# Patient Record
Sex: MALE | Race: BLACK OR AFRICAN AMERICAN | Employment: UNEMPLOYED | URBAN - METROPOLITAN AREA
[De-identification: names, ages, dates, MRNs, and addresses within clinical notes are randomized per-mention and may not be internally consistent; named-entity substitution may affect disease eponyms.]

---

## 2017-02-28 ENCOUNTER — OFFICE VISIT (OUTPATIENT)
Dept: FAMILY MEDICINE CLINIC | Age: 57
End: 2017-02-28

## 2017-02-28 ENCOUNTER — HOSPITAL ENCOUNTER (OUTPATIENT)
Dept: LAB | Age: 57
Discharge: HOME | End: 2017-02-28
Payer: COMMERCIAL

## 2017-02-28 VITALS
DIASTOLIC BLOOD PRESSURE: 80 MMHG | BODY MASS INDEX: 30.7 KG/M2 | RESPIRATION RATE: 12 BRPM | HEART RATE: 69 BPM | HEIGHT: 66 IN | WEIGHT: 191 LBS | SYSTOLIC BLOOD PRESSURE: 160 MMHG | TEMPERATURE: 97.8 F | OXYGEN SATURATION: 98 %

## 2017-02-28 DIAGNOSIS — E78.00 HYPERCHOLESTEREMIA: ICD-10-CM

## 2017-02-28 DIAGNOSIS — I10 HTN (HYPERTENSION), BENIGN: ICD-10-CM

## 2017-02-28 DIAGNOSIS — B35.4 TINEA CORPORIS: ICD-10-CM

## 2017-02-28 DIAGNOSIS — R79.89 AZOTEMIA: ICD-10-CM

## 2017-02-28 DIAGNOSIS — I10 HTN (HYPERTENSION), BENIGN: Primary | ICD-10-CM

## 2017-02-28 DIAGNOSIS — E87.1 HYPONATREMIA: ICD-10-CM

## 2017-02-28 LAB
ALT SERPL-CCNC: 36 U/L (ref 16–61)
ANION GAP BLD CALC-SCNC: 10 MMOL/L (ref 3–18)
AST SERPL W P-5'-P-CCNC: 26 U/L (ref 15–37)
BUN SERPL-MCNC: 63 MG/DL (ref 7–18)
BUN/CREAT SERPL: 15 (ref 12–20)
CALCIUM SERPL-MCNC: 8.9 MG/DL (ref 8.5–10.1)
CHLORIDE SERPL-SCNC: 93 MMOL/L (ref 100–108)
CHOLEST SERPL-MCNC: 214 MG/DL
CO2 SERPL-SCNC: 29 MMOL/L (ref 21–32)
CREAT SERPL-MCNC: 4.31 MG/DL (ref 0.6–1.3)
GLUCOSE SERPL-MCNC: 221 MG/DL (ref 74–99)
HDLC SERPL-MCNC: 71 MG/DL (ref 40–60)
HDLC SERPL: 3 {RATIO} (ref 0–5)
LDLC SERPL CALC-MCNC: 113.4 MG/DL (ref 0–100)
LIPID PROFILE,FLP: ABNORMAL
POTASSIUM SERPL-SCNC: 3.9 MMOL/L (ref 3.5–5.5)
SODIUM SERPL-SCNC: 132 MMOL/L (ref 136–145)
TRIGL SERPL-MCNC: 148 MG/DL (ref ?–150)
VLDLC SERPL CALC-MCNC: 29.6 MG/DL

## 2017-02-28 PROCEDURE — 80048 BASIC METABOLIC PNL TOTAL CA: CPT | Performed by: FAMILY MEDICINE

## 2017-02-28 PROCEDURE — 84450 TRANSFERASE (AST) (SGOT): CPT | Performed by: FAMILY MEDICINE

## 2017-02-28 PROCEDURE — 84460 ALANINE AMINO (ALT) (SGPT): CPT | Performed by: FAMILY MEDICINE

## 2017-02-28 PROCEDURE — 36415 COLL VENOUS BLD VENIPUNCTURE: CPT | Performed by: FAMILY MEDICINE

## 2017-02-28 PROCEDURE — 80061 LIPID PANEL: CPT | Performed by: FAMILY MEDICINE

## 2017-02-28 RX ORDER — KETOCONAZOLE 20 MG/G
CREAM TOPICAL DAILY
Qty: 15 G | Refills: 1 | Status: SHIPPED | OUTPATIENT
Start: 2017-02-28 | End: 2018-01-11

## 2017-02-28 NOTE — PROGRESS NOTES
HISTORY OF PRESENT ILLNESS  Ayana Mcdaniels is a 64 y.o. male. HPI  Patient is here today for evaluation and treatment of: Ringworm/Fatigue/Hypertension/Cholesterol  Ringworm:  States he noted a ringworm on the right shoulder X 2 weeks; There is no itch. Has been using neosporin to area; area may be improving. Fatigue:Pt states that he he gets up in am ; walks around and gets \" exhausted\" ; He is now totally disabled. Blood sugars have been high. He has had blood sugars in the 400s; He is followed by endocrinology. He is scheduled to see endocrinology soon. He is drinking regular soda    Hypertension: BP is stable; he is on losartan, lopressor, lasix, coreg, clonidine and norvasc. BP is stable at home. BP actually increased at second check with this provider    Cholesterol: Pt is on meds as listed below; ( crestor);attempts a lower cholesterol diet. Current Outpatient Prescriptions:     DORZOLAMIDE HCL (DORZOLAMIDE OP), Apply  to eye., Disp: , Rfl:     BRIMONIDINE TARTRATE/TIMOLOL (COMBIGAN OP), Apply  to eye., Disp: , Rfl:     losartan (COZAAR) 100 mg tablet, Take 1 Tab by mouth two (2) times a day., Disp: 1 Tab, Rfl: 0    metoprolol tartrate (LOPRESSOR) 100 mg IR tablet, Take  by mouth two (2) times a day., Disp: , Rfl:     OTHER, PGIIL/P CRM - Apply 1 -2 grams ( 1-2 pumps) to left foot 3 - 4 times daily. , Disp: , Rfl:     furosemide (LASIX) 40 mg tablet, Take 80 mg by mouth daily. , Disp: , Rfl:     cloNIDine (CATAPRES) 0.3 mg/24 hr, 1 Patch by TransDERmal route every seven (7) days. , Disp: 12 Patch, Rfl: 1    carvedilol (COREG) 25 mg tablet, Take 1 Tab by mouth two (2) times daily (with meals). , Disp: 180 Tab, Rfl: 1    VGO 30 alicia, , Disp: , Rfl:     calcitRIOL (ROCALTROL) 0.25 mcg capsule, TAKE ONE CAPSULE BY MOUTH EVERY MONDAY, WEDNESDAY, AND FRIDAY, Disp: , Rfl: 2    rosuvastatin (CRESTOR) 20 mg tablet, Take 1 Tab by mouth nightly., Disp: 30 Tab, Rfl: 6    amLODIPine (NORVASC) 10 mg tablet, TAKE ONE TABLET BY MOUTH EVERY DAY FOR BLOOD PRESSURE, Disp: 30 Tab, Rfl: 5    docusate sodium (COLACE) 100 mg capsule, Take 1 Cap by mouth two (2) times a day., Disp: 60 Cap, Rfl: 0    insulin lispro (HUMALOG) 100 unit/mL injection, Check FSBS AC*HS For sugars between 160 and 200- Give 2 units SQ, For sugars between 201 and 250, Give 3 units SQ, For sugars Between 251 and 300, give 5 units SQ, For Sugars between 301 and 350, give 7 units SQ For sugars between 351 and 400, give 8 units SQ and contact PCP, Disp: 1 Vial, Rfl: 1    glucose blood VI test strips (ASCENSIA AUTODISC VI, ONE TOUCH ULTRA TEST VI) strip, Test twice daily:  Fasting before breakfast and 2 hours after dinner (log readings), One touch ultra 2 test strips please; Dx: 250.02, Disp: 1 Package, Rfl: 11    fluticasone (FLONASE) 50 mcg/actuation nasal spray, 1 [place] by Both Nostrils route two (2) times a day., Disp: 1 Bottle, Rfl: 2    Insulin Needles, Disposable, 31 X 5/16 \" Ndle, Use as directed with Levemir Insulin Pen., Disp: 1 Package, Rfl: 11    ferrous sulfate (IRON) 325 mg (65 mg iron) tablet, Take  by mouth Daily (before breakfast). , Disp: , Rfl:     Lancets (ONE TOUCH DELICA) Misc, Test twice daily: Once in the morning fasting, then two hours after dinner (log results), Disp: 1 Package, Rfl: 11    Blood-Glucose Meter monitoring kit, by Does Not Apply route. One touch ultra2, Disp: 1 Kit, Rfl: 0    cyanocobalamin (VITAMIN B-12) 1,000 mcg tablet, Take 1,000 mcg by mouth daily. , Disp: , Rfl:     SUB-Q INSULIN DEVICE, 20 UNIT (VGO 20), by Does Not Apply route., Disp: , Rfl:     OTHER, Wound Care as directed by Podiatrist, Disp: 1 Each, Rfl: 0    OTHER, Check CBC, CMP, Mg, CRP once every week while on Antibiotics through 5/7/16. results to PCP immediately.  Diagnosis- Osteomyelitis, Disp: 1 Each, Rfl: 6      PMH,  Meds, Allergies, Family History, Social history reviewed    Review of Systems   Constitutional: Negative for chills and fever. Cardiovascular: Negative for chest pain and palpitations. Physical Exam   Visit Vitals    /80    Pulse 69    Temp 97.8 °F (36.6 °C) (Oral)    Resp 12    Ht 5' 6\" (1.676 m)    Wt 191 lb (86.6 kg)    SpO2 98%    BMI 30.83 kg/m2     General appearance: alert, cooperative, no distress, appears stated age  Neck: supple, symmetrical, trachea midline, no adenopathy, thyroid: not enlarged, symmetric, no tenderness/mass/nodules, no carotid bruit and no JVD  Lungs: clear to auscultation bilaterally  Heart: regular rate and rhythm, S1, S2 normal, no murmur, click, rub or gallop  Extremities: extremities normal, atraumatic, no cyanosis or edema    Skin:  Right upper posterior shoulder with  A 1/2 dollar sized annualar lesion with central clearing. Lab Results   Component Value Date/Time    Cholesterol, total 209 05/04/2016 03:30 PM    HDL Cholesterol 81 05/04/2016 03:30 PM    LDL, calculated 111.4 05/04/2016 03:30 PM    VLDL, calculated 16.6 05/04/2016 03:30 PM    Triglyceride 83 05/04/2016 03:30 PM    CHOL/HDL Ratio 2.6 05/04/2016 03:30 PM     Lab Results   Component Value Date/Time    Glucose 135 05/04/2016 03:30 PM    Glucose (POC) 231 03/30/2016 03:19 PM     Lab Results   Component Value Date/Time    Hemoglobin A1c 9.3 05/04/2016 03:30 PM    Hemoglobin A1c (POC) 9.6 12/12/2012 08:05 AM    Hemoglobin A1c, External 8.3 08/25/2016         ASSESSMENT and PLAN    ICD-10-CM ICD-9-CM    1. HTN (hypertension), benign Y08 154.5 METABOLIC PANEL, BASIC   2. Tinea corporis- new B35.4 110.5    3. Hypercholesteremia E78.00 272.0 LIPID PANEL      AST      ALT       As above,   BP elevated; low sodium diet advised, exercise as tolerated  Labs as ordered  Continue current meds as ordered  Avoid Soda  Fatigue may be due to uncontrolled sugars;   Advised pt to discuss with endocrine at 3/14 visit  Low chol diet advised  Follow-up Disposition:  Return in about 4 months (around 6/28/2017) for htn, chol.  An After Visit Summary was printed and given to the patient. This has been fully explained to the patient, who indicates understanding.

## 2017-02-28 NOTE — PATIENT INSTRUCTIONS

## 2017-02-28 NOTE — MR AVS SNAPSHOT
Visit Information Date & Time Provider Department Dept. Phone Encounter #  
 2/28/2017 11:00 AM Margareth Araiza, [address] [facility] [id number] Follow-up Instructions Return in about 4 months (around 6/28/2017) for htn, chol. Upcoming Health Maintenance Date Due Hepatitis C Screening 1960 Pneumococcal 19-64 Highest Risk (1 of 3 - PCV13) 10/14/1979 DTaP/Tdap/Td series (1 - Tdap) 10/14/1981 FOOT EXAM Q1 7/7/2015 INFLUENZA AGE 9 TO ADULT 8/1/2016 MICROALBUMIN Q1 10/14/2016 HEMOGLOBIN A1C Q6M 2/25/2017 LIPID PANEL Q1 8/25/2017 EYE EXAM RETINAL OR DILATED Q1 2/9/2018 COLONOSCOPY 10/9/2023 Allergies as of 2/28/2017  Review Complete On: 2/28/2017 By: Ami Rodgers LPN Severity Noted Reaction Type Reactions Bactrim [Sulfamethoprim Ds]  06/06/2014   Side Effect Nausea and Vomiting Current Immunizations  Reviewed on 5/7/2016 No immunizations on file. Not reviewed this visit You Were Diagnosed With   
  
 Codes Comments HTN (hypertension), benign    -  Primary ICD-10-CM: I10 
ICD-9-CM: 401.1 Tinea corporis     ICD-10-CM: B35.4 ICD-9-CM: 110.5 Hypercholesteremia     ICD-10-CM: E78.00 ICD-9-CM: 272.0 Vitals BP  
  
  
  
  
  
 160/80 Vitals History BMI and BSA Data Body Mass Index Body Surface Area  
 30.83 kg/m 2 2.01 m 2 Preferred Pharmacy Pharmacy Name Phone [address] [phone] Your Updated Medication List  
  
   
This list is accurate as of: 2/28/17 11:53 AM.  Always use your most recent med list. amLODIPine 10 mg tablet Commonly known as:  Kay Chaparro TAKE ONE TABLET BY MOUTH EVERY DAY FOR BLOOD PRESSURE Blood-Glucose Meter monitoring kit  
by Does Not Apply route. One touch ultra2  
  
 calcitRIOL 0.25 mcg capsule Commonly known as:  ROCALTROL TAKE ONE CAPSULE BY MOUTH EVERY MONDAY, WEDNESDAY, AND FRIDAY  
  
 carvedilol 25 mg tablet Commonly known as:  [place] Lindquist Take 1 Tab by mouth two (2) times daily (with meals). cloNIDine 0.3 mg/24 hr  
Commonly known as:  CATAPRES  
1 Patch by TransDERmal route every seven (7) days. COMBIGAN OP Apply  to eye.  
  
 docusate sodium 100 mg capsule Commonly known as:  T[place] Mule Take 1 Cap by mouth two (2) times a day. DORZOLAMIDE OP Apply  to eye. fluticasone 50 mcg/actuation nasal spray Commonly known as:  FLONASE  
1 [place] by Both Nostrils route two (2) times a day. glucose blood VI test strips strip Commonly known as:  ASCENSIA AUTODISC VI, ONE TOUCH ULTRA TEST VI Test twice daily:  Fasting before breakfast and 2 hours after dinner (log readings), One touch ultra 2 test strips please; Dx: 250.02  
  
 insulin lispro 100 unit/mL injection Commonly known as:  HUMALOG Check FSBS AC*HS For sugars between 160 and 200- Give 2 units SQ, For sugars between 201 and 250, Give 3 units SQ, For sugars Between 251 and 300, give 5 units SQ, For Sugars between 301 and 350, give 7 units SQ For sugars between 351 and 400, give 8 units SQ and contact PCP Insulin Needles (Disposable) 31 gauge x 5/16\" Ndle Use as directed with Levemir Insulin Pen. Iron 325 mg (65 mg iron) tablet Generic drug:  ferrous sulfate Take  by mouth Daily (before breakfast). ketoconazole 2 % topical cream  
Commonly known as:  NIZORAL Apply  to affected area daily. Lancets Misc Commonly known as: One Touch [place] L[place] Test twice daily: Once in the morning fasting, then two hours after dinner (log results) LASIX 40 mg tablet Generic drug:  furosemide Take 80 mg by mouth daily. losartan 100 mg tablet Commonly known as:  COZAAR Take 1 Tab by mouth two (2) times a day. metoprolol tartrate 100 mg IR tablet Commonly known as:  LOPRESSOR  
 Take  by mouth two (2) times a day. * OTHER PGIIL/P CRM - Apply 1 -2 grams ( 1-2 pumps) to left foot 3 - 4 times daily. * OTHER Wound Care as directed by Podiatrist  
  
 * OTHER Check CBC, CMP, Mg, CRP once every week while on Antibiotics through 5/7/16. results to PCP immediately. Diagnosis- Osteomyelitis  
  
 rosuvastatin 20 mg tablet Commonly known as:  CRESTOR Take 1 Tab by mouth nightly. VGO 20  
by Does Not Apply route. [facility] Generic drug:  sub-q insulin device, 30 unit VITAMIN B-12 1,000 mcg tablet Generic drug:  cyanocobalamin Take 1,000 mcg by mouth daily. * Notice: This list has 3 medication(s) that are the same as other medications prescribed for you. Read the directions carefully, and ask your doctor or other care provider to review them with you. Prescriptions Sent to Pharmacy Refills  
 ketoconazole (NIZORAL) 2 % topical cream 1 Sig: Apply  to affected area daily. Class: Normal  
 Pharmacy: [address] #: [phone] Route: Topical  
  
Follow-up Instructions Return in about 4 months (around 6/28/2017) for htn, chol. To-Do List   
 07/28/2017 Lab:  ALT   
  
 07/28/2017 Lab:  AST   
  
 07/28/2017 Lab:  LIPID PANEL   
  
 07/28/2017 Lab:  METABOLIC PANEL, BASIC Patient Instructions Low Sodium Diet (2,000 Milligram): Care Instructions Your Care Instructions Too much sodium causes your body to hold on to extra water. This can raise your blood pressure and force your heart and kidneys to work harder. In very serious cases, this could cause you to be put in the hospital. It might even be life-threatening. By limiting sodium, you will feel better and lower your risk of serious problems. The most common source of sodium is salt. People get most of the salt in their diet from canned, prepared, and packaged foods.  Fast food and restaurant meals also are very high in sodium. Your doctor will probably limit your sodium to less than 2,000 milligrams (mg) a day. This limit counts all the sodium in prepared and packaged foods and any salt you add to your food. Follow-up care is a key part of your treatment and safety. Be sure to make and go to all appointments, and call your doctor if you are having problems. It's also a good idea to know your test results and keep a list of the medicines you take. How can you care for yourself at home? Read food labels · Read labels on cans and food packages. The labels tell you how much sodium is in each serving. Make sure that you look at the serving size. If you eat more than the serving size, you have eaten more sodium. · Food labels also tell you the Percent Daily Value for sodium. Choose products with low Percent Daily Values for sodium. · Be aware that sodium can come in forms other than salt, including monosodium glutamate (MSG), sodium citrate, and sodium bicarbonate (baking soda). MSG is often added to Asian food. When you eat out, you can sometimes ask for food without MSG or added salt. Buy low-sodium foods · Buy foods that are labeled \"unsalted\" (no salt added), \"sodium-free\" (less than 5 mg of sodium per serving), or \"low-sodium\" (less than 140 mg of sodium per serving). Foods labeled \"reduced-sodium\" and \"light sodium\" may still have too much sodium. Be sure to read the label to see how much sodium you are getting. · Buy fresh vegetables, or frozen vegetables without added sauces. Buy low-sodium versions of canned vegetables, soups, and other canned goods. Prepare low-sodium meals · Cut back on the amount of salt you use in cooking. This will help you adjust to the taste. Do not add salt after cooking. One teaspoon of salt has about 2,300 mg of sodium. · Take the salt shaker off the table.  
· Flavor your food with garlic, lemon juice, onion, vinegar, herbs, and spices. Do not use soy sauce, lite soy sauce, steak sauce, onion salt, garlic salt, celery salt, mustard, or ketchup on your food. · Use low-sodium salad dressings, sauces, and ketchup. Or make your own salad dressings and sauces without adding salt. · Use less salt (or none) when recipes call for it. You can often use half the salt a recipe calls for without losing flavor. Other foods such as rice, pasta, and grains do not need added salt. · Rinse canned vegetables, and cook them in fresh water. This removes somebut not allof the salt. · Avoid water that is naturally high in sodium or that has been treated with water softeners, which add sodium. Call your local water company to find out the sodium content of your water supply. If you buy bottled water, read the label and choose a sodium-free brand. Avoid high-sodium foods · Avoid eating: ¨ Smoked, cured, salted, and canned meat, fish, and poultry. ¨ Ham, arevalo, hot dogs, and luncheon meats. ¨ Regular, hard, and processed cheese and regular peanut butter. ¨ Crackers with salted tops, and other salted snack foods such as pretzels, chips, and salted popcorn. ¨ Frozen prepared meals, unless labeled low-sodium. ¨ Canned and dried soups, broths, and bouillon, unless labeled sodium-free or low-sodium. ¨ Canned vegetables, unless labeled sodium-free or low-sodium. ¨ [place] fries, pizza, tacos, and other fast foods. ¨ Pickles, olives, ketchup, and other condiments, especially soy sauce, unless labeled sodium-free or low-sodium. Where can you learn more? Go to http://hoang-jayden.info/. Enter U377 in the search box to learn more about \"Low Sodium Diet (2,000 Milligram): Care Instructions. \" Current as of: July 26, 2016 Content Version: 11.1 © 8905-2285 [employer].  Care instructions adapted under license by [employer] (which disclaims liability or warranty for this information). If you have questions about a medical condition or this instruction, always ask your healthcare professional. [address] any warranty or liability for your use of this information. Introducing [facility] SERVICES! Heidi Luis Fernando introduces [employer] patient portal. Now you can access parts of your medical record, email your doctor's office, and request medication refills online. 1. In your internet browser, go to [email or web address] 2. Click on the First Time User? Click Here link in the Sign In box. You will see the New Member Sign Up page. 3. Enter your [employer] Access Code exactly as it appears below. You will not need to use this code after youve completed the sign-up process. If you do not sign up before the expiration date, you must request a new code. · [employer] Access Code: [email or web address] Expires: 5/29/2017 11:53 AM 
 
4. Enter the last four digits of your Social Security Number (xxxx) and Date of Birth (mm/dd/yyyy) as indicated and click Submit. You will be taken to the next sign-up page. 5. Create a [employer] ID. This will be your [employer] login ID and cannot be changed, so think of one that is secure and easy to remember. 6. Create a [employer] password. You can change your password at any time. 7. Enter your Password Reset Question and Answer. This can be used at a later time if you forget your password. 8. Enter your e-mail address. You will receive e-mail notification when new information is available in [address] 9. Click Sign Up. You can now view and download portions of your medical record. 10. Click the Download Summary menu link to download a portable copy of your medical information. If you have questions, please visit the Frequently Asked Questions section of the [employer] website. Remember, [employer] is NOT to be used for urgent needs. For medical emergencies, dial 911. Now available from your iPhone and Android! Please provide this summary of care documentation to your next provider. Your primary care clinician is listed as [facility]. If you have any questions after today's visit, please call [phone].

## 2017-02-28 NOTE — PROGRESS NOTES
1. Have you been to the ER, urgent care clinic since your last visit? Hospitalized since your last visit? No    2. Have you seen or consulted any other health care providers outside of the [address] [place] [address] since your last visit? Include any pap smears or colon screening.  No

## 2017-03-20 NOTE — PROGRESS NOTES
Pt has been referred to renal before ( 2010)'  Pt needs a repeat BMP and he needs a referral back to renal.  Other labs to be reviewed by nursing; total cholesterol has increased. Sodium is low. Sugar is up. Orders will be placed.

## 2017-03-21 ENCOUNTER — PATIENT OUTREACH (OUTPATIENT)
Dept: FAMILY MEDICINE CLINIC | Age: 57
End: 2017-03-21

## 2017-03-21 ENCOUNTER — OFFICE VISIT (OUTPATIENT)
Dept: FAMILY MEDICINE CLINIC | Age: 57
End: 2017-03-21

## 2017-03-21 VITALS
DIASTOLIC BLOOD PRESSURE: 100 MMHG | OXYGEN SATURATION: 97 % | RESPIRATION RATE: 16 BRPM | WEIGHT: 190.4 LBS | HEIGHT: 66 IN | TEMPERATURE: 98 F | BODY MASS INDEX: 30.6 KG/M2 | SYSTOLIC BLOOD PRESSURE: 200 MMHG | HEART RATE: 91 BPM

## 2017-03-21 DIAGNOSIS — E11.8 TYPE 2 DIABETES MELLITUS WITH COMPLICATION, WITH LONG-TERM CURRENT USE OF INSULIN (HCC): ICD-10-CM

## 2017-03-21 DIAGNOSIS — Z87.898 HISTORY OF SYNCOPE: ICD-10-CM

## 2017-03-21 DIAGNOSIS — N40.1 BENIGN PROSTATIC HYPERPLASIA WITH LOWER URINARY TRACT SYMPTOMS, UNSPECIFIED MORPHOLOGY: ICD-10-CM

## 2017-03-21 DIAGNOSIS — Z09 HOSPITAL DISCHARGE FOLLOW-UP: ICD-10-CM

## 2017-03-21 DIAGNOSIS — N18.9 ACUTE ON CHRONIC RENAL FAILURE (HCC): ICD-10-CM

## 2017-03-21 DIAGNOSIS — N17.9 ACUTE ON CHRONIC RENAL FAILURE (HCC): ICD-10-CM

## 2017-03-21 DIAGNOSIS — Z79.4 TYPE 2 DIABETES MELLITUS WITH COMPLICATION, WITH LONG-TERM CURRENT USE OF INSULIN (HCC): ICD-10-CM

## 2017-03-21 DIAGNOSIS — E11.21 DIABETIC NEPHROPATHY ASSOCIATED WITH TYPE 2 DIABETES MELLITUS (HCC): Chronic | ICD-10-CM

## 2017-03-21 DIAGNOSIS — N18.4 CHRONIC KIDNEY DISEASE, STAGE IV (SEVERE) (HCC): ICD-10-CM

## 2017-03-21 DIAGNOSIS — Z79.4 TYPE 2 DIABETES MELLITUS WITH HYPERGLYCEMIA, WITH LONG-TERM CURRENT USE OF INSULIN (HCC): ICD-10-CM

## 2017-03-21 DIAGNOSIS — I10 UNCONTROLLED HYPERTENSION: Primary | ICD-10-CM

## 2017-03-21 DIAGNOSIS — E11.65 TYPE 2 DIABETES MELLITUS WITH HYPERGLYCEMIA, WITH LONG-TERM CURRENT USE OF INSULIN (HCC): ICD-10-CM

## 2017-03-21 LAB — HBA1C MFR BLD HPLC: 10.6 %

## 2017-03-21 RX ORDER — BRIMONIDINE TARTRATE, TIMOLOL MALEATE 2; 5 MG/ML; MG/ML
SOLUTION/ DROPS OPHTHALMIC
COMMUNITY
Start: 2017-02-20 | End: 2018-01-18

## 2017-03-21 RX ORDER — AMLODIPINE BESYLATE 10 MG/1
10 TABLET ORAL DAILY
Qty: 30 TAB | Refills: 4 | Status: SHIPPED | OUTPATIENT
Start: 2017-03-21 | End: 2017-03-24 | Stop reason: DRUGHIGH

## 2017-03-21 RX ORDER — ERGOCALCIFEROL 1.25 MG/1
50000 CAPSULE ORAL
COMMUNITY
Start: 2017-03-17 | End: 2017-08-17 | Stop reason: ALTCHOICE

## 2017-03-21 RX ORDER — CODEINE PHOSPHATE AND GUAIFENESIN 10; 100 MG/5ML; MG/5ML
5 SOLUTION ORAL
COMMUNITY
Start: 2017-03-17 | End: 2017-05-10 | Stop reason: ALTCHOICE

## 2017-03-21 RX ORDER — ROSUVASTATIN CALCIUM 20 MG/1
20 TABLET, COATED ORAL
COMMUNITY
Start: 2017-01-16 | End: 2017-04-28 | Stop reason: SDUPTHER

## 2017-03-21 RX ORDER — AMLODIPINE BESYLATE 5 MG/1
5 TABLET ORAL
COMMUNITY
Start: 2017-03-17 | End: 2017-03-21 | Stop reason: DRUGHIGH

## 2017-03-21 RX ORDER — DORZOLAMIDE HCL 20 MG/ML
SOLUTION/ DROPS OPHTHALMIC
COMMUNITY
Start: 2016-12-15 | End: 2017-11-29 | Stop reason: ALTCHOICE

## 2017-03-21 RX ORDER — FINASTERIDE 5 MG/1
5 TABLET, FILM COATED ORAL DAILY
COMMUNITY
Start: 2017-03-17 | End: 2017-07-10 | Stop reason: SDUPTHER

## 2017-03-21 NOTE — PROGRESS NOTES
1. Have you been to the ER, urgent care clinic since your last visit? Hospitalized since your last visit? Yes, [facility] 3/11/2017 for dizziness and weakness    2. Have you seen or consulted any other health care providers outside of the [address] [place] [address] since your last visit? Include any pap smears or colon screening. NO    3. Would you like to have a Flu Shot Today? No    4. Vaccines? PN    Rx updated.

## 2017-03-21 NOTE — PROGRESS NOTES
64year old male patient here today for transitional care from recent hospitalization see below 3-11-17 through 3-17-17. Oliver Mccarthy He and his wife report that they are confused about medications and he has not been taking as directed. He has been contacted by RN navigator. PCP is Max Paul MD.   Summary of hospitalization as follows  hospitalized at [facility] in March 2017 following a syncopal episode. He was diagnosed with significant autonomic dysfunction on a tilt table test where his systolic blood pressure dropped from 200-97 while in the upright 70° position and administered 1 spray of sublingual nitroglycerin. His heart rate did increase appropriately from 75 to 110 bpm. Because of the significant drop in blood pressure, his blood pressure medications were significantly decreased and he was discharged home only taking amlodipine 5 mg daily. He is poorly controlled dm type 2 is under care by local endocrinologist Dr. Kandice Arshad. Wife is very concerned about tilt table results. Patient does have follow up with endocrinology and neurology and nephrology appointments reviewed with wife. ROS: +patient denies lightheadedness today, denies chest pain, denies syncopal episodes since discharge from hospital, +mild increase in peripheral edema, +weight gain since discharge per wife by several pounds, +trying to adhere to diet per patient and wife.          Date Type Department Care Team Description   03/11/2017 - 03/17/2017 [address]   Daniel Long MD    [address] [facility], [address]   [id number]    [phone] (NNM)       PATEL SHEN MD [address] Venetta Popper   [place], [address]   [phone]    [phone] (64 [facility], [address] [facility], MD    [address] [facility], [address]   [phone]    [phone] (ZGD)       QMNNWTHYUN GAMBINO MD    [address] [facility], [address] [phone]    [phone] (Fax)   Weakness   Last Filed Vital Signs    Vital Sign Reading Time Taken   Blood Pressure 156/82 03/17/2017 8:04 AM EDT   Pulse 76 03/17/2017 8:04 AM EDT   Temperature 36.8 °C (98.3 °F) 03/17/2017 8:04 AM EDT   Respiratory Rate 18 03/17/2017 8:04 AM EDT   Height 1.676 m (5' 6\") 03/11/2017 9:04 PM EST   Weight 82.192 kg (181 lb 3.2 oz) 03/17/2017 4:10 AM EDT   Body Mass Index 29.26 03/17/2017 4:10 AM EDT   Oxygen Saturation 97% 03/17/2017 8:04 AM EDT   Functional Status  Functional Status Response Date of Assessment   Increase in assistance with bed, chair, walking etc. No 03/11/2017   Increase in assistance with bathing, dressing, etc. No 03/11/2017   Discharge Summaries    Yonatan Grant MD - 03/17/2017 8:48 AM EDT  Formatting of this note may be different from the original.  Discharge Summary     Admit Date: 3/11/2017  Discharge Date: 3/17/2017    Patient ID:  Chio Osullivan  64 y.o.  1960    Discharge Diagnoses/Hospital Course:  HPI by admitting MD:   Kel Johnson Complaint: Dizziness  Macy Jean is a 59-year-old with history of insulin-dependent diabetes mellitus on insulin pump, chronic renal failure with a baseline creatinine of 2.0, status post left toe amputations presents to emergency room complaining of to 3 days history of dizziness and feeling weak. Patient indicates that he was sitting outside 3 days ago when the weather was warm and the next day he had episodes of feeling very weak and dizziness. He indicates that he has had low urine output in spite of taking Lasix. He denies vertigo. He denies having any upper or lower extremities weakness. He weeks overall in general. He has some cough but nonproductive and no fever or chills. He denies coming in contact with anybody ill. He denies nausea vomiting or diarrhea but he has developed some he  for the past 2 days.  Patient indicates that he has a endocrinologist at [facility] and has as a nephrologist. He indicates that his creatinine was 2 a year ago.  He indicates that he was told that he has left ventricular hypertrophy but he has never had any heart problems otherwise\"    Hospital course by problems:    Acute on chronic renal failure  - followed by Dr. Yumi Miller, appreciated assistance with BP management and communications with outpatient specialties  - creatinine had improved to 3.0 today, he has peaked at 4.1 abd baseline reported around 2    Orthostatic hypotension  - tilt table test results pending read at the time of discharge  - Nephrology team discussed patient's case with Ryan Kay with [facility] and [facility] in the hospitals [facility] and [place], per their note: \"patient is a perfect case of testing for autonomic nervous system dysfunction with dizziness on standing caused by drop in BP, urinary problems (inability to empty bladder) with BPH like symptoms, sweating abnl (excessive sweating), exercise intolerance.    - made outpatient referral to autonomic testing laboratory  - gave family contact information\"  [facility] and [address] [place] [address], [place] [address] (in [facility])    HTN  - on amlodipine QHS and nitro paste now at night time only  - We have to accept the fact that patient laying BP may be high in 180s range    Diabetes mellitus, on insulin pump with hyperglycemia, uncontrolled  - appreciated assistance from diabetic educator and endocrinology team    Status post left fourth and fifth toe amputation in the past due to diabetes complication    Left ventricular HYPERTROPHY according to patient    Elevated troponin due to renal failure    Plan to dc home today, patient is very eager to go home and feels much better, understands all instructions    Patient awake, alert and oriented x3  Vitals reviewed, No distress  Chest - clear, to auscultation, BL, no wheezing, no crackles  Heart - regular, S1. S2,    Abd - soft, BS +, NT  Ext - no LE edema  Speech intact, no gross neuro abnormalities  Skin warm and dry    Current Discharge Medication List     START taking these medications   Details   finasteride (PROSCAR) 5 mg PO TABS Take 1 Tab by Mouth Once a Day. Qty: 30 Tab, Refills: 3     nitroglycerin (NITROBID) 2 % TD OINT Use 0.5 Inches to affected area Every Night at Bedtime. Qty: 1 Tube, Refills: 3     ergocalciferol (VITAMIN D2) 50,000 unit PO CAPS Take 1 Cap by Mouth Every 7 Days. Qty: 12 Cap, Refills: 0     codeine-guaiFENesin (ROBITUSSIN AC)  mg/5 mL PO SYRP Take 5 mL by Mouth Every 6 Hours As Needed for Cough. Qty: 150 mL, Refills: 0       CONTINUE these medications which have CHANGED   Details   amLODIPine (NORVASC) 5 mg PO TABS Take 1 Tab by Mouth Once a Day. Qty: 30 Tab, Refills: 3       CONTINUE these medications which have NOT CHANGED   Details   dorzolamide (TRUSOPT) 2 % OP Drop INSTIL 1 DROP IN EACH EYE 3 TIMES DAILY  Refills: 3     COMBIGAN 0.2-0.5 % OP Drop INSTIL 1 DROP IN EACH EYE 3 TIMES DAILY  Refills: 3     rosuvastatin (CRESTOR) 20 mg PO TABS Take 20 mg by Mouth Every Night at Bedtime.   Refills: 6     VGO 27 Misc OZ       STOP taking these medications     cloNIDine (CATAPRES) 0.3 mg/24 hr TD PTWK Comments:   Reason for Stopping:     furosemide (LASIX) 80 mg PO TABS Comments:   Reason for Stopping:     metOLazone (ZAROXOLYN) 2.5 mg PO TABS Comments:   Reason for Stopping:     metoprolol (LOPRESSOR) 100 mg PO TABS Comments:   Reason for Stopping:     famotidine (PEPCID) 20 mg PO TABS Comments:   Reason for Stopping:         Operative Procedures and Pertinent Imaging Studies:   Tilt table test pending    Consultants:   Consulting Physicians    Consulting Physician: Md, [facility]  Consulting Physician: Buzz Amezcua MD    Physical Exam on Discharge:  /82 mmHg  Pulse 76  Temp(Src) 98.3 °F (36.8 °C)  Resp 18  Ht 5' 6\" (1.676 m)  Wt 82.192 kg (181 lb 3.2 oz)  BMI 29.26 kg/m2  SpO2 97%  General Appearance: Appears in no acute distress.,     Most Recent BMP and CBC:   Recent Labs   17  0419   GLUCOSE 121*   BUN 37*   CALCIUM 9.0   CREAT 3.0*      POTASSIUM 4.3   CHLORIDE 100   CO2 21     Recent Labs   17  0323   WBC 5.5   RBC 3.42*   HEMOGLOBIN 10.0*   HCT 29.7*   MCV 87   MCH 29   MCHC 34   PLATELET 720   RDW 34.2     Condition at discharge:  Afebrile, Ambulating, Eating, Drinking, Voiding and Stable    Disposition: Home     Patient states he has no needs and does not need PT follow up or home care    Discharge Procedure Orders  Discharge Diet   Diabetic  CHO     Discharge Additional Instructions   Contact [facility] with [facility] in the [facility] and [facility] [address] [facility] and [address] [place] [address] (in [facility])  [phone]     Discharge . .. Follow Up with Specialist   Follow up with Dr Thiago Sheikh (nephrologist) Thursday next week, 3/23/2017     Additional Instructions   [facility] Metabolic [facility]   Discharge Instructions    This will be your regimen for your diabetes medications from discharge onwards. Further adjustments will be required; please coordinate with your endocrinologist as needed. # Basal insulin: V-Go 30 device; replace every morning     # Mealtime insulin: Use 1-4 clicks based on PO intake, as prior to admission; *not to be taken if you're not going to eat*    # Correctional insulin: Use additional clicks for high blood sugars as well, three times daily and at bedtime.     Take 1 click for every 50 mg/dL of blood sugar greater than 150 mg/dL as detailed below    For blood sugars less than 150 mgdL: NO CORRECTIONAL INSULIN    For blood sugar 200 - 250, give 1 additional click on V-Go  For blood sugar 251 - 310, give 2 additional clicks on V-Go  For blood sugar 311 - 375, give 3 additional clicks on V-Go  For blood sugar greater than 375, give 4 additional clicks on V-Go    # For blood sugars greater than 400 please call Dr Delgado Hernández, your primary care provider the [facility] [place] at [phone] or call 911 or go to the emergency department. # Please check your blood sugars a minimum of 3 to 4 times daily and as needed:   - fasting blood glucose (when you wake up before you eat or drink anything)  - pre-meal blood glucose (before lunch and dinner)  - nighttime blood glucose (at bedtime)    * Please use correctional insulin as instructed above for high blood sugars at these times. *     # Call MD if blood sugars consistently less than 100 or greater than 200 mg/dL. # For low blood sugar (< 70 mg/dL with symptoms): take 15 grams of sugar (4 glucose tablets or 1/2 candy bar or 1/2 glass of juice or soda) and check your sugar 15 minutes later. Repeat until your blood sugars are above 70 mg/dL. # Follow up with Dr Delgado Hernández after discharge.     [facility] Metabolic [facility]  [address] [facility],  [place]  [phone]     Discharge Activity   Orthostatic precautions, please remember that your blood pressure tends to drop when you get up       Patient Active Problem List   Diagnosis Code    HLD (hyperlipidemia) E78.5    HTN (hypertension) I10    Diabetic retinopathy ([address] E11.319    Noncompliance with treatment Z91.19    Type II or unspecified type diabetes mellitus without mention of complication, uncontrolled E11.65    Paresthesias/numbness R20.9    Diabetes mellitus with renal manifestations, uncontrolled ([address] E11.29, E11.65    Diabetic foot ulcer ([address] E11.621, L97.509    Gangrene of toe ([address] I96    Dry gangrene ([address] I96    Chronic kidney disease, stage IV (severe) ([address] N18.4    Renal failure (ARF), acute on chronic (HCC) N17.9, N18.9    Diabetic nephropathy ([address] E11.21    Hypertension I10    Malignant hypertension I10    CKD (chronic kidney disease) N18.9    Hyperlipidemia E78.5    Type 2 diabetes mellitus with complication, with long-term current use of insulin ([facility]) E11.8, Z79.4    Orthostatic hypotension I95.1    Chest pain R07.9     Current Outpatient Prescriptions on File Prior to Visit   Medication Sig Dispense Refill    VGO 30 alicia       rosuvastatin (CRESTOR) 20 mg tablet Take 1 Tab by mouth nightly. 30 Tab 6    docusate sodium (COLACE) 100 mg capsule Take 1 Cap by mouth two (2) times a day. 60 Cap 0    glucose blood VI test strips (ASCENSIA AUTODISC VI, ONE TOUCH ULTRA TEST VI) strip Test twice daily:  Fasting before breakfast and 2 hours after dinner (log readings), One touch ultra 2 test strips please; Dx: 250.02 1 Package 11    fluticasone (FLONASE) 50 mcg/actuation nasal spray 1 Van by Both Nostrils route two (2) times a day. 1 Bottle 2    Insulin Needles, Disposable, 31 X 5/16 \" Ndle Use as directed with Levemir Insulin Pen. 1 Package 11    ferrous sulfate (IRON) 325 mg (65 mg iron) tablet Take  by mouth Daily (before breakfast).  Lancets (ONE TOUCH DELICA) Misc Test twice daily: Once in the morning fasting, then two hours after dinner (log results) 1 Package 11    Blood-Glucose Meter monitoring kit by Does Not Apply route. One touch ultra2 1 Kit 0    DORZOLAMIDE HCL (DORZOLAMIDE OP) Apply  to eye.  BRIMONIDINE TARTRATE/TIMOLOL (COMBIGAN OP) Apply  to eye.  ketoconazole (NIZORAL) 2 % topical cream Apply  to affected area daily. 15 g 1    OTHER PGIIL/P CRM - Apply 1 -2 grams ( 1-2 pumps) to left foot 3 - 4 times daily.  cloNIDine (CATAPRES) 0.3 mg/24 hr 1 Patch by TransDERmal route every seven (7) days. 12 Patch 1    calcitRIOL (ROCALTROL) 0.25 mcg capsule TAKE ONE CAPSULE BY MOUTH EVERY MONDAY, WEDNESDAY, AND FRIDAY  2    SUB-Q INSULIN DEVICE, 20 UNIT (VGO 20) by Does Not Apply route.       OTHER Wound Care as directed by Podiatrist 1 Each 0    insulin lispro (HUMALOG) 100 unit/mL injection Check FSBS AC*HS  For sugars between 160 and 200- Give 2 units SQ,  For sugars between 201 and 250, Give 3 units SQ,  For sugars Between 251 and 300, give 5 units SQ,  For Sugars between 301 and 350, give 7 units SQ  For sugars between 351 and 400, give 8 units SQ and contact PCP 1 Vial 1    OTHER Check CBC, CMP, Mg, CRP once every week while on Antibiotics through 5/7/16. results to PCP immediately. Diagnosis- Osteomyelitis 1 Each 6    cyanocobalamin (VITAMIN B-12) 1,000 mcg tablet Take 1,000 mcg by mouth daily. No current facility-administered medications on file prior to visit. Wt Readings from Last 3 Encounters:   03/24/17 190 lb (86.2 kg)   03/21/17 190 lb 6.4 oz (86.4 kg)   02/28/17 191 lb (86.6 kg)   OBJECTIVE:  Awake and alert in no acute distress  Neck supple without lymphadenopathy, no carotid artery bruits auscultated bilaterally. Lungs clear throughout  S1 S2 RRR without ectopy or murmur auscultated. Extremities: no clubbing, cyanosis, +1 peripheral edema ankles bilaterally  Visit Vitals    BP (!) 200/100 (BP 1 Location: Left arm)    Pulse 91    Temp 98 °F (36.7 °C) (Oral)    Resp 16    Ht 5' 6\" (1.676 m)    Wt 190 lb 6.4 oz (86.4 kg)    SpO2 97%    BMI 30.73 kg/m2     Results for orders placed or performed in visit on 03/21/17   AMB POC HEMOGLOBIN A1C   Result Value Ref Range    Hemoglobin A1c (POC) 10.6 %       Mickey Riser was seen today for follow-up. Diagnoses and all orders for this visit:    Uncontrolled hypertension  -     Discontinue: amLODIPine (NORVASC) 10 mg tablet; Take 1 Tab by mouth daily.   Increase amlodipine to 10 mg daily case review with Dr. Elon Habermann  Diabetic nephropathy associated with type 2 diabetes mellitus ([facility]  -      DIABETES FOOT EXAM  -     MICROALBUMIN, UR, RAND W/ MICROALBUMIN/CREA RATIO; Future  -     AMB POC HEMOGLOBIN A1C    Hospital discharge follow-up    Type 2 diabetes mellitus with complication, with long-term current use of insulin (HCC)    Benign prostatic hyperplasia with lower urinary tract symptoms, unspecified morphology    Chronic kidney disease, stage IV (severe) (HCC)    Type 2 diabetes mellitus with hyperglycemia, with long-term current use of insulin (HCC)    Acute on chronic renal failure (HCC)    History of syncope    Anticipatory guidance given  Keep appointments with all specialist  Patient verbalizes understanding. I have discussed the diagnosis with the patient and the intended plan as seen in the above orders. The patient has received an after-visit summary and questions were answered concerning future plans. I have discussed medication side effects and warnings with the patient as well. Follow-up Disposition:  Return in about 4 weeks (around 4/18/2017) for follow up with Dr. Tien Miller hypertension.

## 2017-03-21 NOTE — PROGRESS NOTES
Patient has appointment with Dr. Cindy Roth for insulin pump adjustment in less than one week (endocrine).    Tristen RHOADES

## 2017-03-21 NOTE — PROGRESS NOTES
Telephone call to patient regarding recent hospitalization at [facility].  Patient admitted on 3/11/17 and discharged on 3/17/17. Patient is 64year old  male with history of insulin-dependent diabetes on insulin pump, chronic renal failure, and status post two left to amputations. Patient presented to the ED on 3/11/17 complaining of weakness, dizziness,, and low urine output. Patient stated he was doing better. Unable to perform medication reconciliation at this time due to patient stating he was headed out the door on his way to office for confirmed appointment today with Emily Richter, [address]. Advised patient appointment is not until 1120. Patient stated he was going to get something to eat first.      Will follow up with patient after follow up appointment with provider.

## 2017-03-21 NOTE — MR AVS SNAPSHOT
Visit Information Date & Time Provider Department Dept. Phone Encounter #  
 3/21/2017 11:20 AM Vandana Rees, [address] [phone] [id number] Follow-up Instructions Return in about 4 weeks (around 4/18/2017) for follow up with Dr. Juan Carlos Flores hypertension. Your Appointments 7/10/2017 11:00 AM  
ROUTINE CARE with Beryl Salcedo MD  
[address] 365[address] [facility]) Appt Note: 4m fu htn/ chol [address] [address] 2 [address] [place] [address] [address] Upcoming Health Maintenance Date Due Hepatitis C Screening 1960 Pneumococcal 19-64 Highest Risk (1 of 3 - PCV13) 10/14/1979 FOOT EXAM Q1 7/7/2015 MICROALBUMIN Q1 10/14/2016 HEMOGLOBIN A1C Q6M 2/25/2017 EYE EXAM RETINAL OR DILATED Q1 2/27/2018 LIPID PANEL Q1 2/28/2018 COLONOSCOPY 10/9/2023 DTaP/Tdap/Td series (2 - Td) 3/21/2027 Allergies as of 3/21/2017  Review Complete On: 3/21/2017 By: Christine Martines LPN Severity Noted Reaction Type Reactions Bactrim [Sulfamethoprim Ds]  06/06/2014   Side Effect Nausea and Vomiting Current Immunizations  Reviewed on 5/7/2016 No immunizations on file. Not reviewed this visit You Were Diagnosed With   
  
 Codes Comments Diabetic nephropathy associated with type 2 diabetes mellitus ([facility]    -  Primary ICD-10-CM: E11.21 
ICD-9-CM: 250.40, 583.81 Hospital discharge follow-up     ICD-10-CM: 593 [facility] ICD-9-CM: V67.59 Type 2 diabetes mellitus with complication, with long-term current use of insulin (HCC)     ICD-10-CM: E11.8, Z79.4 ICD-9-CM: 250.90, V58.67 Benign prostatic hyperplasia with lower urinary tract symptoms, unspecified morphology     ICD-10-CM: N40.1 ICD-9-CM: 600.01 Chronic kidney disease, stage IV (severe) (HCC)     ICD-10-CM: N18.4 ICD-9-CM: 585.4 Type 2 diabetes mellitus with hyperglycemia, with long-term current use of insulin (HCC)     ICD-10-CM: E11.65, Z79.4 ICD-9-CM: 250.00, 790.29, V58.67 Acute on chronic renal failure (HCC)     ICD-10-CM: N17.9, N18.9 ICD-9-CM: 584.9, 585.9 Uncontrolled hypertension     ICD-10-CM: I10 
ICD-9-CM: 401.9 Vitals BP Pulse Temp Resp Height(growth percentile) Weight(growth percentile) (!) 200/100 (BP 1 Location: Left arm) 91 98 °F (36.7 °C) (Oral) 16 5' 6\" (1.676 m) 190 lb 6.4 oz (86.4 kg) SpO2 BMI Smoking Status 97% 30.73 kg/m2 Never Smoker Vitals History BMI and BSA Data Body Mass Index Body Surface Area 30.73 kg/m 2 2.01 m 2 Preferred Pharmacy Pharmacy Name Phone [facility], [address] [place] [address] [phone] Your Updated Medication List  
  
   
This list is accurate as of: 3/21/17 12:51 PM.  Always use your most recent med list. amLODIPine 10 mg tablet Commonly known as:  Javid Alstrom Take 1 Tab by mouth daily. Blood-Glucose Meter monitoring kit  
by Does Not Apply route. One touch ultra2  
  
 calcitRIOL 0.25 mcg capsule Commonly known as:  ROCALTROL  
TAKE ONE CAPSULE BY MOUTH EVERY MONDAY, WEDNESDAY, AND FRIDAY  
  
 carvedilol 25 mg tablet Commonly known as:  Jestine Pellet Take 1 Tab by mouth two (2) times daily (with meals). cloNIDine 0.3 mg/24 hr  
Commonly known as:  CATAPRES  
1 Patch by TransDERmal route every seven (7) days. * COMBIGAN OP Apply  to eye. * COMBIGAN 0.2-0.5 % Drop ophthalmic solution Generic drug:  brimonidine-timolol INSTIL 1 DROP IN [facility] EYE 3 TIMES DAILY docusate sodium 100 mg capsule Commonly known as:  Carisa Antis Take 1 Cap by mouth two (2) times a day. * DORZOLAMIDE OP Apply  to eye. * dorzolamide 2 % ophthalmic solution Commonly known as:  TRUSOPT INSTIL 1 DROP IN [facility] EYE 3 TIMES DAILY ergocalciferol 50,000 unit capsule Commonly known as:  ERGOCALCIFEROL  
50,000 Units. finasteride 5 mg tablet Commonly known as:  PROSCAR  
5 mg. fluticasone 50 mcg/actuation nasal spray Commonly known as:  FLONASE  
1 [place] by Both Nostrils route two (2) times a day. glucose blood VI test strips strip Commonly known as:  ASCENSIA AUTODISC VI, ONE TOUCH ULTRA TEST VI Test twice daily:  Fasting before breakfast and 2 hours after dinner (log readings), One touch ultra 2 test strips please; Dx: 250.02  
  
 guaiFENesin-codeine 100-10 mg/5 mL solution Commonly known as:  ROBITUSSIN AC  
5 mL. insulin lispro 100 unit/mL injection Commonly known as:  HUMALOG Check FSBS AC*HS For sugars between 160 and 200- Give 2 units SQ, For sugars between 201 and 250, Give 3 units SQ, For sugars Between 251 and 300, give 5 units SQ, For Sugars between 301 and 350, give 7 units SQ For sugars between 351 and 400, give 8 units SQ and contact PCP Insulin Needles (Disposable) 31 gauge x 5/16\" Ndle Use as directed with Levemir Insulin Pen. Iron 325 mg (65 mg iron) tablet Generic drug:  ferrous sulfate Take  by mouth Daily (before breakfast). ketoconazole 2 % topical cream  
Commonly known as:  NIZORAL Apply  to affected area daily. Lancets Misc Commonly known as: One Touch Bynum Lacy Test twice daily: Once in the morning fasting, then two hours after dinner (log results) LASIX 40 mg tablet Generic drug:  furosemide Take 80 mg by mouth daily. losartan 100 mg tablet Commonly known as:  COZAAR Take 1 Tab by mouth two (2) times a day. metoprolol tartrate 100 mg IR tablet Commonly known as:  LOPRESSOR Take  by mouth two (2) times a day. nitroglycerin 2 % ointment Commonly known as:  NITROBID Use 0.5 Inches to affected area Every Night at Bedtime. * OTHER PGIIL/P CRM - Apply 1 -2 grams ( 1-2 pumps) to left foot 3 - 4 times daily.  
  
 * OTHER Wound Care as directed by Podiatrist  
  
 * OTHER Check CBC, CMP, Mg, CRP once every week while on Antibiotics through 5/7/16. results to PCP immediately. Diagnosis- Osteomyelitis * rosuvastatin 20 mg tablet Commonly known as:  CRESTOR Take 1 Tab by mouth nightly. * rosuvastatin 20 mg tablet Commonly known as:  CRESTOR  
20 mg. VGO 20  
by Does Not Apply route. * VGO 30 Jacqueline Generic drug:  sub-q insulin device, 30 unit * VGO 30 Jacqueline Generic drug:  sub-q insulin device, 30 unit VITAMIN B-12 1,000 mcg tablet Generic drug:  cyanocobalamin Take 1,000 mcg by mouth daily. * Notice: This list has 11 medication(s) that are the same as other medications prescribed for you. Read the directions carefully, and ask your doctor or other care provider to review them with you. Prescriptions Sent to Pharmacy Refills  
 amLODIPine (NORVASC) 10 mg tablet 4 Sig: Take 1 Tab by mouth daily. Class: Normal  
 Pharmacy: [address] #: [phone] Route: Oral  
  
We Performed the Following AMB POC HEMOGLOBIN A1C [28645 CPT(R)] HM DIABETES FOOT EXAM [HM7 Custom] Follow-up Instructions Return in about 4 weeks (around 4/18/2017) for follow up with Dr. Yaritza Tobin hypertension. To-Do List   
 03/21/2017 Lab:  MICROALBUMIN, UR, RAND W/ MICROALBUMIN/CREA RATIO Patient Instructions High Blood Pressure: Care Instructions Your Care Instructions If your blood pressure is usually above 140/90, you have high blood pressure, or hypertension. That means the top number is 140 or higher or the bottom number is 90 or higher, or both. Despite what a lot of people think, high blood pressure usually doesn't cause headaches or make you feel dizzy or lightheaded. It usually has no symptoms.  But it does increase your risk for heart attack, stroke, and kidney or eye damage. The higher your blood pressure, the more your risk increases. Your doctor will give you a goal for your blood pressure. Your goal will be based on your health and your age. An example of a goal is to keep your blood pressure below 140/90. Lifestyle changes, such as eating healthy and being active, are always important to help lower blood pressure. You might also take medicine to reach your blood pressure goal. 
Follow-up care is a key part of your treatment and safety. Be sure to make and go to all appointments, and call your doctor if you are having problems. It's also a good idea to know your test results and keep a list of the medicines you take. How can you care for yourself at home? Medical treatment · If you stop taking your medicine, your blood pressure will go back up. You may take one or more types of medicine to lower your blood pressure. Be safe with medicines. Take your medicine exactly as prescribed. Call your doctor if you think you are having a problem with your medicine. · Talk to your doctor before you start taking aspirin every day. Aspirin can help certain people lower their risk of a heart attack or stroke. But taking aspirin isn't right for everyone, because it can cause serious bleeding. · See your doctor regularly. You may need to see the doctor more often at first or until your blood pressure comes down. · If you are taking blood pressure medicine, talk to your doctor before you take decongestants or anti-inflammatory medicine, such as ibuprofen. Some of these medicines can raise blood pressure. · Learn how to check your blood pressure at home. Lifestyle changes · Stay at a healthy weight. This is especially important if you put on weight around the waist. Losing even 10 pounds can help you lower your blood pressure. · If your doctor recommends it, get more exercise. Walking is a good choice. Bit by bit, increase the amount you walk every day.  Try for at least 30 minutes on most days of the week. You also may want to swim, bike, or do other activities. · Avoid or limit alcohol. Talk to your doctor about whether you can drink any alcohol. · Try to limit how much sodium you eat to less than 2,300 milligrams (mg) a day. Your doctor may ask you to try to eat less than 1,500 mg a day. · Eat plenty of fruits (such as bananas and oranges), vegetables, legumes, whole grains, and low-fat dairy products. · Lower the amount of saturated fat in your diet. Saturated fat is found in animal products such as milk, cheese, and meat. Limiting these foods may help you lose weight and also lower your risk for heart disease. · Do not smoke. Smoking increases your risk for heart attack and stroke. If you need help quitting, talk to your doctor about stop-smoking programs and medicines. These can increase your chances of quitting for good. When should you call for help? Call 911 anytime you think you may need emergency care. This may mean having symptoms that suggest that your blood pressure is causing a serious heart or blood vessel problem. Your blood pressure may be over 180/110. For example, call 911 if: 
· You have symptoms of a heart attack. These may include: ¨ Chest pain or pressure, or a strange feeling in the chest. 
¨ Sweating. ¨ Shortness of breath. ¨ Nausea or vomiting. ¨ Pain, pressure, or a strange feeling in the back, neck, jaw, or upper belly or in one or both shoulders or arms. ¨ Lightheadedness or sudden weakness. ¨ A fast or irregular heartbeat. · You have symptoms of a stroke. These may include: 
¨ Sudden numbness, tingling, weakness, or loss of movement in your face, arm, or leg, especially on only one side of your body. ¨ Sudden vision changes. ¨ Sudden trouble speaking. ¨ Sudden confusion or trouble understanding simple statements. ¨ Sudden problems with walking or balance. ¨ A sudden, severe headache that is different from past headaches. · You have severe back or belly pain. Do not wait until your blood pressure comes down on its own. Get help right away. Call your doctor now or seek immediate care if: 
· Your blood pressure is much higher than normal (such as 180/110 or higher), but you don't have symptoms. · You think high blood pressure is causing symptoms, such as: ¨ Severe headache. ¨ Blurry vision. Watch closely for changes in your health, and be sure to contact your doctor if: 
· Your blood pressure measures 140/90 or higher at least 2 times. That means the top number is 140 or higher or the bottom number is 90 or higher, or both. · You think you may be having side effects from your blood pressure medicine. · Your blood pressure is usually normal, but it goes above normal at least 2 times. Where can you learn more? Go to http://hoang-jayden.info/. Enter S023 in the search box to learn more about \"High Blood Pressure: Care Instructions. \" Current as of: August 8, 2016 Content Version: 11.1 © 5692-0702 [employer]. Care instructions adapted under license by [employer] (which disclaims liability or warranty for this information). If you have questions about a medical condition or this instruction, always ask your healthcare professional. [address] any warranty or liability for your use of this information. Amlodipine (By mouth) Amlodipine ([id number]-[id number]) Treats high blood pressure and angina (chest pain). This medicine is a calcium channel blocker. Brand Name(s):Norvasc There may be other brand names for this medicine. When This Medicine Should Not Be Used: This medicine is not right for everyone. Do not use it if you had an allergic reaction to amlodipine. How to Use This Medicine:  
Tablet, Dissolving Tablet · Take your medicine as directed.  Your dose may need to be changed several times to find what works best for you. Take this medicine at the same time each day. · Read and follow the patient instructions that come with this medicine. Talk to your doctor or pharmacist if you have any questions. · Missed dose: Take a dose as soon as you remember. If it has been more than 12 hours since you were supposed to take your dose, skip the missed dose and take your next regular dose at the regular time. · Store the medicine in a closed container at room temperature, away from heat, moisture, and direct light. Drugs and Foods to Avoid: Ask your doctor or pharmacist before using any other medicine, including over-the-counter medicines, vitamins, and herbal products. · Some medicines can affect how amlodipine works. Tell your doctor if you are also using any of the following: ¨ Clarithromycin, cyclosporine, diltiazem, itraconazole, ritonavir, sildenafil, simvastatin, tacrolimus Warnings While Using This Medicine: · Tell your doctor if you are pregnant or breastfeeding, or if you have liver disease, heart disease, coronary artery disease, or aortic stenosis. · This medicine could lower your blood pressure too much, especially when you first use it or if you are dehydrated. Stand or sit up slowly if you feel lightheaded or dizzy. · Your doctor will check your progress and the effects of this medicine at regular visits. Keep all appointments. · Do not stop using this medicine without asking your doctor, even if you feel well. This medicine will not cure high blood pressure, but it will help keep it in normal range. You may have to take blood pressure medicine for the rest of your life. · Keep all medicine out of the reach of children. Never share your medicine with anyone. Possible Side Effects While Using This Medicine:  
Call your doctor right away if you notice any of these side effects: · Allergic reaction: Itching or hives, swelling in your face or hands, swelling or tingling in your mouth or throat, chest tightness, trouble breathing · Lightheadedness, dizziness · New or worsening chest pain · Swelling in your hands, ankles, or legs · Trouble breathing, nausea, unusual sweating, fainting If you notice other side effects that you think are caused by this medicine, tell your doctor. Call your doctor for medical advice about side effects. You may report side effects to FDA at 0-867-ESD-8197 © 2016 [address] is for End User's use only and may not be sold, redistributed or otherwise used for commercial purposes. The above information is an  only. It is not intended as medical advice for individual conditions or treatments. Talk to your doctor, nurse or pharmacist before following any medical regimen to see if it is safe and effective for you. Introducing [facility]! Jeffrey Elena introduces [employer] patient portal. Now you can access parts of your medical record, email your doctor's office, and request medication refills online. 1. In your internet browser, go to [email or web address] 2. Click on the First Time User? Click Here link in the Sign In box. You will see the New Member Sign Up page. 3. Enter your [employer] Access Code exactly as it appears below. You will not need to use this code after youve completed the sign-up process. If you do not sign up before the expiration date, you must request a new code. · [employer] Access Code: [email or web address] Expires: 5/29/2017 12:53 PM 
 
4. Enter the last four digits of your Social Security Number (xxxx) and Date of Birth (mm/dd/yyyy) as indicated and click Submit. You will be taken to the next sign-up page. 5. Create a el?t ID. This will be your [employer] login ID and cannot be changed, so think of one that is secure and easy to remember. 6. Create a el?t password. You can change your password at any time. 7. Enter your Password Reset Question and Answer. This can be used at a later time if you forget your password. 8. Enter your e-mail address. You will receive e-mail notification when new information is available in [address] 9. Click Sign Up. You can now view and download portions of your medical record. 10. Click the Download Summary menu link to download a portable copy of your medical information. If you have questions, please visit the Frequently Asked Questions section of the [employer] website. Remember, [employer] is NOT to be used for urgent needs. For medical emergencies, dial 911. Now available from your iPhone and Android! Please provide this summary of care documentation to your next provider. Your primary care clinician is listed as [address]. If you have any questions after today's visit, please call [phone].

## 2017-03-21 NOTE — PATIENT INSTRUCTIONS
High Blood Pressure: Care Instructions  Your Care Instructions  If your blood pressure is usually above 140/90, you have high blood pressure, or hypertension. That means the top number is 140 or higher or the bottom number is 90 or higher, or both. Despite what a lot of people think, high blood pressure usually doesn't cause headaches or make you feel dizzy or lightheaded. It usually has no symptoms. But it does increase your risk for heart attack, stroke, and kidney or eye damage. The higher your blood pressure, the more your risk increases. Your doctor will give you a goal for your blood pressure. Your goal will be based on your health and your age. An example of a goal is to keep your blood pressure below 140/90. Lifestyle changes, such as eating healthy and being active, are always important to help lower blood pressure. You might also take medicine to reach your blood pressure goal.  Follow-up care is a key part of your treatment and safety. Be sure to make and go to all appointments, and call your doctor if you are having problems. It's also a good idea to know your test results and keep a list of the medicines you take. How can you care for yourself at home? Medical treatment  · If you stop taking your medicine, your blood pressure will go back up. You may take one or more types of medicine to lower your blood pressure. Be safe with medicines. Take your medicine exactly as prescribed. Call your doctor if you think you are having a problem with your medicine. · Talk to your doctor before you start taking aspirin every day. Aspirin can help certain people lower their risk of a heart attack or stroke. But taking aspirin isn't right for everyone, because it can cause serious bleeding. · See your doctor regularly. You may need to see the doctor more often at first or until your blood pressure comes down.   · If you are taking blood pressure medicine, talk to your doctor before you take decongestants or anti-inflammatory medicine, such as ibuprofen. Some of these medicines can raise blood pressure. · Learn how to check your blood pressure at home. Lifestyle changes  · Stay at a healthy weight. This is especially important if you put on weight around the waist. Losing even 10 pounds can help you lower your blood pressure. · If your doctor recommends it, get more exercise. Walking is a good choice. Bit by bit, increase the amount you walk every day. Try for at least 30 minutes on most days of the week. You also may want to swim, bike, or do other activities. · Avoid or limit alcohol. Talk to your doctor about whether you can drink any alcohol. · Try to limit how much sodium you eat to less than 2,300 milligrams (mg) a day. Your doctor may ask you to try to eat less than 1,500 mg a day. · Eat plenty of fruits (such as bananas and oranges), vegetables, legumes, whole grains, and low-fat dairy products. · Lower the amount of saturated fat in your diet. Saturated fat is found in animal products such as milk, cheese, and meat. Limiting these foods may help you lose weight and also lower your risk for heart disease. · Do not smoke. Smoking increases your risk for heart attack and stroke. If you need help quitting, talk to your doctor about stop-smoking programs and medicines. These can increase your chances of quitting for good. When should you call for help? Call 911 anytime you think you may need emergency care. This may mean having symptoms that suggest that your blood pressure is causing a serious heart or blood vessel problem. Your blood pressure may be over 180/110. For example, call 911 if:  · You have symptoms of a heart attack. These may include:  ¨ Chest pain or pressure, or a strange feeling in the chest.  ¨ Sweating. ¨ Shortness of breath. ¨ Nausea or vomiting. ¨ Pain, pressure, or a strange feeling in the back, neck, jaw, or upper belly or in one or both shoulders or arms.   ¨ Lightheadedness or sudden weakness. ¨ A fast or irregular heartbeat. · You have symptoms of a stroke. These may include:  ¨ Sudden numbness, tingling, weakness, or loss of movement in your face, arm, or leg, especially on only one side of your body. ¨ Sudden vision changes. ¨ Sudden trouble speaking. ¨ Sudden confusion or trouble understanding simple statements. ¨ Sudden problems with walking or balance. ¨ A sudden, severe headache that is different from past headaches. · You have severe back or belly pain. Do not wait until your blood pressure comes down on its own. Get help right away. Call your doctor now or seek immediate care if:  · Your blood pressure is much higher than normal (such as 180/110 or higher), but you don't have symptoms. · You think high blood pressure is causing symptoms, such as:  ¨ Severe headache. ¨ Blurry vision. Watch closely for changes in your health, and be sure to contact your doctor if:  · Your blood pressure measures 140/90 or higher at least 2 times. That means the top number is 140 or higher or the bottom number is 90 or higher, or both. · You think you may be having side effects from your blood pressure medicine. · Your blood pressure is usually normal, but it goes above normal at least 2 times. Where can you learn more? Go to http://hoang-jayden.info/. Enter B507 in the search box to learn more about \"High Blood Pressure: Care Instructions. \"  Current as of: August 8, 2016  Content Version: 11.1  © 6064-1856 [employer]. Care instructions adapted under license by [employer] (which disclaims liability or warranty for this information). If you have questions about a medical condition or this instruction, always ask your healthcare professional. [place] [address] any warranty or liability for your use of this information. Amlodipine (By mouth)   Amlodipine ([id number])  Treats high blood pressure and angina (chest pain). This medicine is a calcium channel blocker. Brand Name(s):Norvasc   There may be other brand names for this medicine. When This Medicine Should Not Be Used: This medicine is not right for everyone. Do not use it if you had an allergic reaction to amlodipine. How to Use This Medicine:   Tablet, Dissolving Tablet  · Take your medicine as directed. Your dose may need to be changed several times to find what works best for you. Take this medicine at the same time each day. · Read and follow the patient instructions that come with this medicine. Talk to your doctor or pharmacist if you have any questions. · Missed dose: Take a dose as soon as you remember. If it has been more than 12 hours since you were supposed to take your dose, skip the missed dose and take your next regular dose at the regular time. · Store the medicine in a closed container at room temperature, away from heat, moisture, and direct light. Drugs and Foods to Avoid:   Ask your doctor or pharmacist before using any other medicine, including over-the-counter medicines, vitamins, and herbal products. · Some medicines can affect how amlodipine works. Tell your doctor if you are also using any of the following:   ¨ Clarithromycin, cyclosporine, diltiazem, itraconazole, ritonavir, sildenafil, simvastatin, tacrolimus  Warnings While Using This Medicine:   · Tell your doctor if you are pregnant or breastfeeding, or if you have liver disease, heart disease, coronary artery disease, or aortic stenosis. · This medicine could lower your blood pressure too much, especially when you first use it or if you are dehydrated. Stand or sit up slowly if you feel lightheaded or dizzy. · Your doctor will check your progress and the effects of this medicine at regular visits. Keep all appointments. · Do not stop using this medicine without asking your doctor, even if you feel well.  This medicine will not cure high blood pressure, but it will help keep it in normal range. You may have to take blood pressure medicine for the rest of your life. · Keep all medicine out of the reach of children. Never share your medicine with anyone. Possible Side Effects While Using This Medicine:   Call your doctor right away if you notice any of these side effects:  · Allergic reaction: Itching or hives, swelling in your face or hands, swelling or tingling in your mouth or throat, chest tightness, trouble breathing  · Lightheadedness, dizziness  · New or worsening chest pain  · Swelling in your hands, ankles, or legs  · Trouble breathing, nausea, unusual sweating, fainting  If you notice other side effects that you think are caused by this medicine, tell your doctor. Call your doctor for medical advice about side effects. You may report side effects to FDA at 3-099-FDA-4673  © 2016 [address] is for End User's use only and may not be sold, redistributed or otherwise used for commercial purposes. The above information is an  only. It is not intended as medical advice for individual conditions or treatments. Talk to your doctor, nurse or pharmacist before following any medical regimen to see if it is safe and effective for you.

## 2017-03-24 ENCOUNTER — OFFICE VISIT (OUTPATIENT)
Dept: CARDIOLOGY CLINIC | Age: 57
End: 2017-03-24

## 2017-03-24 VITALS
OXYGEN SATURATION: 97 % | DIASTOLIC BLOOD PRESSURE: 112 MMHG | SYSTOLIC BLOOD PRESSURE: 224 MMHG | BODY MASS INDEX: 30.53 KG/M2 | HEIGHT: 66 IN | WEIGHT: 190 LBS | HEART RATE: 86 BPM

## 2017-03-24 DIAGNOSIS — I95.1 ORTHOSTATIC HYPOTENSION: ICD-10-CM

## 2017-03-24 DIAGNOSIS — I10 ESSENTIAL HYPERTENSION: Primary | ICD-10-CM

## 2017-03-24 DIAGNOSIS — N18.3 CKD (CHRONIC KIDNEY DISEASE), STAGE 3 (MODERATE): ICD-10-CM

## 2017-03-24 DIAGNOSIS — E78.5 HYPERLIPIDEMIA, UNSPECIFIED HYPERLIPIDEMIA TYPE: ICD-10-CM

## 2017-03-24 RX ORDER — AMLODIPINE BESYLATE 5 MG/1
5 TABLET ORAL DAILY
Qty: 30 TAB | Refills: 6 | Status: SHIPPED | OUTPATIENT
Start: 2017-03-24 | End: 2017-05-10 | Stop reason: ALTCHOICE

## 2017-03-24 RX ORDER — FUROSEMIDE 20 MG/1
20 TABLET ORAL DAILY
Qty: 30 TAB | Refills: 6 | Status: SHIPPED | OUTPATIENT
Start: 2017-03-24 | End: 2017-04-28 | Stop reason: ALTCHOICE

## 2017-03-24 NOTE — PROGRESS NOTES
1. Have you been to the ER, urgent care clinic since your last visit? Hospitalized since your last visit? No     2. Have you seen or consulted any other health care providers outside of the [address] [place] [address] since your last visit? Include any pap smears or colon screening.  No

## 2017-03-24 NOTE — PATIENT INSTRUCTIONS
Decrease amlodipine to 5 mg one tab daily  Decrease lasix to 20 mg one tab daily  Stop nitro paste, lopressor, Coreg, losartin  1 month bp check  3 month follow up

## 2017-03-24 NOTE — PROGRESS NOTES
HISTORY OF PRESENT ILLNESS  Maile Rosa is a 64 y.o. male. Hypertension   Pertinent negatives include no chest pain, no abdominal pain, no headaches and no shortness of breath. Shortness of Breath   Pertinent negatives include no fever, no headaches, no cough, no wheezing, no PND, no orthopnea, no chest pain, no vomiting, no abdominal pain, no rash, no leg swelling and no claudication. Dizziness   Pertinent negatives include no chest pain, no abdominal pain, no headaches and no shortness of breath. Patient presents for a follow-up office visit. He was initially referred here for long-standing poorly controlled hypertension. The patient also has a history of chronic kidney disease, stage III, long-standing poorly controlled diabetes, dyslipidemia, and intermittent compliance with his medications. Patient was last seen in the office 5-6 months ago. At that time he was quite hypertensive, so his blood pressure medication was adjusted. His metoprolol was changed to carvedilol and he was placed on a clonidine patch. He underwent an echocardiogram in October 2016 which showed moderate to marked concentric LVH with preserved LV systolic function, EF 28-33% without any significant valvular heart disease. He was recently hospitalized at [facility] in March 2017 following a syncopal episode. He was diagnosed with significant autonomic dysfunction on a tilt table test where his systolic blood pressure dropped from 200-97 while in the upright 70° position and administered 1 spray of sublingual nitroglycerin. His heart rate did increase appropriately from 75 to 110 bpm.  Because of the significant drop in blood pressure, his blood pressure medications were significantly decreased and he was discharged home only taking amlodipine 5 mg daily. Patient reports that his systolic readings have been usually around 200 on this regimen.   He was also taken off of his diuretics, however, his nephrologist recently restarted Lasix at a lower dose at 20 mg daily. Patient has not had any recurrent dizzy spells or syncope. He states he only had one luke syncopal episode. He denies any chest pain or shortness of breath. Past Medical History:   Diagnosis Date    Cardiac echocardiogram 10/21/2016    EF 60-65%. No WMA. Mod-marked LVH. Normal diastolic fx. No significant valvular heart disease.  Cardiac treadmill stress test, low risk 11/02/2012    Negative maximal exercise treadmill test.  Ex time 7 min 45 sec.  Cardiovascular LE peripheral arterial testing 03/22/2016    No significant peripheral arterial disease at rest bilaterally. ABIs deferred due to calcified vessels.  Cardiovascular LLE venous duplex 06/06/2012    Left leg:  No DVT.  Cardiovascular renal duplex 10/21/2016    No significant renal artery stenosis.  CKD (chronic kidney disease) stage 3, GFR 30-59 ml/min     Diabetes mellitus ([facility] [address] 3/12/2010    Diabetic retinopathy ([facility] [address] 5/4/2010    Eye examination 5/4/10    OU: 20/20; OD: 20/25; OS: 20/25 without correction.  HLD (hyperlipidemia) 3/12/2010    HTN (hypertension) 3/12/2010     Current Outpatient Prescriptions   Medication Sig Dispense Refill    guaiFENesin-codeine (ROBITUSSIN AC) 100-10 mg/5 mL solution 5 mL.  nitroglycerin (NITROBID) 2 % ointment Use 0.5 Inches to affected area Every Night at Bedtime.  finasteride (PROSCAR) 5 mg tablet 5 mg.  ergocalciferol (ERGOCALCIFEROL) 50,000 unit capsule 50,000 Units.  dorzolamide (TRUSOPT) 2 % ophthalmic solution INSTIL 1 DROP IN EACH EYE 3 TIMES DAILY      brimonidine-timolol (COMBIGAN) 0.2-0.5 % drop ophthalmic solution INSTIL 1 DROP IN EACH EYE 3 TIMES DAILY      sub-q insulin device, 30 unit (VGO 30) alicia       rosuvastatin (CRESTOR) 20 mg tablet 20 mg.      DORZOLAMIDE HCL (DORZOLAMIDE OP) Apply  to eye.  BRIMONIDINE TARTRATE/TIMOLOL (COMBIGAN OP) Apply  to eye.       ketoconazole (NIZORAL) 2 % topical cream Apply  to affected area daily. 15 g 1    OTHER PGIIL/P CRM - Apply 1 -2 grams ( 1-2 pumps) to left foot 3 - 4 times daily.  cloNIDine (CATAPRES) 0.3 mg/24 hr 1 Patch by TransDERmal route every seven (7) days. 12 Patch 1    VGO 30 alicia       calcitRIOL (ROCALTROL) 0.25 mcg capsule TAKE ONE CAPSULE BY MOUTH EVERY MONDAY, WEDNESDAY, AND FRIDAY  2    rosuvastatin (CRESTOR) 20 mg tablet Take 1 Tab by mouth nightly. 30 Tab 6    SUB-Q INSULIN DEVICE, 20 UNIT (VGO 20) by Does Not Apply route.  OTHER Wound Care as directed by Podiatrist 1 Each 0    docusate sodium (COLACE) 100 mg capsule Take 1 Cap by mouth two (2) times a day. 60 Cap 0    insulin lispro (HUMALOG) 100 unit/mL injection Check FSBS AC*HS  For sugars between 160 and 200- Give 2 units SQ,  For sugars between 201 and 250, Give 3 units SQ,  For sugars Between 251 and 300, give 5 units SQ,  For Sugars between 301 and 350, give 7 units SQ  For sugars between 351 and 400, give 8 units SQ and contact PCP 1 Vial 1    OTHER Check CBC, CMP, Mg, CRP once every week while on Antibiotics through 5/7/16. results to PCP immediately. Diagnosis- Osteomyelitis 1 Each 6    glucose blood VI test strips (ASCENSIA AUTODISC VI, ONE TOUCH ULTRA TEST VI) strip Test twice daily:  Fasting before breakfast and 2 hours after dinner (log readings), One touch ultra 2 test strips please; Dx: 250.02 1 Package 11    fluticasone (FLONASE) 50 mcg/actuation nasal spray 1 Hooper by Both Nostrils route two (2) times a day. 1 Bottle 2    Insulin Needles, Disposable, 31 X 5/16 \" Ndle Use as directed with Levemir Insulin Pen. 1 Package 11    ferrous sulfate (IRON) 325 mg (65 mg iron) tablet Take  by mouth Daily (before breakfast).  Lancets (ONE TOUCH DELICA) Misc Test twice daily: Once in the morning fasting, then two hours after dinner (log results) 1 Package 11    Blood-Glucose Meter monitoring kit by Does Not Apply route.  One touch ultra2 1 Kit 0    cyanocobalamin (VITAMIN B-12) 1,000 mcg tablet Take 1,000 mcg by mouth daily. Allergies   Allergen Reactions    Bactrim [Sulfamethoprim Ds] Nausea and Vomiting        Social History   Substance Use Topics    Smoking status: Never Smoker    Smokeless tobacco: Never Used    Alcohol use No     Family History   Problem Relation Age of Onset    Diabetes Sister     Sickle Cell Anemia Sister     Diabetes Sister        Review of Systems   Constitutional: Negative for chills, fever, malaise/fatigue and weight loss. HENT: Negative for nosebleeds. Eyes: Negative for blurred vision and double vision. Respiratory: Negative for cough, shortness of breath and wheezing. Cardiovascular: Negative for chest pain, palpitations, orthopnea, claudication, leg swelling and PND. Gastrointestinal: Negative for abdominal pain, heartburn, nausea and vomiting. Genitourinary: Negative for dysuria and hematuria. Musculoskeletal: Negative for falls and myalgias. Skin: Negative for rash. Neurological: Positive for dizziness. Negative for focal weakness and headaches. Endo/Heme/Allergies: Does not bruise/bleed easily. Psychiatric/Behavioral: Negative for substance abuse. Visit Vitals    BP (!) 224/112    Pulse 86    Ht 5' 6\" (1.676 m)    Wt 86.2 kg (190 lb)    SpO2 97%    BMI 30.67 kg/m2       Physical Exam   Constitutional: He is oriented to person, place, and time. He appears well-developed and well-nourished. HENT:   Head: Normocephalic and atraumatic. Eyes: Conjunctivae are normal.   Neck: Neck supple. No JVD present. Carotid bruit is not present. Cardiovascular: Normal rate, regular rhythm, S1 normal, S2 normal and normal pulses. Exam reveals distant heart sounds. Exam reveals no gallop and no S3. No murmur heard. Pulmonary/Chest: Breath sounds normal. He has no wheezes. He has no rales. Abdominal: Soft. Bowel sounds are normal. There is no tenderness.    Musculoskeletal: He exhibits no tenderness or deformity. Edema:  Trace bilateral lower extremity. Neurological: He is alert and oriented to person, place, and time. Skin: Skin is warm and dry. Psychiatric: He has a normal mood and affect. His behavior is normal. Thought content normal.     EKG: Normal sinus rhythm, normal axis, normal QTc interval, borderline voltage criteria for LVH, mild diffuse inferolateral T-wave changes, likely from strain, ischemia cannot be excluded. No change compared to the previous EKG    ASSESSMENT and PLAN    Hypertensive cardiovascular disease. No evidence of heart failure. His blood pressure significantly elevated off the majority of his medications. He underwent an echocardiogram in our office in October 2016 and more recently earlier this month at [facility].  He has preserved LV systolic function, EF 60% with significant concentric LVH. I recommended that he restart his clonidine TTS 3 patch weekly and continuing his amlodipine 5 mg nightly. Given his orthostasis, I would accept a supine blood pressure around 196-613 mmHg systolic. Orthostatic hypotension. This was attributed to autonomic dysfunction. Patient is scheduled to follow-up with the dysautonomia clinic. I would avoid high doses of diuretics. I suspect he will be able to tolerate the clonidine and amlodipine for now. Dyslipidemia. Patient is managed with Crestor. This is followed by his PCP. Diabetes mellitus, type II, insulin dependent. Historically this has been poorly controlled. Chronic kidney disease, stage III. This is followed by nephrology. He is now on a lower dose of Lasix at 20 mg daily. I would like the patient to return in 1 month  for blood pressure check. I can see him back in 3 months, sooner if needed.

## 2017-03-24 NOTE — MR AVS SNAPSHOT
Visit Information Date & Time Provider Department Dept. Phone Encounter #  
 3/24/2017  2:00 PM Eufemia Shelton MD [facility] [address] [id number] Your Appointments 4/28/2017  9:30 AM  
Nurse Visit with Bp Hv [facility] ([address] [place] [address]) Appt Note: b/p check [facility] [address] [address] [address] [phone] [address] [facility] [address] [address]  
  
    
 6/22/2017  9:40 AM  
Follow Up with Eufemia Shelton MD  
[facility] ([address] [place] [address]) Appt Note: 3 mth f/up [facility] [address] [address] [address] [phone] [phone]  
  
    
 7/10/2017 11:00 AM  
ROUTINE CARE with Annamarie Shepherd MD  
[address] [place] [address]) Appt Note: 4m fu htn/ chol [address] [address] [address] [place] [address] [address] Upcoming Health Maintenance Date Due Pneumococcal 19-64 Highest Risk (1 of 3 - PCV13) 10/14/1979 HEMOGLOBIN A1C Q6M 9/21/2017 EYE EXAM RETINAL OR DILATED Q1 2/27/2018 LIPID PANEL Q1 2/28/2018 FOOT EXAM Q1 3/21/2018 MICROALBUMIN Q1 3/21/2018 COLONOSCOPY 10/9/2023 DTaP/Tdap/Td series (2 - Td) 3/21/2027 Allergies as of 3/24/2017  Review Complete On: 3/21/2017 By: Tera Infante LPN Severity Noted Reaction Type Reactions Bactrim [Sulfamethoprim Ds]  06/06/2014   Side Effect Nausea and Vomiting Current Immunizations  Reviewed on 5/7/2016 No immunizations on file. Not reviewed this visit You Were Diagnosed With   
  
 Codes Comments Hyperlipidemia, unspecified hyperlipidemia type    -  Primary ICD-10-CM: E78.5 ICD-9-CM: 272.4 Essential hypertension     ICD-10-CM: I10 
ICD-9-CM: 401.9 Vitals BP Pulse Height(growth percentile) Weight(growth percentile) SpO2 BMI  
 (!) 224/112 86 5' 6\" (1.676 m) 190 lb (86.2 kg) 97% 30.67 kg/m2 Smoking Status Never Smoker Vitals History BMI and BSA Data Body Mass Index Body Surface Area  
 30.67 kg/m 2 2 m 2 Preferred Pharmacy Pharmacy Name Phone [facility], Barbara [facility]arez  [phone] Your Updated Medication List  
  
   
This list is accurate as of: 3/24/17  3:47 PM.  Always use your most recent med list.  
  
  
  
  
 Blood-Glucose Meter monitoring kit  
by Does Not Apply route. One touch ultra2  
  
 calcitRIOL 0.25 mcg capsule Commonly known as:  ROCALTROL  
TAKE ONE CAPSULE BY MOUTH EVERY MONDAY, WEDNESDAY, AND FRIDAY  
  
 cloNIDine 0.3 mg/24 hr  
Commonly known as:  CATAPRES  
1 Patch by TransDERmal route every seven (7) days. * COMBIGAN OP Apply  to eye. * COMBIGAN 0.2-0.5 % Drop ophthalmic solution Generic drug:  brimonidine-timolol INSTIL 1 DROP IN [facility] EYE 3 TIMES DAILY docusate sodium 100 mg capsule Commonly known as:  Oneal Maxwell Take 1 Cap by mouth two (2) times a day. * DORZOLAMIDE OP Apply  to eye. * dorzolamide 2 % ophthalmic solution Commonly known as:  TRUSOPT INSTIL 1 DROP IN [facility] EYE 3 TIMES DAILY  
  
 ergocalciferol 50,000 unit capsule Commonly known as:  ERGOCALCIFEROL  
50,000 Units. finasteride 5 mg tablet Commonly known as:  PROSCAR  
5 mg. fluticasone 50 mcg/actuation nasal spray Commonly known as:  FLONASE  
1 [place] by Both Nostrils route two (2) times a day. glucose blood VI test strips strip Commonly known as:  ASCENSIA AUTODISC VI, ONE TOUCH ULTRA TEST VI Test twice daily:  Fasting before breakfast and 2 hours after dinner (log readings), One touch ultra 2 test strips please; Dx: 250.02  
  
 guaiFENesin-codeine 100-10 mg/5 mL solution Commonly known as:  ROBITUSSIN AC  
 5 mL.  
  
 insulin lispro 100 unit/mL injection Commonly known as:  HUMALOG Check FSBS AC*HS For sugars between 160 and 200- Give 2 units SQ, For sugars between 201 and 250, Give 3 units SQ, For sugars Between 251 and 300, give 5 units SQ, For Sugars between 301 and 350, give 7 units SQ For sugars between 351 and 400, give 8 units SQ and contact PCP Insulin Needles (Disposable) 31 gauge x 5/16\" Ndle Use as directed with Levemir Insulin Pen. Iron 325 mg (65 mg iron) tablet Generic drug:  ferrous sulfate Take  by mouth Daily (before breakfast). ketoconazole 2 % topical cream  
Commonly known as:  NIZORAL Apply  to affected area daily. Lancets Misc Commonly known as: One Touch Gilbert Dings Test twice daily: Once in the morning fasting, then two hours after dinner (log results)  
  
 nitroglycerin 2 % ointment Commonly known as:  NITROBID Use 0.5 Inches to affected area Every Night at Bedtime. * OTHER PGIIL/P CRM - Apply 1 -2 grams ( 1-2 pumps) to left foot 3 - 4 times daily. * OTHER Wound Care as directed by Podiatrist  
  
 * OTHER Check CBC, CMP, Mg, CRP once every week while on Antibiotics through 5/7/16. results to PCP immediately. Diagnosis- Osteomyelitis * rosuvastatin 20 mg tablet Commonly known as:  CRESTOR Take 1 Tab by mouth nightly. * rosuvastatin 20 mg tablet Commonly known as:  CRESTOR  
20 mg. VGO 20  
by Does Not Apply route. * VGO 30 Jacqueline Generic drug:  sub-q insulin device, 30 unit * VGO 30 Jacqueline Generic drug:  sub-q insulin device, 30 unit VITAMIN B-12 1,000 mcg tablet Generic drug:  cyanocobalamin Take 1,000 mcg by mouth daily. * Notice: This list has 11 medication(s) that are the same as other medications prescribed for you. Read the directions carefully, and ask your doctor or other care provider to review them with you. We Performed the Following AMB POC EKG ROUTINE W/ 12 LEADS, INTER & REP [05140 CPT(R)] Patient Instructions Decrease amlodipine to 5 mg one tab daily Decrease lasix to 20 mg one tab daily Stop nitro paste, lopressor, Coreg, losartin 
1 month bp check 3 month follow up Introducing \[facility]\"" & HEALTH SERVICES! [facility] introduces [employer] patient portal. Now you can access parts of your medical record, email your doctor's office, and request medication refills online. 1. In your internet browser, go to [email or web address] 2. Click on the First Time User? Click Here link in the Sign In box. You will see the New Member Sign Up page. 3. Enter your [employer] Access Code exactly as it appears below. You will not need to use this code after youve completed the sign-up process. If you do not sign up before the expiration date, you must request a new code. · [employer] Access Code: [email or web address] Expires: 5/29/2017 12:53 PM 
 
4. Enter the last four digits of your Social Security Number (xxxx) and Date of Birth (mm/dd/yyyy) as indicated and click Submit. You will be taken to the next sign-up page. 5. Create a [employer] ID. This will be your [employer] login ID and cannot be changed, so think of one that is secure and easy to remember. 6. Create a [employer] password. You can change your password at any time. 7. Enter your Password Reset Question and Answer. This can be used at a later time if you forget your password. 8. Enter your e-mail address. You will receive e-mail notification when new information is available in [address] 9. Click Sign Up. You can now view and download portions of your medical record. 10. Click the Download Summary menu link to download a portable copy of your medical information. If you have questions, please visit the Frequently Asked Questions section of the [employer] website. Remember, [employer] is NOT to be used for urgent needs. For medical emergencies, dial 911. Now available from your iPhone and Android! Please provide this summary of care documentation to your next provider. Your primary care clinician is listed as [address]. If you have any questions after today's visit, please call [phone].

## 2017-03-28 ENCOUNTER — TELEPHONE (OUTPATIENT)
Dept: FAMILY MEDICINE CLINIC | Age: 57
End: 2017-03-28

## 2017-03-28 RX ORDER — PROMETHAZINE HYDROCHLORIDE 25 MG/1
25 TABLET ORAL
Qty: 20 TAB | Refills: 0 | Status: SHIPPED | OUTPATIENT
Start: 2017-03-28 | End: 2017-05-13 | Stop reason: ALTCHOICE

## 2017-03-28 NOTE — TELEPHONE ENCOUNTER
Patients wife called office to get advice regarding patient. Patient has vommiting and diahhreaa since last night. Please advise [phone] or [phone].

## 2017-03-28 NOTE — TELEPHONE ENCOUNTER
Spoke with Mrs. Crawford to inform that patient should not take gauifenesin-codeine (Robitussin AC) while taking medication Phenergan. Patient verbalized understanding.

## 2017-03-28 NOTE — TELEPHONE ENCOUNTER
Spoke with Jasvir Mcgarry states that patient has chills, diarrhea, and vomiting since last night. Mrs. Crawford states patient has vomited brown liquid 10 times and is not eating anything. Informed that patient may be having a virus, to keep hydrated with water or caffeine-free clear liquids, rest, and eat a bland diet for example like a BRAT (banana, rice, applesauce, and toast) diet. Informed patient of information on Clinical Reference (nausea & vomiting) and to go to emergency room if patient vomits blood or what looks like coffee grounds, passing only a little dark urine, dry eyes and dry mouth, feeling thirstier than usual, high fever, belly pain, or loss of consciousness. Mrs. Crawford verbalized understanding.

## 2017-03-28 NOTE — TELEPHONE ENCOUNTER
Spoke with Mrs. Crawford to inform that Dr. Jazmin Lim is sending a medication order Phenergan to pharmacy to ease nausea and vomiting. Mrs. Crawford verbalized understanding.

## 2017-03-29 NOTE — TELEPHONE ENCOUNTER
Spoke with Nelly Cristobal, permission to release on file, to inquire if patient has improved. Mrs. Crawford stated patient has improved and has stopped vomiting. Patient is still complaining of diarrhea but this symptom has improved since yesterday. Encouraged bland diet or BRAT diet and to keep hydrated with water or clear caffeine-free drinks. Mrs. Crawford verbalized understanding.

## 2017-04-05 ENCOUNTER — PATIENT OUTREACH (OUTPATIENT)
Dept: FAMILY MEDICINE CLINIC | Age: 57
End: 2017-04-05

## 2017-04-05 NOTE — PROGRESS NOTES
Late entry:  Medication reconciliation performed at office visit with provider by ambulatory care navigator on 3/21/17.

## 2017-04-10 ENCOUNTER — PATIENT OUTREACH (OUTPATIENT)
Dept: FAMILY MEDICINE CLINIC | Age: 57
End: 2017-04-10

## 2017-04-28 ENCOUNTER — CLINICAL SUPPORT (OUTPATIENT)
Dept: CARDIOLOGY CLINIC | Age: 57
End: 2017-04-28

## 2017-04-28 VITALS
WEIGHT: 190 LBS | BODY MASS INDEX: 30.67 KG/M2 | SYSTOLIC BLOOD PRESSURE: 136 MMHG | DIASTOLIC BLOOD PRESSURE: 90 MMHG | HEART RATE: 69 BPM | OXYGEN SATURATION: 99 %

## 2017-04-28 DIAGNOSIS — I10 MALIGNANT HYPERTENSION: Primary | ICD-10-CM

## 2017-04-28 RX ORDER — FUROSEMIDE 40 MG/1
TABLET ORAL
Refills: 1 | COMMUNITY
Start: 2017-03-23 | End: 2017-07-28

## 2017-04-28 NOTE — PATIENT INSTRUCTIONS
Calcitriol (By mouth)   Calcitriol ([id number]-CZMA-ol)  Treats low calcium. This medicine is a form of vitamin D.   Brand Name(s): Rocaltrol   There may be other brand names for this medicine. When This Medicine Should Not Be Used: This medicine is not right for everyone. Do not use it if you had an allergic reaction to calcitriol or to vitamin D. How to Use This Medicine:   Liquid Filled Capsule, Liquid  · Take your medicine as directed. Your dose may need to be changed several times to find what works best for you. · Measure the oral liquid medicine with a marked measuring spoon, oral syringe, or medicine cup. · Missed dose: Take a dose as soon as you remember. If it is almost time for your next dose, wait until then and take a regular dose. Do not take extra medicine to make up for a missed dose. · Store the medicine in a closed container at room temperature, away from heat, moisture, and direct light. Drugs and Foods to Avoid:   Ask your doctor or pharmacist before using any other medicine, including over-the-counter medicines, vitamins, and herbal products. · Do not take vitamins, calcium supplements, or other forms of vitamin D while you are taking calcitriol. If you are on dialysis, do not take antacids that contain magnesium while you are taking calcitriol. · Some medicines can affect how calcitriol works. Tell your doctor if you are taking any of the following:   ¨ Cholestyramine  ¨ Digitalis  ¨ Diuretics (water pills), such as hydrochlorothiazide (HCTZ)  ¨ Ketoconazole  ¨ Phenytoin or phenobarbital  ¨ Steroids, such as hydrocortisone, methylprednisolone, prednisone  Warnings While Using This Medicine:   · Tell your doctor if you are pregnant, breastfeeding, or have any medical problems. · Do not use more calcitriol than your doctor ordered. Too much of this medicine may cause a dangerous amount of calcium to build up in your body.   · If you are not on dialysis, drink extra liquids to prevent dehydration. Ask your doctor how much liquid to drink each day. · Keep all medicine out of the reach of children. Never share your medicine with anyone. Possible Side Effects While Using This Medicine:   Call your doctor right away if you notice any of these side effects:  · Allergic reaction: Itching or hives, swelling in your face or hands, swelling or tingling in your mouth or throat, chest tightness, trouble breathing  · Bone or muscle pain  · Cloudy or foaming urine  · Irregular heartbeat  · Nausea and vomiting, long-lasting headache, constipation, sleepiness, weakness  · Severe stomach pain, metallic taste in your mouth  · Seizures  If you notice other side effects that you think are caused by this medicine, tell your doctor. Call your doctor for medical advice about side effects. You may report side effects to FDA at 1-891-FDA-0770  © 2017 [facility] Information is for End User's use only and may not be sold, redistributed or otherwise used for commercial purposes. The above information is an  only. It is not intended as medical advice for individual conditions or treatments. Talk to your doctor, nurse or pharmacist before following any medical regimen to see if it is safe and effective for you.

## 2017-04-28 NOTE — Clinical Note
Discuss with Dr. Jag Knight concerning nephrology increasing lasix to 40 mg BID. (Orthostatic hypotension - autonomic dysfunction, it's in his plan) thanks.  I sent a reminder to myself too

## 2017-04-28 NOTE — PROGRESS NOTES
Komal Nur is a 64 y.o. male that is here for a blood pressure check. His current medications are listed below. Current Outpatient Prescriptions   Medication Sig    furosemide (LASIX) 40 mg tablet TAKE 1 TABLET BY MOUTH BID    amLODIPine (NORVASC) 5 mg tablet Take 1 Tab by mouth daily.  finasteride (PROSCAR) 5 mg tablet Take 5 mg by mouth daily.  ergocalciferol (ERGOCALCIFEROL) 50,000 unit capsule 50,000 Units.  dorzolamide (TRUSOPT) 2 % ophthalmic solution INSTIL 1 DROP IN EACH EYE 3 TIMES DAILY    brimonidine-timolol (COMBIGAN) 0.2-0.5 % drop ophthalmic solution INSTIL 1 DROP IN EACH EYE 3 TIMES DAILY    sub-q insulin device, 30 unit (VGO 30) alicia     DORZOLAMIDE HCL (DORZOLAMIDE OP) Apply  to eye.  BRIMONIDINE TARTRATE/TIMOLOL (COMBIGAN OP) Apply  to eye.  ketoconazole (NIZORAL) 2 % topical cream Apply  to affected area daily.  cloNIDine (CATAPRES) 0.3 mg/24 hr 1 Patch by TransDERmal route every seven (7) days.  calcitRIOL (ROCALTROL) 0.25 mcg capsule TAKE ONE CAPSULE BY MOUTH EVERY MONDAY, WEDNESDAY, AND FRIDAY    rosuvastatin (CRESTOR) 20 mg tablet Take 1 Tab by mouth nightly.  docusate sodium (COLACE) 100 mg capsule Take 1 Cap by mouth two (2) times a day.  insulin lispro (HUMALOG) 100 unit/mL injection Check FSBS AC*HS  For sugars between 160 and 200- Give 2 units SQ,  For sugars between 201 and 250, Give 3 units SQ,  For sugars Between 251 and 300, give 5 units SQ,  For Sugars between 301 and 350, give 7 units SQ  For sugars between 351 and 400, give 8 units SQ and contact PCP    glucose blood VI test strips (ASCENSIA AUTODISC VI, ONE TOUCH ULTRA TEST VI) strip Test twice daily:  Fasting before breakfast and 2 hours after dinner (log readings), One touch ultra 2 test strips please; Dx: 250.02    fluticasone (FLONASE) 50 mcg/actuation nasal spray 1 [place] by Both Nostrils route two (2) times a day.     Insulin Needles, Disposable, 31 X 5/16 \" Ndle Use as directed with Levemir Insulin Pen.  ferrous sulfate (IRON) 325 mg (65 mg iron) tablet Take  by mouth Daily (before breakfast).  Lancets (ONE TOUCH DELICA) Misc Test twice daily: Once in the morning fasting, then two hours after dinner (log results)    Blood-Glucose Meter monitoring kit by Does Not Apply route. One touch ultra2    cyanocobalamin (VITAMIN B-12) 1,000 mcg tablet Take 1,000 mcg by mouth daily.  promethazine (PHENERGAN) 25 mg tablet Take 1 Tab by mouth every six (6) hours as needed for Nausea.  guaiFENesin-codeine (ROBITUSSIN AC) 100-10 mg/5 mL solution 5 mL.  nitroglycerin (NITROBID) 2 % ointment Use 0.5 Inches to affected area Every Night at Bedtime.  OTHER PGIIL/P CRM - Apply 1 -2 grams ( 1-2 pumps) to left foot 3 - 4 times daily.  SUB-Q INSULIN DEVICE, 20 UNIT (VGO 20) by Does Not Apply route.  OTHER Wound Care as directed by Podiatrist    OTHER Check CBC, CMP, Mg, CRP once every week while on Antibiotics through 5/7/16. results to PCP immediately. Diagnosis- Osteomyelitis     No current facility-administered medications for this visit. His   Visit Vitals    /90 (BP 1 Location: Left arm, BP Patient Position: Sitting)    Pulse 69    Wt 86.2 kg (190 lb)    SpO2 99%    BMI 30.67 kg/m2       Patient seen for one month BP check. He denies cardiac complaints. He reports occasional dizziness and denies other cardiac complaints. Lasix was decreased on 3/24/17 by Dr. Torrie Garcia. However since that visit the patient states he was seen by nephrology who increased the Lasix to 40 mg twice a day due to edema in his lower extremities. I discussed with Williams Barry NP. Patient was informed to monitor his BP at home due to history of orthostatic hypotension. Patient has a BP wrist cuff and reports that he monitors his BP regularly. He was also informed to follow up with nephrology regarding increased lasix dose.  Patient informed of instructions, read back and verbalized understanding Perfecto Amas, LPN

## 2017-05-04 RX ORDER — ROSUVASTATIN CALCIUM 10 MG/1
TABLET, FILM COATED ORAL
Qty: 30 TAB | Refills: 4 | Status: SHIPPED | OUTPATIENT
Start: 2017-05-04 | End: 2017-08-28 | Stop reason: ALTCHOICE

## 2017-05-10 ENCOUNTER — OFFICE VISIT (OUTPATIENT)
Dept: FAMILY MEDICINE CLINIC | Age: 57
End: 2017-05-10

## 2017-05-10 VITALS
OXYGEN SATURATION: 99 % | DIASTOLIC BLOOD PRESSURE: 90 MMHG | BODY MASS INDEX: 30.22 KG/M2 | RESPIRATION RATE: 12 BRPM | WEIGHT: 188 LBS | SYSTOLIC BLOOD PRESSURE: 168 MMHG | HEART RATE: 74 BPM | HEIGHT: 66 IN | TEMPERATURE: 97.7 F

## 2017-05-10 DIAGNOSIS — I10 ESSENTIAL HYPERTENSION: Primary | ICD-10-CM

## 2017-05-10 RX ORDER — AMLODIPINE BESYLATE 10 MG/1
10 TABLET ORAL DAILY
COMMUNITY
End: 2017-07-10 | Stop reason: SDUPTHER

## 2017-05-10 NOTE — PROGRESS NOTES
1. Have you been to the ER, urgent care clinic since your last visit? Hospitalized since your last visit? Yes Where: [facility]    2. Have you seen or consulted any other health care providers outside of the [address] [place] [address] since your last visit? Include any pap smears or colon screening.  No

## 2017-05-10 NOTE — PROGRESS NOTES
HISTORY OF PRESENT ILLNESS  Pema Yancey is a 64 y.o. male. HPI  Patient is here today for evaluation and treatment of: Hypertension    Hypertension:  Pt is on norvasc and catapres; He tolerates med well; BP was better with check at home. Pt has not had any further syncopal episodes. He will see renal in about 3 weeks - June 6; his dizziness is some better. He will see podiatry on 5/18. Has had laser surgery on left eye 2 days ago. Inquires about  Use of Vitamin D and calcitriol prescribed by renal;  Reviewed old labs showing low levels of each; Advised pt to check with renal about use of meds. Current Outpatient Prescriptions:     amLODIPine (NORVASC) 10 mg tablet, Take 10 mg by mouth daily. , Disp: , Rfl:     sub-q insulin device, 40 unit (VGO 40) laicia, by Does Not Apply route., Disp: , Rfl:     furosemide (LASIX) 40 mg tablet, TAKE 1 TABLET BY MOUTH BID, Disp: , Rfl: 1    finasteride (PROSCAR) 5 mg tablet, Take 5 mg by mouth daily. , Disp: , Rfl:     ergocalciferol (ERGOCALCIFEROL) 50,000 unit capsule, 50,000 Units. , Disp: , Rfl:     dorzolamide (TRUSOPT) 2 % ophthalmic solution, INSTIL 1 DROP IN EACH EYE 3 TIMES DAILY, Disp: , Rfl:     brimonidine-timolol (COMBIGAN) 0.2-0.5 % drop ophthalmic solution, INSTIL 1 DROP IN EACH EYE 3 TIMES DAILY, Disp: , Rfl:     ketoconazole (NIZORAL) 2 % topical cream, Apply  to affected area daily. , Disp: 15 g, Rfl: 1    OTHER, PGIIL/P CRM - Apply 1 -2 grams ( 1-2 pumps) to left foot 3 - 4 times daily. , Disp: , Rfl:     cloNIDine (CATAPRES) 0.3 mg/24 hr, 1 Patch by TransDERmal route every seven (7) days. , Disp: 12 Patch, Rfl: 1    rosuvastatin (CRESTOR) 20 mg tablet, Take 1 Tab by mouth nightly., Disp: 30 Tab, Rfl: 6    docusate sodium (COLACE) 100 mg capsule, Take 1 Cap by mouth two (2) times a day., Disp: 60 Cap, Rfl: 0    insulin lispro (HUMALOG) 100 unit/mL injection, Check FSBS AC*HS For sugars between 160 and 200- Give 2 units SQ, For sugars between 201 and 250, Give 3 units SQ, For sugars Between 251 and 300, give 5 units SQ, For Sugars between 301 and 350, give 7 units SQ For sugars between 351 and 400, give 8 units SQ and contact PCP, Disp: 1 Vial, Rfl: 1    glucose blood VI test strips (ASCENSIA AUTODISC VI, ONE TOUCH ULTRA TEST VI) strip, Test twice daily:  Fasting before breakfast and 2 hours after dinner (log readings), One touch ultra 2 test strips please; Dx: 250.02, Disp: 1 Package, Rfl: 11    fluticasone (FLONASE) 50 mcg/actuation nasal spray, 1 [place] by Both Nostrils route two (2) times a day., Disp: 1 Bottle, Rfl: 2    ferrous sulfate (IRON) 325 mg (65 mg iron) tablet, Take  by mouth Daily (before breakfast). Take 1 tablet by mouth once a week as per patient., Disp: , Rfl:     Lancets (ONE TOUCH DELICA) Misc, Test twice daily: Once in the morning fasting, then two hours after dinner (log results), Disp: 1 Package, Rfl: 11    Blood-Glucose Meter monitoring kit, by Does Not Apply route. One touch ultra2, Disp: 1 Kit, Rfl: 0    cyanocobalamin (VITAMIN B-12) 1,000 mcg tablet, Take 1,000 mcg by mouth daily. , Disp: , Rfl:     CRESTOR 10 mg tablet, TAKE ONE TABLET BY MOUTH IN THE EVENING, Disp: 30 Tab, Rfl: 4    promethazine (PHENERGAN) 25 mg tablet, Take 1 Tab by mouth every six (6) hours as needed for Nausea., Disp: 20 Tab, Rfl: 0    nitroglycerin (NITROBID) 2 % ointment, Use 0.5 Inches to affected area Every Night at Bedtime. , Disp: , Rfl:     sub-q insulin device, 30 unit (VGO 30) alicia, , Disp: , Rfl:     calcitRIOL (ROCALTROL) 0.25 mcg capsule, TAKE ONE CAPSULE BY MOUTH EVERY MONDAY, WEDNESDAY, AND FRIDAY, Disp: , Rfl: 2    SUB-Q INSULIN DEVICE, 20 UNIT (VGO 20), by Does Not Apply route., Disp: , Rfl:     OTHER, Wound Care as directed by Podiatrist, Disp: 1 Each, Rfl: 0    OTHER, Check CBC, CMP, Mg, CRP once every week while on Antibiotics through 5/7/16. results to PCP immediately.  Diagnosis- Osteomyelitis, Disp: 1 Each, Rfl: 6    Insulin Needles, Disposable, 31 X 5/16 \" Ndle, Use as directed with Levemir Insulin Pen., Disp: 1 Package, Rfl: 11    PMH,  Meds, Allergies, Family History, Social history reviewed      Review of Systems   Constitutional: Negative for chills and fever. Respiratory: Negative for cough and shortness of breath. Cardiovascular: Negative for chest pain and palpitations. Physical Exam   Constitutional: He appears well-developed and well-nourished. Cardiovascular: Normal rate and regular rhythm. Exam reveals no gallop and no friction rub. No murmur heard. Pulmonary/Chest: Breath sounds normal. No respiratory distress. He has no wheezes. He has no rales. Nursing note and vitals reviewed. mild ankle edema  Visit Vitals    /90    Pulse 74    Temp 97.7 °F (36.5 °C) (Oral)    Resp 12    Ht 5' 6\" (1.676 m)    Wt 188 lb (85.3 kg)    SpO2 99%    BMI 30.34 kg/m2         ASSESSMENT and PLAN    ICD-10-CM ICD-9-CM    1. Essential hypertension I10 401.9      As above,  BP elevated today but better at home; will monitor  Follow-up Disposition:  Return in about 3 months (around 8/10/2017) for htn. An After Visit Summary was printed and given to the patient.

## 2017-05-10 NOTE — MR AVS SNAPSHOT
Visit Information Date & Time Provider Department Dept. Phone Encounter #  
 5/10/2017  1:00 PM Naheed Corbin, [address] [id number] Follow-up Instructions Return in about 3 months (around 8/10/2017) for htn. Your Appointments 6/22/2017  9:40 AM  
Follow Up with Sindy Mcdaniels MD  
[facility] ([address]) Appt Note: 3 mth f/up [place] [id number] [phone] [place] [id number]  
  
    
 7/10/2017 11:00 AM  
ROUTINE CARE with Naheed Corbin MD  
[address] [facility]) Appt Note: 4m fu htn/ chol [address] [address] [address] [place] [address] [phone] Upcoming Health Maintenance Date Due Pneumococcal 19-64 Medium Risk (1 of 1 - PPSV23) 10[birth year]14/1979 INFLUENZA AGE 9 TO ADULT 8/1/2017 HEMOGLOBIN A1C Q6M 9/21/2017 LIPID PANEL Q1 2/28/2018 FOOT EXAM Q1 3/21/2018 MICROALBUMIN Q1 3/21/2018 EYE EXAM RETINAL OR DILATED Q1 4/20/2018 COLONOSCOPY 10/9/2023 DTaP/Tdap/Td series (2 - Td) 3/21/2027 Allergies as of 5/10/2017  Review Complete On: 5/10/2017 By: Naheed Corbin MD  
  
 Severity Noted Reaction Type Reactions Bactrim [Sulfamethoprim Ds]  06/06/2014   Side Effect Nausea and Vomiting Current Immunizations  Reviewed on 5/7/2016 No immunizations on file. Not reviewed this visit You Were Diagnosed With   
  
 Codes Comments Essential hypertension    -  Primary ICD-10-CM: I10 
ICD-9-CM: 401.9 Vitals BP Pulse Temp Resp Height(growth percentile) Weight(growth percentile)  
 168/90 74 97.7 °F (36.5 °C) (Oral) 12 5' 6\" (1.676 m) 188 lb (85.3 kg) SpO2 BMI Smoking Status 99% 30.34 kg/m2 Never Smoker Vitals History BMI and BSA Data Body Mass Index Body Surface Area  
 30.34 kg/m 2 1.99 m 2 Preferred Pharmacy Pharmacy Name Phone [facility], [place]  [phone] Your Updated Medication List  
  
   
This list is accurate as of: 5/10/17  2:16 PM.  Always use your most recent med list. amLODIPine 10 mg tablet Commonly known as:  Opal Heymann Take 10 mg by mouth daily. Blood-Glucose Meter monitoring kit  
by Does Not Apply route. One touch ultra2  
  
 calcitRIOL 0.25 mcg capsule Commonly known as:  ROCALTROL  
TAKE ONE CAPSULE BY MOUTH EVERY MONDAY, WEDNESDAY, AND FRIDAY  
  
 cloNIDine 0.3 mg/24 hr  
Commonly known as:  CATAPRES  
1 Patch by TransDERmal route every seven (7) days. COMBIGAN 0.2-0.5 % Drop ophthalmic solution Generic drug:  brimonidine-timolol INSTIL 1 DROP IN [facility] EYE 3 TIMES DAILY docusate sodium 100 mg capsule Commonly known as:  [place] Take 1 Cap by mouth two (2) times a day. dorzolamide 2 % ophthalmic solution Commonly known as:  TRUSOPT INSTIL 1 DROP IN [facility] EYE 3 TIMES DAILY  
  
 ergocalciferol 50,000 unit capsule Commonly known as:  ERGOCALCIFEROL  
50,000 Units. finasteride 5 mg tablet Commonly known as:  PROSCAR Take 5 mg by mouth daily. fluticasone 50 mcg/actuation nasal spray Commonly known as:  FLONASE  
1 Benedict by Both Nostrils route two (2) times a day. furosemide 40 mg tablet Commonly known as:  LASIX TAKE 1 TABLET BY MOUTH BID  
  
 glucose blood VI test strips strip Commonly known as:  ASCENSIA AUTODISC VI, ONE TOUCH ULTRA TEST VI Test twice daily:  Fasting before breakfast and 2 hours after dinner (log readings), One touch ultra 2 test strips please; Dx: 250.02  
  
 insulin lispro 100 unit/mL injection Commonly known as:  HUMALOG Check FSBS AC*HS For sugars between 160 and 200- Give 2 units SQ, For sugars between 201 and 250, Give 3 units SQ, For sugars Between 251 and 300, give 5 units SQ, For Sugars between 301 and 350, give 7 units SQ For sugars between 351 and 400, give 8 units SQ and contact PCP Insulin Needles (Disposable) 31 gauge x 5/16\" Ndle Use as directed with Levemir Insulin Pen. Iron 325 mg (65 mg iron) tablet Generic drug:  ferrous sulfate Take  by mouth Daily (before breakfast). Take 1 tablet by mouth once a week as per patient.  
  
 ketoconazole 2 % topical cream  
Commonly known as:  NIZORAL Apply  to affected area daily. Lancets Misc Commonly known as: One Touch Lillette Cai Test twice daily: Once in the morning fasting, then two hours after dinner (log results)  
  
 nitroglycerin 2 % ointment Commonly known as:  NITROBID Use 0.5 Inches to affected area Every Night at Bedtime. * OTHER PGIIL/P CRM - Apply 1 -2 grams ( 1-2 pumps) to left foot 3 - 4 times daily. * OTHER Wound Care as directed by Podiatrist  
  
 * OTHER Check CBC, CMP, Mg, CRP once every week while on Antibiotics through 5/7/16. results to PCP immediately. Diagnosis- Osteomyelitis  
  
 promethazine 25 mg tablet Commonly known as:  PHENERGAN Take 1 Tab by mouth every six (6) hours as needed for Nausea. * rosuvastatin 20 mg tablet Commonly known as:  CRESTOR Take 1 Tab by mouth nightly. * CRESTOR 10 mg tablet Generic drug:  rosuvastatin TAKE ONE TABLET BY MOUTH IN THE EVENING  
  
 VGO 20  
by Does Not Apply route. [address] [facility] [address] Generic drug:  sub-q insulin device, 30 unit [address] [facility] [address] Generic drug:  sub-q insulin device, 40 unit  
by Does Not Apply route. VITAMIN B-12 1,000 mcg tablet Generic drug:  cyanocobalamin Take 1,000 mcg by mouth daily. * Notice: This list has 5 medication(s) that are the same as other medications prescribed for you.  Read the directions carefully, and ask your doctor or other care provider to review them with you. Follow-up Instructions Return in about 3 months (around 8/10/2017) for htn. Patient Instructions Kidney Disease and High Blood Pressure: Care Instructions Your Care Instructions Long-term (chronic) kidney disease happens when the kidneys cannot remove waste and keep your body's fluids and chemicals in balance. Usually, the kidneys remove waste from the blood through the urine. When the kidneys are not working well, waste can build up so much that it poisons the body. Kidney disease can make you very tired. It also can cause swelling, or edema, in your legs or other areas of your body. High blood pressure is one of the major causes of chronic kidney disease. And kidney disease can also cause high blood pressure. No matter which came first, having high blood pressure damages the tiny blood vessels in the kidneys. If you have high blood pressure, it is important to lower it. There are many things you can do to lower your blood pressure, which may help slow or stop the damage to your kidneys. Follow-up care is a key part of your treatment and safety. Be sure to make and go to all appointments, and call your doctor if you are having problems. It's also a good idea to know your test results and keep a list of the medicines you take. How can you care for yourself at home? · Be safe with medicines. Take your medicines exactly as prescribed. Call your doctor if you have any problems with your medicine. You will probably need more than one medicine to lower your blood pressure. You will get more details on the specific medicines your doctor prescribes. · Work with your doctor and a dietitian to plan meals that have the right amount of nutrients for you. You will probably have to limit salt, fluids, and protein. · Stay at a healthy weight.  This is very important if you put on weight around the waist. Losing even 10 pounds can help you lower your blood pressure. · Manage other health problems such as diabetes and high cholesterol. You can help lower your risk for heart disease and blood vessel problems with a healthy lifestyle along with medicines. · Do not take ibuprofen (Advil, Motrin) or naproxen (Aleve), or similar medicines, unless your doctor tells you to. They may make chronic kidney disease worse. It is okay to take acetaminophen (Tylenol). · If your doctor recommends it, get more exercise. Walking is a good choice. Bit by bit, increase the amount you walk every day. Try for at least 30 minutes on most days of the week. You also may want to swim, bike, or do other activities. · Limit or avoid alcohol. Talk to your doctor about whether you can drink any alcohol. · Do not smoke or allow others to smoke around you. If you need help quitting, talk to your doctor about stop-smoking programs and medicines. These can increase your chances of quitting for good. When should you call for help? Call 911 anytime you think you may need emergency care. For example, call if: 
· You passed out (lost consciousness). · You have symptoms of a heart attack, such as: ¨ Chest pain or pressure. ¨ Sweating. ¨ Shortness of breath. ¨ Nausea or vomiting. ¨ Pain that spreads from the chest to the neck, jaw, or one or both shoulders or arms. ¨ Dizziness or lightheadedness. ¨ A fast or uneven pulse. After calling 911, chew 1 adult-strength aspirin. Wait for an ambulance. Do not try to drive yourself. Call your doctor now or seek immediate medical care if: 
· You are confused or are more tired or weak than usual. 
· You bleed or have bruises. Watch closely for changes in your health, and be sure to contact your doctor if: 
· You do not want to eat. · You have new swelling of your arms or feet, or swelling that you already have gets worse. · You do not get better as expected. Where can you learn more? Go to http://hoang-jayden.info/. Enter H161 in the search box to learn more about \"Kidney Disease and High Blood Pressure: Care Instructions. \" Current as of: November 20, 2015 Content Version: 11.2 © 4843-5981 [employer]. Care instructions adapted under license by [employer] (which disclaims liability or warranty for this information). If you have questions about a medical condition or this instruction, always ask your healthcare professional. [address] any warranty or liability for your use of this information. Introducing [facility]! [facility] introduces [employer] patient portal. Now you can access parts of your medical record, email your doctor's office, and request medication refills online. 1. In your internet browser, go to [email or web address] 2. Click on the First Time User? Click Here link in the Sign In box. You will see the New Member Sign Up page. 3. Enter your [employer] Access Code exactly as it appears below. You will not need to use this code after youve completed the sign-up process. If you do not sign up before the expiration date, you must request a new code. · [employer] Access Code: [email or web address] Expires: 5/29/2017 12:53 PM 
 
4. Enter the last four digits of your Social Security Number (xxxx) and Date of Birth (mm/dd/yyyy) as indicated and click Submit. You will be taken to the next sign-up page. 5. Create a Analytics Enginest ID. This will be your [employer] login ID and cannot be changed, so think of one that is secure and easy to remember. 6. Create a [employer] password. You can change your password at any time. 7. Enter your Password Reset Question and Answer. This can be used at a later time if you forget your password. 8. Enter your e-mail address. You will receive e-mail notification when new information is available in [address] 9. Click Sign Up. You can now view and download portions of your medical record. 10. Click the Download Summary menu link to download a portable copy of your medical information. If you have questions, please visit the Frequently Asked Questions section of the [employer] website. Remember, [employer] is NOT to be used for urgent needs. For medical emergencies, dial 911. Now available from your iPhone and Android! Please provide this summary of care documentation to your next provider. Your primary care clinician is listed as [address]. If you have any questions after today's visit, please call [phone].

## 2017-05-10 NOTE — PATIENT INSTRUCTIONS
Kidney Disease and High Blood Pressure: Care Instructions  Your Care Instructions  Long-term (chronic) kidney disease happens when the kidneys cannot remove waste and keep your body's fluids and chemicals in balance. Usually, the kidneys remove waste from the blood through the urine. When the kidneys are not working well, waste can build up so much that it poisons the body. Kidney disease can make you very tired. It also can cause swelling, or edema, in your legs or other areas of your body. High blood pressure is one of the major causes of chronic kidney disease. And kidney disease can also cause high blood pressure. No matter which came first, having high blood pressure damages the tiny blood vessels in the kidneys. If you have high blood pressure, it is important to lower it. There are many things you can do to lower your blood pressure, which may help slow or stop the damage to your kidneys. Follow-up care is a key part of your treatment and safety. Be sure to make and go to all appointments, and call your doctor if you are having problems. It's also a good idea to know your test results and keep a list of the medicines you take. How can you care for yourself at home? · Be safe with medicines. Take your medicines exactly as prescribed. Call your doctor if you have any problems with your medicine. You will probably need more than one medicine to lower your blood pressure. You will get more details on the specific medicines your doctor prescribes. · Work with your doctor and a dietitian to plan meals that have the right amount of nutrients for you. You will probably have to limit salt, fluids, and protein. · Stay at a healthy weight. This is very important if you put on weight around the waist. Losing even 10 pounds can help you lower your blood pressure. · Manage other health problems such as diabetes and high cholesterol.  You can help lower your risk for heart disease and blood vessel problems with a healthy lifestyle along with medicines. · Do not take ibuprofen (Advil, Motrin) or naproxen (Aleve), or similar medicines, unless your doctor tells you to. They may make chronic kidney disease worse. It is okay to take acetaminophen (Tylenol). · If your doctor recommends it, get more exercise. Walking is a good choice. Bit by bit, increase the amount you walk every day. Try for at least 30 minutes on most days of the week. You also may want to swim, bike, or do other activities. · Limit or avoid alcohol. Talk to your doctor about whether you can drink any alcohol. · Do not smoke or allow others to smoke around you. If you need help quitting, talk to your doctor about stop-smoking programs and medicines. These can increase your chances of quitting for good. When should you call for help? Call 911 anytime you think you may need emergency care. For example, call if:  · You passed out (lost consciousness). · You have symptoms of a heart attack, such as:  ¨ Chest pain or pressure. ¨ Sweating. ¨ Shortness of breath. ¨ Nausea or vomiting. ¨ Pain that spreads from the chest to the neck, jaw, or one or both shoulders or arms. ¨ Dizziness or lightheadedness. ¨ A fast or uneven pulse. After calling 911, chew 1 adult-strength aspirin. Wait for an ambulance. Do not try to drive yourself. Call your doctor now or seek immediate medical care if:  · You are confused or are more tired or weak than usual.  · You bleed or have bruises. Watch closely for changes in your health, and be sure to contact your doctor if:  · You do not want to eat. · You have new swelling of your arms or feet, or swelling that you already have gets worse. · You do not get better as expected. Where can you learn more? Go to http://hoang-jayden.info/. Enter H383 in the search box to learn more about \"Kidney Disease and High Blood Pressure: Care Instructions. \"  Current as of: November 20, 2015  Content Version: 11.2  © 1190-2567 [employer]wise, Incorporated. Care instructions adapted under license by [employer] (which disclaims liability or warranty for this information). If you have questions about a medical condition or this instruction, always ask your healthcare professional. [place] [address] any warranty or liability for your use of this information.

## 2017-07-10 ENCOUNTER — HOSPITAL ENCOUNTER (OUTPATIENT)
Dept: LAB | Age: 57
Discharge: HOME | End: 2017-07-10
Payer: COMMERCIAL

## 2017-07-10 ENCOUNTER — OFFICE VISIT (OUTPATIENT)
Dept: FAMILY MEDICINE CLINIC | Age: 57
End: 2017-07-10

## 2017-07-10 VITALS
HEART RATE: 84 BPM | OXYGEN SATURATION: 98 % | BODY MASS INDEX: 32.47 KG/M2 | SYSTOLIC BLOOD PRESSURE: 180 MMHG | HEIGHT: 66 IN | RESPIRATION RATE: 16 BRPM | WEIGHT: 202 LBS | DIASTOLIC BLOOD PRESSURE: 100 MMHG | TEMPERATURE: 97.9 F

## 2017-07-10 DIAGNOSIS — I10 HTN (HYPERTENSION), BENIGN: Primary | ICD-10-CM

## 2017-07-10 DIAGNOSIS — E87.1 HYPONATREMIA: ICD-10-CM

## 2017-07-10 DIAGNOSIS — E78.5 HYPERLIPIDEMIA, UNSPECIFIED HYPERLIPIDEMIA TYPE: ICD-10-CM

## 2017-07-10 DIAGNOSIS — R79.89 AZOTEMIA: ICD-10-CM

## 2017-07-10 LAB
ANION GAP BLD CALC-SCNC: 10 MMOL/L (ref 3–18)
BUN SERPL-MCNC: 39 MG/DL (ref 7–18)
BUN/CREAT SERPL: 9 (ref 12–20)
CALCIUM SERPL-MCNC: 8.6 MG/DL (ref 8.5–10.1)
CHLORIDE SERPL-SCNC: 105 MMOL/L (ref 100–108)
CO2 SERPL-SCNC: 25 MMOL/L (ref 21–32)
CREAT SERPL-MCNC: 4.54 MG/DL (ref 0.6–1.3)
GLUCOSE SERPL-MCNC: 80 MG/DL (ref 74–99)
POTASSIUM SERPL-SCNC: 3.6 MMOL/L (ref 3.5–5.5)
SODIUM SERPL-SCNC: 140 MMOL/L (ref 136–145)

## 2017-07-10 PROCEDURE — 36415 COLL VENOUS BLD VENIPUNCTURE: CPT | Performed by: FAMILY MEDICINE

## 2017-07-10 PROCEDURE — 80048 BASIC METABOLIC PNL TOTAL CA: CPT | Performed by: FAMILY MEDICINE

## 2017-07-10 RX ORDER — CLONIDINE 0.3 MG/24H
1 PATCH, EXTENDED RELEASE TRANSDERMAL
Qty: 12 PATCH | Refills: 1 | Status: SHIPPED | OUTPATIENT
Start: 2017-07-10 | End: 2017-10-02 | Stop reason: SDUPTHER

## 2017-07-10 RX ORDER — AMLODIPINE BESYLATE 10 MG/1
10 TABLET ORAL DAILY
Qty: 30 TAB | Refills: 6 | Status: SHIPPED | OUTPATIENT
Start: 2017-07-10 | End: 2018-01-11 | Stop reason: SDUPTHER

## 2017-07-10 RX ORDER — FINASTERIDE 5 MG/1
5 TABLET, FILM COATED ORAL DAILY
Qty: 30 TAB | Refills: 6 | Status: SHIPPED | OUTPATIENT
Start: 2017-07-10 | End: 2018-03-07 | Stop reason: SDUPTHER

## 2017-07-10 NOTE — PROGRESS NOTES
1. Have you been to the ER, urgent care clinic since your last visit? Hospitalized since your last visit? No    2. Have you seen or consulted any other health care providers outside of the [address] [place] [address] since your last visit? Include any pap smears or colon screening.  No

## 2017-07-10 NOTE — MR AVS SNAPSHOT
Visit Information Date & Time Provider Department Dept. Phone Encounter #  
 7/10/2017 11:00 AM Lady Villar, [address] [id number] Follow-up Instructions Return in about 3 months (around 10/10/2017) for BP. Your Appointments 8/10/2017 11:20 AM  
ROUTINE CARE with Lady Villar MD  
[facility] ([address]) Appt Note: fu on htn  
 [address] [address] Thor Fletcher [address] [address] Upcoming Health Maintenance Date Due INFLUENZA AGE 9 TO ADULT 8/1/2017 HEMOGLOBIN A1C Q6M 9/21/2017 LIPID PANEL Q1 2/28/2018 FOOT EXAM Q1 3/21/2018 MICROALBUMIN Q1 3/21/2018 EYE EXAM RETINAL OR DILATED Q1 5/8/2018 COLONOSCOPY 10/9/2023 DTaP/Tdap/Td series (2 - Td) 3/21/2027 Allergies as of 7/10/2017  Review Complete On: 7/10/2017 By: Lady Villar MD  
  
 Severity Noted Reaction Type Reactions Bactrim [Sulfamethoprim Ds]  06/06/2014   Side Effect Nausea and Vomiting Current Immunizations  Reviewed on 5/7/2016 No immunizations on file. Not reviewed this visit You Were Diagnosed With   
  
 Codes Comments HTN (hypertension), benign    -  Primary ICD-10-CM: I10 
ICD-9-CM: 401.1 Hyperlipidemia, unspecified hyperlipidemia type     ICD-10-CM: E78.5 ICD-9-CM: 272.4 Vitals BP Pulse Temp Resp Height(growth percentile) Weight(growth percentile) (!) 180/100 84 97.9 °F (36.6 °C) (Oral) 16 5' 6\" (1.676 m) 202 lb (91.6 kg) SpO2 BMI Smoking Status 98% 32.6 kg/m2 Never Smoker Vitals History BMI and BSA Data Body Mass Index Body Surface Area  
 32.6 kg/m 2 2.07 m 2 Preferred Pharmacy Pharmacy Name Phone [facility], [address] [place] [address] [phone] Your Updated Medication List  
  
   
 This list is accurate as of: 7/10/17 12:29 PM.  Always use your most recent med list. amLODIPine 10 mg tablet Commonly known as:  Sundra Boon Take 1 Tab by mouth daily. Blood-Glucose Meter monitoring kit  
by Does Not Apply route. One touch ultra2  
  
 calcitRIOL 0.25 mcg capsule Commonly known as:  ROCALTROL  
TAKE ONE CAPSULE BY MOUTH EVERY MONDAY, WEDNESDAY, AND FRIDAY  
  
 cloNIDine 0.3 mg/24 hr  
Commonly known as:  CATAPRES  
1 Patch by TransDERmal route every seven (7) days. COMBIGAN 0.2-0.5 % Drop ophthalmic solution Generic drug:  brimonidine-timolol INSTIL 1 DROP IN [facility] EYE 3 TIMES DAILY docusate sodium 100 mg capsule Commonly known as:  Corrina Silk Take 1 Cap by mouth two (2) times a day. dorzolamide 2 % ophthalmic solution Commonly known as:  TRUSOPT INSTIL 1 DROP IN [facility] EYE 3 TIMES DAILY  
  
 ergocalciferol 50,000 unit capsule Commonly known as:  ERGOCALCIFEROL  
50,000 Units. finasteride 5 mg tablet Commonly known as:  PROSCAR Take 1 Tab by mouth daily. fluticasone 50 mcg/actuation nasal spray Commonly known as:  FLONASE  
1 [place] by Both Nostrils route two (2) times a day. furosemide 40 mg tablet Commonly known as:  LASIX TAKE 1 TABLET BY MOUTH BID  
  
 glucose blood VI test strips strip Commonly known as:  ASCENSIA AUTODISC VI, ONE TOUCH ULTRA TEST VI Test twice daily:  Fasting before breakfast and 2 hours after dinner (log readings), One touch ultra 2 test strips please; Dx: 250.02  
  
 insulin lispro 100 unit/mL injection Commonly known as:  HUMALOG Check FSBS AC*HS For sugars between 160 and 200- Give 2 units SQ, For sugars between 201 and 250, Give 3 units SQ, For sugars Between 251 and 300, give 5 units SQ, For Sugars between 301 and 350, give 7 units SQ For sugars between 351 and 400, give 8 units SQ and contact PCP Insulin Needles (Disposable) 31 gauge x 5/16\" Ndle Use as directed with Levemir Insulin Pen. Iron 325 mg (65 mg iron) tablet Generic drug:  ferrous sulfate Take  by mouth Daily (before breakfast). Take 1 tablet by mouth once a week as per patient.  
  
 ketoconazole 2 % topical cream  
Commonly known as:  NIZORAL Apply  to affected area daily. Lancets Misc Commonly known as: One Touch [place] Test twice daily: Once in the morning fasting, then two hours after dinner (log results)  
  
 nitroglycerin 2 % ointment Commonly known as:  NITROBID Use 0.5 Inches to affected area Every Night at Bedtime. * OTHER PGIIL/P CRM - Apply 1 -2 grams ( 1-2 pumps) to left foot 3 - 4 times daily. * OTHER Wound Care as directed by Podiatrist  
  
 * OTHER Check CBC, CMP, Mg, CRP once every week while on Antibiotics through 5/7/16. results to PCP immediately. Diagnosis- Osteomyelitis * rosuvastatin 20 mg tablet Commonly known as:  CRESTOR Take 1 Tab by mouth nightly. * CRESTOR 10 mg tablet Generic drug:  rosuvastatin TAKE ONE TABLET BY MOUTH IN THE EVENING  
  
 VGO 20  
by Does Not Apply route. [address] [facility] [address] Generic drug:  sub-q insulin device, 30 unit [address] [facility] [address] Generic drug:  sub-q insulin device, 40 unit  
by Does Not Apply route. VITAMIN B-12 1,000 mcg tablet Generic drug:  cyanocobalamin Take 1,000 mcg by mouth daily. * Notice: This list has 5 medication(s) that are the same as other medications prescribed for you. Read the directions carefully, and ask your doctor or other care provider to review them with you. Prescriptions Sent to Pharmacy Refills  
 finasteride (PROSCAR) 5 mg tablet 6 Sig: Take 1 Tab by mouth daily. Class: Normal  
 Pharmacy: [facility], [address] [place] [address] #: [phone] Route: Oral  
 amLODIPine (NORVASC) 10 mg tablet 6 Sig: Take 1 Tab by mouth daily.   
 Class: Normal  
 [facility] #: [phone] Route: Oral  
 cloNIDine (CATAPRES) 0.3 mg/24 hr 1 Si Patch by TransDERmal route every seven (7) days. Class: Normal  
 [facility] #: [phone] Route: TransDERmal  
  
Follow-up Instructions Return in about 3 months (around 10/10/2017) for BP. Patient Instructions DASH Diet: Care Instructions Your Care Instructions The DASH diet is an eating plan that can help lower your blood pressure. DASH stands for Dietary Approaches to Stop Hypertension. Hypertension is high blood pressure. The DASH diet focuses on eating foods that are high in calcium, potassium, and magnesium. These nutrients can lower blood pressure. The foods that are highest in these nutrients are fruits, vegetables, low-fat dairy products, nuts, seeds, and legumes. But taking calcium, potassium, and magnesium supplements instead of eating foods that are high in those nutrients does not have the same effect. The DASH diet also includes whole grains, fish, and poultry. The DASH diet is one of several lifestyle changes your doctor may recommend to lower your high blood pressure. Your doctor may also want you to decrease the amount of sodium in your diet. Lowering sodium while following the DASH diet can lower blood pressure even further than just the DASH diet alone. Follow-up care is a key part of your treatment and safety. Be sure to make and go to all appointments, and call your doctor if you are having problems. It's also a good idea to know your test results and keep a list of the medicines you take. How can you care for yourself at home? Following the DASH diet · Eat 4 to 5 servings of fruit each day.  A serving is 1 medium-sized piece of fruit, ½ cup chopped or canned fruit, 1/4 cup dried fruit, or 4 ounces (½ cup) of fruit juice. Choose fruit more often than fruit juice. · Eat 4 to 5 servings of vegetables each day. A serving is 1 cup of lettuce or raw leafy vegetables, ½ cup of chopped or cooked vegetables, or 4 ounces (½ cup) of vegetable juice. Choose vegetables more often than vegetable juice. · Get 2 to 3 servings of low-fat and fat-free dairy each day. A serving is 8 ounces of milk, 1 cup of yogurt, or 1 ½ ounces of cheese. · Eat 6 to 8 servings of grains each day. A serving is 1 slice of bread, 1 ounce of dry cereal, or ½ cup of cooked rice, pasta, or cooked cereal. Try to choose whole-grain products as much as possible. · Limit lean meat, poultry, and fish to 2 servings each day. A serving is 3 ounces, about the size of a deck of cards. · Eat 4 to 5 servings of nuts, seeds, and legumes (cooked dried beans, lentils, and split peas) each week. A serving is 1/3 cup of nuts, 2 tablespoons of seeds, or ½ cup of cooked beans or peas. · Limit fats and oils to 2 to 3 servings each day. A serving is 1 teaspoon of vegetable oil or 2 tablespoons of salad dressing. · Limit sweets and added sugars to 5 servings or less a week. A serving is 1 tablespoon jelly or jam, ½ cup sorbet, or 1 cup of lemonade. · Eat less than 2,300 milligrams (mg) of sodium a day. If you limit your sodium to 1,500 mg a day, you can lower your blood pressure even more. Tips for success · Start small. Do not try to make dramatic changes to your diet all at once. You might feel that you are missing out on your favorite foods and then be more likely to not follow the plan. Make small changes, and stick with them. Once those changes become habit, add a few more changes. · Try some of the following: ¨ Make it a goal to eat a fruit or vegetable at every meal and at snacks. This will make it easy to get the recommended amount of fruits and vegetables each day. ¨ Try yogurt topped with fruit and nuts for a snack or healthy dessert. ¨ Add lettuce, tomato, cucumber, and onion to sandwiches. ¨ Combine a ready-made pizza crust with low-fat mozzarella cheese and lots of vegetable toppings. Try using tomatoes, squash, spinach, broccoli, carrots, cauliflower, and onions. ¨ Have a variety of cut-up vegetables with a low-fat dip as an appetizer instead of chips and dip. ¨ Sprinkle sunflower seeds or chopped almonds over salads. Or try adding chopped walnuts or almonds to cooked vegetables. ¨ Try some vegetarian meals using beans and peas. Add garbanzo or kidney beans to salads. Make burritos and tacos with mashed estrada beans or black beans. Where can you learn more? Go to http://hoang-jayden.info/. Enter Z457 in the search box to learn more about \"DASH Diet: Care Instructions. \" Current as of: April 3, 2017 Content Version: 11.3 © 1774-2908 [employer]. Care instructions adapted under license by [employer] (which disclaims liability or warranty for this information). If you have questions about a medical condition or this instruction, always ask your healthcare professional. [address] any warranty or liability for your use of this information. Introducing [facility]! Kathy Hidalgo introduces e-Rewards patient portal. Now you can access parts of your medical record, email your doctor's office, and request medication refills online. 1. In your internet browser, go to [email or web address] 2. Click on the First Time User? Click Here link in the Sign In box. You will see the New Member Sign Up page. 3. Enter your e-Rewards Access Code exactly as it appears below. You will not need to use this code after youve completed the sign-up process. If you do not sign up before the expiration date, you must request a new code. · e-Rewards Access Code: [email or web address] Expires: 10/8/2017 12:29 PM 
 
 4. Enter the last four digits of your Social Security Number (xxxx) and Date of Birth (mm/dd/yyyy) as indicated and click Submit. You will be taken to the next sign-up page. 5. Create a [employer] ID. This will be your [employer] login ID and cannot be changed, so think of one that is secure and easy to remember. 6. Create a [employer] password. You can change your password at any time. 7. Enter your Password Reset Question and Answer. This can be used at a later time if you forget your password. 8. Enter your e-mail address. You will receive e-mail notification when new information is available in [address] 9. Click Sign Up. You can now view and download portions of your medical record. 10. Click the Download Summary menu link to download a portable copy of your medical information. If you have questions, please visit the Frequently Asked Questions section of the [employer] website. Remember, [employer] is NOT to be used for urgent needs. For medical emergencies, dial 911. Now available from your iPhone and Android! Please provide this summary of care documentation to your next provider. Your primary care clinician is listed as [address]. If you have any questions after today's visit, please call [phone].

## 2017-07-10 NOTE — PATIENT INSTRUCTIONS

## 2017-07-10 NOTE — PROGRESS NOTES
HISTORY OF PRESENT ILLNESS  Antionette Richardson is a 64 y.o. male. HPI  Patient is here today for evaluation and treatment of: Hypertension/Cholesterol problem    Hypertension: Has had BPs in the 160s /90s. Takes meds as listed below; he has been out of the catapres patch; he needs a refill; He does see cardiology. Has noted some weight gain. Wt Readings from Last 3 Encounters:   07/10/17 202 lb (91.6 kg)   05/10/17 188 lb (85.3 kg)   04/28/17 190 lb (86.2 kg)         Cholesterol: Pt is nonfasting;  Has had just had blood drawn this am and results are pending. Pt states he has been taken off losartan ;  States renal and the specialists while he was in the hospital told him med was not helping the kidneys. Current Outpatient Prescriptions:     amLODIPine (NORVASC) 10 mg tablet, Take 10 mg by mouth daily. , Disp: , Rfl:     sub-q insulin device, 40 unit (VGO 40) alicia, by Does Not Apply route., Disp: , Rfl:     CRESTOR 10 mg tablet, TAKE ONE TABLET BY MOUTH IN THE EVENING, Disp: 30 Tab, Rfl: 4    furosemide (LASIX) 40 mg tablet, TAKE 1 TABLET BY MOUTH BID, Disp: , Rfl: 1    nitroglycerin (NITROBID) 2 % ointment, Use 0.5 Inches to affected area Every Night at Bedtime. , Disp: , Rfl:     finasteride (PROSCAR) 5 mg tablet, Take 5 mg by mouth daily. , Disp: , Rfl:     ergocalciferol (ERGOCALCIFEROL) 50,000 unit capsule, 50,000 Units. , Disp: , Rfl:     dorzolamide (TRUSOPT) 2 % ophthalmic solution, INSTIL 1 DROP IN EACH EYE 3 TIMES DAILY, Disp: , Rfl:     brimonidine-timolol (COMBIGAN) 0.2-0.5 % drop ophthalmic solution, INSTIL 1 DROP IN EACH EYE 3 TIMES DAILY, Disp: , Rfl:     ketoconazole (NIZORAL) 2 % topical cream, Apply  to affected area daily. , Disp: 15 g, Rfl: 1    OTHER, PGIIL/P CRM - Apply 1 -2 grams ( 1-2 pumps) to left foot 3 - 4 times daily. , Disp: , Rfl:     cloNIDine (CATAPRES) 0.3 mg/24 hr, 1 Patch by TransDERmal route every seven (7) days. , Disp: 12 Patch, Rfl: 1    calcitRIOL (ROCALTROL) 0.25 mcg capsule, TAKE ONE CAPSULE BY MOUTH EVERY MONDAY, WEDNESDAY, AND FRIDAY, Disp: , Rfl: 2    rosuvastatin (CRESTOR) 20 mg tablet, Take 1 Tab by mouth nightly., Disp: 30 Tab, Rfl: 6    OTHER, Wound Care as directed by Podiatrist, Disp: 1 Each, Rfl: 0    docusate sodium (COLACE) 100 mg capsule, Take 1 Cap by mouth two (2) times a day., Disp: 60 Cap, Rfl: 0    insulin lispro (HUMALOG) 100 unit/mL injection, Check FSBS AC*HS For sugars between 160 and 200- Give 2 units SQ, For sugars between 201 and 250, Give 3 units SQ, For sugars Between 251 and 300, give 5 units SQ, For Sugars between 301 and 350, give 7 units SQ For sugars between 351 and 400, give 8 units SQ and contact PCP, Disp: 1 Vial, Rfl: 1    glucose blood VI test strips (ASCENSIA AUTODISC VI, ONE TOUCH ULTRA TEST VI) strip, Test twice daily:  Fasting before breakfast and 2 hours after dinner (log readings), One touch ultra 2 test strips please; Dx: 250.02, Disp: 1 Package, Rfl: 11    fluticasone (FLONASE) 50 mcg/actuation nasal spray, 1 [place] by Both Nostrils route two (2) times a day., Disp: 1 Bottle, Rfl: 2    Insulin Needles, Disposable, 31 X 5/16 \" Ndle, Use as directed with Levemir Insulin Pen., Disp: 1 Package, Rfl: 11    ferrous sulfate (IRON) 325 mg (65 mg iron) tablet, Take  by mouth Daily (before breakfast). Take 1 tablet by mouth once a week as per patient., Disp: , Rfl:     Lancets (ONE TOUCH DELICA) Misc, Test twice daily: Once in the morning fasting, then two hours after dinner (log results), Disp: 1 Package, Rfl: 11    Blood-Glucose Meter monitoring kit, by Does Not Apply route. One touch ultra2, Disp: 1 Kit, Rfl: 0    cyanocobalamin (VITAMIN B-12) 1,000 mcg tablet, Take 1,000 mcg by mouth daily. , Disp: , Rfl:     sub-q insulin device, 30 unit (VGO 30) alicia, , Disp: , Rfl:     SUB-Q INSULIN DEVICE, 20 UNIT (VGO 20), by Does Not Apply route., Disp: , Rfl:     OTHER, Check CBC, CMP, Mg, CRP once every week while on Antibiotics through 5/7/16. results to PCP immediately. Diagnosis- Osteomyelitis, Disp: 1 Each, Rfl: 6      PMH,  Meds, Allergies, Family History, Social history reviewed    Review of Systems   Eyes:        Has had recent laser treatment; thinks the vision is worsening. Respiratory: Positive for shortness of breath (on ocassion;  none significant surrently. ). Musculoskeletal:        Neuropathic pain in feet       Physical Exam   Constitutional: He appears well-developed and well-nourished. No distress. Cardiovascular: Normal rate and regular rhythm. Exam reveals no gallop and no friction rub. No murmur heard. Pulmonary/Chest: Breath sounds normal. No respiratory distress. He has no wheezes. He has no rales. Musculoskeletal: He exhibits edema (in bilateral feet). Nursing note and vitals reviewed. Visit Vitals    BP (!) 180/100    Pulse 84    Temp 97.9 °F (36.6 °C) (Oral)    Resp 16    Ht 5' 6\" (1.676 m)    Wt 202 lb (91.6 kg)    SpO2 98%    BMI 32.6 kg/m2       Lab Results   Component Value Date/Time    Cholesterol, total 214 02/28/2017 09:22 AM    HDL Cholesterol 71 02/28/2017 09:22 AM    LDL, calculated 113.4 02/28/2017 09:22 AM    VLDL, calculated 29.6 02/28/2017 09:22 AM    Triglyceride 148 02/28/2017 09:22 AM    CHOL/HDL Ratio 3.0 02/28/2017 09:22 AM     Lab Results   Component Value Date/Time    Sodium 132 02/28/2017 09:22 AM    Potassium 3.9 02/28/2017 09:22 AM    Chloride 93 02/28/2017 09:22 AM    CO2 29 02/28/2017 09:22 AM    Anion gap 10 02/28/2017 09:22 AM    Glucose 221 02/28/2017 09:22 AM    BUN 63 02/28/2017 09:22 AM    Creatinine 4.31 02/28/2017 09:22 AM    BUN/Creatinine ratio 15 02/28/2017 09:22 AM    GFR est AA 17 02/28/2017 09:22 AM    GFR est non-AA 14 02/28/2017 09:22 AM    Calcium 8.9 02/28/2017 09:22 AM         ASSESSMENT and PLAN    ICD-10-CM ICD-9-CM    1. HTN (hypertension), benign I10 401.1    2.  Hyperlipidemia, unspecified hyperlipidemia type E78.5 272.4 As above, BP not controlled due to no clonidine  Refilled clonidine  Continue other current meds  Follow up with specialists as scheduled  Follow-up Disposition:  Return in about 3 months (around 10/10/2017) for BP. An After Visit Summary was printed and given to the patient. This has been fully explained to the patient, who indicates understanding.

## 2017-07-28 ENCOUNTER — OFFICE VISIT (OUTPATIENT)
Dept: CARDIOLOGY CLINIC | Age: 57
End: 2017-07-28

## 2017-07-28 VITALS
BODY MASS INDEX: 32.14 KG/M2 | DIASTOLIC BLOOD PRESSURE: 110 MMHG | HEIGHT: 66 IN | WEIGHT: 200 LBS | OXYGEN SATURATION: 97 % | SYSTOLIC BLOOD PRESSURE: 240 MMHG | HEART RATE: 81 BPM

## 2017-07-28 DIAGNOSIS — N18.3 CKD (CHRONIC KIDNEY DISEASE), STAGE 3 (MODERATE): ICD-10-CM

## 2017-07-28 DIAGNOSIS — R60.0 BILATERAL LOWER EXTREMITY EDEMA: ICD-10-CM

## 2017-07-28 DIAGNOSIS — I10 MALIGNANT HYPERTENSION: Primary | ICD-10-CM

## 2017-07-28 DIAGNOSIS — E78.5 HYPERLIPIDEMIA, UNSPECIFIED HYPERLIPIDEMIA TYPE: ICD-10-CM

## 2017-07-28 DIAGNOSIS — R06.09 DOE (DYSPNEA ON EXERTION): ICD-10-CM

## 2017-07-28 RX ORDER — BUMETANIDE 2 MG/1
2 TABLET ORAL DAILY
Qty: 30 TAB | Refills: 6 | Status: SHIPPED | OUTPATIENT
Start: 2017-07-28 | End: 2017-08-17 | Stop reason: SDUPTHER

## 2017-07-28 NOTE — PATIENT INSTRUCTIONS
Restart Clonidine patch 0.3/24 hours - 1 patch every 7 days  Discontinue Lasix (furosemide)  Begin Bumex 2mg - one tablet daily  BMP to be done prior to follow-up  Follow-up with Jadiel Holliday in 2 weeks   Weigh daily and record.   If you you lose 8-10 pounds prior to returning for follow-up, call the office so we can adjust your fluid pill  Low sodium diet 2000mg diet  Follow-up with Dr. Whitney Milian as scheduled and as needed

## 2017-07-28 NOTE — MR AVS SNAPSHOT
Visit Information Date & Time Provider Department Dept. Phone Encounter #  
 7/28/2017  2:00 PM Karlie Rodriguez NP [facility] [address] [id number] Your Appointments 8/11/2017  9:30 AM  
Follow Up with Karlie Rodriguez NP [facility] [place] [facility] ([address]) Appt Note: 2 week  f/u HTN  
 [address] [place] [address]  
[phone] Stewart Louann  
  
    
 10/10/2017 10:40 AM  
ROUTINE CARE with Camille Felton MD  
[address] [facility] [address] [facility]) Appt Note: 3m fu BP; .  
 [address] [address] [address] [place] [address] [address]  
  
    
 10/11/2017  2:40 PM  
Follow Up with Jacek Medrano MD  
[facility] ([address] [place] [address]) Appt Note: 3 mth f/up; 6/9/17 - lmom to r/s june appt. provider out of office plk; 6/9/17 - lmom to r/s june appt. provider out of office plk; 6/13/17 - lmom to r/s june appt provider out of office plk; called 2x and mailed r/s letter - Jenifer Curtis; Rescheduling Appointment From 06/22/2017 [place] [id number]  
[phone] [address] [place] [address] [address] Upcoming Health Maintenance Date Due INFLUENZA AGE 9 TO ADULT 8/1/2017 HEMOGLOBIN A1C Q6M 9/21/2017 LIPID PANEL Q1 2/28/2018 FOOT EXAM Q1 3/21/2018 MICROALBUMIN Q1 3/21/2018 EYE EXAM RETINAL OR DILATED Q1 5/8/2018 COLONOSCOPY 10/9/2023 DTaP/Tdap/Td series (2 - Td) 3/21/2027 Allergies as of 7/28/2017  Review Complete On: 7/10/2017 By: Camille Felton MD  
  
 Severity Noted Reaction Type Reactions Bactrim [Sulfamethoprim Ds]  06/06/2014   Side Effect Nausea and Vomiting Current Immunizations  Reviewed on 5/7/2016 No immunizations on file. Not reviewed this visit You Were Diagnosed With   
  
 Codes Comments Malignant hypertension    -  Primary ICD-10-CM: I10 
ICD-9-CM: 401.0 CKD (chronic kidney disease), stage 3 (moderate)     ICD-10-CM: N18.3 ICD-9-CM: 657. 3 Hyperlipidemia, unspecified hyperlipidemia type     ICD-10-CM: E78.5 ICD-9-CM: 272.4 Bilateral lower extremity edema     ICD-10-CM: R60.0 ICD-9-CM: 782.3 ABRAMS (dyspnea on exertion)     ICD-10-CM: R06.09 
ICD-9-CM: 786.09 Vitals BP Pulse Height(growth percentile) Weight(growth percentile) SpO2 BMI  
 (!) 210/102 (BP 1 Location: Left arm, BP Patient Position: Sitting) 81 5' 6\" (1.676 m) 200 lb (90.7 kg) 97% 32.28 kg/m2 Smoking Status Never Smoker BMI and BSA Data Body Mass Index Body Surface Area  
 32.28 kg/m 2 2.06 m 2 Preferred Pharmacy Pharmacy Name Phone [facility], [address] [place] [address] [phone] Your Updated Medication List  
  
   
This list is accurate as of: 7/28/17  3:19 PM.  Always use your most recent med list. amLODIPine 10 mg tablet Commonly known as:  Achilles [place] Take 1 Tab by mouth daily. Blood-Glucose Meter monitoring kit  
by Does Not Apply route. One touch ultra2  
  
 calcitRIOL 0.25 mcg capsule Commonly known as:  ROCALTROL  
TAKE ONE CAPSULE BY MOUTH EVERY MONDAY, WEDNESDAY, AND FRIDAY  
  
 cloNIDine 0.3 mg/24 hr  
Commonly known as:  CATAPRES  
1 Patch by TransDERmal route every seven (7) days. COMBIGAN 0.2-0.5 % Drop ophthalmic solution Generic drug:  brimonidine-timolol INSTIL 1 DROP IN [facility] EYE 3 TIMES DAILY docusate sodium 100 mg capsule Commonly known as:  Carole Breech Take 1 Cap by mouth two (2) times a day. dorzolamide 2 % ophthalmic solution Commonly known as:  TRUSOPT INSTIL 1 DROP IN [facility] EYE 3 TIMES DAILY  
  
 ergocalciferol 50,000 unit capsule Commonly known as:  ERGOCALCIFEROL  
50,000 Units. finasteride 5 mg tablet Commonly known as:  PROSCAR Take 1 Tab by mouth daily. fluticasone 50 mcg/actuation nasal spray Commonly known as:  FLONASE  
1 [place] by Both Nostrils route two (2) times a day. furosemide 40 mg tablet Commonly known as:  LASIX TAKE 1 TABLET BY MOUTH BID  
  
 glucose blood VI test strips strip Commonly known as:  ASCENSIA AUTODISC VI, ONE TOUCH ULTRA TEST VI Test twice daily:  Fasting before breakfast and 2 hours after dinner (log readings), One touch ultra 2 test strips please; Dx: 250.02  
  
 insulin lispro 100 unit/mL injection Commonly known as:  HUMALOG Check FSBS AC*HS For sugars between 160 and 200- Give 2 units SQ, For sugars between 201 and 250, Give 3 units SQ, For sugars Between 251 and 300, give 5 units SQ, For Sugars between 301 and 350, give 7 units SQ For sugars between 351 and 400, give 8 units SQ and contact PCP Insulin Needles (Disposable) 31 gauge x 5/16\" Ndle Use as directed with Levemir Insulin Pen. Iron 325 mg (65 mg iron) tablet Generic drug:  ferrous sulfate Take  by mouth Daily (before breakfast). Take 1 tablet by mouth once a week as per patient.  
  
 ketoconazole 2 % topical cream  
Commonly known as:  NIZORAL Apply  to affected area daily. Lancets Misc Commonly known as: One Touch Sandi Moisés Test twice daily: Once in the morning fasting, then two hours after dinner (log results)  
  
 nitroglycerin 2 % ointment Commonly known as:  NITROBID Use 0.5 Inches to affected area Every Night at Bedtime. OTHER PGIIL/P CRM - Apply 1 -2 grams ( 1-2 pumps) to left foot 3 - 4 times daily. * rosuvastatin 20 mg tablet Commonly known as:  CRESTOR Take 1 Tab by mouth nightly. * CRESTOR 10 mg tablet Generic drug:  rosuvastatin TAKE ONE TABLET BY MOUTH IN THE EVENING  
  
 VGO 20  
by Does Not Apply route. [facility] Generic drug:  sub-q insulin device, 30 unit Melba Valentino Generic drug:  sub-q insulin device, 40 unit  
by Does Not Apply route. VITAMIN B-12 1,000 mcg tablet Generic drug:  cyanocobalamin Take 1,000 mcg by mouth daily. * Notice: This list has 2 medication(s) that are the same as other medications prescribed for you. Read the directions carefully, and ask your doctor or other care provider to review them with you. We Performed the Following AMB POC EKG ROUTINE W/ 12 LEADS, INTER & REP [80676 CPT(R)] To-Do List   
 Around 08/10/2017 Lab:  METABOLIC PANEL, BASIC Patient Instructions Restart Clonidine patch 0.3/24 hours - 1 patch every 7 days Discontinue Lasix (furosemide) Begin Bumex 2mg - one tablet daily BMP to be done prior to follow-up Follow-up with Lucille Lindsey in 2 weeks Weigh daily and record. If you you lose 8-10 pounds prior to returning for follow-up, call the office so we can adjust your fluid pill Low sodium diet 2000mg diet Follow-up with Dr. Jill Leon as scheduled and as needed Introducing [facility]! [facility] introduces [employer] patient portal. Now you can access parts of your medical record, email your doctor's office, and request medication refills online. 1. In your internet browser, go to [email or web address] 2. Click on the First Time User? Click Here link in the Sign In box. You will see the New Member Sign Up page. 3. Enter your [employer] Access Code exactly as it appears below. You will not need to use this code after youve completed the sign-up process. If you do not sign up before the expiration date, you must request a new code. · [employer] Access Code: [email or web address] Expires: 10/8/2017 12:29 PM 
 
4. Enter the last four digits of your Social Security Number (xxxx) and Date of Birth (mm/dd/yyyy) as indicated and click Submit. You will be taken to the next sign-up page. 5. Create a [employer] ID. This will be your [employer] login ID and cannot be changed, so think of one that is secure and easy to remember. 6. Create a [employer] password. You can change your password at any time. 7. Enter your Password Reset Question and Answer. This can be used at a later time if you forget your password. 8. Enter your e-mail address. You will receive e-mail notification when new information is available in [address] 9. Click Sign Up. You can now view and download portions of your medical record. 10. Click the Download Summary menu link to download a portable copy of your medical information. If you have questions, please visit the Frequently Asked Questions section of the [employer] website. Remember, [employer] is NOT to be used for urgent needs. For medical emergencies, dial 911. Now available from your iPhone and Android! Please provide this summary of care documentation to your next provider. Your primary care clinician is listed as [address]. If you have any questions after today's visit, please call [phone].

## 2017-07-28 NOTE — PROGRESS NOTES
Sohail Waite presents today for evaluation of complaints of increased lower extremity edema, 10 pound weight gain, and elevated blood pressures. His wife called the office to schedule this appointment. They state that he has been without his clonidine patch for about a month. They report that previously the clonidine patches were around $40 a month but recently the price increased to about $100 a month and they were unable to afford the medication. He is a 27-year-old -American male with history of chronic kidney disease stage III-IV, long-standing poorly controlled diabetes and hypertension, dyslipidemia, and intermittent compliance with his medications. He also has significant autonomic dysfunction as noted on the tilt table study done in March 2017. He was [facility] but it is unclear whether or not he followed up with them. Both the patient and his wife states that compliance with his medications has been dependent on their finances. He states that at times, he is unable to afford all of his medications. He was last seen by Dr. Eric Patterson in March 2017. His last echocardiogram was done in March 2017 which showed a ejection fraction of 65% with significant concentric LVH. His last treadmill stress test was done in November 2012 which was considered and negative maximal stress test.  He had a renal artery duplex study done in Oct. 2016, which showed no significant renal artery stenosis. During his March 2017 appointment with Dr. Eric Patterson, his blood pressure was also quite elevated at 224/112 and at that time, they also reported that he had been without his clonidine patch. Per Dr. Kamryn Zheng last office note, he stated that a supine blood pressure around 894-460 mmHg systolic would be acceptable due to his history of orthostatic hypotension attributed to autonomic dysfunction. He continues to complain of fatigue.  He denies chest pain, tightness, heaviness, and palpitations. He denies shortness of breath at rest, admits to dyspnea on exertion, denies orthopnea and PND. He denies abdominal bloating. He denies lightheadedness, admits to occasional dizziness, and denies syncope. He admits to increased lower extremity edema and denies claudication. Denies nausea, vomiting, diarrhea, fever, chills. He is followed by Dr. Raegan Lemon and MrTimur Michaud states that hemodialysis has been discussed with him. His wife states that he is taking his Lasix 40 mg twice a day but it does not seem to be working. PMH:  Past Medical History:   Diagnosis Date    Cardiac echocardiogram 10/21/2016    EF 60-65%. No WMA. Mod-marked LVH. Normal diastolic fx. No significant valvular heart disease.  Cardiac treadmill stress test, low risk 11/02/2012    Negative maximal exercise treadmill test.  Ex time 7 min 45 sec.  Cardiovascular LE peripheral arterial testing 03/22/2016    No significant peripheral arterial disease at rest bilaterally. ABIs deferred due to calcified vessels.  Cardiovascular LLE venous duplex 06/06/2012    Left leg:  No DVT.  Cardiovascular renal duplex 10/21/2016    No significant renal artery stenosis.  CKD (chronic kidney disease) stage 3, GFR 30-59 ml/min     Diabetes mellitus ([address] 3/12/2010    Diabetic retinopathy ([facility] [address] 5/4/2010    Eye examination 5/4/10    OU: 20/20; OD: 20/25; OS: 20/25 without correction.  HLD (hyperlipidemia) 3/12/2010    HTN (hypertension) 3/12/2010       PSH:  Past Surgical History:   Procedure Laterality Date    HX HERNIA REPAIR  2005       MEDS:  Current Outpatient Prescriptions   Medication Sig    finasteride (PROSCAR) 5 mg tablet Take 1 Tab by mouth daily.  amLODIPine (NORVASC) 10 mg tablet Take 1 Tab by mouth daily.  cloNIDine (CATAPRES) 0.3 mg/24 hr 1 Patch by TransDERmal route every seven (7) days.     sub-q insulin device, 40 unit (VGO 40) alicia by Does Not Apply route.    CRESTOR 10 mg tablet TAKE ONE TABLET BY MOUTH IN THE EVENING    furosemide (LASIX) 40 mg tablet TAKE 1 TABLET BY MOUTH BID    nitroglycerin (NITROBID) 2 % ointment Use 0.5 Inches to affected area Every Night at Bedtime.  ergocalciferol (ERGOCALCIFEROL) 50,000 unit capsule 50,000 Units.  dorzolamide (TRUSOPT) 2 % ophthalmic solution INSTIL 1 DROP IN EACH EYE 3 TIMES DAILY    brimonidine-timolol (COMBIGAN) 0.2-0.5 % drop ophthalmic solution INSTIL 1 DROP IN EACH EYE 3 TIMES DAILY    sub-q insulin device, 30 unit (VGO 30) alicia     ketoconazole (NIZORAL) 2 % topical cream Apply  to affected area daily.  OTHER PGIIL/P CRM - Apply 1 -2 grams ( 1-2 pumps) to left foot 3 - 4 times daily.  calcitRIOL (ROCALTROL) 0.25 mcg capsule TAKE ONE CAPSULE BY MOUTH EVERY MONDAY, WEDNESDAY, AND FRIDAY    rosuvastatin (CRESTOR) 20 mg tablet Take 1 Tab by mouth nightly.  SUB-Q INSULIN DEVICE, 20 UNIT (VGO 20) by Does Not Apply route.  docusate sodium (COLACE) 100 mg capsule Take 1 Cap by mouth two (2) times a day.  insulin lispro (HUMALOG) 100 unit/mL injection Check FSBS AC*HS  For sugars between 160 and 200- Give 2 units SQ,  For sugars between 201 and 250, Give 3 units SQ,  For sugars Between 251 and 300, give 5 units SQ,  For Sugars between 301 and 350, give 7 units SQ  For sugars between 351 and 400, give 8 units SQ and contact PCP    glucose blood VI test strips (ASCENSIA AUTODISC VI, ONE TOUCH ULTRA TEST VI) strip Test twice daily:  Fasting before breakfast and 2 hours after dinner (log readings), One touch ultra 2 test strips please; Dx: 250.02    fluticasone (FLONASE) 50 mcg/actuation nasal spray 1 [place] by Both Nostrils route two (2) times a day.  Insulin Needles, Disposable, 31 X 5/16 \" Ndle Use as directed with Levemir Insulin Pen.  ferrous sulfate (IRON) 325 mg (65 mg iron) tablet Take  by mouth Daily (before breakfast). Take 1 tablet by mouth once a week as per patient.     Lancets (ONE TOUCH DELICA) Misc Test twice daily: Once in the morning fasting, then two hours after dinner (log results)    Blood-Glucose Meter monitoring kit by Does Not Apply route. One touch ultra2    cyanocobalamin (VITAMIN B-12) 1,000 mcg tablet Take 1,000 mcg by mouth daily. No current facility-administered medications for this visit. Allergies and Sensitivities:  Allergies   Allergen Reactions    Bactrim [Sulfamethoprim Ds] Nausea and Vomiting       Family History:  Family History   Problem Relation Age of Onset    Diabetes Sister     Sickle Cell Anemia Sister     Diabetes Sister        Social History:  He  reports that he has never smoked. He has never used smokeless tobacco.  He  reports that he does not drink alcohol. Physical:  Visit Vitals    BP (!) 210/102 (BP 1 Location: Left arm, BP Patient Position: Sitting)    Pulse 81    Ht 5' 6\" (1.676 m)    Wt 90.7 kg (200 lb)    SpO2 97%    BMI 32.28 kg/m2         Exam:  Neck:  Supple, no JVD, no carotid bruits  CV:  Normal S1 and  S2, no murmurs, rubs, or gallops noted  Lungs:  Clear to ausculation throughout, no wheezes or rales  Abd:  Soft, non-tender, non-distended with good bowel sounds. No hepatosplenomegaly  Extremities:  2+ to 3+ pitting lower extremity edema bilaterally      Data:  EKG:    Normal sinus rhythm, rate 81.  Mild T-wave inversions inferolaterally and in the high lateral leads, possible ischemia.       LABS:  Lab Results   Component Value Date/Time    Sodium 140 07/10/2017 10:00 AM    Potassium 3.6 07/10/2017 10:00 AM    Chloride 105 07/10/2017 10:00 AM    CO2 25 07/10/2017 10:00 AM    Glucose 80 07/10/2017 10:00 AM    BUN 39 07/10/2017 10:00 AM    Creatinine 4.54 07/10/2017 10:00 AM     Lab Results   Component Value Date/Time    Cholesterol, total 214 02/28/2017 09:22 AM    HDL Cholesterol 71 02/28/2017 09:22 AM    LDL, calculated 113.4 02/28/2017 09:22 AM    Triglyceride 148 02/28/2017 09:22 AM    CHOL/HDL Ratio 3.0 02/28/2017 09:22 AM     Lab Results   Component Value Date/Time    ALT (SGPT) 36 02/28/2017 09:22 AM       Impression / Plan:  1. Malignant hypertension, blood pressure quite elevated but has been without his Clonidine patch for about 1 month  2. History of orthostatic hypotension due to autonomic dysfunction as noted during tilt table study in March 2017  3. Hypercholesterolemia, on rosuvastatin 20 mg  4. Diabetes mellitus, recommend Hgb A1c less than 7% from cardiac standpoint  5. Chronic kidney disease, stage 3-4, followed by Dr. Nelson Nick    Mr. Mindy Dial was seen today for evaluation of complaints of increased lower extremity edema, 10 pound weight gain, and elevated blood pressures. He states that he has been without his clonidine patch for about a month due to limited finances. His wife states that previously the clonidine patches were about $40 a month but recently the price increased to about $100 a month and they could not afford the medication. That I can tell, they did not report this to the office. If so, we likely would have at least started him on oral clonidine for blood pressure control. I asked that they check with other pharmacies in the area with regards to cost of the patches and if they find a pharmacy that is much more affordable, they should obtain the clonidine patches from that pharmacy. His wife states that he is taking his Lasix 40 mg twice a day but it does not seem to be effective in decreasing his lower extremity edema. I explained to them that it may be multifactorial in that he is taking a calcium channel blocker, his blood pressures are markedly elevated, and he has chronic kidney disease with a creatinine around 4.5. He states that the edema worsens throughout the day and is actually not present when he first wakes up in the morning which also may indicate some venous insufficiency.   He states that the edema does improve to some degree if he keeps his legs elevated. His weight is up 10 pounds since his previous office visit and he complains of increased lower extremity edema. At this time, I asked that they discontinue the Lasix and he will be started on Bumex 2 mg once a day. After a long discussion regarding his blood pressure management, they state that they will go ahead and pay for the clonidine patches as they state that when he was on it, his blood pressure was much better controlled. He will return for follow-up with me in 2 weeks and I asked that he have a BMP done prior to returning for follow-up. If the Bumex is effective in diuresing him and he loses 8-10 pounds prior to returning for follow-up, I asked that they call the office so that we can decrease the dose of the Bumex. The importance of a low-sodium diet was again discussed with him and his wife. He states that he is trying to stay within the recommended limit of 3521-2039 mg per day. He will follow-up with Dr. Giselle Garces as scheduled and as needed. Roman Panchal MSN, FNP-BC    Please note:  Portions of this chart were created with Dragon medical speech to text program.  Unrecognized errors may be present.

## 2017-08-10 ENCOUNTER — TELEPHONE (OUTPATIENT)
Dept: FAMILY MEDICINE CLINIC | Age: 57
End: 2017-08-10

## 2017-08-10 LAB
BUN SERPL-MCNC: 37 MG/DL (ref 6–24)
BUN/CREAT SERPL: 8 (ref 9–20)
CALCIUM SERPL-MCNC: 8.7 MG/DL (ref 8.7–10.2)
CHLORIDE SERPL-SCNC: 100 MMOL/L (ref 96–106)
CO2 SERPL-SCNC: 25 MMOL/L (ref 18–29)
CREAT SERPL-MCNC: 4.7 MG/DL (ref 0.76–1.27)
GLUCOSE SERPL-MCNC: 83 MG/DL (ref 65–99)
POTASSIUM SERPL-SCNC: 3.9 MMOL/L (ref 3.5–5.2)
SODIUM SERPL-SCNC: 138 MMOL/L (ref 134–144)

## 2017-08-10 NOTE — TELEPHONE ENCOUNTER
Pt wife called nidia wanting to speak with Dr. Arnold Gomez regarding  health didn't want to give no information please call [phone]. Pt came into the office today thinking she had an appt.  Pt was seen in July not due back until October

## 2017-08-10 NOTE — TELEPHONE ENCOUNTER
Called and talked to patients wife per wife's request. VICK on file. Pt's wife states that she has taken pt to the ED as he had some testicular swelling. Pt is currently in the ED. Evaluation in progress. Pt is advised to make a  follow up appointment after ED visit.

## 2017-08-10 NOTE — TELEPHONE ENCOUNTER
Returning call to patient ---paged on call provider. Spoke to patient and wife. Patient reports that he has been experiencing increasing testicular enlargement to the size of a grapefruit for past several days. He has gained 12 pounds. He is reporting ABRAMS. He reports that he is taking all prescribed medications. Patient reports that he and wife called office today and have not heard back. ROS: denies chest pain, +peripheral edema, denies dyspnea at rest.  Reviewed last cardiology note with patient and he affirms that he is taking bumex and clonidine patch along with other routinely prescribed medications. Advised to go the emergency room for further evaluation of symptoms. Patient and wife report that they are already on the way to [facility] ED. This provider advises that PCP will be notified. Patient and wife verbalize understanding.    Cheri RHOADES

## 2017-08-17 ENCOUNTER — OFFICE VISIT (OUTPATIENT)
Dept: CARDIOLOGY CLINIC | Age: 57
End: 2017-08-17

## 2017-08-17 VITALS
DIASTOLIC BLOOD PRESSURE: 80 MMHG | HEART RATE: 67 BPM | SYSTOLIC BLOOD PRESSURE: 130 MMHG | OXYGEN SATURATION: 98 % | HEIGHT: 66 IN | BODY MASS INDEX: 31.82 KG/M2 | WEIGHT: 198 LBS

## 2017-08-17 DIAGNOSIS — E78.5 HYPERLIPIDEMIA, UNSPECIFIED HYPERLIPIDEMIA TYPE: ICD-10-CM

## 2017-08-17 DIAGNOSIS — I10 MALIGNANT HYPERTENSION: Primary | ICD-10-CM

## 2017-08-17 DIAGNOSIS — N18.3 CKD (CHRONIC KIDNEY DISEASE), STAGE 3 (MODERATE): ICD-10-CM

## 2017-08-17 RX ORDER — BUMETANIDE 2 MG/1
1 TABLET ORAL 2 TIMES DAILY
Qty: 1 TAB | Refills: 0
Start: 2017-08-17 | End: 2018-01-11 | Stop reason: SDUPTHER

## 2017-08-17 RX ORDER — HYDRALAZINE HYDROCHLORIDE 50 MG/1
75 TABLET, FILM COATED ORAL 3 TIMES DAILY
Qty: 135 TAB | Refills: 6 | Status: SHIPPED | OUTPATIENT
Start: 2017-08-17 | End: 2017-08-17 | Stop reason: SDUPTHER

## 2017-08-17 RX ORDER — HYDRALAZINE HYDROCHLORIDE 50 MG/1
75 TABLET, FILM COATED ORAL 3 TIMES DAILY
Qty: 135 TAB | Refills: 6 | Status: SHIPPED | OUTPATIENT
Start: 2017-08-17 | End: 2018-03-17

## 2017-08-17 RX ORDER — HYDRALAZINE HYDROCHLORIDE 50 MG/1
25 TABLET, FILM COATED ORAL 3 TIMES DAILY
COMMUNITY
End: 2017-08-17 | Stop reason: SDUPTHER

## 2017-08-17 NOTE — PATIENT INSTRUCTIONS
Continue present medication regimen  Follow-up with Marjan Reyes in 4 weeks  Follow-up with Dr. Eric Patterson as scheduled and as needed  Weigh daily and record  Limit sodium intake to 2000mg per day  Limit fluid intake to no more than  6, eight ounce glasses of any type of fluids per day (48 ounces per day)  Call if you notice sudden or progressive weight gain (3-5 pounds in 2-3 days), increasing shortness of breath, abdominal bloating, increasing lower extremity edema, inability to lie flat or on your normal number of pillows, having to sit up to catch your breath, fatigue, increased somnolence (sleeping more), poor appetite         Heart Failure: Care Instructions  Your Care Instructions    Heart failure occurs when your heart does not pump as much blood as the body needs. Failure does not mean that the heart has stopped pumping but rather that it is not pumping as well as it should. Over time, this causes fluid buildup in your lungs and other parts of your body. Fluid buildup can cause shortness of breath, fatigue, swollen ankles, and other problems. By taking medicines regularly, reducing sodium (salt) in your diet, checking your weight every day, and making lifestyle changes, you can feel better and live longer. Follow-up care is a key part of your treatment and safety. Be sure to make and go to all appointments, and call your doctor if you are having problems. It's also a good idea to know your test results and keep a list of the medicines you take. How can you care for yourself at home? Medicines  · Be safe with medicines. Take your medicines exactly as prescribed. Call your doctor if you think you are having a problem with your medicine. · Do not take any vitamins, over-the-counter medicine, or herbal products without talking to your doctor first. Kathryn Dial not take ibuprofen (Advil or Motrin) and naproxen (Aleve) without talking to your doctor first. They could make your heart failure worse.   · You may be taking some of the following medicine. ¨ Beta-blockers can slow heart rate, decrease blood pressure, and improve your condition. Taking a beta-blocker may lower your chance of needing to be hospitalized. ¨ Angiotensin-converting enzyme inhibitors (ACEIs) reduce the heart's workload, lower blood pressure, and reduce swelling. Taking an ACEI may lower your chance of needing to be hospitalized again. ¨ Angiotensin II receptor blockers (ARBs) work like ACEIs. Your doctor may prescribe them instead of ACEIs. ¨ Diuretics, also called water pills, reduce swelling. ¨ Potassium supplements replace this important mineral, which is sometimes lost with diuretics. ¨ Aspirin and other blood thinners prevent blood clots, which can cause a stroke or heart attack. You will get more details on the specific medicines your doctor prescribes. Diet  · Your doctor may suggest that you limit sodium to 2,000 milligrams (mg) a day or less. That is less than 1 teaspoon of salt a day, including all the salt you eat in cooking or in packaged foods. People get most of their sodium from processed foods. Fast food and restaurant meals also tend to be very high in sodium. · Ask your doctor how much liquid you can drink each day. You may have to limit liquids. Weight  · Weigh yourself without clothing at the same time each day. Record your weight. Call your doctor if you have a sudden weight gain, such as more than 2 to 3 pounds in a day or 5 pounds in a week. (Your doctor may suggest a different range of weight gain.) A sudden weight gain may mean that your heart failure is getting worse. Activity level  · Start light exercise (if your doctor says it is okay). Even if you can only do a small amount, exercise will help you get stronger, have more energy, and manage your weight and your stress. Walking is an easy way to get exercise. Start out by walking a little more than you did before. Bit by bit, increase the amount you walk.   · When you exercise, watch for signs that your heart is working too hard. You are pushing yourself too hard if you cannot talk while you are exercising. If you become short of breath or dizzy or have chest pain, stop, sit down, and rest.  · If you feel \"wiped out\" the day after you exercise, walk slower or for a shorter distance until you can work up to a better pace. · Get enough rest at night. Sleeping with 1 or 2 pillows under your upper body and head may help you breathe easier. Lifestyle changes  · Do not smoke. Smoking can make a heart condition worse. If you need help quitting, talk to your doctor about stop-smoking programs and medicines. These can increase your chances of quitting for good. Quitting smoking may be the most important step you can take to protect your heart. · Limit alcohol to 2 drinks a day for men and 1 drink a day for women. Too much alcohol can cause health problems. · Avoid getting sick from colds and the flu. Get a pneumococcal vaccine shot. If you have had one before, ask your doctor whether you need another dose. Get a flu shot each year. If you must be around people with colds or the flu, wash your hands often. When should you call for help? Call 911 if you have symptoms of sudden heart failure such as:  · You have severe trouble breathing. · You cough up pink, foamy mucus. · You have a new irregular or rapid heartbeat. Call your doctor now or seek immediate medical care if:  · You have new or increased shortness of breath. · You are dizzy or lightheaded, or you feel like you may faint. · You have sudden weight gain, such as more than 2 to 3 pounds in a day or 5 pounds in a week. (Your doctor may suggest a different range of weight gain.)  · You have increased swelling in your legs, ankles, or feet. · You are suddenly so tired or weak that you cannot do your usual activities. Watch closely for changes in your health, and be sure to contact your doctor if:  · You develop new symptoms.   Where can you learn more? Go to http://hoang-jayden.info/. Enter Z947 in the search box to learn more about \"Heart Failure: Care Instructions. \"  Current as of: April 3, 2017  Content Version: 11.3  © 1476-6950 [employer]. Care instructions adapted under license by [employer] (which disclaims liability or warranty for this information). If you have questions about a medical condition or this instruction, always ask your healthcare professional. [address] any warranty or liability for your use of this information. Limiting Sodium and Fluids With Heart Failure: Care Instructions  Your Care Instructions  Sodium causes your body to hold on to extra water. This may cause your heart failure symptoms to get worse. Limiting sodium may help you feel better and lower your risk of having to go to the hospital.  People get most of their sodium from processed foods. Fast food and restaurant meals also tend to be very high in sodium. Your doctor may suggest that you limit sodium to 2,000 milligrams (mg) a day or less. That is less than 1 teaspoon of salt a day, including all the salt you eat in cooked or packaged foods. Usually, you have to limit the amount of liquids you drink only if your heart failure is severe. Limiting sodium alone often is enough to help your body get rid of extra fluids. However, your doctor may tell you to limit your fluid intake to a set amount each day. Follow-up care is a key part of your treatment and safety. Be sure to make and go to all appointments, and call your doctor if you are having problems. It's also a good idea to know your test results and keep a list of the medicines you take. How can you care for yourself at home? Read food labels  · Read food labels on cans and food packages. The labels tell you how much sodium is in each serving. Make sure that you look at the serving size.  If you eat more than the serving size, you have eaten more sodium than is listed for one serving. · Food labels also tell you the Percent Daily Value. If the Percent Daily Value says 50%, it means that you will get at least 50% of all the sodium you need for the entire day in one serving. Choose products with low Percent Daily Values for sodium. · Be aware that sodium can come in forms other than salt, including monosodium glutamate (MSG), sodium citrate, and sodium bicarbonate (baking soda). MSG is often added to Asian food. You can sometimes ask for food without MSG or salt. Buy low-sodium foods  · Buy foods that are labeled \"unsalted\" (no salt added), \"sodium-free\" (less than 5 mg of sodium per serving), or \"low-sodium\" (less than 140 mg of sodium per serving). A food labeled \"light sodium\" has less than half of the full-sodium version of that food. Foods labeled \"reduced-sodium\" may still have too much sodium. · Buy fresh vegetables or plain, frozen vegetables. Buy low-sodium versions of canned vegetables, soups, and other canned goods. Prepare low-sodium meals  · Use less salt each day when cooking. Reducing salt in this way will help you adjust to the taste. Do not add salt after cooking. Take the salt shaker off the table. · Flavor your food with garlic, lemon juice, onion, vinegar, herbs, and spices instead of salt. Do not use soy sauce, steak sauce, onion salt, garlic salt, mustard, or ketchup on your food. · Make your own salad dressings, sauces, and ketchup without adding salt. · Use less salt (or none) when recipes call for it. You can often use half the salt a recipe calls for without losing flavor. Other dishes like rice, pasta, and grains do not need added salt. · Rinse canned vegetables. This removes somebut not allof the salt. · Avoid water that has a naturally high sodium content or that has been treated with water softeners, which add sodium. Call your local water company to find out the sodium content of your water supply. If you buy bottled water, read the label and choose a sodium-free brand. Avoid high-sodium foods, such as:  · Smoked, cured, salted, and canned meat, fish, and poultry. · Ham, arevalo, hot dogs, and luncheon meats. · Regular, hard, and processed cheese and regular peanut butter. · Crackers with salted tops. · Frozen prepared meals. · Canned and dried soups, broths, and bouillon, unless labeled sodium-free or low-sodium. · Canned vegetables, unless labeled sodium-free or low-sodium. · Salted snack foods such as chips and pretzels. · [place] fries, pizza, tacos, and other fast foods. · Pickles, olives, ketchup, and other condiments, especially soy sauce, unless labeled sodium-free or low-sodium. If you cannot cook for yourself  · Have family members or friends help you, or have someone cook low-sodium meals. · Check with your local senior nutrition program to find out where meals are served and whether they offer a low-sodium option. You can often find these programs through your local health department or hospital.  · Have meals delivered to your home. Most [facility] have a Meals on [employer]. These programs provide one hot meal a day for older adults, delivered to their homes. Ask whether these meals are low-sodium. Let them know that you are on a low-sodium diet. Limiting fluid intake  · Find a method that works for you. You might simply write down how much you drink every time you do. Some people keep a container filled with the amount of fluid allowed for that day. If they drink from a source other than the container, then they pour out that amount. · Measure your regular drinking glasses to find out how much fluid each one holds. Once you know this, you will not have to measure every time. · Besides water, milk, juices, and other drinks, some foods have a lot of fluid.  Count any foods that will melt (such as ice cream or gelatin dessert) or liquid foods (such as soup) as part of your fluid intake for the day. Where can you learn more? Go to http://hoang-jayden.info/. Enter A166 in the search box to learn more about \"Limiting Sodium and Fluids With Heart Failure: Care Instructions. \"  Current as of: November 15, 2016  Content Version: 11.3  © 7772-5462 [employer] ([place] Care instructions adapted under license by [employer] (which disclaims liability or warranty for this information). If you have questions about a medical condition or this instruction, always ask your healthcare professional. [address] any warranty or liability for your use of this information.

## 2017-08-17 NOTE — MR AVS SNAPSHOT
Visit Information Date & Time Provider Department Dept. Phone Encounter #  
 8/17/2017  9:30 AM Rambo Cadena NP [facility] [address] [id number] Your Appointments 8/28/2017  9:00 AM  
ROUTINE CARE with Bennie Rivas MD  
[address]) Appt Note: transitional care 16 [facility] [address] [address] 2 [facility] [address] [address] 9/11/2017  9:00 AM  
Follow Up with Rambo Cadena NP [facility] ([address] [place] [address]) Appt Note: 1 month f/u CHF/HTN  
 [facility] [address] [address] [address] [phone] Terry Lew  
  
    
 10/10/2017 10:40 AM  
ROUTINE CARE with Bennie Rivas MD  
[address]) Appt Note: 3m fu BP; .  
 [address] [place] [address] [address] [phone]  
  
    
 10/11/2017  2:40 PM  
Follow Up with Juliano Lopez MD  
[facility] ([address] [place] [address]) Appt Note: 3 mth f/up; 6/9/17 - lmom to r/s june appt. provider out of office plk; 6/9/17 - lmom to r/s june appt. provider out of office plk; 6/13/17 - lmom to r/s june appt provider out of office plk; called 2x and mailed r/s letter - Basil Shells; Rescheduling Appointment From 06/22/2017 [place] [address] [address] [address] [phone] 2300 [facility] [address] Upcoming Health Maintenance Date Due INFLUENZA AGE 9 TO ADULT 8/1/2017 HEMOGLOBIN A1C Q6M 9/21/2017 LIPID PANEL Q1 2/28/2018 FOOT EXAM Q1 3/21/2018 MICROALBUMIN Q1 3/21/2018 EYE EXAM RETINAL OR DILATED Q1 5/8/2018 COLONOSCOPY 10/9/2023 DTaP/Tdap/Td series (2 - Td) 3/21/2027 Allergies as of 8/17/2017  Review Complete On: 8/17/2017 By: Cassandra Almanzar NP Severity Noted Reaction Type Reactions Bactrim [Sulfamethoprim Ds]  06/06/2014   Side Effect Nausea and Vomiting Current Immunizations  Reviewed on 5/7/2016 No immunizations on file. Not reviewed this visit You Were Diagnosed With   
  
 Codes Comments Malignant hypertension    -  Primary ICD-10-CM: I10 
ICD-9-CM: 401.0 Hyperlipidemia, unspecified hyperlipidemia type     ICD-10-CM: E78.5 ICD-9-CM: 272.4 CKD (chronic kidney disease), stage 3 (moderate)     ICD-10-CM: N18.3 ICD-9-CM: 587. 3 Diabetes mellitus due to underlying condition, uncontrolled, with diabetic nephropathy, with long-term current use of insulin (HCC)     ICD-10-CM: E08.21, E08.65, Z79.4 ICD-9-CM: 249.41, 583.81, V58.67 Vitals BP Pulse Height(growth percentile) Weight(growth percentile) SpO2 BMI  
 130/80 (BP 1 Location: Left arm, BP Patient Position: Sitting) 67 5' 6\" (1.676 m) 198 lb (89.8 kg) 98% 31.96 kg/m2 Smoking Status Never Smoker BMI and BSA Data Body Mass Index Body Surface Area 31.96 kg/m 2 2.04 m 2 Preferred Pharmacy Pharmacy Name Phone [facility], [address] [place] [address] [phone] Your Updated Medication List  
  
   
This list is accurate as of: 8/17/17 10:12 AM.  Always use your most recent med list. amLODIPine 10 mg tablet Commonly known as:  Lisa Longoria Take 1 Tab by mouth daily. Blood-Glucose Meter monitoring kit  
by Does Not Apply route. One touch ultra2  
  
 bumetanide 2 mg tablet Commonly known as:  Anatoly Goel Take 1 Tab by mouth daily. calcitRIOL 0.25 mcg capsule Commonly known as:  ROCALTROL  
TAKE ONE CAPSULE BY MOUTH EVERY MONDAY, WEDNESDAY, AND FRIDAY  
  
 cloNIDine 0.3 mg/24 hr  
Commonly known as:  CATAPRES  
 1 Patch by TransDERmal route every seven (7) days. COMBIGAN 0.2-0.5 % Drop ophthalmic solution Generic drug:  brimonidine-timolol INSTIL 1 DROP IN [facility] EYE 3 TIMES DAILY docusate sodium 100 mg capsule Commonly known as:  Gayle Debra Take 1 Cap by mouth two (2) times a day. dorzolamide 2 % ophthalmic solution Commonly known as:  TRUSOPT INSTIL 1 DROP IN [facility] EYE 3 TIMES DAILY  
  
 ergocalciferol 50,000 unit capsule Commonly known as:  ERGOCALCIFEROL  
50,000 Units. finasteride 5 mg tablet Commonly known as:  PROSCAR Take 1 Tab by mouth daily. fluticasone 50 mcg/actuation nasal spray Commonly known as:  FLONASE  
1 [place] by Both Nostrils route two (2) times a day. glucose blood VI test strips strip Commonly known as:  ASCENSIA AUTODISC VI, ONE TOUCH ULTRA TEST VI Test twice daily:  Fasting before breakfast and 2 hours after dinner (log readings), One touch ultra 2 test strips please; Dx: 250.02  
  
 hydrALAZINE 50 mg tablet Commonly known as:  APRESOLINE Take 1.5 Tabs by mouth three (3) times daily. insulin lispro 100 unit/mL injection Commonly known as:  HUMALOG Check FSBS AC*HS For sugars between 160 and 200- Give 2 units SQ, For sugars between 201 and 250, Give 3 units SQ, For sugars Between 251 and 300, give 5 units SQ, For Sugars between 301 and 350, give 7 units SQ For sugars between 351 and 400, give 8 units SQ and contact PCP Insulin Needles (Disposable) 31 gauge x 5/16\" Ndle Use as directed with Levemir Insulin Pen. Iron 325 mg (65 mg iron) tablet Generic drug:  ferrous sulfate Take  by mouth Daily (before breakfast). Take 1 tablet by mouth once a week as per patient.  
  
 ketoconazole 2 % topical cream  
Commonly known as:  NIZORAL Apply  to affected area daily. Lancets Misc Commonly known as: One Touch Alison Bonnet Test twice daily: Once in the morning fasting, then two hours after dinner (log results) nitroglycerin 2 % ointment Commonly known as:  NITROBID Use 0.5 Inches to affected area Every Night at Bedtime. OTHER PGIIL/P CRM - Apply 1 -2 grams ( 1-2 pumps) to left foot 3 - 4 times daily. * rosuvastatin 20 mg tablet Commonly known as:  CRESTOR Take 1 Tab by mouth nightly. * CRESTOR 10 mg tablet Generic drug:  rosuvastatin TAKE ONE TABLET BY MOUTH IN THE EVENING  
  
 [facility] [address] Generic drug:  sub-q insulin device, 30 unit [address] Generic drug:  sub-q insulin device, 40 unit  
by Does Not Apply route. VITAMIN B-12 1,000 mcg tablet Generic drug:  cyanocobalamin Take 1,000 mcg by mouth daily. * Notice: This list has 2 medication(s) that are the same as other medications prescribed for you. Read the directions carefully, and ask your doctor or other care provider to review them with you. Prescriptions Printed Refills  
 hydrALAZINE (APRESOLINE) 50 mg tablet 6 Sig: Take 1.5 Tabs by mouth three (3) times daily. Class: Print Route: Oral  
  
Patient Instructions Continue present medication regimen Follow-up with Jese Chaudhry in 4 weeks Follow-up with Dr. David Brooks as scheduled and as needed Weigh daily and record Limit sodium intake to 2000mg per day Limit fluid intake to no more than  6, eight ounce glasses of any type of fluids per day (48 ounces per day) Call if you notice sudden or progressive weight gain (3-5 pounds in 2-3 days), increasing shortness of breath, abdominal bloating, increasing lower extremity edema, inability to lie flat or on your normal number of pillows, having to sit up to catch your breath, fatigue, increased somnolence (sleeping more), poor appetite Heart Failure: Care Instructions Your Care Instructions Heart failure occurs when your heart does not pump as much blood as the body needs.  Failure does not mean that the heart has stopped pumping but rather that it is not pumping as well as it should. Over time, this causes fluid buildup in your lungs and other parts of your body. Fluid buildup can cause shortness of breath, fatigue, swollen ankles, and other problems. By taking medicines regularly, reducing sodium (salt) in your diet, checking your weight every day, and making lifestyle changes, you can feel better and live longer. Follow-up care is a key part of your treatment and safety. Be sure to make and go to all appointments, and call your doctor if you are having problems. It's also a good idea to know your test results and keep a list of the medicines you take. How can you care for yourself at home? Medicines · Be safe with medicines. Take your medicines exactly as prescribed. Call your doctor if you think you are having a problem with your medicine. · Do not take any vitamins, over-the-counter medicine, or herbal products without talking to your doctor first. Reche Baseman not take ibuprofen (Advil or Motrin) and naproxen (Aleve) without talking to your doctor first. They could make your heart failure worse. · You may be taking some of the following medicine. ¨ Beta-blockers can slow heart rate, decrease blood pressure, and improve your condition. Taking a beta-blocker may lower your chance of needing to be hospitalized. ¨ Angiotensin-converting enzyme inhibitors (ACEIs) reduce the heart's workload, lower blood pressure, and reduce swelling. Taking an ACEI may lower your chance of needing to be hospitalized again. ¨ Angiotensin II receptor blockers (ARBs) work like ACEIs. Your doctor may prescribe them instead of ACEIs. ¨ Diuretics, also called water pills, reduce swelling. ¨ Potassium supplements replace this important mineral, which is sometimes lost with diuretics. ¨ Aspirin and other blood thinners prevent blood clots, which can cause a stroke or heart attack. You will get more details on the specific medicines your doctor prescribes. Diet · Your doctor may suggest that you limit sodium to 2,000 milligrams (mg) a day or less. That is less than 1 teaspoon of salt a day, including all the salt you eat in cooking or in packaged foods. People get most of their sodium from processed foods. Fast food and restaurant meals also tend to be very high in sodium. · Ask your doctor how much liquid you can drink each day. You may have to limit liquids. Weight · Weigh yourself without clothing at the same time each day. Record your weight. Call your doctor if you have a sudden weight gain, such as more than 2 to 3 pounds in a day or 5 pounds in a week. (Your doctor may suggest a different range of weight gain.) A sudden weight gain may mean that your heart failure is getting worse. Activity level · Start light exercise (if your doctor says it is okay). Even if you can only do a small amount, exercise will help you get stronger, have more energy, and manage your weight and your stress. Walking is an easy way to get exercise. Start out by walking a little more than you did before. Bit by bit, increase the amount you walk. · When you exercise, watch for signs that your heart is working too hard. You are pushing yourself too hard if you cannot talk while you are exercising. If you become short of breath or dizzy or have chest pain, stop, sit down, and rest. 
· If you feel \"wiped out\" the day after you exercise, walk slower or for a shorter distance until you can work up to a better pace. · Get enough rest at night. Sleeping with 1 or 2 pillows under your upper body and head may help you breathe easier. Lifestyle changes · Do not smoke. Smoking can make a heart condition worse. If you need help quitting, talk to your doctor about stop-smoking programs and medicines. These can increase your chances of quitting for good. Quitting smoking may be the most important step you can take to protect your heart. · Limit alcohol to 2 drinks a day for men and 1 drink a day for women. Too much alcohol can cause health problems. · Avoid getting sick from colds and the flu. Get a pneumococcal vaccine shot. If you have had one before, ask your doctor whether you need another dose. Get a flu shot each year. If you must be around people with colds or the flu, wash your hands often. When should you call for help? Call 911 if you have symptoms of sudden heart failure such as: 
· You have severe trouble breathing. · You cough up pink, foamy mucus. · You have a new irregular or rapid heartbeat. Call your doctor now or seek immediate medical care if: 
· You have new or increased shortness of breath. · You are dizzy or lightheaded, or you feel like you may faint. · You have sudden weight gain, such as more than 2 to 3 pounds in a day or 5 pounds in a week. (Your doctor may suggest a different range of weight gain.) · You have increased swelling in your legs, ankles, or feet. · You are suddenly so tired or weak that you cannot do your usual activities. Watch closely for changes in your health, and be sure to contact your doctor if: 
· You develop new symptoms. Where can you learn more? Go to http://hoang-jayden.info/. Enter U668 in the search box to learn more about \"Heart Failure: Care Instructions. \" Current as of: April 3, 2017 Content Version: 11.3 © 4299-9800 [employer]. Care instructions adapted under license by [employer] (which disclaims liability or warranty for this information). If you have questions about a medical condition or this instruction, always ask your healthcare professional. [place] [address] any warranty or liability for your use of this information. Limiting Sodium and Fluids With Heart Failure: Care Instructions Your Care Instructions Sodium causes your body to hold on to extra water.  This may cause your heart failure symptoms to get worse. Limiting sodium may help you feel better and lower your risk of having to go to the hospital. 
People get most of their sodium from processed foods. Fast food and restaurant meals also tend to be very high in sodium. Your doctor may suggest that you limit sodium to 2,000 milligrams (mg) a day or less. That is less than 1 teaspoon of salt a day, including all the salt you eat in cooked or packaged foods. Usually, you have to limit the amount of liquids you drink only if your heart failure is severe. Limiting sodium alone often is enough to help your body get rid of extra fluids. However, your doctor may tell you to limit your fluid intake to a set amount each day. Follow-up care is a key part of your treatment and safety. Be sure to make and go to all appointments, and call your doctor if you are having problems. It's also a good idea to know your test results and keep a list of the medicines you take. How can you care for yourself at home? Read food labels · Read food labels on cans and food packages. The labels tell you how much sodium is in each serving. Make sure that you look at the serving size. If you eat more than the serving size, you have eaten more sodium than is listed for one serving. · Food labels also tell you the Percent Daily Value. If the Percent Daily Value says 50%, it means that you will get at least 50% of all the sodium you need for the entire day in one serving. Choose products with low Percent Daily Values for sodium. · Be aware that sodium can come in forms other than salt, including monosodium glutamate (MSG), sodium citrate, and sodium bicarbonate (baking soda). MSG is often added to Asian food. You can sometimes ask for food without MSG or salt. Buy low-sodium foods · Buy foods that are labeled \"unsalted\" (no salt added), \"sodium-free\" (less than 5 mg of sodium per serving), or \"low-sodium\" (less than 140 mg of sodium per serving). A food labeled \"light sodium\" has less than half of the full-sodium version of that food. Foods labeled \"reduced-sodium\" may still have too much sodium. · Buy fresh vegetables or plain, frozen vegetables. Buy low-sodium versions of canned vegetables, soups, and other canned goods. Prepare low-sodium meals · Use less salt each day when cooking. Reducing salt in this way will help you adjust to the taste. Do not add salt after cooking. Take the salt shaker off the table. · Flavor your food with garlic, lemon juice, onion, vinegar, herbs, and spices instead of salt. Do not use soy sauce, steak sauce, onion salt, garlic salt, mustard, or ketchup on your food. · Make your own salad dressings, sauces, and ketchup without adding salt. · Use less salt (or none) when recipes call for it. You can often use half the salt a recipe calls for without losing flavor. Other dishes like rice, pasta, and grains do not need added salt. · Rinse canned vegetables. This removes somebut not allof the salt. · Avoid water that has a naturally high sodium content or that has been treated with water softeners, which add sodium. Call your local water company to find out the sodium content of your water supply. If you buy bottled water, read the label and choose a sodium-free brand. Avoid high-sodium foods, such as: 
· Smoked, cured, salted, and canned meat, fish, and poultry. · Ham, arevalo, hot dogs, and luncheon meats. · Regular, hard, and processed cheese and regular peanut butter. · Crackers with salted tops. · Frozen prepared meals. · Canned and dried soups, broths, and bouillon, unless labeled sodium-free or low-sodium. · Canned vegetables, unless labeled sodium-free or low-sodium. · Salted snack foods such as chips and pretzels. · [place] fries, pizza, tacos, and other fast foods. · Pickles, olives, ketchup, and other condiments, especially soy sauce, unless labeled sodium-free or low-sodium. If you cannot cook for yourself · Have family members or friends help you, or have someone cook low-sodium meals. · Check with your local senior nutrition program to find out where meals are served and whether they offer a low-sodium option. You can often find these programs through your local health department or hospital. 
· Have meals delivered to your home. Most [facility] have a Meals on FRANKY Guerra. These programs provide one hot meal a day for older adults, delivered to their homes. Ask whether these meals are low-sodium. Let them know that you are on a low-sodium diet. Limiting fluid intake · Find a method that works for you. You might simply write down how much you drink every time you do. Some people keep a container filled with the amount of fluid allowed for that day. If they drink from a source other than the container, then they pour out that amount. · Measure your regular drinking glasses to find out how much fluid each one holds. Once you know this, you will not have to measure every time. · Besides water, milk, juices, and other drinks, some foods have a lot of fluid. Count any foods that will melt (such as ice cream or gelatin dessert) or liquid foods (such as soup) as part of your fluid intake for the day. Where can you learn more? Go to http://hoang-jayden.info/. Enter A166 in the search box to learn more about \"Limiting Sodium and Fluids With Heart Failure: Care Instructions. \" Current as of: November 15, 2016 Content Version: 11.3 © 2435-1543 [employer]. Care instructions adapted under license by [employer] (which disclaims liability or warranty for this information). If you have questions about a medical condition or this instruction, always ask your healthcare professional. [place] [address] any warranty or liability for your use of this information. Introducing [facility]! [employer] introduces [employer] patient portal. Now you can access parts of your medical record, email your doctor's office, and request medication refills online. 1. In your internet browser, go to [email or web address] 2. Click on the First Time User? Click Here link in the Sign In box. You will see the New Member Sign Up page. 3. Enter your [employer] Access Code exactly as it appears below. You will not need to use this code after youve completed the sign-up process. If you do not sign up before the expiration date, you must request a new code. · [employer] Access Code: [email or web address] Expires: 10/8/2017 12:29 PM 
 
4. Enter the last four digits of your Social Security Number (xxxx) and Date of Birth (mm/dd/yyyy) as indicated and click Submit. You will be taken to the next sign-up page. 5. Create a [employer] ID. This will be your [employer] login ID and cannot be changed, so think of one that is secure and easy to remember. 6. Create a [employer] password. You can change your password at any time. 7. Enter your Password Reset Question and Answer. This can be used at a later time if you forget your password. 8. Enter your e-mail address. You will receive e-mail notification when new information is available in [address] 9. Click Sign Up. You can now view and download portions of your medical record. 10. Click the Download Summary menu link to download a portable copy of your medical information. If you have questions, please visit the Frequently Asked Questions section of the [employer] website. Remember, [employer] is NOT to be used for urgent needs. For medical emergencies, dial 911. Now available from your iPhone and Android! Please provide this summary of care documentation to your next provider. Your primary care clinician is listed as [address]. If you have any questions after today's visit, please call [phone].

## 2017-08-17 NOTE — PROGRESS NOTES
1. Have you been to the ER, urgent care clinic since your last visit? Hospitalized since your last visit? Yes, [facility] 08/10/17 for fluid retention and  Hypertension. 2. Have you seen or consulted any other health care providers outside of the [address] [place] [address] since your last visit? Include any pap smears or colon screening.  No    Verbal order and read back per Saul Richards NP

## 2017-08-17 NOTE — PROGRESS NOTES
Khadra Daniels presents today for a post-hospital follow-up. He presented to [facility] on 8/10/17, with complaints of dizziness, elevated blood pressures and blood glucose levels at home. He also reported increasing scrotal edema. When see on 7/28/17, he was switched from lasix to Bumex and was restarted on his clonidine patch as he had been without his clonidine patch for about a month due to cost.  He also admitted to occasional non-compliance with his medications and diet. During his hospital stay, he was diuresed with IV Bumex, his losartan was discontinued and he was started on hydralazine, placed on a sodium and fluid restriction, and followed by nephrology. He was noted to have progressively worsening renal function and it was felt that he may need to begin hemodialysis in the near future. He is normally followed by Dr. Gael Jarquin and Mr. Alexi Regalado wife states that he has followed up with him since being discharged home. He is a 63-year-old -American male with history of chronic kidney disease stage III-IV, long-standing poorly controlled diabetes and hypertension, dyslipidemia, and intermittent compliance with his medications. He also has significant autonomic dysfunction as noted on the tilt table study done in March 2017. He was [facility] to [facility] but it is unclear whether or not he followed up with them. Both the patient and his wife states that compliance with his medications has been dependent on their finances. He states that at times, he is unable to afford all of his medications. He was last seen by Dr. Deandra Whipple in March 2017. His last echocardiogram was done in March 2017 which showed a ejection fraction of 65% with significant concentric LVH.   His last treadmill stress test was done in November 2012 which was considered and negative maximal stress test.  He had a renal artery duplex study done in Oct. 2016, which showed no significant renal artery stenosis. During his March 2017 appointment with Dr. Pauly Ospina, his blood pressure was also quite elevated at 224/112 and at that time, they also reported that he had been without his clonidine patch. Per Dr. Valentina Sky last office note, he stated that a supine blood pressure around 257-075 mmHg systolic would be acceptable due to his history of orthostatic hypotension attributed to autonomic dysfunction. He is feeling better. He states that his scrotal and lower extremity edema have markedly improved. His wife states that his legs \"almost looked back to normal\" when he was in the hospital but since returning home, some edema has returned but it is currently only mild. He denies chest pain, tightness, heaviness, and palpitations. He denies shortness of breath at rest, admits to dyspnea on exertion, denies orthopnea and PND. He denies abdominal bloating. He denies lightheadedness, admits to occasional dizziness, and denies syncope. He admits to increased lower extremity edema and denies claudication. Denies nausea, vomiting, diarrhea, fever, chills. PMH:  Past Medical History:   Diagnosis Date    Cardiac echocardiogram 10/21/2016    EF 60-65%. No WMA. Mod-marked LVH. Normal diastolic fx. No significant valvular heart disease.  Cardiac treadmill stress test, low risk 11/02/2012    Negative maximal exercise treadmill test.  Ex time 7 min 45 sec.  Cardiovascular LE peripheral arterial testing 03/22/2016    No significant peripheral arterial disease at rest bilaterally. ABIs deferred due to calcified vessels.  Cardiovascular LLE venous duplex 06/06/2012    Left leg:  No DVT.  Cardiovascular renal duplex 10/21/2016    No significant renal artery stenosis.       CKD (chronic kidney disease) stage 3, GFR 30-59 ml/min     Diabetes mellitus ([address] 3/12/2010    Diabetic retinopathy ([facility] [address] 5/4/2010    Eye examination 5/4/10    OU: 20/20; OD: 20/25; OS: 20/25 without correction.  HLD (hyperlipidemia) 3/12/2010    HTN (hypertension) 3/12/2010       PSH:  Past Surgical History:   Procedure Laterality Date    HX HERNIA REPAIR  2005       MEDS:  Current Outpatient Prescriptions   Medication Sig    hydrALAZINE (APRESOLINE) 50 mg tablet Take 1.5 Tabs by mouth three (3) times daily.  bumetanide (BUMEX) 2 mg tablet Take 0.5 Tabs by mouth two (2) times a day.  finasteride (PROSCAR) 5 mg tablet Take 1 Tab by mouth daily.  amLODIPine (NORVASC) 10 mg tablet Take 1 Tab by mouth daily.  cloNIDine (CATAPRES) 0.3 mg/24 hr 1 Patch by TransDERmal route every seven (7) days.  sub-q insulin device, 40 unit (VGO 40) alicia by Does Not Apply route.  CRESTOR 10 mg tablet TAKE ONE TABLET BY MOUTH IN THE EVENING    nitroglycerin (NITROBID) 2 % ointment Use 0.5 Inches to affected area Every Night at Bedtime.  dorzolamide (TRUSOPT) 2 % ophthalmic solution INSTIL 1 DROP IN EACH EYE 3 TIMES DAILY    brimonidine-timolol (COMBIGAN) 0.2-0.5 % drop ophthalmic solution INSTIL 1 DROP IN EACH EYE 3 TIMES DAILY    ketoconazole (NIZORAL) 2 % topical cream Apply  to affected area daily.  OTHER PGIIL/P CRM - Apply 1 -2 grams ( 1-2 pumps) to left foot 3 - 4 times daily.  calcitRIOL (ROCALTROL) 0.25 mcg capsule TAKE ONE CAPSULE BY MOUTH EVERY MONDAY, WEDNESDAY, AND FRIDAY    rosuvastatin (CRESTOR) 20 mg tablet Take 1 Tab by mouth nightly.  docusate sodium (COLACE) 100 mg capsule Take 1 Cap by mouth two (2) times a day.  glucose blood VI test strips (ASCENSIA AUTODISC VI, ONE TOUCH ULTRA TEST VI) strip Test twice daily:  Fasting before breakfast and 2 hours after dinner (log readings), One touch ultra 2 test strips please; Dx: 250.02    fluticasone (FLONASE) 50 mcg/actuation nasal spray 1 [place] by Both Nostrils route two (2) times a day.  Insulin Needles, Disposable, 31 X 5/16 \" Ndle Use as directed with Levemir Insulin Pen.     ferrous sulfate (IRON) 325 mg (65 mg iron) tablet Take  by mouth Daily (before breakfast). Take 1 tablet by mouth once a week as per patient.  Lancets (ONE TOUCH DELICA) Misc Test twice daily: Once in the morning fasting, then two hours after dinner (log results)    Blood-Glucose Meter monitoring kit by Does Not Apply route. One touch ultra2    cyanocobalamin (VITAMIN B-12) 1,000 mcg tablet Take 1,000 mcg by mouth daily. No current facility-administered medications for this visit. Allergies and Sensitivities:  Allergies   Allergen Reactions    Bactrim [Sulfamethoprim Ds] Nausea and Vomiting       Family History:  Family History   Problem Relation Age of Onset    Diabetes Sister     Sickle Cell Anemia Sister     Diabetes Sister        Social History:  He  reports that he has never smoked. He has never used smokeless tobacco.  He  reports that he does not drink alcohol. Physical:  Visit Vitals    /80 (BP 1 Location: Left arm, BP Patient Position: Sitting)    Pulse 67    Ht 5' 6\" (1.676 m)    Wt 89.8 kg (198 lb)    SpO2 98%    BMI 31.96 kg/m2       His weight is down 2 pounds since his lat visit      Exam:  Neck:  Supple, no JVD, no carotid bruits  CV:  Normal S1 and  S2, no murmurs, rubs, or gallops noted  Lungs:  Clear to ausculation throughout, no wheezes or rales  Abd:  Soft, non-tender, non-distended with good bowel sounds.   No hepatosplenomegaly  Extremities:  2+ softly pitting lower extremity edema bilaterally from his shins to his feet      Data:  EKG:    Not done today      LABS:  Lab Results   Component Value Date/Time    Sodium 138 08/09/2017 01:24 PM    Potassium 3.9 08/09/2017 01:24 PM    Chloride 100 08/09/2017 01:24 PM    CO2 25 08/09/2017 01:24 PM    Glucose 83 08/09/2017 01:24 PM    BUN 37 08/09/2017 01:24 PM    Creatinine 4.70 08/09/2017 01:24 PM     Lab Results   Component Value Date/Time    Cholesterol, total 214 02/28/2017 09:22 AM    HDL Cholesterol 71 02/28/2017 09:22 AM    LDL, calculated 113.4 02/28/2017 09:22 AM    Triglyceride 148 02/28/2017 09:22 AM    CHOL/HDL Ratio 3.0 02/28/2017 09:22 AM     Lab Results   Component Value Date/Time    ALT (SGPT) 36 02/28/2017 09:22 AM       Impression / Plan:  1. Malignant hypertension, blood pressure now much better controlled  2. History of orthostatic hypotension due to autonomic dysfunction as noted during tilt table study in March 2017  3. Hypercholesterolemia, on rosuvastatin 20 mg  4. Diabetes mellitus, recommend Hgb A1c less than 7% from cardiac standpoint  (9.1 recently)  5. Chronic kidney disease, stage 3-4, followed by Dr. Nelson Nick    Mr. Mindy Dial was seen today for follow-up after a recent hospitalization for complaints of dizziness, anasarca, high blood pressures and high blood glucose levels. Chetan Machuca He was given IV bumex and his losartan was discontinued and started on hydralazine 75mg TID. Upon discharge, he was asked to continue his Bumex 1mg twice a day. He states that he has been taking all of his medications and now understands the importance of taking his medications and adhering to dietary limitations for sodium and fluids. His Hgb A1c was 12.5 and we discussed the importance of getting his diabetes well controlled (Hgb A1c less than 7.0 from cardiac standpoint). His blood pressure is much better controlled today at 130/80. His breath sounds are clear and he has some lower extremity edema that has improved since his last appointment. He reports that the scrotal edema has markedly improved. He has seen his nephrologist since being discharged home. No medication changes were made today and he will follow-up with me in one month. Congestive heart failure teaching reinforced today. Advised to limit sodium intake to no more than 1500-2000mg per day and to also limit his fluid intake to 48 ounces per day. Advised to weigh daily every morning and record weights.   Instructed to call our office if progressive weight gain is noted over a 2 to 3 day period of time, shortness of breath increases, or if abdominal bloating, nausea, fatigue, or increased lower extremity edema is noted. He will follow-up with Dr. Zi Austin as scheduled and as needed. Anne Marie Arteaga MSN, FNP-BC    Please note:  Portions of this chart were created with Dragon medical speech to text program.  Unrecognized errors may be present.

## 2017-08-28 ENCOUNTER — OFFICE VISIT (OUTPATIENT)
Dept: FAMILY MEDICINE CLINIC | Age: 57
End: 2017-08-28

## 2017-08-28 VITALS
RESPIRATION RATE: 16 BRPM | HEIGHT: 66 IN | DIASTOLIC BLOOD PRESSURE: 96 MMHG | OXYGEN SATURATION: 98 % | HEART RATE: 72 BPM | BODY MASS INDEX: 31.98 KG/M2 | SYSTOLIC BLOOD PRESSURE: 200 MMHG | TEMPERATURE: 98.2 F | WEIGHT: 199 LBS

## 2017-08-28 DIAGNOSIS — N17.9 AKI (ACUTE KIDNEY INJURY) (HCC): ICD-10-CM

## 2017-08-28 DIAGNOSIS — I10 ESSENTIAL HYPERTENSION: Primary | ICD-10-CM

## 2017-08-28 RX ORDER — BUMETANIDE 1 MG/1
1 TABLET ORAL
COMMUNITY
Start: 2017-08-14 | End: 2017-08-28 | Stop reason: SDUPTHER

## 2017-08-28 RX ORDER — HYDRALAZINE HYDROCHLORIDE 50 MG/1
75 TABLET, FILM COATED ORAL
COMMUNITY
Start: 2017-08-14 | End: 2017-08-28 | Stop reason: SDUPTHER

## 2017-08-28 NOTE — PROGRESS NOTES
1. Have you been to the ER, urgent care clinic since your last visit? Hospitalized since your last visit? Yes Where: [facility]    2. Have you seen or consulted any other health care providers outside of the [address] [place] [address] since your last visit? Include any pap smears or colon screening.  No

## 2017-08-28 NOTE — PATIENT INSTRUCTIONS

## 2017-08-28 NOTE — PROGRESS NOTES
HISTORY OF PRESENT ILLNESS  Terrence Whitlock is a 64 y.o. male. HPI  Patient is here today for follow up on: Acute Kidney Injury    Pts BP was 155/80 prior to his visit. Today at the office the elevators are not working and pt has had to take the stairs. Thinks this may contributory to     Acute Kidney Injury: Pt had developed swelling of the testicles; he was also had as swelling of his feet and legs; He is under the the care of renal ( Dr. Sindhu Burks); Has seen Dr. Sindhu Burks since his hospitalization. The testicles have now improved and the feet are overall better; Feet may swell at times. He is less SOB currently. May still get some fatigue with moving around. He does keep feet elevated. Pt was initially treated with spironolactone in the hospital.  States BP was well controlled after that. This was not a discharge med for pt. Pt was hospitalized from 8/12- 8/14, 2017. Current Outpatient Prescriptions:     hydrALAZINE (APRESOLINE) 50 mg tablet, Take 1.5 Tabs by mouth three (3) times daily. , Disp: 135 Tab, Rfl: 6    bumetanide (BUMEX) 2 mg tablet, Take 0.5 Tabs by mouth two (2) times a day., Disp: 1 Tab, Rfl: 0    finasteride (PROSCAR) 5 mg tablet, Take 1 Tab by mouth daily. , Disp: 30 Tab, Rfl: 6    amLODIPine (NORVASC) 10 mg tablet, Take 1 Tab by mouth daily. , Disp: 30 Tab, Rfl: 6    cloNIDine (CATAPRES) 0.3 mg/24 hr, 1 Patch by TransDERmal route every seven (7) days. , Disp: 12 Patch, Rfl: 1    sub-q insulin device, 40 unit (VGO 40) alicia, by Does Not Apply route., Disp: , Rfl:     dorzolamide (TRUSOPT) 2 % ophthalmic solution, INSTIL 1 DROP IN EACH EYE 3 TIMES DAILY, Disp: , Rfl:     brimonidine-timolol (COMBIGAN) 0.2-0.5 % drop ophthalmic solution, INSTIL 1 DROP IN EACH EYE 3 TIMES DAILY, Disp: , Rfl:     ketoconazole (NIZORAL) 2 % topical cream, Apply  to affected area daily. , Disp: 15 g, Rfl: 1    OTHER, PGIIL/P CRM - Apply 1 -2 grams ( 1-2 pumps) to left foot 3 - 4 times daily. , Disp: , Rfl:   calcitRIOL (ROCALTROL) 0.25 mcg capsule, TAKE ONE CAPSULE BY MOUTH EVERY MONDAY, WEDNESDAY, AND FRIDAY, Disp: , Rfl: 2    rosuvastatin (CRESTOR) 20 mg tablet, Take 1 Tab by mouth nightly., Disp: 30 Tab, Rfl: 6    docusate sodium (COLACE) 100 mg capsule, Take 1 Cap by mouth two (2) times a day., Disp: 60 Cap, Rfl: 0    glucose blood VI test strips (ASCENSIA AUTODISC VI, ONE TOUCH ULTRA TEST VI) strip, Test twice daily:  Fasting before breakfast and 2 hours after dinner (log readings), One touch ultra 2 test strips please; Dx: 250.02, Disp: 1 Package, Rfl: 11    fluticasone (FLONASE) 50 mcg/actuation nasal spray, 1 [place] by Both Nostrils route two (2) times a day., Disp: 1 Bottle, Rfl: 2    Insulin Needles, Disposable, 31 X 5/16 \" Ndle, Use as directed with Levemir Insulin Pen., Disp: 1 Package, Rfl: 11    ferrous sulfate (IRON) 325 mg (65 mg iron) tablet, Take  by mouth Daily (before breakfast). Take 1 tablet by mouth once a week as per patient., Disp: , Rfl:     Lancets (ONE TOUCH DELICA) Misc, Test twice daily: Once in the morning fasting, then two hours after dinner (log results), Disp: 1 Package, Rfl: 11    Blood-Glucose Meter monitoring kit, by Does Not Apply route. One touch ultra2, Disp: 1 Kit, Rfl: 0    cyanocobalamin (VITAMIN B-12) 1,000 mcg tablet, Take 1,000 mcg by mouth daily. , Disp: , Rfl:       PMH,  Meds, Allergies, Family History, Social history reviewed    Review of Systems   Constitutional: Negative for fever. Eyes: Positive for blurred vision (at baseline; no difference than usual;  under eye MD care. ). Respiratory: Negative for cough and wheezing. Cardiovascular: Positive for leg swelling (some; ). Physical Exam   Constitutional: He appears well-developed and well-nourished. No distress. Cardiovascular: Normal rate and regular rhythm. Exam reveals no gallop and no friction rub. No murmur heard. Pulmonary/Chest: Breath sounds normal. No respiratory distress.  He has no wheezes. He has no rales. Musculoskeletal: He exhibits no edema. Nursing note and vitals reviewed. Visit Vitals    BP (!) 200/96    Pulse 72    Temp 98.2 °F (36.8 °C) (Oral)    Resp 16    Ht 5' 6\" (1.676 m)    Wt 199 lb (90.3 kg)    SpO2 98%    BMI 32.12 kg/m2         ASSESSMENT and PLAN    ICD-10-CM ICD-9-CM    1. Essential hypertension- not controlled I10 401.9    2. CARLOS (acute kidney injury) ([facility] [address]- new; s/p hospitalization followed by dr. Goldie Causey N17.9 584.9        BP not controlled; May be multifactorial and today pt had to walk a flight of stairs. BP was better at home. Pt advised to keep a log of BP. Pts BP at cardiology office is noted after pt resumed clonidine patch. Continue other current meds  Low sodium diet advised  Follow up with consultants as scheduled  Follow-up Disposition:  Return in about 2 weeks (around 9/11/2017) for bp check. An After Visit Summary was printed and given to the patient. This has been fully explained to the patient, who indicates understanding.

## 2017-08-28 NOTE — MR AVS SNAPSHOT
Visit Information Date & Time Provider Department Dept. Phone Encounter #  
 8/28/2017  9:00 AM Rubenanthony Rivas, [address] [id number] Follow-up Instructions Return in about 2 weeks (around 9/11/2017) for bp check. Your Appointments 9/11/2017  9:00 AM  
Follow Up with Rambo Cadena NP [facility] ([facility]) Appt Note: 1 month f/u CHF/HTN  
 Brennon Briceno [facility] [address]  
[phone] Terry Lew  
  
    
 10/10/2017 10:40 AM  
ROUTINE CARE with Bennie Rivas MD  
[address] [facility] [address] [facility]) Appt Note: 3m fu BP; .  
 [address] [address] [address] [place] [address] [address]  
  
    
 10/11/2017  2:40 PM  
Follow Up with Jane Lopes MD  
[facility] ([address] [place] [address]) Appt Note: 3 mth f/up; 6/9/17 - lmom to r/s june appt. provider out of office plk; 6/9/17 - lmom to r/s june appt. provider out of office plk; 6/13/17 - lmom to r/s june appt provider out of office plk; called 2x and mailed r/s letter - Basil Shells; Rescheduling Appointment From 06/22/2017 Bren[place] [address]  
[phone] [address] [place] [address] [address] Upcoming Health Maintenance Date Due INFLUENZA AGE 9 TO ADULT 8/1/2017 HEMOGLOBIN A1C Q6M 9/21/2017 LIPID PANEL Q1 2/28/2018 FOOT EXAM Q1 3/21/2018 MICROALBUMIN Q1 3/21/2018 EYE EXAM RETINAL OR DILATED Q1 5/8/2018 COLONOSCOPY 10/9/2023 DTaP/Tdap/Td series (2 - Td) 3/21/2027 Allergies as of 8/28/2017  Review Complete On: 8/28/2017 By: Nses Gotti LPN Severity Noted Reaction Type Reactions Bactrim [Sulfamethoprim Ds]  06/06/2014   Side Effect Nausea and Vomiting Current Immunizations  Reviewed on 5/7/2016 No immunizations on file. Not reviewed this visit You Were Diagnosed With   
  
 Codes Comments Essential hypertension    -  Primary ICD-10-CM: I10 
ICD-9-CM: 401.9 CARLOS (acute kidney injury) ([facility] [address]     ICD-10-CM: N17.9 ICD-9-CM: 765. 9 Vitals BP Pulse Temp Resp Height(growth percentile) Weight(growth percentile) (!) 200/96 72 98.2 °F (36.8 °C) (Oral) 16 5' 6\" (1.676 m) 199 lb (90.3 kg) SpO2 BMI Smoking Status 98% 32.12 kg/m2 Never Smoker Vitals History BMI and BSA Data Body Mass Index Body Surface Area  
 32.12 kg/m 2 2.05 m 2 Preferred Pharmacy Pharmacy Name Phone [facility], [address] [place] [address] [phone] Your Updated Medication List  
  
   
This list is accurate as of: 8/28/17 10:19 AM.  Always use your most recent med list. amLODIPine 10 mg tablet Commonly known as:  Olivares Inks Take 1 Tab by mouth daily. Blood-Glucose Meter monitoring kit  
by Does Not Apply route. One touch ultra2  
  
 bumetanide 2 mg tablet Commonly known as:  Ole Rinku Take 0.5 Tabs by mouth two (2) times a day. calcitRIOL 0.25 mcg capsule Commonly known as:  ROCALTROL  
TAKE ONE CAPSULE BY MOUTH EVERY MONDAY, WEDNESDAY, AND FRIDAY  
  
 cloNIDine 0.3 mg/24 hr  
Commonly known as:  CATAPRES  
1 Patch by TransDERmal route every seven (7) days. COMBIGAN 0.2-0.5 % Drop ophthalmic solution Generic drug:  brimonidine-timolol INSTIL 1 DROP IN [facility] EYE 3 TIMES DAILY docusate sodium 100 mg capsule Commonly known as:  Guillermo Parody Take 1 Cap by mouth two (2) times a day. dorzolamide 2 % ophthalmic solution Commonly known as:  TRUSOPT INSTIL 1 DROP IN [facility] EYE 3 TIMES DAILY  
  
 finasteride 5 mg tablet Commonly known as:  PROSCAR Take 1 Tab by mouth daily. fluticasone 50 mcg/actuation nasal spray Commonly known as:  FLONASE  
1 [place] by Both Nostrils route two (2) times a day. glucose blood VI test strips strip Commonly known as:  ASCENSIA AUTODISC VI, ONE TOUCH ULTRA TEST VI Test twice daily:  Fasting before breakfast and 2 hours after dinner (log readings), One touch ultra 2 test strips please; Dx: 250.02  
  
 hydrALAZINE 50 mg tablet Commonly known as:  APRESOLINE Take 1.5 Tabs by mouth three (3) times daily. Insulin Needles (Disposable) 31 gauge x 5/16\" Ndle Use as directed with Levemir Insulin Pen. Iron 325 mg (65 mg iron) tablet Generic drug:  ferrous sulfate Take  by mouth Daily (before breakfast). Take 1 tablet by mouth once a week as per patient.  
  
 ketoconazole 2 % topical cream  
Commonly known as:  NIZORAL Apply  to affected area daily. Lancets Misc Commonly known as: One Touch Gregg Kym Test twice daily: Once in the morning fasting, then two hours after dinner (log results) OTHER PGIIL/P CRM - Apply 1 -2 grams ( 1-2 pumps) to left foot 3 - 4 times daily. rosuvastatin 20 mg tablet Commonly known as:  CRESTOR Take 1 Tab by mouth nightly. [address] Generic drug:  sub-q insulin device, 40 unit  
by Does Not Apply route. VITAMIN B-12 1,000 mcg tablet Generic drug:  cyanocobalamin Take 1,000 mcg by mouth daily. Follow-up Instructions Return in about 2 weeks (around 9/11/2017) for bp check. Patient Instructions Low Sodium Diet (2,000 Milligram): Care Instructions Your Care Instructions Too much sodium causes your body to hold on to extra water. This can raise your blood pressure and force your heart and kidneys to work harder. In very serious cases, this could cause you to be put in the hospital. It might even be life-threatening. By limiting sodium, you will feel better and lower your risk of serious problems. The most common source of sodium is salt. People get most of the salt in their diet from canned, prepared, and packaged foods. Fast food and restaurant meals also are very high in sodium. Your doctor will probably limit your sodium to less than 2,000 milligrams (mg) a day. This limit counts all the sodium in prepared and packaged foods and any salt you add to your food. Follow-up care is a key part of your treatment and safety. Be sure to make and go to all appointments, and call your doctor if you are having problems. It's also a good idea to know your test results and keep a list of the medicines you take. How can you care for yourself at home? Read food labels · Read labels on cans and food packages. The labels tell you how much sodium is in each serving. Make sure that you look at the serving size. If you eat more than the serving size, you have eaten more sodium. · Food labels also tell you the Percent Daily Value for sodium. Choose products with low Percent Daily Values for sodium. · Be aware that sodium can come in forms other than salt, including monosodium glutamate (MSG), sodium citrate, and sodium bicarbonate (baking soda). MSG is often added to Asian food. When you eat out, you can sometimes ask for food without MSG or added salt. Buy low-sodium foods · Buy foods that are labeled \"unsalted\" (no salt added), \"sodium-free\" (less than 5 mg of sodium per serving), or \"low-sodium\" (less than 140 mg of sodium per serving). Foods labeled \"reduced-sodium\" and \"light sodium\" may still have too much sodium. Be sure to read the label to see how much sodium you are getting. · Buy fresh vegetables, or frozen vegetables without added sauces. Buy low-sodium versions of canned vegetables, soups, and other canned goods. Prepare low-sodium meals · Cut back on the amount of salt you use in cooking. This will help you adjust to the taste. Do not add salt after cooking.  One teaspoon of salt has about 2,300 mg of sodium. · Take the salt shaker off the table. · Flavor your food with garlic, lemon juice, onion, vinegar, herbs, and spices. Do not use soy sauce, lite soy sauce, steak sauce, onion salt, garlic salt, celery salt, mustard, or ketchup on your food. · Use low-sodium salad dressings, sauces, and ketchup. Or make your own salad dressings and sauces without adding salt. · Use less salt (or none) when recipes call for it. You can often use half the salt a recipe calls for without losing flavor. Other foods such as rice, pasta, and grains do not need added salt. · Rinse canned vegetables, and cook them in fresh water. This removes somebut not allof the salt. · Avoid water that is naturally high in sodium or that has been treated with water softeners, which add sodium. Call your local water company to find out the sodium content of your water supply. If you buy bottled water, read the label and choose a sodium-free brand. Avoid high-sodium foods · Avoid eating: ¨ Smoked, cured, salted, and canned meat, fish, and poultry. ¨ Ham, arevalo, hot dogs, and luncheon meats. ¨ Regular, hard, and processed cheese and regular peanut butter. ¨ Crackers with salted tops, and other salted snack foods such as pretzels, chips, and salted popcorn. ¨ Frozen prepared meals, unless labeled low-sodium. ¨ Canned and dried soups, broths, and bouillon, unless labeled sodium-free or low-sodium. ¨ Canned vegetables, unless labeled sodium-free or low-sodium. ¨ [place] fries, pizza, tacos, and other fast foods. ¨ Pickles, olives, ketchup, and other condiments, especially soy sauce, unless labeled sodium-free or low-sodium. Where can you learn more? Go to http://hoang-jayden.info/. Enter R985 in the search box to learn more about \"Low Sodium Diet (2,000 Milligram): Care Instructions. \" Current as of: July 26, 2016 Content Version: 11.3 © 6521-1903 Healthwise, Incorporated. Care instructions adapted under license by [employer] (which disclaims liability or warranty for this information). If you have questions about a medical condition or this instruction, always ask your healthcare professional. [address] any warranty or liability for your use of this information. Introducing [facility]! Magda Alvarez introduces Siasto patient portal. Now you can access parts of your medical record, email your doctor's office, and request medication refills online. 1. In your internet browser, go to [email or web address] 2. Click on the First Time User? Click Here link in the Sign In box. You will see the New Member Sign Up page. 3. Enter your Siasto Access Code exactly as it appears below. You will not need to use this code after youve completed the sign-up process. If you do not sign up before the expiration date, you must request a new code. · Siasto Access Code: [email or web address] Expires: 10/8/2017 12:29 PM 
 
4. Enter the last four digits of your Social Security Number (xxxx) and Date of Birth (mm/dd/yyyy) as indicated and click Submit. You will be taken to the next sign-up page. 5. Create a Siasto ID. This will be your Siasto login ID and cannot be changed, so think of one that is secure and easy to remember. 6. Create a Siasto password. You can change your password at any time. 7. Enter your Password Reset Question and Answer. This can be used at a later time if you forget your password. 8. Enter your e-mail address. You will receive e-mail notification when new information is available in [address] 9. Click Sign Up. You can now view and download portions of your medical record. 10. Click the Download Summary menu link to download a portable copy of your medical information.  
 
If you have questions, please visit the Frequently Asked Questions section of the DataRose. Remember, [email or web address] is NOT to be used for urgent needs. For medical emergencies, dial 911. Now available from your iPhone and Android! Please provide this summary of care documentation to your next provider. Your primary care clinician is listed as [address]. If you have any questions after today's visit, please call [phone].

## 2017-09-11 ENCOUNTER — OFFICE VISIT (OUTPATIENT)
Dept: CARDIOLOGY CLINIC | Age: 57
End: 2017-09-11

## 2017-09-11 VITALS
BODY MASS INDEX: 31.82 KG/M2 | WEIGHT: 198 LBS | HEART RATE: 73 BPM | OXYGEN SATURATION: 98 % | DIASTOLIC BLOOD PRESSURE: 100 MMHG | SYSTOLIC BLOOD PRESSURE: 160 MMHG | HEIGHT: 66 IN

## 2017-09-11 DIAGNOSIS — I10 MALIGNANT HYPERTENSION: ICD-10-CM

## 2017-09-11 DIAGNOSIS — N18.3 CKD (CHRONIC KIDNEY DISEASE), STAGE 3 (MODERATE): ICD-10-CM

## 2017-09-11 DIAGNOSIS — E78.5 HYPERLIPIDEMIA, UNSPECIFIED HYPERLIPIDEMIA TYPE: ICD-10-CM

## 2017-09-11 DIAGNOSIS — I10 ESSENTIAL HYPERTENSION: Primary | ICD-10-CM

## 2017-09-11 NOTE — MR AVS SNAPSHOT
Visit Information Date & Time Provider Department Dept. Phone Encounter #  
 9/11/2017  9:00 AM Ellie Hogan NP [facility] [address] [id number] Your Appointments 9/14/2017 12:20 PM  
ROUTINE CARE with Arthuro Cranker, MD [place] ([facility]) Appt Note: 2 wk fu bp check [address] [address] [address] [place] [address] [address]  
  
    
 10/10/2017 10:40 AM  
ROUTINE CARE with Arthuro Cranker, MD [facility] Appt Note: 3m fu BP; .  
 [address] [address] [phone]  
  
    
 10/11/2017  2:40 PM  
Follow Up with Ivan Powers MD  
[facility] ([facility]) Appt Note: 3 mth f/up; 6/9/17 - lmom to r/s june appt. provider out of office plk; 6/9/17 - lmom to r/s june appt. provider out of office plk; 6/13/17 - lmom to r/s june appt provider out of office plk; called 2x and mailed r/s letter - Steve Bryson; Rescheduling Appointment From 06/22/2017 [place] [address] [address] [address] [phone] [address] [place] [address] [address] Upcoming Health Maintenance Date Due INFLUENZA AGE 9 TO ADULT 8/1/2017 HEMOGLOBIN A1C Q6M 9/21/2017 LIPID PANEL Q1 2/28/2018 FOOT EXAM Q1 3/21/2018 MICROALBUMIN Q1 3/21/2018 EYE EXAM RETINAL OR DILATED Q1 5/8/2018 COLONOSCOPY 10/9/2023 DTaP/Tdap/Td series (2 - Td) 3/21/2027 Allergies as of 9/11/2017  Review Complete On: 9/11/2017 By: Ellie Hogan NP Severity Noted Reaction Type Reactions Bactrim [Sulfamethoprim Ds]  06/06/2014   Side Effect Nausea and Vomiting Current Immunizations  Reviewed on 5/7/2016 No immunizations on file. Not reviewed this visit You Were Diagnosed With   
  
 Codes Comments Essential hypertension    -  Primary ICD-10-CM: I10 
ICD-9-CM: 401.9 CKD (chronic kidney disease), stage 3 (moderate)     ICD-10-CM: N18.3 ICD-9-CM: 636. 3 Hyperlipidemia, unspecified hyperlipidemia type     ICD-10-CM: E78.5 ICD-9-CM: 272.4 Diabetes mellitus due to underlying condition, uncontrolled, with diabetic nephropathy, with long-term current use of insulin (HCC)     ICD-10-CM: E08.21, E08.65, Z79.4 ICD-9-CM: 249.41, 583.81, V58.67 Malignant hypertension     ICD-10-CM: I10 
ICD-9-CM: 401.0 Vitals BP Pulse Height(growth percentile) Weight(growth percentile) SpO2 BMI  
 (!) 160/100 73 5' 6\" (1.676 m) 198 lb (89.8 kg) 98% 31.96 kg/m2 Smoking Status Never Smoker Vitals History BMI and BSA Data Body Mass Index Body Surface Area 31.96 kg/m 2 2.04 m 2 Preferred Pharmacy Pharmacy Name Phone [facility], [address] [place] [address] [phone] Your Updated Medication List  
  
   
This list is accurate as of: 9/11/17  9:25 AM.  Always use your most recent med list. amLODIPine 10 mg tablet Commonly known as:  [place] Take 1 Tab by mouth daily. Blood-Glucose Meter monitoring kit  
by Does Not Apply route. One touch ultra2  
  
 bumetanide 2 mg tablet Commonly known as:  Thurlow Marjanh Take 0.5 Tabs by mouth two (2) times a day. calcitRIOL 0.25 mcg capsule Commonly known as:  ROCALTROL  
TAKE ONE CAPSULE BY MOUTH EVERY MONDAY, WEDNESDAY, AND FRIDAY  
  
 cloNIDine 0.3 mg/24 hr  
Commonly known as:  CATAPRES  
1 Patch by TransDERmal route every seven (7) days. COMBIGAN 0.2-0.5 % Drop ophthalmic solution Generic drug:  brimonidine-timolol INSTIL 1 DROP IN [facility] EYE 3 TIMES DAILY docusate sodium 100 mg capsule Commonly known as:  [place] Breech Take 1 Cap by mouth two (2) times a day. dorzolamide 2 % ophthalmic solution Commonly known as:  TRUSOPT  
 INSTIL 1 DROP IN [facility] EYE 3 TIMES DAILY  
  
 finasteride 5 mg tablet Commonly known as:  PROSCAR Take 1 Tab by mouth daily. fluticasone 50 mcg/actuation nasal spray Commonly known as:  FLONASE  
1 [place] by Both Nostrils route two (2) times a day. glucose blood VI test strips strip Commonly known as:  ASCENSIA AUTODISC VI, ONE TOUCH ULTRA TEST VI Test twice daily:  Fasting before breakfast and 2 hours after dinner (log readings), One touch ultra 2 test strips please; Dx: 250.02  
  
 hydrALAZINE 50 mg tablet Commonly known as:  APRESOLINE Take 1.5 Tabs by mouth three (3) times daily. Insulin Needles (Disposable) 31 gauge x 5/16\" Ndle Use as directed with Levemir Insulin Pen. Iron 325 mg (65 mg iron) tablet Generic drug:  ferrous sulfate Take  by mouth Daily (before breakfast). Take 1 tablet by mouth once a week as per patient.  
  
 ketoconazole 2 % topical cream  
Commonly known as:  NIZORAL Apply  to affected area daily. Lancets Misc Commonly known as: One Touch Fartun Dowdy Test twice daily: Once in the morning fasting, then two hours after dinner (log results) OTHER PGIIL/P CRM - Apply 1 -2 grams ( 1-2 pumps) to left foot 3 - 4 times daily. rosuvastatin 20 mg tablet Commonly known as:  CRESTOR Take 1 Tab by mouth nightly. [address] Generic drug:  sub-q insulin device, 40 unit  
by Does Not Apply route. VITAMIN B-12 1,000 mcg tablet Generic drug:  cyanocobalamin Take 1,000 mcg by mouth daily. We Performed the Following AMB POC EKG ROUTINE W/ 12 LEADS, INTER & REP [44134 CPT(R)] Patient Instructions Continue present medication regimen Follow-up with Dr. Esha Hernandes as scheduled and as needed Weigh daily and record Limit sodium intake to 2000mg per day Limit fluid intake to no more than  6, eight ounce glasses of any type of fluids per day Call if you notice sudden or progressive weight gain (3-5 pounds in 2-3 days), increasing shortness of breath, abdominal bloating, increasing lower extremity edema, inability to lie flat or on your normal number of pillows, having to sit up to catch your breath, fatigue, increased somnolence (sleeping more), poor appetite Introducing [facility]! [employer] introduces Precog patient portal. Now you can access parts of your medical record, email your doctor's office, and request medication refills online. 1. In your internet browser, go to [email or web address] 2. Click on the First Time User? Click Here link in the Sign In box. You will see the New Member Sign Up page. 3. Enter your Precog Access Code exactly as it appears below. You will not need to use this code after youve completed the sign-up process. If you do not sign up before the expiration date, you must request a new code. · Precog Access Code: [email or web address] Expires: 10/8/2017 12:29 PM 
 
4. Enter the last four digits of your Social Security Number (xxxx) and Date of Birth (mm/dd/yyyy) as indicated and click Submit. You will be taken to the next sign-up page. 5. Create a Precog ID. This will be your Precog login ID and cannot be changed, so think of one that is secure and easy to remember. 6. Create a Precog password. You can change your password at any time. 7. Enter your Password Reset Question and Answer. This can be used at a later time if you forget your password. 8. Enter your e-mail address. You will receive e-mail notification when new information is available in [address] 9. Click Sign Up. You can now view and download portions of your medical record. 10. Click the Download Summary menu link to download a portable copy of your medical information.  
 
If you have questions, please visit the Frequently Asked Questions section of the [employer]. Remember, IKOTECHhart is NOT to be used for urgent needs. For medical emergencies, dial 911. Now available from your iPhone and Android! Please provide this summary of care documentation to your next provider. Your primary care clinician is listed as [address]. If you have any questions after today's visit, please call [phone].

## 2017-09-11 NOTE — PROGRESS NOTES
1. Have you been to the ER, urgent care clinic since your last visit? Hospitalized since your last visit? no  2. Have you seen or consulted any other health care providers outside of the [address] [place] [address] since your last visit? Include any pap smears or colon screening.   no

## 2017-09-11 NOTE — PROGRESS NOTES
Nicmo Blanton presents today for a one month follow-up of CHF/HTN. He did not take any of his medications yet this morning and his blood sugar was in the 80's before they left home and now is in the 70's and he feels \"shaky. \"      He is a 66-year-old -American male with history of chronic kidney disease stage III-IV, long-standing poorly controlled diabetes and hypertension, dyslipidemia, and intermittent compliance with his medications. He also has significant autonomic dysfunction as noted on the tilt table study done in March 2017. He was supposed to be [facility] but it is unclear whether or not he followed up with them. Both the patient and his wife states that compliance with his medications has been dependent on their finances. He states that at times, he is unable to afford all of his medications. He was last seen by Dr. Pilar Calloway in March 2017. His last echocardiogram was done in March 2017 which showed a ejection fraction of 65% with significant concentric LVH. His last treadmill stress test was done in November 2012 which was considered and negative maximal stress test.  He had a renal artery duplex study done in Oct. 2016, which showed no significant renal artery stenosis. During his March 2017 appointment with Dr. Pilar Calloway, his blood pressure was also quite elevated at 224/112 and at that time, they also reported that he had been without his clonidine patch. Per Dr. Dreama Seip last office note, he stated that a supine blood pressure around 864-756 mmHg systolic would be acceptable due to his history of orthostatic hypotension attributed to autonomic dysfunction. He presented to [facility] ER on 8/10/17, with complaints of dizziness, elevated blood pressures and blood glucose levels at home. He also reported increasing scrotal edema.   When see on 7/28/17, he was switched from lasix to Bumex and was restarted on his clonidine patch as he had been without his clonidine patch for about a month due to cost.  He also admitted to occasional non-compliance with his medications and diet. During his hospital stay, he was diuresed with IV Bumex, his losartan was discontinued and he was started on hydralazine, placed on a sodium and fluid restriction, and followed by nephrology. He was noted to have progressively worsening renal function and it was felt that he may need to begin hemodialysis in the near future. He is normally followed by Dr. Macey Villela and Mr. Raghav Hernandez wife states that he has followed up with him since being discharged home. Aside from his blood glucose level being lower than normal, heart failure-wise, he is feeling well. He states that he has no scrotal edema. He denies chest pain, tightness, heaviness, and palpitations. He denies shortness of breath at rest, admits to dyspnea on exertion, denies orthopnea and PND. He denies abdominal bloating. He denies lightheadedness, admits to occasional dizziness, and denies syncope. He admits to some lower extremity edema and denies claudication. Denies nausea, vomiting, diarrhea, fever, chills. PMH:  Past Medical History:   Diagnosis Date    Cardiac echocardiogram 10/21/2016    EF 60-65%. No WMA. Mod-marked LVH. Normal diastolic fx. No significant valvular heart disease.  Cardiac treadmill stress test, low risk 11/02/2012    Negative maximal exercise treadmill test.  Ex time 7 min 45 sec.  Cardiovascular LE peripheral arterial testing 03/22/2016    No significant peripheral arterial disease at rest bilaterally. ABIs deferred due to calcified vessels.  Cardiovascular LLE venous duplex 06/06/2012    Left leg:  No DVT.  Cardiovascular renal duplex 10/21/2016    No significant renal artery stenosis.       CKD (chronic kidney disease) stage 3, GFR 30-59 ml/min     Diabetes mellitus ([address] 3/12/2010    Diabetic retinopathy ([address] 5/4/2010    Eye examination 5/4/10    OU: 20/20; OD: 20/25; OS: 20/25 without correction.  Glaucoma 08/17/2017    Lolly Valdes    HLD (hyperlipidemia) 3/12/2010    HTN (hypertension) 3/12/2010       PSH:  Past Surgical History:   Procedure Laterality Date    HX HERNIA REPAIR  2005       MEDS:  Current Outpatient Prescriptions   Medication Sig    hydrALAZINE (APRESOLINE) 50 mg tablet Take 1.5 Tabs by mouth three (3) times daily.  bumetanide (BUMEX) 2 mg tablet Take 0.5 Tabs by mouth two (2) times a day.  finasteride (PROSCAR) 5 mg tablet Take 1 Tab by mouth daily.  amLODIPine (NORVASC) 10 mg tablet Take 1 Tab by mouth daily.  cloNIDine (CATAPRES) 0.3 mg/24 hr 1 Patch by TransDERmal route every seven (7) days.  sub-q insulin device, 40 unit (VGO 40) alicia by Does Not Apply route.  dorzolamide (TRUSOPT) 2 % ophthalmic solution INSTIL 1 DROP IN EACH EYE 3 TIMES DAILY    brimonidine-timolol (COMBIGAN) 0.2-0.5 % drop ophthalmic solution INSTIL 1 DROP IN EACH EYE 3 TIMES DAILY    ketoconazole (NIZORAL) 2 % topical cream Apply  to affected area daily.  OTHER PGIIL/P CRM - Apply 1 -2 grams ( 1-2 pumps) to left foot 3 - 4 times daily.  calcitRIOL (ROCALTROL) 0.25 mcg capsule TAKE ONE CAPSULE BY MOUTH EVERY MONDAY, WEDNESDAY, AND FRIDAY    rosuvastatin (CRESTOR) 20 mg tablet Take 1 Tab by mouth nightly.  docusate sodium (COLACE) 100 mg capsule Take 1 Cap by mouth two (2) times a day.  glucose blood VI test strips (ASCENSIA AUTODISC VI, ONE TOUCH ULTRA TEST VI) strip Test twice daily:  Fasting before breakfast and 2 hours after dinner (log readings), One touch ultra 2 test strips please; Dx: 250.02    fluticasone (FLONASE) 50 mcg/actuation nasal spray 1 [place] by Both Nostrils route two (2) times a day.  Insulin Needles, Disposable, 31 X 5/16 \" Ndle Use as directed with Levemir Insulin Pen.  ferrous sulfate (IRON) 325 mg (65 mg iron) tablet Take  by mouth Daily (before breakfast). Take 1 tablet by mouth once a week as per patient.     Lancets (ONE TOUCH DELICA) Misc Test twice daily: Once in the morning fasting, then two hours after dinner (log results)    Blood-Glucose Meter monitoring kit by Does Not Apply route. One touch ultra2    cyanocobalamin (VITAMIN B-12) 1,000 mcg tablet Take 1,000 mcg by mouth daily. No current facility-administered medications for this visit. Allergies and Sensitivities:  Allergies   Allergen Reactions    Bactrim [Sulfamethoprim Ds] Nausea and Vomiting       Family History:  Family History   Problem Relation Age of Onset    Diabetes Sister     Sickle Cell Anemia Sister     Diabetes Sister        Social History:  He  reports that he has never smoked. He has never used smokeless tobacco.  He  reports that he does not drink alcohol. Physical:  Visit Vitals    BP (!) 160/100    Pulse 73    Ht 5' 6\" (1.676 m)    Wt 89.8 kg (198 lb)    SpO2 98%    BMI 31.96 kg/m2       His weight is unchanged since his lat visit      Exam:  Neck:  Supple, no JVD, no carotid bruits  CV:  Normal S1 and  S2, no murmurs, rubs, or gallops noted  Lungs:  Clear to ausculation throughout, no wheezes or rales  Abd:  Soft, non-tender, non-distended with good bowel sounds. No hepatosplenomegaly  Extremities:  2+ softly pitting lower extremity edema bilaterally around his ankles      Data:  EKG:    Normal sinus rhythm.  T-wave abnormality.  Consider lateral ischemia.       LABS:  Lab Results   Component Value Date/Time    Sodium 138 08/09/2017 01:24 PM    Potassium 3.9 08/09/2017 01:24 PM    Chloride 100 08/09/2017 01:24 PM    CO2 25 08/09/2017 01:24 PM    Glucose 83 08/09/2017 01:24 PM    BUN 37 08/09/2017 01:24 PM    Creatinine 4.70 08/09/2017 01:24 PM     Lab Results   Component Value Date/Time    Cholesterol, total 214 02/28/2017 09:22 AM    HDL Cholesterol 71 02/28/2017 09:22 AM    LDL, calculated 113.4 02/28/2017 09:22 AM    Triglyceride 148 02/28/2017 09:22 AM    CHOL/HDL Ratio 3.0 02/28/2017 09:22 AM     Lab Results Component Value Date/Time    ALT (SGPT) 36 02/28/2017 09:22 AM       Impression / Plan:  1. Malignant hypertension, blood pressure elevated but did not take medications this morning  2. History of orthostatic hypotension due to autonomic dysfunction as noted during tilt table study in March 2017  3. Hypercholesterolemia, on rosuvastatin 20 mg  4. Diabetes mellitus, recommend Hgb A1c less than 7% from cardiac standpoint  (9.1 recently)  5. Chronic kidney disease, stage 3-4, followed by Dr. Zaina Pugh    Mr. Perla Ferraro was seen today for a one month follow-up of CHF/HTN. His blood pressure is elevated as he has not taken any of his medications this morning and has also not eaten breakfast.  He reports that his blood glucose level was in the 80's prior to leaving home and here in the office, it was in the low 70's. He complains of feeling \"shaky and nervous\" which he states normally happens when his blood sugar is low. He was given a package of peanut butter and jelly crackers and he ate 2 of them while sitting in the exam room. After eating the crackers, he stated that he was beginning to feel better. They were reminded of the importance of eating protein to keep his sugars stable. He states that his before bedtime snack was apple sauce. His breath sounds are clear and he has some lower extremity edema. He reports that the scrotal edema has resolved. At this time, no changes were made as they report that his blood pressures have been in the 218'T to 835'C systolic at home which is acceptable in view of his orthostatic hypotension. His weight is stable and positive reinforcement given for limiting his sodium intake and being very aware of his fluid intake. They will continue to monitor his blood pressures at home and will call us if they notice consistently high blood pressures. Congestive heart failure teaching reinforced today.   Advised to limit sodium intake to no more than 1500-2000mg per day and to also limit his fluid intake to 48 ounces per day. Advised to weigh daily every morning and record weights. Instructed to call our office if progressive weight gain is noted over a 2 to 3 day period of time, shortness of breath increases, or if abdominal bloating, nausea, fatigue, or increased lower extremity edema is noted. He will follow-up with Dr. Jaime Batista as scheduled and as needed. Vonnie Cannon MSN, FNP-BC    Please note:  Portions of this chart were created with Dragon medical speech to text program.  Unrecognized errors may be present.

## 2017-09-11 NOTE — PATIENT INSTRUCTIONS
Continue present medication regimen  Follow-up with Dr. Blanca Addison as scheduled and as needed  Weigh daily and record  Limit sodium intake to 2000mg per day  Limit fluid intake to no more than  6, eight ounce glasses of any type of fluids per day  Call if you notice sudden or progressive weight gain (3-5 pounds in 2-3 days), increasing shortness of breath, abdominal bloating, increasing lower extremity edema, inability to lie flat or on your normal number of pillows, having to sit up to catch your breath, fatigue, increased somnolence (sleeping more), poor appetite

## 2017-10-02 RX ORDER — CLONIDINE 0.3 MG/24H
PATCH, EXTENDED RELEASE TRANSDERMAL
Qty: 12 PATCH | Refills: 1 | Status: SHIPPED | OUTPATIENT
Start: 2017-10-02 | End: 2018-04-20 | Stop reason: SDUPTHER

## 2017-10-10 ENCOUNTER — OFFICE VISIT (OUTPATIENT)
Dept: FAMILY MEDICINE CLINIC | Age: 57
End: 2017-10-10

## 2017-10-10 VITALS
HEIGHT: 66 IN | BODY MASS INDEX: 31.82 KG/M2 | HEART RATE: 78 BPM | DIASTOLIC BLOOD PRESSURE: 84 MMHG | RESPIRATION RATE: 16 BRPM | OXYGEN SATURATION: 97 % | TEMPERATURE: 98.2 F | SYSTOLIC BLOOD PRESSURE: 160 MMHG | WEIGHT: 198 LBS

## 2017-10-10 DIAGNOSIS — Z23 ENCOUNTER FOR IMMUNIZATION: ICD-10-CM

## 2017-10-10 DIAGNOSIS — I10 HTN (HYPERTENSION), BENIGN: Primary | ICD-10-CM

## 2017-10-10 NOTE — PATIENT INSTRUCTIONS

## 2017-10-10 NOTE — PROGRESS NOTES
1. Have you been to the ER, urgent care clinic since your last visit? Hospitalized since your last visit? No    2. Have you seen or consulted any other health care providers outside of the [address] [place] [address] since your last visit? Include any pap smears or colon screening.  No

## 2017-10-10 NOTE — MR AVS SNAPSHOT
Visit Information Date & Time Provider Department Dept. Phone Encounter #  
 10/10/2017 10:40 AM Tiffani Zhao[address] Amer[place]enny Lindquist [id number] Follow-up Instructions Return in about 3 months (around 1/10/2018) for BP/chol. Your Appointments 10/11/2017  2:40 PM  
Follow Up with Rona Fisher MD  
[facility] ([facility]) Appt Note: 3 mth f/up; 6/9/17 - lmom to r/s june appt. provider out of office plk; 6/9/17 - lmom to r/s june appt. provider out of office plk; 6/13/17 - lmom to r/s june appt provider out of office plk; called 2x and mailed r/s letter - Kevin Hearing; Rescheduling Appointment From 06/22/2017 [address] [id number] [phone] [address] [place] [address] [address] Upcoming Health Maintenance Date Due HEMOGLOBIN A1C Q6M 1/26/2018* LIPID PANEL Q1 2/28/2018 FOOT EXAM Q1 3/21/2018 MICROALBUMIN Q1 3/21/2018 EYE EXAM RETINAL OR DILATED Q1 5/8/2018 COLONOSCOPY 10/9/2023 DTaP/Tdap/Td series (2 - Td) 3/21/2027 *Topic was postponed. The date shown is not the original due date. Allergies as of 10/10/2017  Review Complete On: 10/10/2017 By: Brant Dee LPN Severity Noted Reaction Type Reactions Bactrim [Sulfamethoprim Ds]  06/06/2014   Side Effect Nausea and Vomiting Current Immunizations  Reviewed on 5/7/2016 Name Date Influenza Vaccine (Quad) PF  Incomplete Not reviewed this visit You Were Diagnosed With   
  
 Codes Comments HTN (hypertension), benign    -  Primary ICD-10-CM: I10 
ICD-9-CM: 401.1 Encounter for immunization     ICD-10-CM: Z96 ICD-9-CM: V03.89 Vitals BP Pulse Temp Resp Height(growth percentile) Weight(growth percentile) 160/84 78 98.2 °F (36.8 °C) (Oral) 16 5' 6\" (1.676 m) 198 lb (89.8 kg) SpO2 BMI Smoking Status 97% 31.96 kg/m2 Never Smoker Vitals History BMI and BSA Data Body Mass Index Body Surface Area 31.96 kg/m 2 2.04 m 2 Preferred Pharmacy Pharmacy Name Phone [facility], [address]  [phone] Your Updated Medication List  
  
   
This list is accurate as of: 10/10/17 11:25 AM.  Always use your most recent med list. amLODIPine 10 mg tablet Commonly known as:  Kay Colon Take 1 Tab by mouth daily. Blood-Glucose Meter monitoring kit  
by Does Not Apply route. One touch ultra2  
  
 bumetanide 2 mg tablet Commonly known as:  Oriana Bolus Take 0.5 Tabs by mouth two (2) times a day. calcitRIOL 0.25 mcg capsule Commonly known as:  ROCALTROL  
TAKE ONE CAPSULE BY MOUTH EVERY MONDAY, WEDNESDAY, AND FRIDAY  
  
 cloNIDine 0.3 mg/24 hr  
Commonly known as:  CATAPRES  
APPLY 1 PATCH TO SKIN ONCE EVERY 7 DAYS  
  
 COMBIGAN 0.2-0.5 % Drop ophthalmic solution Generic drug:  brimonidine-timolol INSTIL 1 DROP IN [facility] EYE 3 TIMES DAILY docusate sodium 100 mg capsule Commonly known as:  Maria Del Rosario Peels Take 1 Cap by mouth two (2) times a day. dorzolamide 2 % ophthalmic solution Commonly known as:  TRUSOPT INSTIL 1 DROP IN [facility] EYE 3 TIMES DAILY  
  
 finasteride 5 mg tablet Commonly known as:  PROSCAR Take 1 Tab by mouth daily. fluticasone 50 mcg/actuation nasal spray Commonly known as:  FLONASE  
1 [place] by Both Nostrils route two (2) times a day. glucose blood VI test strips strip Commonly known as:  ASCENSIA AUTODISC VI, ONE TOUCH ULTRA TEST VI Test twice daily:  Fasting before breakfast and 2 hours after dinner (log readings), One touch ultra 2 test strips please; Dx: 250.02  
  
 hydrALAZINE 50 mg tablet Commonly known as:  APRESOLINE Take 1.5 Tabs by mouth three (3) times daily. Insulin Needles (Disposable) 31 gauge x 5/16\" Ndle Use as directed with Levemir Insulin Pen. Iron 325 mg (65 mg iron) tablet Generic drug:  ferrous sulfate Take  by mouth Daily (before breakfast). Take 1 tablet by mouth once a week as per patient.  
  
 ketoconazole 2 % topical cream  
Commonly known as:  NIZORAL Apply  to affected area daily. Lancets Misc Commonly known as: One Touch Rico Donta Test twice daily: Once in the morning fasting, then two hours after dinner (log results) OTHER PGIIL/P CRM - Apply 1 -2 grams ( 1-2 pumps) to left foot 3 - 4 times daily. rosuvastatin 20 mg tablet Commonly known as:  CRESTOR Take 1 Tab by mouth nightly. [address] Generic drug:  sub-q insulin device, 40 unit  
by Does Not Apply route. VITAMIN B-12 1,000 mcg tablet Generic drug:  cyanocobalamin Take 1,000 mcg by mouth daily. We Performed the Following INFLUENZA VIRUS VAC QUAD,SPLIT,PRESV FREE SYRINGE IM [id number] CPT(R)] Follow-up Instructions Return in about 3 months (around 1/10/2018) for BP/chol. Patient Instructions High Blood Pressure: Care Instructions Your Care Instructions If your blood pressure is usually above 140/90, you have high blood pressure, or hypertension. That means the top number is 140 or higher or the bottom number is 90 or higher, or both. Despite what a lot of people think, high blood pressure usually doesn't cause headaches or make you feel dizzy or lightheaded. It usually has no symptoms. But it does increase your risk for heart attack, stroke, and kidney or eye damage. The higher your blood pressure, the more your risk increases. Your doctor will give you a goal for your blood pressure. Your goal will be based on your health and your age. An example of a goal is to keep your blood pressure below 140/90. Lifestyle changes, such as eating healthy and being active, are always important to help lower blood pressure.  You might also take medicine to reach your blood pressure goal. 
 Follow-up care is a key part of your treatment and safety. Be sure to make and go to all appointments, and call your doctor if you are having problems. It's also a good idea to know your test results and keep a list of the medicines you take. How can you care for yourself at home? Medical treatment · If you stop taking your medicine, your blood pressure will go back up. You may take one or more types of medicine to lower your blood pressure. Be safe with medicines. Take your medicine exactly as prescribed. Call your doctor if you think you are having a problem with your medicine. · Talk to your doctor before you start taking aspirin every day. Aspirin can help certain people lower their risk of a heart attack or stroke. But taking aspirin isn't right for everyone, because it can cause serious bleeding. · See your doctor regularly. You may need to see the doctor more often at first or until your blood pressure comes down. · If you are taking blood pressure medicine, talk to your doctor before you take decongestants or anti-inflammatory medicine, such as ibuprofen. Some of these medicines can raise blood pressure. · Learn how to check your blood pressure at home. Lifestyle changes · Stay at a healthy weight. This is especially important if you put on weight around the waist. Losing even 10 pounds can help you lower your blood pressure. · If your doctor recommends it, get more exercise. Walking is a good choice. Bit by bit, increase the amount you walk every day. Try for at least 30 minutes on most days of the week. You also may want to swim, bike, or do other activities. · Avoid or limit alcohol. Talk to your doctor about whether you can drink any alcohol. · Try to limit how much sodium you eat to less than 2,300 milligrams (mg) a day. Your doctor may ask you to try to eat less than 1,500 mg a day.  
· Eat plenty of fruits (such as bananas and oranges), vegetables, legumes, whole grains, and low-fat dairy products. · Lower the amount of saturated fat in your diet. Saturated fat is found in animal products such as milk, cheese, and meat. Limiting these foods may help you lose weight and also lower your risk for heart disease. · Do not smoke. Smoking increases your risk for heart attack and stroke. If you need help quitting, talk to your doctor about stop-smoking programs and medicines. These can increase your chances of quitting for good. When should you call for help? Call 911 anytime you think you may need emergency care. This may mean having symptoms that suggest that your blood pressure is causing a serious heart or blood vessel problem. Your blood pressure may be over 180/110. For example, call 911 if: 
· You have symptoms of a heart attack. These may include: ¨ Chest pain or pressure, or a strange feeling in the chest. 
¨ Sweating. ¨ Shortness of breath. ¨ Nausea or vomiting. ¨ Pain, pressure, or a strange feeling in the back, neck, jaw, or upper belly or in one or both shoulders or arms. ¨ Lightheadedness or sudden weakness. ¨ A fast or irregular heartbeat. · You have symptoms of a stroke. These may include: 
¨ Sudden numbness, tingling, weakness, or loss of movement in your face, arm, or leg, especially on only one side of your body. ¨ Sudden vision changes. ¨ Sudden trouble speaking. ¨ Sudden confusion or trouble understanding simple statements. ¨ Sudden problems with walking or balance. ¨ A sudden, severe headache that is different from past headaches. · You have severe back or belly pain. Do not wait until your blood pressure comes down on its own. Get help right away. Call your doctor now or seek immediate care if: 
· Your blood pressure is much higher than normal (such as 180/110 or higher), but you don't have symptoms. · You think high blood pressure is causing symptoms, such as: ¨ Severe headache. ¨ Blurry vision. Watch closely for changes in your health, and be sure to contact your doctor if: 
· Your blood pressure measures 140/90 or higher at least 2 times. That means the top number is 140 or higher or the bottom number is 90 or higher, or both. · You think you may be having side effects from your blood pressure medicine. · Your blood pressure is usually normal, but it goes above normal at least 2 times. Where can you learn more? Go to http://hoang-jayden.info/. Enter P909 in the search box to learn more about \"High Blood Pressure: Care Instructions. \" Current as of: August 8, 2016 Content Version: 11.3 © 4263-3934 [employer]. Care instructions adapted under license by [employer] (which disclaims liability or warranty for this information). If you have questions about a medical condition or this instruction, always ask your healthcare professional. [place] [address] any warranty or liability for your use of this information. Introducing [facility]! [employer] introduces [employer] patient portal. Now you can access parts of your medical record, email your doctor's office, and request medication refills online. 1. In your internet browser, go to [email or web address] 2. Click on the First Time User? Click Here link in the Sign In box. You will see the New Member Sign Up page. 3. Enter your [employer] Access Code exactly as it appears below. You will not need to use this code after youve completed the sign-up process. If you do not sign up before the expiration date, you must request a new code. · [employer] Access Code: [email or web address] Expires: 1/8/2018 11:21 AM 
 
4. Enter the last four digits of your Social Security Number (xxxx) and Date of Birth (mm/dd/yyyy) as indicated and click Submit. You will be taken to the next sign-up page. 5. Create a [employer] ID.  This will be your [employer] login ID and cannot be changed, so think of one that is secure and easy to remember. 6. Create a [employer] password. You can change your password at any time. 7. Enter your Password Reset Question and Answer. This can be used at a later time if you forget your password. 8. Enter your e-mail address. You will receive e-mail notification when new information is available in [address] 9. Click Sign Up. You can now view and download portions of your medical record. 10. Click the Download Summary menu link to download a portable copy of your medical information. If you have questions, please visit the Frequently Asked Questions section of the [employer] website. Remember, [employer] is NOT to be used for urgent needs. For medical emergencies, dial 911. Now available from your iPhone and Android! Please provide this summary of care documentation to your next provider. Your primary care clinician is listed as [address]. If you have any questions after today's visit, please call [phone].

## 2017-10-10 NOTE — PROGRESS NOTES
HISTORY OF PRESENT ILLNESS  Kathy Calderon is a 64 y.o. male. HPI   Patient is here today for evaluation and treatment of; Hypertension. Hypertension:  Has had some recent elevated BPs; BP was more elevated at last cardiology visit. Better today. He has recently had blood work through renal; thinks he did have a cholesterol check. States his A1c was also checked at that time; He state this was elevated. He is under the care of endocrine; Dr. Darron Sanabria; has follow up on 11/14/2017. Will plan to request last lab results from Dr. Mahsa Lund BPs at home have been stable for the majority of his checks per his wife. Current Outpatient Prescriptions:     cloNIDine (CATAPRES) 0.3 mg/24 hr, APPLY 1 PATCH TO SKIN ONCE EVERY 7 DAYS, Disp: 12 Patch, Rfl: 1    hydrALAZINE (APRESOLINE) 50 mg tablet, Take 1.5 Tabs by mouth three (3) times daily. , Disp: 135 Tab, Rfl: 6    bumetanide (BUMEX) 2 mg tablet, Take 0.5 Tabs by mouth two (2) times a day., Disp: 1 Tab, Rfl: 0    finasteride (PROSCAR) 5 mg tablet, Take 1 Tab by mouth daily. , Disp: 30 Tab, Rfl: 6    amLODIPine (NORVASC) 10 mg tablet, Take 1 Tab by mouth daily. , Disp: 30 Tab, Rfl: 6    sub-q insulin device, 40 unit (VGO 40) alicia, by Does Not Apply route., Disp: , Rfl:     dorzolamide (TRUSOPT) 2 % ophthalmic solution, INSTIL 1 DROP IN EACH EYE 3 TIMES DAILY, Disp: , Rfl:     brimonidine-timolol (COMBIGAN) 0.2-0.5 % drop ophthalmic solution, INSTIL 1 DROP IN EACH EYE 3 TIMES DAILY, Disp: , Rfl:     ketoconazole (NIZORAL) 2 % topical cream, Apply  to affected area daily. , Disp: 15 g, Rfl: 1    OTHER, PGIIL/P CRM - Apply 1 -2 grams ( 1-2 pumps) to left foot 3 - 4 times daily. , Disp: , Rfl:     calcitRIOL (ROCALTROL) 0.25 mcg capsule, TAKE ONE CAPSULE BY MOUTH EVERY MONDAY, WEDNESDAY, AND FRIDAY, Disp: , Rfl: 2    rosuvastatin (CRESTOR) 20 mg tablet, Take 1 Tab by mouth nightly., Disp: 30 Tab, Rfl: 6    docusate sodium (COLACE) 100 mg capsule, Take 1 Cap by mouth two (2) times a day., Disp: 60 Cap, Rfl: 0    glucose blood VI test strips (ASCENSIA AUTODISC VI, ONE TOUCH ULTRA TEST VI) strip, Test twice daily:  Fasting before breakfast and 2 hours after dinner (log readings), One touch ultra 2 test strips please; Dx: 250.02, Disp: 1 Package, Rfl: 11    fluticasone (FLONASE) 50 mcg/actuation nasal spray, 1 [place] by Both Nostrils route two (2) times a day., Disp: 1 Bottle, Rfl: 2    Insulin Needles, Disposable, 31 X 5/16 \" Ndle, Use as directed with Levemir Insulin Pen., Disp: 1 Package, Rfl: 11    ferrous sulfate (IRON) 325 mg (65 mg iron) tablet, Take  by mouth Daily (before breakfast). Take 1 tablet by mouth once a week as per patient., Disp: , Rfl:     Lancets (ONE TOUCH DELICA) Misc, Test twice daily: Once in the morning fasting, then two hours after dinner (log results), Disp: 1 Package, Rfl: 11    Blood-Glucose Meter monitoring kit, by Does Not Apply route. One touch ultra2, Disp: 1 Kit, Rfl: 0    cyanocobalamin (VITAMIN B-12) 1,000 mcg tablet, Take 1,000 mcg by mouth daily. , Disp: , Rfl:     PMH,  Meds, Allergies, Family History, Social history reviewed    Review of Systems   Constitutional: Negative for chills and fever. Cardiovascular: Positive for leg swelling (at baseline). Negative for chest pain and palpitations. Physical Exam   Constitutional: He appears well-developed and well-nourished. HENT:   Head: Atraumatic. Cardiovascular: Normal rate and regular rhythm. Exam reveals no gallop and no friction rub. No murmur heard. Pulmonary/Chest: Breath sounds normal. No respiratory distress. Musculoskeletal: He exhibits edema. Nursing note and vitals reviewed. ASSESSMENT and PLAN    ICD-10-CM ICD-9-CM    1. HTN (hypertension), benign I10 401.1    2.  Encounter for immunization Z23 V03.89 INFLUENZA VIRUS VAC QUAD,SPLIT,PRESV FREE SYRINGE IM       Overall better from last visit here with stable home BPs as well;   Flu shot today, Get last labs from Dr. Deepak Pollack to monitor BPs  Follow-up Disposition:  Return in about 3 months (around 1/10/2018) for BP/chol.

## 2017-10-11 ENCOUNTER — OFFICE VISIT (OUTPATIENT)
Dept: CARDIOLOGY CLINIC | Age: 57
End: 2017-10-11

## 2017-10-11 VITALS
DIASTOLIC BLOOD PRESSURE: 90 MMHG | HEART RATE: 74 BPM | SYSTOLIC BLOOD PRESSURE: 198 MMHG | WEIGHT: 205 LBS | HEIGHT: 66 IN | OXYGEN SATURATION: 98 % | BODY MASS INDEX: 32.95 KG/M2

## 2017-10-11 DIAGNOSIS — I95.1 ORTHOSTATIC HYPOTENSION: ICD-10-CM

## 2017-10-11 DIAGNOSIS — N18.5 STAGE 5 CHRONIC KIDNEY DISEASE NOT ON CHRONIC DIALYSIS (HCC): ICD-10-CM

## 2017-10-11 DIAGNOSIS — E78.5 HYPERLIPIDEMIA, UNSPECIFIED HYPERLIPIDEMIA TYPE: ICD-10-CM

## 2017-10-11 DIAGNOSIS — I10 MALIGNANT HYPERTENSION: Primary | ICD-10-CM

## 2017-10-11 NOTE — MR AVS SNAPSHOT
Visit Information Date & Time Provider Department Dept. Phone Encounter #  
 10/11/2017  2:40 PM Clarissa Mcfarlane MD [facility] [employer] [facility] [id number] Your Appointments 1/10/2018  8:20 AM  
Office Visit with Almas Tinajero MD  
[place] [address] [facility]) Appt Note: fu on bp/chol [address] [address] [place] [address]  
[phone]  
  
   
 [address],  [address] Upcoming Health Maintenance Date Due HEMOGLOBIN A1C Q6M 1/26/2018* LIPID PANEL Q1 2/28/2018 FOOT EXAM Q1 3/21/2018 MICROALBUMIN Q1 3/21/2018 EYE EXAM RETINAL OR DILATED Q1 5/8/2018 COLONOSCOPY 10/9/2023 DTaP/Tdap/Td series (2 - Td) 3/21/2027 *Topic was postponed. The date shown is not the original due date. Allergies as of 10/11/2017  Review Complete On: 10/10/2017 By: Almas Tinajero MD  
  
 Severity Noted Reaction Type Reactions Bactrim [Sulfamethoprim Ds]  06/06/2014   Side Effect Nausea and Vomiting Current Immunizations  Reviewed on 5/7/2016 Name Date Influenza Vaccine (Quad) PF 10/10/2017 Not reviewed this visit You Were Diagnosed With   
  
 Codes Comments Hyperlipidemia, unspecified hyperlipidemia type    -  Primary ICD-10-CM: E78.5 ICD-9-CM: 272.4 Malignant hypertension     ICD-10-CM: I10 
ICD-9-CM: 401.0 Essential hypertension     ICD-10-CM: I10 
ICD-9-CM: 401.9 Vitals BP Pulse Height(growth percentile) Weight(growth percentile) SpO2 BMI  
 198/90 74 5' 6\" (1.676 m) 205 lb (93 kg) 98% 33.09 kg/m2 Smoking Status Never Smoker Vitals History BMI and BSA Data Body Mass Index Body Surface Area 33.09 kg/m 2 2.08 m 2 Preferred Pharmacy Pharmacy Name Phone [facility], [address] [place] [address] [phone] Your Updated Medication List  
  
   
 This list is accurate as of: 10/11/17  3:30 PM.  Always use your most recent med list. amLODIPine 10 mg tablet Commonly known as:  Masood Blas Take 1 Tab by mouth daily. Blood-Glucose Meter monitoring kit  
by Does Not Apply route. One touch ultra2  
  
 bumetanide 2 mg tablet Commonly known as:  [place] Days Take 0.5 Tabs by mouth two (2) times a day. calcitRIOL 0.25 mcg capsule Commonly known as:  ROCALTROL  
TAKE ONE CAPSULE BY MOUTH EVERY MONDAY, WEDNESDAY, AND FRIDAY  
  
 cloNIDine 0.3 mg/24 hr  
Commonly known as:  CATAPRES  
APPLY 1 PATCH TO SKIN ONCE EVERY 7 DAYS  
  
 COMBIGAN 0.2-0.5 % Drop ophthalmic solution Generic drug:  brimonidine-timolol INSTIL 1 DROP IN [facility] EYE 3 TIMES DAILY docusate sodium 100 mg capsule Commonly known as:  Ltanya Brian Take 1 Cap by mouth two (2) times a day. dorzolamide 2 % ophthalmic solution Commonly known as:  TRUSOPT INSTIL 1 DROP IN [facility] EYE 3 TIMES DAILY  
  
 finasteride 5 mg tablet Commonly known as:  PROSCAR Take 1 Tab by mouth daily. fluticasone 50 mcg/actuation nasal spray Commonly known as:  FLONASE  
1 [place] by Both Nostrils route two (2) times a day. glucose blood VI test strips strip Commonly known as:  ASCENSIA AUTODISC VI, ONE TOUCH ULTRA TEST VI Test twice daily:  Fasting before breakfast and 2 hours after dinner (log readings), One touch ultra 2 test strips please; Dx: 250.02  
  
 hydrALAZINE 50 mg tablet Commonly known as:  APRESOLINE Take 1.5 Tabs by mouth three (3) times daily. Insulin Needles (Disposable) 31 gauge x 5/16\" Ndle Use as directed with Levemir Insulin Pen. Iron 325 mg (65 mg iron) tablet Generic drug:  ferrous sulfate Take  by mouth Daily (before breakfast). Take 1 tablet by mouth once a week as per patient.  
  
 ketoconazole 2 % topical cream  
Commonly known as:  NIZORAL Apply  to affected area daily. Lancets Misc Commonly known as: One Touch Rozanna Bares Test twice daily: Once in the morning fasting, then two hours after dinner (log results) OTHER PGIIL/P CRM - Apply 1 -2 grams ( 1-2 pumps) to left foot 3 - 4 times daily. rosuvastatin 20 mg tablet Commonly known as:  CRESTOR Take 1 Tab by mouth nightly. [address] Generic drug:  sub-q insulin device, 40 unit  
by Does Not Apply route. VITAMIN B-12 1,000 mcg tablet Generic drug:  cyanocobalamin Take 1,000 mcg by mouth daily. We Performed the Following AMB POC EKG ROUTINE W/ 12 LEADS, INTER & REP [49148 CPT(R)] Patient Instructions See Jeff Alfaro in 3 months Follow up with Dr Kathe Sims in 6 months Take extra Bumex 1 mg as needed for swelling Introducing [facility]! [facility] introduces Trovali patient portal. Now you can access parts of your medical record, email your doctor's office, and request medication refills online. 1. In your internet browser, go to [email or web address] 2. Click on the First Time User? Click Here link in the Sign In box. You will see the New Member Sign Up page. 3. Enter your Trovali Access Code exactly as it appears below. You will not need to use this code after youve completed the sign-up process. If you do not sign up before the expiration date, you must request a new code. · Trovali Access Code: [email or web address] Expires: 1/8/2018 11:21 AM 
 
4. Enter the last four digits of your Social Security Number (xxxx) and Date of Birth (mm/dd/yyyy) as indicated and click Submit. You will be taken to the next sign-up page. 5. Create a [employer] ID. This will be your Trovali login ID and cannot be changed, so think of one that is secure and easy to remember. 6. Create a [employer] password. You can change your password at any time. 7. Enter your Password Reset Question and Answer. This can be used at a later time if you forget your password. 8. Enter your e-mail address. You will receive e-mail notification when new information is available in [address] 9. Click Sign Up. You can now view and download portions of your medical record. 10. Click the Download Summary menu link to download a portable copy of your medical information. If you have questions, please visit the Frequently Asked Questions section of the Sentri website. Remember, Sentri is NOT to be used for urgent needs. For medical emergencies, dial 911. Now available from your iPhone and Android! Please provide this summary of care documentation to your next provider. Your primary care clinician is listed as [address]. If you have any questions after today's visit, please call [phone].

## 2017-10-11 NOTE — PROGRESS NOTES
HISTORY OF PRESENT ILLNESS  Kash Curiel is a 64 y.o. male. Hypertension   Associated symptoms include shortness of breath. Pertinent negatives include no chest pain, no abdominal pain and no headaches. Shortness of Breath   Associated symptoms include leg swelling. Pertinent negatives include no fever, no headaches, no cough, no wheezing, no PND, no orthopnea, no chest pain, no vomiting, no abdominal pain, no rash and no claudication. Leg Swelling   Associated symptoms include shortness of breath. Pertinent negatives include no chest pain, no abdominal pain and no headaches. Patient presents for a follow-up office visit. He was initially referred here for long-standing poorly controlled hypertension. The patient also has a history of chronic kidney disease, now stage V, long-standing poorly controlled diabetes, dyslipidemia, and intermittent compliance with his medications. He underwent an echocardiogram in October 2016 which showed moderate to marked concentric LVH with preserved LV systolic function, EF 95-83% without any significant valvular heart disease. He was hospitalized at [facility] in March 2017 following a syncopal episode. He was diagnosed with significant autonomic dysfunction on a tilt table test where his systolic blood pressure dropped from 200-97 while in the upright 70° position and administered 1 spray of sublingual nitroglycerin. His heart rate did increase appropriately from 75 to 110 bpm.  Because of the significant drop in blood pressure, his blood pressure medications were significantly decreased as were some of his diuretics. The patient was last seen by me in the office approximately 6 months ago. He was hospitalized in August of this year for volume overload, treated with intravenous Bumex, was started on oral Bumex and states his swelling has been better.   His blood pressure remains quite labile, though he has not had any recurrent syncopal or near syncopal episodes. He follows up regularly with his nephrologist who is not yet told him that he needs to be started on dialysis. He denies any change in his exertional dyspnea. He does get leg swelling throughout the day which is usually worse when he is sitting or standing, but then improves with elevation of his legs. He denies any orthopnea or PND. No new chest pain or pressure. Past Medical History:   Diagnosis Date    Cardiac echocardiogram 10/21/2016    EF 60-65%. No WMA. Mod-marked LVH. Normal diastolic fx. No significant valvular heart disease.  Cardiac treadmill stress test, low risk 11/02/2012    Negative maximal exercise treadmill test.  Ex time 7 min 45 sec.  Cardiovascular LE peripheral arterial testing 03/22/2016    No significant peripheral arterial disease at rest bilaterally. ABIs deferred due to calcified vessels.  Cardiovascular LLE venous duplex 06/06/2012    Left leg:  No DVT.  Cardiovascular renal duplex 10/21/2016    No significant renal artery stenosis.  CKD (chronic kidney disease), stage V ([address]     Diabetes mellitus ([facility] 3/12/2010    Diabetic retinopathy ([facility] 5/4/2010    Eye examination 5/4/10    OU: 20/20; OD: 20/25; OS: 20/25 without correction.  Glaucoma 08/17/2017    Armand Miranda HLD (hyperlipidemia) 3/12/2010    HTN (hypertension) 3/12/2010     Current Outpatient Prescriptions   Medication Sig Dispense Refill    cloNIDine (CATAPRES) 0.3 mg/24 hr APPLY 1 PATCH TO SKIN ONCE EVERY 7 DAYS 12 Patch 1    hydrALAZINE (APRESOLINE) 50 mg tablet Take 1.5 Tabs by mouth three (3) times daily. 135 Tab 6    bumetanide (BUMEX) 2 mg tablet Take 0.5 Tabs by mouth two (2) times a day. 1 Tab 0    finasteride (PROSCAR) 5 mg tablet Take 1 Tab by mouth daily. 30 Tab 6    amLODIPine (NORVASC) 10 mg tablet Take 1 Tab by mouth daily. 30 Tab 6    sub-q insulin device, 40 unit (VGO 40) alicia by Does Not Apply route.       dorzolamide (TRUSOPT) 2 % ophthalmic solution INSTIL 1 DROP IN EACH EYE 3 TIMES DAILY      brimonidine-timolol (COMBIGAN) 0.2-0.5 % drop ophthalmic solution INSTIL 1 DROP IN EACH EYE 3 TIMES DAILY      ketoconazole (NIZORAL) 2 % topical cream Apply  to affected area daily. 15 g 1    OTHER PGIIL/P CRM - Apply 1 -2 grams ( 1-2 pumps) to left foot 3 - 4 times daily.  calcitRIOL (ROCALTROL) 0.25 mcg capsule TAKE ONE CAPSULE BY MOUTH EVERY MONDAY, WEDNESDAY, AND FRIDAY  2    rosuvastatin (CRESTOR) 20 mg tablet Take 1 Tab by mouth nightly. 30 Tab 6    docusate sodium (COLACE) 100 mg capsule Take 1 Cap by mouth two (2) times a day. 60 Cap 0    glucose blood VI test strips (ASCENSIA AUTODISC VI, ONE TOUCH ULTRA TEST VI) strip Test twice daily:  Fasting before breakfast and 2 hours after dinner (log readings), One touch ultra 2 test strips please; Dx: 250.02 1 Package 11    fluticasone (FLONASE) 50 mcg/actuation nasal spray 1 [place] by Both Nostrils route two (2) times a day. 1 Bottle 2    Insulin Needles, Disposable, 31 X 5/16 \" Ndle Use as directed with Levemir Insulin Pen. 1 Package 11    ferrous sulfate (IRON) 325 mg (65 mg iron) tablet Take  by mouth Daily (before breakfast). Take 1 tablet by mouth once a week as per patient.  Lancets (ONE TOUCH DELICA) Misc Test twice daily: Once in the morning fasting, then two hours after dinner (log results) 1 Package 11    Blood-Glucose Meter monitoring kit by Does Not Apply route. One touch ultra2 1 Kit 0    cyanocobalamin (VITAMIN B-12) 1,000 mcg tablet Take 1,000 mcg by mouth daily. Allergies   Allergen Reactions    Bactrim [Sulfamethoprim Ds] Nausea and Vomiting        Social History   Substance Use Topics    Smoking status: Never Smoker    Smokeless tobacco: Never Used    Alcohol use No         Review of Systems   Constitutional: Negative for chills, fever, malaise/fatigue and weight loss. HENT: Negative for nosebleeds. Eyes: Negative for blurred vision and double vision. Respiratory: Positive for shortness of breath. Negative for cough and wheezing. Cardiovascular: Positive for leg swelling. Negative for chest pain, palpitations, orthopnea, claudication and PND. Gastrointestinal: Negative for abdominal pain, heartburn, nausea and vomiting. Genitourinary: Negative for dysuria and hematuria. Musculoskeletal: Negative for falls and myalgias. Skin: Negative for rash. Neurological: Negative for dizziness, focal weakness and headaches. Endo/Heme/Allergies: Does not bruise/bleed easily. Psychiatric/Behavioral: Negative for substance abuse. Visit Vitals    /90    Pulse 74    Ht 5' 6\" (1.676 m)    Wt 93 kg (205 lb)    SpO2 98%    BMI 33.09 kg/m2       Physical Exam   Constitutional: He is oriented to person, place, and time. He appears well-developed and well-nourished. HENT:   Head: Normocephalic and atraumatic. Eyes: Conjunctivae are normal.   Neck: Neck supple. No JVD present. Carotid bruit is not present. Cardiovascular: Normal rate, regular rhythm, S1 normal, S2 normal and normal pulses. Exam reveals distant heart sounds. Exam reveals no gallop and no S3. No murmur heard. Pulmonary/Chest: Breath sounds normal. He has no wheezes. He has no rales. Abdominal: Soft. Bowel sounds are normal. There is no tenderness. Musculoskeletal: He exhibits no tenderness or deformity. Edema: 2+ bilateral lower extremity to the knees. Neurological: He is alert and oriented to person, place, and time. Skin: Skin is warm and dry. Psychiatric: He has a normal mood and affect. His behavior is normal. Thought content normal.     EKG: Normal sinus rhythm, normal axis, normal QTc interval, borderline voltage criteria for LVH, mild diffuse inferolateral T-wave changes, likely from strain, ischemia cannot be excluded. No change compared to the previous EKG    ASSESSMENT and PLAN    Hypertensive cardiovascular disease.       He has preserved LV systolic function, EF 65% with significant concentric LVH. He is now back on the majority of his blood pressure medications. He reports that his blood pressure has been better recently, though it is quite elevated today in the office again. Given his orthostasis, I would accept a supine blood pressure around 092-640 mmHg systolic. For now I would continue his current medical regimen, and advised him to take his medications regularly. Orthostatic hypotension. This was attributed to autonomic dysfunction. Patient is scheduled to follow-up with the dysautonomia clinic. No recurrent syncopal or near syncopal episodes. I suspect this is related to a long history of poorly controlled diabetes. Dyslipidemia. Patient is managed with Crestor. This is followed by his PCP. Diabetes mellitus, type II, insulin dependent. Historically this has been poorly controlled. Chronic kidney disease, now stage V. This is followed by nephrology. He is on a scheduled dose of Bumex 1 mg twice daily. Volume overload. I suspect this is more like related to his chronic kidney disease. His weight is up 7 pounds since last month. I have counseled him on the importance of a low sodium and low volume diet. I have asked him to take an extra 1 mg of Bumex as needed throughout the day and continue to take his schedule I milligram twice daily. I suspect he will ultimately require hemodialysis to adequately control his volume. Follow-up with Bolivar Aden in 3 months  I would like to see him back in 6 months, sooner if needed.

## 2017-10-11 NOTE — PATIENT INSTRUCTIONS
See Sandra Ballesteros in 3 months   Follow up with Dr Shey Lou in 6 months   Take extra Bumex tablet  as needed for swelling

## 2017-11-02 ENCOUNTER — OFFICE VISIT (OUTPATIENT)
Dept: FAMILY MEDICINE CLINIC | Age: 57
End: 2017-11-02

## 2017-11-02 ENCOUNTER — HOSPITAL ENCOUNTER (OUTPATIENT)
Dept: LAB | Age: 57
Discharge: HOME | End: 2017-11-02
Payer: COMMERCIAL

## 2017-11-02 VITALS
HEART RATE: 77 BPM | HEIGHT: 66 IN | OXYGEN SATURATION: 98 % | RESPIRATION RATE: 16 BRPM | SYSTOLIC BLOOD PRESSURE: 187 MMHG | TEMPERATURE: 98.5 F | DIASTOLIC BLOOD PRESSURE: 100 MMHG | WEIGHT: 202 LBS | BODY MASS INDEX: 32.47 KG/M2

## 2017-11-02 DIAGNOSIS — Z01.818 PREOP EXAMINATION: Primary | ICD-10-CM

## 2017-11-02 DIAGNOSIS — Z01.818 PREOP EXAMINATION: ICD-10-CM

## 2017-11-02 PROBLEM — R77.8 ELEVATED TROPONIN: Status: ACTIVE | Noted: 2017-04-04

## 2017-11-02 PROBLEM — I16.0 HYPERTENSIVE URGENCY: Status: ACTIVE | Noted: 2017-04-04

## 2017-11-02 LAB
ALBUMIN SERPL-MCNC: 2.4 G/DL (ref 3.4–5)
ALBUMIN/GLOB SERPL: 0.7 {RATIO} (ref 0.8–1.7)
ALP SERPL-CCNC: 91 U/L (ref 45–117)
ALT SERPL-CCNC: 24 U/L (ref 16–61)
ANION GAP SERPL CALC-SCNC: 12 MMOL/L (ref 3–18)
AST SERPL-CCNC: 29 U/L (ref 15–37)
BILIRUB SERPL-MCNC: 0.2 MG/DL (ref 0.2–1)
BUN SERPL-MCNC: 52 MG/DL (ref 7–18)
BUN/CREAT SERPL: 7 (ref 12–20)
CALCIUM SERPL-MCNC: 8.6 MG/DL (ref 8.5–10.1)
CHLORIDE SERPL-SCNC: 107 MMOL/L (ref 100–108)
CO2 SERPL-SCNC: 22 MMOL/L (ref 21–32)
CREAT SERPL-MCNC: 7.02 MG/DL (ref 0.6–1.3)
GLOBULIN SER CALC-MCNC: 3.3 G/DL (ref 2–4)
GLUCOSE SERPL-MCNC: 91 MG/DL (ref 74–99)
POTASSIUM SERPL-SCNC: 3.9 MMOL/L (ref 3.5–5.5)
PROT SERPL-MCNC: 5.7 G/DL (ref 6.4–8.2)
SODIUM SERPL-SCNC: 141 MMOL/L (ref 136–145)

## 2017-11-02 PROCEDURE — 80053 COMPREHEN METABOLIC PANEL: CPT | Performed by: NURSE PRACTITIONER

## 2017-11-02 PROCEDURE — 36415 COLL VENOUS BLD VENIPUNCTURE: CPT | Performed by: NURSE PRACTITIONER

## 2017-11-02 RX ORDER — ROSUVASTATIN CALCIUM 10 MG/1
TABLET, COATED ORAL
Refills: 4 | COMMUNITY
Start: 2017-10-08 | End: 2017-11-02 | Stop reason: SDUPTHER

## 2017-11-02 NOTE — PROGRESS NOTES
Preoperative Evaluation    Date of Exam: 2017    Bismark Rivas is a 62 y.o. male (:1960) who presents for preoperative evaluation. Procedure/Surgery: Augustin Ricks shunt OS  Date of Procedure/Surgery: 17  Surgeon: Deonna Valera MD  Hospital/Surgical Facility: [facility] Consults  Primary Physician: Gennaro Posadas MD  Latex Allergy: no    Problem List:     Patient Active Problem List    Diagnosis Date Noted    Stage 5 chronic kidney disease not on chronic dialysis ([address] 10/11/2017    Diabetes mellitus due to underlying condition, uncontrolled, with diabetic nephropathy, with long-term current use of insulin ([address] 10/11/2017    Hypertensive urgency 2017    Elevated troponin 2017    Orthostatic hypotension 2017    Type 2 diabetes mellitus with complication, with long-term current use of insulin ([address] 2017    Malignant hypertension 10/12/2016    CKD (chronic kidney disease) 10/12/2016    Hyperlipidemia 10/12/2016    Hypertension 2016    Chronic kidney disease, stage IV (severe) ([address] 2016    Renal failure (ARF), acute on chronic ([address] 2016    Diabetic nephropathy ([address] 2016    Diabetic foot ulcer ([address] 2016    Gangrene of toe ([address] 2016    Dry gangrene ([address] 2016    Chest pain 10/26/2012    Noncompliance with treatment 2010    Type II or unspecified type diabetes mellitus without mention of complication, uncontrolled 2010    Paresthesias/numbness 2010    Diabetes mellitus with renal manifestations, uncontrolled ([address] 2010    Diabetic retinopathy ([address] 2010    HLD (hyperlipidemia) 2010    HTN (hypertension) 2010     Medical History:     Past Medical History:   Diagnosis Date    Cardiac echocardiogram 10/21/2016    EF 60-65%. No WMA. Mod-marked LVH. Normal diastolic fx. No significant valvular heart disease.     Cardiac treadmill stress test, low risk 11/02/2012    Negative maximal exercise treadmill test.  Ex time 7 min 45 sec.  Cardiovascular LE peripheral arterial testing 03/22/2016    No significant peripheral arterial disease at rest bilaterally. ABIs deferred due to calcified vessels.  Cardiovascular LLE venous duplex 06/06/2012    Left leg:  No DVT.  Cardiovascular renal duplex 10/21/2016    No significant renal artery stenosis.  CKD (chronic kidney disease), stage V ([address]     Diabetes mellitus ([facility] 3/12/2010    Diabetic retinopathy ([facility] 5/4/2010    Eye examination 5/4/10    OU: 20/20; OD: 20/25; OS: 20/25 without correction.  Glaucoma 08/17/2017    Rachna Grate    HLD (hyperlipidemia) 3/12/2010    HTN (hypertension) 3/12/2010     Allergies: Allergies   Allergen Reactions    Bactrim [Sulfamethoprim Ds] Nausea and Vomiting      Medications:     Current Outpatient Prescriptions   Medication Sig    cloNIDine (CATAPRES) 0.3 mg/24 hr APPLY 1 PATCH TO SKIN ONCE EVERY 7 DAYS    hydrALAZINE (APRESOLINE) 50 mg tablet Take 1.5 Tabs by mouth three (3) times daily.  bumetanide (BUMEX) 2 mg tablet Take 0.5 Tabs by mouth two (2) times a day.  finasteride (PROSCAR) 5 mg tablet Take 1 Tab by mouth daily.  amLODIPine (NORVASC) 10 mg tablet Take 1 Tab by mouth daily.  sub-q insulin device, 40 unit (VGO 40) alicia by Does Not Apply route.  dorzolamide (TRUSOPT) 2 % ophthalmic solution INSTIL 1 DROP IN EACH EYE 3 TIMES DAILY    brimonidine-timolol (COMBIGAN) 0.2-0.5 % drop ophthalmic solution INSTIL 1 DROP IN EACH EYE 3 TIMES DAILY    ketoconazole (NIZORAL) 2 % topical cream Apply  to affected area daily.  OTHER PGIIL/P CRM - Apply 1 -2 grams ( 1-2 pumps) to left foot 3 - 4 times daily.  calcitRIOL (ROCALTROL) 0.25 mcg capsule TAKE ONE CAPSULE BY MOUTH EVERY MONDAY, WEDNESDAY, AND FRIDAY    rosuvastatin (CRESTOR) 20 mg tablet Take 1 Tab by mouth nightly.     docusate sodium (COLACE) 100 mg capsule Take 1 Cap by mouth two (2) times a day.  glucose blood VI test strips (ASCENSIA AUTODISC VI, ONE TOUCH ULTRA TEST VI) strip Test twice daily:  Fasting before breakfast and 2 hours after dinner (log readings), One touch ultra 2 test strips please; Dx: 250.02    fluticasone (FLONASE) 50 mcg/actuation nasal spray 1 [place] by Both Nostrils route two (2) times a day.  Insulin Needles, Disposable, 31 X 5/16 \" Ndle Use as directed with Levemir Insulin Pen.  ferrous sulfate (IRON) 325 mg (65 mg iron) tablet Take  by mouth Daily (before breakfast). Take 1 tablet by mouth once a week as per patient.  Lancets (ONE TOUCH DELICA) Misc Test twice daily: Once in the morning fasting, then two hours after dinner (log results)    Blood-Glucose Meter monitoring kit by Does Not Apply route. One touch ultra2    cyanocobalamin (VITAMIN B-12) 1,000 mcg tablet Take 1,000 mcg by mouth daily. No current facility-administered medications for this visit. Surgical History:     Past Surgical History:   Procedure Laterality Date    HX HERNIA REPAIR  2005     Social History:     Social History     Social History    Marital status:      Spouse name: N/A    Number of children: N/A    Years of education: N/A     Social History Main Topics    Smoking status: Never Smoker    Smokeless tobacco: Never Used    Alcohol use No    Drug use: No    Sexual activity: Yes     Partners: Female     Other Topics Concern    Caffeine Concern Yes     6 cups a month    Exercise Yes     walking 4 hour a day   [place] Yes     Social History Narrative    Safety issues/concerns: ( none)Domestic Violence, (none)Guns in home,(doesn't use)Sunscreen, (working)Smoke Detectors, (safe)Housing.         Recent use of: No recent use of aspirin (ASA), NSAIDS or steroids    Tetanus up to date: tetanus status unknown to the patient      Anesthesia Complications: no  History of abnormal bleeding : None  History of Blood Transfusions: no  Health Care Directive or Living Will: no    REVIEW OF SYSTEMS:  Review of Systems - Negative except glaucoma    EXAM:   Visit Vitals    BP (!) 187/100 (BP 1 Location: Left arm, BP Patient Position: Sitting)    Pulse 77    Temp 98.5 °F (36.9 °C) (Oral)    Resp 16    Ht 5' 6\" (1.676 m)    Wt 202 lb (91.6 kg)    SpO2 98%    BMI 32.6 kg/m2     General appearance - alert, well appearing, and in no distress  Mental status - alert, oriented to person, place, and time  Ears - bilateral TM's and external ear canals normal  Nose - normal and patent, no erythema, discharge or polyps  Mouth - mucous membranes moist, pharynx normal without lesions  Neck - supple, no significant adenopathy  Lymphatics - no palpable lymphadenopathy, no hepatosplenomegaly  Chest - clear to auscultation, no wheezes, rales or rhonchi, symmetric air entry  Heart - normal rate, regular rhythm, normal S1, S2, no murmurs, rubs, clicks or gallops  Abdomen - soft, nontender, nondistended, no masses or organomegaly  Back exam - full range of motion, no tenderness, palpable spasm or pain on motion  Neurological - alert, oriented, normal speech, no focal findings or movement disorder noted  Musculoskeletal - no joint tenderness, deformity or swelling  Skin - normal coloration and turgor, no rashes, no suspicious skin lesions noted      DIAGNOSTICS: COMP        IMPRESSION:   Preop  Glaucoma  Coronary disease  Diabetes mellitus  No contraindications to planned surgery    Malcom Nick NP   11/2/2017

## 2017-11-02 NOTE — PROGRESS NOTES
1. Have you been to the ER, urgent care clinic since your last visit? Hospitalized since your last visit? No    2. Have you seen or consulted any other health care providers outside of the [address] [place] [address] since your last visit? Include any pap smears or colon screening.  No

## 2017-11-02 NOTE — PROGRESS NOTES
HISTORY OF PRESENT ILLNESS  Donald Yoon is a 62 y.o. male. Patient presents today for clearance for eye surgery.     HPI    ROS  Visit Vitals    BP (!) 187/100 (BP 1 Location: Left arm, BP Patient Position: Sitting)    Pulse 77    Temp 98.5 °F (36.9 °C) (Oral)    Resp 16    Ht 5' 6\" (1.676 m)    Wt 202 lb (91.6 kg)    SpO2 98%    BMI 32.6 kg/m2       Physical Exam    ASSESSMENT and PLAN  {ASSESSMENT/PLAN:57809}

## 2017-11-03 DIAGNOSIS — N18.5 STAGE 5 CHRONIC KIDNEY DISEASE NOT ON CHRONIC DIALYSIS (HCC): Primary | ICD-10-CM

## 2017-11-06 ENCOUNTER — ANESTHESIA EVENT (OUTPATIENT)
Dept: SURGERY | Age: 57
End: 2017-11-06
Payer: COMMERCIAL

## 2017-11-06 NOTE — PROGRESS NOTES
I called Pt in regards to Lab results. Informed Pt that Kidney function was elevated.   Informed Pt that Dr Charmaine Salazar will refer him to a Nephrologist.  Pt sts he already sees Liz Joseph

## 2017-11-07 ENCOUNTER — ANESTHESIA (OUTPATIENT)
Dept: SURGERY | Age: 57
End: 2017-11-07
Payer: COMMERCIAL

## 2017-11-07 ENCOUNTER — HOSPITAL ENCOUNTER (OUTPATIENT)
Age: 57
Setting detail: OUTPATIENT SURGERY
Discharge: HOME | End: 2017-11-07
Attending: OPHTHALMOLOGY | Admitting: OPHTHALMOLOGY
Payer: COMMERCIAL

## 2017-11-07 VITALS
SYSTOLIC BLOOD PRESSURE: 148 MMHG | DIASTOLIC BLOOD PRESSURE: 64 MMHG | WEIGHT: 202 LBS | BODY MASS INDEX: 32.47 KG/M2 | HEART RATE: 68 BPM | OXYGEN SATURATION: 97 % | TEMPERATURE: 98.1 F | HEIGHT: 66 IN | RESPIRATION RATE: 18 BRPM

## 2017-11-07 LAB
BUN BLD-MCNC: 52 MG/DL (ref 7–18)
CHLORIDE BLD-SCNC: 103 MMOL/L (ref 100–108)
GLUCOSE BLD STRIP.AUTO-MCNC: 216 MG/DL (ref 70–110)
GLUCOSE BLD STRIP.AUTO-MCNC: 286 MG/DL (ref 74–106)
HCT VFR BLD CALC: 34 % (ref 36–49)
HGB BLD-MCNC: 11.6 G/DL (ref 12–16)
POTASSIUM BLD-SCNC: 4.2 MMOL/L (ref 3.5–5.5)
SODIUM BLD-SCNC: 135 MMOL/L (ref 136–145)

## 2017-11-07 PROCEDURE — 74011000250 HC RX REV CODE- 250

## 2017-11-07 PROCEDURE — 82962 GLUCOSE BLOOD TEST: CPT

## 2017-11-07 PROCEDURE — C1783 OCULAR IMP, AQUEOUS DRAIN DE: HCPCS | Performed by: OPHTHALMOLOGY

## 2017-11-07 PROCEDURE — 74011250636 HC RX REV CODE- 250/636

## 2017-11-07 PROCEDURE — 76060000032 HC ANESTHESIA 0.5 TO 1 HR: Performed by: OPHTHALMOLOGY

## 2017-11-07 PROCEDURE — 77030007855 HC KNF OPHTH IKNF ALCN -A: Performed by: OPHTHALMOLOGY

## 2017-11-07 PROCEDURE — 76010000138 HC OR TIME 0.5 TO 1 HR: Performed by: OPHTHALMOLOGY

## 2017-11-07 PROCEDURE — 77030011291 HC ERAS HEMSTAT BVTC -A: Performed by: OPHTHALMOLOGY

## 2017-11-07 PROCEDURE — 76210000021 HC REC RM PH II 0.5 TO 1 HR: Performed by: OPHTHALMOLOGY

## 2017-11-07 PROCEDURE — 74011000250 HC RX REV CODE- 250: Performed by: OPHTHALMOLOGY

## 2017-11-07 PROCEDURE — 74011636637 HC RX REV CODE- 636/637: Performed by: NURSE ANESTHETIST, CERTIFIED REGISTERED

## 2017-11-07 PROCEDURE — 74011250637 HC RX REV CODE- 250/637: Performed by: NURSE ANESTHETIST, CERTIFIED REGISTERED

## 2017-11-07 PROCEDURE — 74011250636 HC RX REV CODE- 250/636: Performed by: OPHTHALMOLOGY

## 2017-11-07 PROCEDURE — 82947 ASSAY GLUCOSE BLOOD QUANT: CPT

## 2017-11-07 PROCEDURE — 74011000250 HC RX REV CODE- 250: Performed by: ANESTHESIOLOGY

## 2017-11-07 PROCEDURE — 77030003624: Performed by: OPHTHALMOLOGY

## 2017-11-07 PROCEDURE — 74011250636 HC RX REV CODE- 250/636: Performed by: ANESTHESIOLOGY

## 2017-11-07 PROCEDURE — 77030032490 HC SLV COMPR SCD KNE COVD -B: Performed by: OPHTHALMOLOGY

## 2017-11-07 DEVICE — GLAUCOMA SHUNT
Type: IMPLANTABLE DEVICE | Site: EYE | Status: FUNCTIONAL
Brand: AHMED™ GLAUCOMA VALVE

## 2017-11-07 RX ORDER — LIDOCAINE HYDROCHLORIDE 20 MG/ML
INJECTION, SOLUTION EPIDURAL; INFILTRATION; INTRACAUDAL; PERINEURAL AS NEEDED
Status: DISCONTINUED | OUTPATIENT
Start: 2017-11-07 | End: 2017-11-07 | Stop reason: HOSPADM

## 2017-11-07 RX ORDER — PROPOFOL 10 MG/ML
INJECTION, EMULSION INTRAVENOUS AS NEEDED
Status: DISCONTINUED | OUTPATIENT
Start: 2017-11-07 | End: 2017-11-07 | Stop reason: HOSPADM

## 2017-11-07 RX ORDER — CEFAZOLIN SODIUM 1 G/3ML
INJECTION, POWDER, FOR SOLUTION INTRAMUSCULAR; INTRAVENOUS AS NEEDED
Status: DISCONTINUED | OUTPATIENT
Start: 2017-11-07 | End: 2017-11-07 | Stop reason: HOSPADM

## 2017-11-07 RX ORDER — NEOMYCIN SULFATE, POLYMYXIN B SULFATE, AND DEXAMETHASONE 3.5; 10000; 1 MG/G; [USP'U]/G; MG/G
OINTMENT OPHTHALMIC AS NEEDED
Status: DISCONTINUED | OUTPATIENT
Start: 2017-11-07 | End: 2017-11-07 | Stop reason: HOSPADM

## 2017-11-07 RX ORDER — LABETALOL HCL 20 MG/4 ML
SYRINGE (ML) INTRAVENOUS AS NEEDED
Status: DISCONTINUED | OUTPATIENT
Start: 2017-11-07 | End: 2017-11-07 | Stop reason: HOSPADM

## 2017-11-07 RX ORDER — LIDOCAINE HYDROCHLORIDE 40 MG/ML
INJECTION, SOLUTION RETROBULBAR; TOPICAL AS NEEDED
Status: DISCONTINUED | OUTPATIENT
Start: 2017-11-07 | End: 2017-11-07 | Stop reason: HOSPADM

## 2017-11-07 RX ORDER — FAMOTIDINE 20 MG/1
20 TABLET, FILM COATED ORAL ONCE
Status: COMPLETED | OUTPATIENT
Start: 2017-11-07 | End: 2017-11-07

## 2017-11-07 RX ORDER — INSULIN LISPRO 100 [IU]/ML
INJECTION, SOLUTION INTRAVENOUS; SUBCUTANEOUS ONCE
Status: CANCELLED | OUTPATIENT
Start: 2017-11-07 | End: 2017-11-07

## 2017-11-07 RX ORDER — SODIUM CHLORIDE, SODIUM LACTATE, POTASSIUM CHLORIDE, CALCIUM CHLORIDE 600; 310; 30; 20 MG/100ML; MG/100ML; MG/100ML; MG/100ML
100 INJECTION, SOLUTION INTRAVENOUS CONTINUOUS
Status: DISCONTINUED | OUTPATIENT
Start: 2017-11-07 | End: 2017-11-07

## 2017-11-07 RX ORDER — SODIUM CHLORIDE 0.9 % (FLUSH) 0.9 %
5-10 SYRINGE (ML) INJECTION AS NEEDED
Status: CANCELLED | OUTPATIENT
Start: 2017-11-07

## 2017-11-07 RX ORDER — MOXIFLOXACIN 5 MG/ML
1 SOLUTION/ DROPS OPHTHALMIC
Status: COMPLETED | OUTPATIENT
Start: 2017-11-07 | End: 2017-11-07

## 2017-11-07 RX ORDER — DEXAMETHASONE SODIUM PHOSPHATE 100 MG/10ML
INJECTION INTRAMUSCULAR; INTRAVENOUS AS NEEDED
Status: DISCONTINUED | OUTPATIENT
Start: 2017-11-07 | End: 2017-11-07 | Stop reason: HOSPADM

## 2017-11-07 RX ORDER — ATROPINE SULFATE 10 MG/ML
SOLUTION/ DROPS OPHTHALMIC AS NEEDED
Status: DISCONTINUED | OUTPATIENT
Start: 2017-11-07 | End: 2017-11-07 | Stop reason: HOSPADM

## 2017-11-07 RX ORDER — HYDRALAZINE HYDROCHLORIDE 20 MG/ML
INJECTION INTRAMUSCULAR; INTRAVENOUS AS NEEDED
Status: DISCONTINUED | OUTPATIENT
Start: 2017-11-07 | End: 2017-11-07 | Stop reason: HOSPADM

## 2017-11-07 RX ORDER — LABETALOL HCL 20 MG/4 ML
5 SYRINGE (ML) INTRAVENOUS AS NEEDED
Status: DISCONTINUED | OUTPATIENT
Start: 2017-11-07 | End: 2017-11-07 | Stop reason: HOSPADM

## 2017-11-07 RX ORDER — ONDANSETRON 2 MG/ML
4 INJECTION INTRAMUSCULAR; INTRAVENOUS ONCE
Status: CANCELLED | OUTPATIENT
Start: 2017-11-07 | End: 2017-11-07

## 2017-11-07 RX ORDER — DEXTROSE 50 % IN WATER (D50W) INTRAVENOUS SYRINGE
25-50 AS NEEDED
Status: CANCELLED | OUTPATIENT
Start: 2017-11-07

## 2017-11-07 RX ORDER — FLURBIPROFEN SODIUM 0.3 MG/ML
1 SOLUTION/ DROPS OPHTHALMIC
Status: COMPLETED | OUTPATIENT
Start: 2017-11-07 | End: 2017-11-07

## 2017-11-07 RX ORDER — INSULIN LISPRO 100 [IU]/ML
INJECTION, SOLUTION INTRAVENOUS; SUBCUTANEOUS ONCE
Status: COMPLETED | OUTPATIENT
Start: 2017-11-07 | End: 2017-11-07

## 2017-11-07 RX ORDER — SODIUM CHLORIDE 9 MG/ML
50 INJECTION, SOLUTION INTRAVENOUS CONTINUOUS
Status: DISCONTINUED | OUTPATIENT
Start: 2017-11-07 | End: 2017-11-07 | Stop reason: HOSPADM

## 2017-11-07 RX ORDER — MAGNESIUM SULFATE 100 %
4 CRYSTALS MISCELLANEOUS AS NEEDED
Status: CANCELLED | OUTPATIENT
Start: 2017-11-07

## 2017-11-07 RX ORDER — EPINEPHRINE 1 MG/ML
INJECTION, SOLUTION, CONCENTRATE INTRAVENOUS AS NEEDED
Status: DISCONTINUED | OUTPATIENT
Start: 2017-11-07 | End: 2017-11-07 | Stop reason: HOSPADM

## 2017-11-07 RX ORDER — SODIUM CHLORIDE 9 MG/ML
25 INJECTION, SOLUTION INTRAVENOUS CONTINUOUS
Status: CANCELLED | OUTPATIENT
Start: 2017-11-07

## 2017-11-07 RX ADMIN — Medication 10 MG: at 07:57

## 2017-11-07 RX ADMIN — INSULIN LISPRO 9 UNITS: 100 INJECTION, SOLUTION INTRAVENOUS; SUBCUTANEOUS at 07:34

## 2017-11-07 RX ADMIN — LABETALOL HYDROCHLORIDE 5 MG: 5 INJECTION, SOLUTION INTRAVENOUS at 07:15

## 2017-11-07 RX ADMIN — SODIUM CHLORIDE: 900 INJECTION, SOLUTION INTRAVENOUS at 07:42

## 2017-11-07 RX ADMIN — FAMOTIDINE 20 MG: 20 TABLET, FILM COATED ORAL at 06:58

## 2017-11-07 RX ADMIN — Medication 10 MG: at 07:49

## 2017-11-07 RX ADMIN — MOXIFLOXACIN HYDROCHLORIDE 1 DROP: 5 SOLUTION/ DROPS OPHTHALMIC at 06:42

## 2017-11-07 RX ADMIN — FLURBIPROFEN SODIUM 1 DROP: 0.3 SOLUTION/ DROPS OPHTHALMIC at 06:41

## 2017-11-07 RX ADMIN — MOXIFLOXACIN HYDROCHLORIDE 1 DROP: 5 SOLUTION/ DROPS OPHTHALMIC at 07:21

## 2017-11-07 RX ADMIN — MOXIFLOXACIN HYDROCHLORIDE 1 DROP: 5 SOLUTION/ DROPS OPHTHALMIC at 07:00

## 2017-11-07 RX ADMIN — PROPOFOL 50 MG: 10 INJECTION, EMULSION INTRAVENOUS at 08:08

## 2017-11-07 RX ADMIN — HYDRALAZINE HYDROCHLORIDE 8 MG: 20 INJECTION INTRAMUSCULAR; INTRAVENOUS at 08:01

## 2017-11-07 RX ADMIN — FLURBIPROFEN SODIUM 1 DROP: 0.3 SOLUTION/ DROPS OPHTHALMIC at 07:00

## 2017-11-07 RX ADMIN — FLURBIPROFEN SODIUM 1 DROP: 0.3 SOLUTION/ DROPS OPHTHALMIC at 07:21

## 2017-11-07 RX ADMIN — LIDOCAINE HYDROCHLORIDE 20 MG: 20 INJECTION, SOLUTION EPIDURAL; INFILTRATION; INTRACAUDAL; PERINEURAL at 08:09

## 2017-11-07 RX ADMIN — HYDRALAZINE HYDROCHLORIDE 8 MG: 20 INJECTION INTRAMUSCULAR; INTRAVENOUS at 08:05

## 2017-11-07 RX ADMIN — LIDOCAINE HYDROCHLORIDE 50 MG: 20 INJECTION, SOLUTION EPIDURAL; INFILTRATION; INTRACAUDAL; PERINEURAL at 08:08

## 2017-11-07 NOTE — ANESTHESIA POSTPROCEDURE EVALUATION
Post-Anesthesia Evaluation & Assessment    Visit Vitals    /64    Pulse 68    Temp 36.7 °C (98.1 °F)    Resp 16    Ht 5' 6\" (1.676 m)    Wt 91.6 kg (202 lb)    SpO2 97%    BMI 32.6 kg/m2       Nausea/Vomiting: no nausea and no vomiting    Pain score (VAS): 0    Post-operative hydration adequate.     Mental status & Level of consciousness: orientation per pre-anesthetic level    Neurological status: moves all extremities, sensation grossly intact    Pulmonary status: airway patent, no supplemental oxygen required    Complications related to anesthesia: none    Additional comments:        Sandi Dolan CRNA  November 7, 2017

## 2017-11-07 NOTE — OP NOTES
Brief Op Note    Pre-Op Diagnosis: h40.51x2    Post-Op Diagnosis:  h40.51x2    Procedures: Procedure(s):  INSERTION of  AHMED valve without corneal patch graft left eye     Surgeon: Pranay Slater MD    Assistants: Surgeon(s):  Pranay Slater MD    Anesthesia:  MAC     Estimated Blood Loss: * No values recorded between 11/7/2017  8:02 AM and 11/7/2017  8:39 AM *    Findings:  Ahmed valve inserted without complications; no patch graft material used    See scanned note for operative note detail    Complications: None                     Pranay Slater MD  11/7/2017  8:40 AM

## 2017-11-07 NOTE — DISCHARGE INSTRUCTIONS
1. Patient to be discharged home with discharge criteria met. 2. Patient is to keep patch on the eye until taken off by doctor. 3. Patient is to continue the same drops in the non-operative eye. 4. Patient can take extra strength tylenol for pain relief. 5. Patient is to bring all eye medications with them to the next doctors appointment. Hard copy of appointment slip in chart. 6. Patient to call [phone] for any fever, eye pain not controlled with tylenol. DISCHARGE SUMMARY from Nurse    PATIENT INSTRUCTIONS:    After general anesthesia or intravenous sedation, for 24 hours or while taking prescription Narcotics:  · Limit your activities  · Do not drive and operate hazardous machinery  · Do not make important personal or business decisions  · Do  not drink alcoholic beverages  · If you have not urinated within 8 hours after discharge, please contact your surgeon on call. Report the following to your surgeon:  · Excessive pain, swelling, redness or odor of or around the surgical area  · Temperature over 100.5  · Nausea and vomiting lasting longer than 4 hours or if unable to take medications  · Any signs of decreased circulation or nerve impairment to extremity: change in color, persistent  numbness, tingling, coldness or increase pain  · Any questions    What to do at Home:  Recommended activity  These are general instructions for a healthy lifestyle:    No smoking/ No tobacco products/ Avoid exposure to second hand smoke  Surgeon General's Warning:  Quitting smoking now greatly reduces serious risk to your health.     Obesity, smoking, and sedentary lifestyle greatly increases your risk for illness    A healthy diet, regular physical exercise & weight monitoring are important for maintaining a healthy lifestyle    You may be retaining fluid if you have a history of heart failure or if you experience any of the following symptoms:  Weight gain of 3 pounds or more overnight or 5 pounds in a week, increased swelling in our hands or feet or shortness of breath while lying flat in bed. Please call your doctor as soon as you notice any of these symptoms; do not wait until your next office visit. Recognize signs and symptoms of STROKE:    F-face looks uneven    A-arms unable to move or move unevenly    S-speech slurred or non-existent    T-time-call 911 as soon as signs and symptoms begin-DO NOT go       Back to bed or wait to see if you get better-TIME IS BRAIN. Warning Signs of HEART ATTACK     Call 911 if you have these symptoms:   Chest discomfort. Most heart attacks involve discomfort in the center of the chest that lasts more than a few minutes, or that goes away and comes back. It can feel like uncomfortable pressure, squeezing, fullness, or pain.  Discomfort in other areas of the upper body. Symptoms can include pain or discomfort in one or both arms, the back, neck, jaw, or stomach.  Shortness of breath with or without chest discomfort.  Other signs may include breaking out in a cold sweat, nausea, or lightheadedness. Don't wait more than five minutes to call 911 - MINUTES MATTER! Fast action can save your life. Calling 911 is almost always the fastest way to get lifesaving treatment. Emergency Medical Services staff can begin treatment when they arrive -- up to an hour sooner than if someone gets to the hospital by car. The discharge information has been reviewed with the spouse. The spouse verbalized understanding. Discharge medications reviewed with the spouse and appropriate educational materials and side effects teaching were provided. ___________________________________________________________________________________________________________________________________    Patient armband removed and given to patient to take home.   Patient was informed of the privacy risks if armband lost or stolen

## 2017-11-07 NOTE — IP AVS SNAPSHOT
Leanne Kelsey 
 
 
 [address] 
[phone] Patient: Debby Walter MRN: [id number] :1960 About your hospitalization You were admitted on:  2017 You last received care in the:  [facility] PHASE 2 RECOVERY You were discharged on:  2017 Why you were hospitalized Your primary diagnosis was:  Not on File Things You Need To Do (next 8 weeks) Follow up with Tom Stephens MD  
  
Phone:  [phone] Where:  [facility], [address], [address] [address] Discharge Orders None A check feliberto indicates which time of day the medication should be taken. My Medications TAKE these medications as instructed Instructions Each Dose to Equal  
 Morning Noon Evening Bedtime  
 amLODIPine 10 mg tablet Commonly known as:  Masood Blas Your last dose was: Your next dose is: Take 1 Tab by mouth daily. 10 mg Blood-Glucose Meter monitoring kit Your last dose was: Your next dose is:    
   
   
 by Does Not Apply route. One touch ultra2  
     
   
   
   
  
 bumetanide 2 mg tablet Commonly known as:  Marisue Days Your last dose was: Your next dose is: Take 0.5 Tabs by mouth two (2) times a day. 1 mg  
    
   
   
   
  
 calcitRIOL 0.25 mcg capsule Commonly known as:  ROCALTROL Your last dose was: Your next dose is: TAKE ONE CAPSULE BY MOUTH EVERY MONDAY, WEDNESDAY, AND FRIDAY  
     
   
   
   
  
 cloNIDine 0.3 mg/24 hr  
Commonly known as:  CATAPRES Your last dose was: Your next dose is:    
   
   
 APPLY 1 PATCH TO SKIN ONCE EVERY 7 DAYS  
     
   
   
   
  
 COMBIGAN 0.2-0.5 % Drop ophthalmic solution Generic drug:  brimonidine-timolol Your last dose was: Your next dose is: INSTIL 1 DROP IN [facility] EYE 3 TIMES DAILY docusate sodium 100 mg capsule Commonly known as:  Deanne Donate Your last dose was: Your next dose is: Take 1 Cap by mouth two (2) times a day. 100 mg  
    
   
   
   
  
 dorzolamide 2 % ophthalmic solution Commonly known as:  TRUSOPT Your last dose was: Your next dose is:    
   
   
 INSTIL 1 DROP IN [facility] EYE 3 TIMES DAILY  
     
   
   
   
  
 finasteride 5 mg tablet Commonly known as:  PROSCAR Your last dose was: Your next dose is: Take 1 Tab by mouth daily. 5 mg  
    
   
   
   
  
 fluticasone 50 mcg/actuation nasal spray Commonly known as:  Terrence Rogers Your last dose was: Your next dose is:    
   
   
 1 Spray by Both Nostrils route two (2) times a day. 1 Spray  
    
   
   
   
  
 glucose blood VI test strips strip Commonly known as:  ASCENSIA AUTODISC VI, ONE TOUCH ULTRA TEST VI Your last dose was: Your next dose is:    
   
   
 Test twice daily:  Fasting before breakfast and 2 hours after dinner (log readings), One touch ultra 2 test strips please; Dx: 250.02  
     
   
   
   
  
 hydrALAZINE 50 mg tablet Commonly known as:  APRESOLINE Your last dose was: Your next dose is: Take 1.5 Tabs by mouth three (3) times daily. 75 mg Insulin Needles (Disposable) 31 gauge x 5/16\" Ndle Your last dose was: Your next dose is:    
   
   
 Use as directed with Levemir Insulin Pen. Iron 325 mg (65 mg iron) tablet Generic drug:  ferrous sulfate Your last dose was: Your next dose is: Take  by mouth Daily (before breakfast). Take 1 tablet by mouth once a week as per patient.  
     
   
   
   
  
 ketoconazole 2 % topical cream  
Commonly known as:  NIZORAL Your last dose was: Your next dose is: Apply  to affected area daily. Lancets Misc Commonly known as: One Touch Wilburt Mutters Your last dose was: Your next dose is:    
   
   
 Test twice daily: Once in the morning fasting, then two hours after dinner (log results) OTHER Your last dose was: Your next dose is: PGIIL/P CRM - Apply 1 -2 grams ( 1-2 pumps) to left foot 3 - 4 times daily. rosuvastatin 20 mg tablet Commonly known as:  CRESTOR Your last dose was: Your next dose is: Take 1 Tab by mouth nightly. 20 mg  
    
   
   
   
  
 VGO 40 Jacqueline Generic drug:  sub-q insulin device, 40 unit Your last dose was: Your next dose is:    
   
   
 by Does Not Apply route. VITAMIN B-12 1,000 mcg tablet Generic drug:  cyanocobalamin Your last dose was: Your next dose is: Take 1,000 mcg by mouth daily. 1000 mcg Discharge Instructions 1. Patient to be discharged home with discharge criteria met. 2. Patient is to keep patch on the eye until taken off by doctor. 3. Patient is to continue the same drops in the non-operative eye. 4. Patient can take extra strength tylenol for pain relief. 5. Patient is to bring all eye medications with them to the next doctors appointment. Hard copy of appointment slip in chart. 6. Patient to call [phone] for any fever, eye pain not controlled with tylenol. DISCHARGE SUMMARY from Nurse PATIENT INSTRUCTIONS: 
 
After general anesthesia or intravenous sedation, for 24 hours or while taking prescription Narcotics: · Limit your activities · Do not drive and operate hazardous machinery · Do not make important personal or business decisions · Do  not drink alcoholic beverages · If you have not urinated within 8 hours after discharge, please contact your surgeon on call. Report the following to your surgeon: 
· Excessive pain, swelling, redness or odor of or around the surgical area · Temperature over 100.5 · Nausea and vomiting lasting longer than 4 hours or if unable to take medications · Any signs of decreased circulation or nerve impairment to extremity: change in color, persistent  numbness, tingling, coldness or increase pain · Any questions What to do at Home: 
Recommended activity These are general instructions for a healthy lifestyle: No smoking/ No tobacco products/ Avoid exposure to second hand smoke Surgeon General's Warning:  Quitting smoking now greatly reduces serious risk to your health. Obesity, smoking, and sedentary lifestyle greatly increases your risk for illness A healthy diet, regular physical exercise & weight monitoring are important for maintaining a healthy lifestyle You may be retaining fluid if you have a history of heart failure or if you experience any of the following symptoms:  Weight gain of 3 pounds or more overnight or 5 pounds in a week, increased swelling in our hands or feet or shortness of breath while lying flat in bed. Please call your doctor as soon as you notice any of these symptoms; do not wait until your next office visit. Recognize signs and symptoms of STROKE: 
 
F-face looks uneven A-arms unable to move or move unevenly S-speech slurred or non-existent T-time-call 911 as soon as signs and symptoms begin-DO NOT go Back to bed or wait to see if you get better-TIME IS BRAIN. Warning Signs of HEART ATTACK Call 911 if you have these symptoms: 
? Chest discomfort. Most heart attacks involve discomfort in the center of the chest that lasts more than a few minutes, or that goes away and comes back. It can feel like uncomfortable pressure, squeezing, fullness, or pain. ? Discomfort in other areas of the upper body.  Symptoms can include pain or discomfort in one or both arms, the back, neck, jaw, or stomach. ? Shortness of breath with or without chest discomfort. ? Other signs may include breaking out in a cold sweat, nausea, or lightheadedness. Don't wait more than five minutes to call [address]! Fast action can save your life. Calling 911 is almost always the fastest way to get lifesaving treatment. Emergency Medical Services staff can begin treatment when they arrive  up to an hour sooner than if someone gets to the hospital by car. The discharge information has been reviewed with the spouse. The spouse verbalized understanding. Discharge medications reviewed with the spouse and appropriate educational materials and side effects teaching were provided. ___________________________________________________________________________________________________________________________________ Patient armband removed and given to patient to take home. Patient was informed of the privacy risks if armband lost or stolen Introducing [facility]! [employer] introduces [employer] patient portal. Now you can access parts of your medical record, email your doctor's office, and request medication refills online. 1. In your internet browser, go to [email or web address] 2. Click on the First Time User? Click Here link in the Sign In box. You will see the New Member Sign Up page. 3. Enter your [employer] Access Code exactly as it appears below. You will not need to use this code after youve completed the sign-up process. If you do not sign up before the expiration date, you must request a new code. · [employer] Access Code: [email or web address] Expires: 1/8/2018 10:21 AM 
 
4. Enter the last four digits of your Social Security Number (xxxx) and Date of Birth (mm/dd/yyyy) as indicated and click Submit. You will be taken to the next sign-up page. 5. Create a [employer] ID. This will be your [employer] login ID and cannot be changed, so think of one that is secure and easy to remember. 6. Create a [employer] password. You can change your password at any time. 7. Enter your Password Reset Question and Answer. This can be used at a later time if you forget your password. 8. Enter your e-mail address. You will receive e-mail notification when new information is available in [address] 9. Click Sign Up. You can now view and download portions of your medical record. 10. Click the Download Summary menu link to download a portable copy of your medical information. If you have questions, please visit the Frequently Asked Questions section of the [employer] website. Remember, [employer] is NOT to be used for urgent needs. For medical emergencies, dial 911. Now available from your iPhone and Android! Providers Seen During Your Hospitalization Provider Specialty Primary office phone Lamonte Prader, MD Ophthalmology [phone] Your Primary Care Physician (PCP) Primary Care Physician Office Phone Office Fax Angelika Clarks [phone] You are allergic to the following Allergen Reactions Bactrim (Sulfamethoprim Ds) Nausea and Vomiting Recent Documentation Height Weight BMI Smoking Status 1.676 m 91.6 kg 32.6 kg/m2 Never Smoker Emergency Contacts Name Discharge Info Relation Home Work Mobile Sondra Crawford DISCHARGE CAREGIVER [3] Spouse [3] [phone] Patient Belongings The following personal items are in your possession at time of discharge: 
  Dental Appliances: None  Visual Aid: None      Home Medications: None   Jewelry: None  Clothing: Pants, Shirt, Footwear, Socks, Jacket/Coat, Undergarments    Other Valuables: None Please provide this summary of care documentation to your next provider. Signatures-by signing, you are acknowledging that this After Visit Summary has been reviewed with you and you have received a copy. Patient Signature:  ____________________________________________________________ Date:  ____________________________________________________________  
  
Leah Feller Provider Signature:  ____________________________________________________________ Date:  ____________________________________________________________

## 2017-11-07 NOTE — ANESTHESIA PREPROCEDURE EVALUATION
Anesthetic History   No history of anesthetic complications            Review of Systems / Medical History  Patient summary reviewed and pertinent labs reviewed    Pulmonary          Undiagnosed apnea         Neuro/Psych   Within defined limits           Cardiovascular    Hypertension                   GI/Hepatic/Renal         Renal disease: ESRD       Endo/Other    Diabetes: type 2, using insulin    Obesity and anemia     Other Findings   Comments:   Risk Factors for Postoperative nausea/vomiting:       History of postoperative nausea/vomiting? NO       Female? NO       Motion sickness? NO       Intended opioid administration for postoperative analgesia? NO      Smoking Abstinence  Current Smoker? NO  Elective Surgery? YES  Seen preoperatively by anesthesiologist or proxy prior to day of surgery? YES  Pt abstained from smoking 24 hours prior to anesthesia?  N/A           Physical Exam    Airway  Mallampati: III  TM Distance: 4 - 6 cm  Neck ROM: decreased range of motion, short neck   Mouth opening: Diminished (comment)     Cardiovascular    Rhythm: irregular  Rate: normal         Dental    Dentition: Poor dentition     Pulmonary  Breath sounds clear to auscultation               Abdominal  GI exam deferred       Other Findings            Anesthetic Plan    ASA: 4  Anesthesia type: MAC            Anesthetic plan and risks discussed with: Patient

## 2017-11-15 ENCOUNTER — HOSPITAL ENCOUNTER (EMERGENCY)
Age: 57
Discharge: HOME | End: 2017-11-15
Attending: EMERGENCY MEDICINE
Payer: COMMERCIAL

## 2017-11-15 ENCOUNTER — APPOINTMENT (OUTPATIENT)
Dept: GENERAL RADIOLOGY | Age: 57
End: 2017-11-15
Attending: NURSE PRACTITIONER
Payer: COMMERCIAL

## 2017-11-15 VITALS
SYSTOLIC BLOOD PRESSURE: 191 MMHG | HEART RATE: 92 BPM | TEMPERATURE: 98.2 F | OXYGEN SATURATION: 96 % | DIASTOLIC BLOOD PRESSURE: 92 MMHG | RESPIRATION RATE: 16 BRPM

## 2017-11-15 DIAGNOSIS — S39.012A BACK STRAIN, INITIAL ENCOUNTER: ICD-10-CM
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
## 2017-11-15 LAB
ALBUMIN SERPL-MCNC: 2.3 G/DL (ref 3.4–5)
ALBUMIN/GLOB SERPL: 0.5 {RATIO} (ref 0.8–1.7)
ALP SERPL-CCNC: 90 U/L (ref 45–117)
ALT SERPL-CCNC: 24 U/L (ref 16–61)
ANION GAP SERPL CALC-SCNC: 8 MMOL/L (ref 3–18)
AST SERPL-CCNC: 19 U/L (ref 15–37)
BILIRUB SERPL-MCNC: 0.2 MG/DL (ref 0.2–1)
BUN SERPL-MCNC: 60 MG/DL (ref 7–18)
BUN/CREAT SERPL: 8 (ref 12–20)
CALCIUM SERPL-MCNC: 8.5 MG/DL (ref 8.5–10.1)
CHLORIDE SERPL-SCNC: 108 MMOL/L (ref 100–108)
CO2 SERPL-SCNC: 24 MMOL/L (ref 21–32)
CREAT SERPL-MCNC: 7.93 MG/DL (ref 0.6–1.3)
GLOBULIN SER CALC-MCNC: 4.5 G/DL (ref 2–4)
GLUCOSE SERPL-MCNC: 301 MG/DL (ref 74–99)
POTASSIUM SERPL-SCNC: 3.9 MMOL/L (ref 3.5–5.5)
PROT SERPL-MCNC: 6.8 G/DL (ref 6.4–8.2)
SODIUM SERPL-SCNC: 140 MMOL/L (ref 136–145)

## 2017-11-15 PROCEDURE — 72100 X-RAY EXAM L-S SPINE 2/3 VWS: CPT

## 2017-11-15 PROCEDURE — 99284 EMERGENCY DEPT VISIT MOD MDM: CPT

## 2017-11-15 PROCEDURE — 74011250637 HC RX REV CODE- 250/637: Performed by: NURSE PRACTITIONER

## 2017-11-15 PROCEDURE — 80053 COMPREHEN METABOLIC PANEL: CPT | Performed by: EMERGENCY MEDICINE

## 2017-11-15 RX ORDER — OXYCODONE AND ACETAMINOPHEN 5; 325 MG/1; MG/1
2 TABLET ORAL
Status: COMPLETED | OUTPATIENT
Start: 2017-11-15 | End: 2017-11-15

## 2017-11-15 RX ORDER — HYDRALAZINE HYDROCHLORIDE 25 MG/1
25 TABLET, FILM COATED ORAL
Status: COMPLETED | OUTPATIENT
Start: 2017-11-15 | End: 2017-11-15

## 2017-11-15 RX ORDER — OXYCODONE AND ACETAMINOPHEN 5; 325 MG/1; MG/1
1 TABLET ORAL
Qty: 20 TAB | Refills: 0 | Status: SHIPPED | OUTPATIENT
Start: 2017-11-15 | End: 2018-01-11 | Stop reason: ALTCHOICE

## 2017-11-15 RX ADMIN — HYDRALAZINE HYDROCHLORIDE 25 MG: 25 TABLET, FILM COATED ORAL at 20:26

## 2017-11-15 RX ADMIN — OXYCODONE HYDROCHLORIDE AND ACETAMINOPHEN 2 TABLET: 5; 325 TABLET ORAL at 20:26
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
## 2017-11-20 ENCOUNTER — OFFICE VISIT (OUTPATIENT)
Dept: FAMILY MEDICINE CLINIC | Age: 57
End: 2017-11-20

## 2017-11-20 VITALS
SYSTOLIC BLOOD PRESSURE: 210 MMHG | BODY MASS INDEX: 31.18 KG/M2 | TEMPERATURE: 98.2 F | DIASTOLIC BLOOD PRESSURE: 98 MMHG | HEIGHT: 66 IN | RESPIRATION RATE: 20 BRPM | HEART RATE: 84 BPM | WEIGHT: 194 LBS | OXYGEN SATURATION: 98 %

## 2017-11-20 DIAGNOSIS — M62.830 MUSCLE SPASM OF BACK: ICD-10-CM

## 2017-11-20 DIAGNOSIS — M62.838 MUSCLE SPASMS OF NECK: Primary | ICD-10-CM

## 2017-11-20 RX ORDER — TIMOLOL MALEATE 5 MG/ML
1 SOLUTION/ DROPS OPHTHALMIC 3 TIMES DAILY
COMMUNITY
End: 2018-01-11 | Stop reason: ALTCHOICE

## 2017-11-20 RX ORDER — MOXIFLOXACIN 5 MG/ML
1 SOLUTION/ DROPS OPHTHALMIC 3 TIMES DAILY
COMMUNITY
End: 2018-01-11 | Stop reason: ALTCHOICE

## 2017-11-20 RX ORDER — CYCLOBENZAPRINE HCL 10 MG
10 TABLET ORAL
Qty: 30 TAB | Refills: 0 | Status: SHIPPED | OUTPATIENT
Start: 2017-11-20 | End: 2020-02-18 | Stop reason: SDUPTHER

## 2017-11-20 RX ORDER — ATROPINE SULFATE 10 MG/ML
1 SOLUTION/ DROPS OPHTHALMIC DAILY
Refills: 0 | COMMUNITY
Start: 2017-11-08 | End: 2018-01-11 | Stop reason: ALTCHOICE

## 2017-11-20 RX ORDER — DIFLUPREDNATE 0.5 MG/ML
1 EMULSION OPHTHALMIC DAILY
COMMUNITY
End: 2018-01-11 | Stop reason: ALTCHOICE

## 2017-11-20 NOTE — PATIENT INSTRUCTIONS
Back Spasm: Care Instructions  Your Care Instructions  A back spasm is sudden tightness and pain in your back muscles. It may happen from overuse or an injury. Things like sleeping in an awkward way, bending, lifting, standing, or sitting can sometimes cause a spasm. But the cause isn't always clear. Home treatment includes using heat or ice, taking over-the-counter (OTC) pain medicines, and avoiding activities that may cause back pain. For a back spasm that doesn't get better with home care, your doctor may prescribe medicine. Treatments such as massage or manipulation may also help ease a back spasm. Your doctor may also suggest exercise or physical therapy to help improve strength and flexibility in your back muscles. In most cases, getting back to your normal activities is good foryour back. Just make sure to avoid doing things that make your pain worse. Follow-up care is a key part of your treatment and safety. Be sure to make and go to all appointments, and call your doctor if you are having problems. It's also a good idea to know your test results and keep a list of the medicines you take. How can you care for yourself at home? ? Heat, ice, and medicines  ? · To relieve pain, use heat or ice (whichever feels better) on the affected area. ¨ Put a warm water bottle, a heating pad set on low, or a warm cloth on your back. Put a thin cloth between the heating pad and your skin. Do not go to sleep with a heating pad on your skin. ¨ Try ice or a cold pack on the area for 10 to 20 minutes at a time. Put a thin cloth between the ice and your skin. ? · Ask your doctor if you can take acetaminophen (such as Tylenol) or nonsteroidal anti-inflammatory drugs, such as ibuprofen or naproxen. Your doctor can prescribe stronger medicines if needed. Be safe with medicines. Read and follow all instructions on the label. ?Body positions and posture  ?  · Sit or lie in positions that are most comfortable for you and that reduce pain. Try one of these positions when you lie down:  ¨ Lie on your back with your knees bent and supported by large pillows. ¨ Lie on the floor with your legs on the seat of a sofa or chair. [place] on your side with your knees and hips bent and a pillow between your legs. ¨ Lie on your stomach if it does not make pain worse. ? · Do not sit up in bed. Avoid soft couches and twisted positions. ? · Avoid bed rest after the first day of back pain. Bed rest can help relieve pain at first, but it delays healing. Continued rest without activity is usually not good for your back. ? · If you must sit for long periods of time, take breaks from sitting. Change positions every 30 minutes. Get up and walk around, or lie in a comfortable position. Activity  ? · Take short walks several times a day. You can start with 5 to 10 minutes, 3 or 4 times a day, and work up to longer walks. Walk on level surfaces and avoid hills and stairs until your back starts to feel better. ? · After your back spasm starts to feel better, try to stretch your muscles every day, especially before and after exercise and at bedtime. Regular stretching can help relax your muscles. ? · To prevent future back pain, do exercises to stretch and strengthen your back and stomach. Learn to use good posture, safe lifting techniques, and other ways to move to help you avoid back pain. When should you call for help? Call 911 anytime you think you may need emergency care. For example, call if:  ? · You are unable to move an arm or a leg at all. ?Call your doctor now or seek immediate medical care if:  ? · You have new or worse symptoms in your legs, belly, or buttocks. Symptoms may include:  ¨ Numbness or tingling. ¨ Weakness. ¨ Pain. ? · You lose bladder or bowel control. ? Watch closely for changes in your health, and be sure to contact your doctor if:  ? · You do not get better as expected. Where can you learn more?   Go to http://hoang-jayden.info/. Enter E232 in the search box to learn more about \"Back Spasm: Care Instructions. \"  Current as of: March 21, 2017  Content Version: 11.4  © 8755-0795 [employer], [email or web address]. Care instructions adapted under license by [employer] (which disclaims liability or warranty for this information). If you have questions about a medical condition or this instruction, always ask your healthcare professional. [place] [address] any warranty or liability for your use of this information.

## 2017-11-20 NOTE — MR AVS SNAPSHOT
Visit Information Date & Time Provider Department Dept. Phone Encounter #  
 11/20/2017 10:20 AM Gennaro Posadas, [address] [place] [address] [address] [facility] [id number] Follow-up Instructions Return in about 1 week (around 11/27/2017) for bp/neck. Your Appointments 1/10/2018  8:20 AM  
Office Visit with Gennaro Posadas MD  
[facility] [address] [place] [address]) Appt Note: fu on bp/chol [address] [address]  
[phone]  
  
   
 [address] [facility] [place]  
  
    
 1/11/2018  9:00 AM  
Follow Up with Jacquelin Waldrop NP [facility] ([address] [place] [address]) Appt Note: 3 mth f/up [place] [address] [address] [id number] [phone] [address] [facility] [address] 4/13/2018  9:00 AM  
Follow Up with Karolina Monique MD  
[facility] ([address] [place] [address]) Appt Note: 6 month follow up [place] [address] [address] [address] [phone] [address] [facility] [address] Upcoming Health Maintenance Date Due HEMOGLOBIN A1C Q6M 1/26/2018* LIPID PANEL Q1 2/28/2018 FOOT EXAM Q1 3/21/2018 MICROALBUMIN Q1 3/21/2018 EYE EXAM RETINAL OR DILATED Q1 5/8/2018 COLONOSCOPY 10/9/2023 DTaP/Tdap/Td series (2 - Td) 3/21/2027 *Topic was postponed. The date shown is not the original due date. Allergies as of 11/20/2017  Review Complete On: 11/20/2017 By: Vijay Leach LPN Severity Noted Reaction Type Reactions Bactrim [Sulfamethoprim Ds]  06/06/2014   Side Effect Nausea and Vomiting Current Immunizations  Reviewed on 11/2/2017 Name Date Influenza Vaccine (Quad) PF 10/10/2017 Not reviewed this visit You Were Diagnosed With   
  
 Codes Comments Muscle spasms of neck    -  Primary ICD-10-CM: K43.198 ICD-9-CM: 728.85 Muscle spasm of back     ICD-10-CM: I22.139 ICD-9-CM: 724.8 Vitals BP Pulse Temp Resp Height(growth percentile) Weight(growth percentile) (!) 210/98 84 98.2 °F (36.8 °C) (Oral) 20 5' 6\" (1.676 m) 194 lb (88 kg) SpO2 BMI Smoking Status 98% 31.31 kg/m2 Never Smoker Vitals History BMI and BSA Data Body Mass Index Body Surface Area  
 31.31 kg/m 2 2.02 m 2 Preferred Pharmacy Pharmacy Name Phone [facility], [address] [place] [address] [phone] Your Updated Medication List  
  
   
This list is accurate as of: 11/20/17 11:13 AM.  Always use your most recent med list. amLODIPine 10 mg tablet Commonly known as:  Iwona Spruce Take 1 Tab by mouth daily. atropine 1 % ophthalmic solution Apply 1 Drop to eye daily. Left eye only Blood-Glucose Meter monitoring kit  
by Does Not Apply route. One touch ultra2  
  
 bumetanide 2 mg tablet Commonly known as:  Wilhelmenia Montane Take 0.5 Tabs by mouth two (2) times a day. calcitRIOL 0.25 mcg capsule Commonly known as:  ROCALTROL  
TAKE ONE CAPSULE BY MOUTH EVERY MONDAY, WEDNESDAY, AND FRIDAY  
  
 cloNIDine 0.3 mg/24 hr  
Commonly known as:  CATAPRES  
APPLY 1 PATCH TO SKIN ONCE EVERY 7 DAYS  
  
 COMBIGAN 0.2-0.5 % Drop ophthalmic solution Generic drug:  brimonidine-timolol INSTIL 1 DROP IN [facility] EYE 3 TIMES DAILY  
  
 cyclobenzaprine 10 mg tablet Commonly known as:  FLEXERIL Take 1 Tab by mouth three (3) times daily as needed for Muscle Spasm(s). docusate sodium 100 mg capsule Commonly known as:  Hildegarde Berth Take 1 Cap by mouth two (2) times a day. dorzolamide 2 % ophthalmic solution Commonly known as:  TRUSOPT INSTIL 1 DROP IN [facility] EYE 3 TIMES DAILY DUREZOL 0.05 % ophthalmic emulsion Generic drug:  Difluprednate Administer 1 Drop to both eyes daily. Indications: 3x day for 2 weeks, 2 times day for 2 weeks, then once daily for 2 weeks  
  
 finasteride 5 mg tablet Commonly known as:  PROSCAR Take 1 Tab by mouth daily. fluticasone 50 mcg/actuation nasal spray Commonly known as:  FLONASE  
1 [place] by Both Nostrils route two (2) times a day. glucose blood VI test strips strip Commonly known as:  ASCENSIA AUTODISC VI, ONE TOUCH ULTRA TEST VI Test twice daily:  Fasting before breakfast and 2 hours after dinner (log readings), One touch ultra 2 test strips please; Dx: 250.02  
  
 hydrALAZINE 50 mg tablet Commonly known as:  APRESOLINE Take 1.5 Tabs by mouth three (3) times daily. ILEVRO 0.3 % Drps Generic drug:  nepafenac Apply 1 Drop to eye daily. Indications: once daily x 4 weeks Insulin Needles (Disposable) 31 gauge x 5/16\" Ndle Use as directed with Levemir Insulin Pen. Iron 325 mg (65 mg iron) tablet Generic drug:  ferrous sulfate Take  by mouth Daily (before breakfast). Take 1 tablet by mouth once a week as per patient.  
  
 ketoconazole 2 % topical cream  
Commonly known as:  NIZORAL Apply  to affected area daily. Lancets Misc Commonly known as: One Touch Ariadna Douds Test twice daily: Once in the morning fasting, then two hours after dinner (log results) OTHER PGIIL/P CRM - Apply 1 -2 grams ( 1-2 pumps) to left foot 3 - 4 times daily. oxyCODONE-acetaminophen 5-325 mg per tablet Commonly known as:  PERCOCET Take 1 Tab by mouth every four (4) hours as needed for Pain for up to 20 doses. Max Daily Amount: 6 Tabs. rosuvastatin 20 mg tablet Commonly known as:  CRESTOR Take 1 Tab by mouth nightly. timolol 0.5 % ophthalmic solution Commonly known as:  TIMOPTIC  
1 Drop three (3) times daily. Indications: to right eye  
  
 VGO 40 Excell Drape Generic drug:  sub-q insulin device, 40 unit  
by Does Not Apply route. VIGAMOX 0.5 % ophthalmic solution Generic drug:  moxifloxacin 1 Drop three (3) times daily. Indications: x 1 week VITAMIN B-12 1,000 mcg tablet Generic drug:  cyanocobalamin Take 1,000 mcg by mouth daily. Prescriptions Sent to Pharmacy Refills  
 cyclobenzaprine (FLEXERIL) 10 mg tablet 0 Sig: Take 1 Tab by mouth three (3) times daily as needed for Muscle Spasm(s). Class: Normal  
 Pharmacy: [facility], [address] [place] [address] #: [phone] Route: Oral  
  
Follow-up Instructions Return in about 1 week (around 11/27/2017) for bp/neck. Patient Instructions Back Spasm: Care Instructions Your Care Instructions A back spasm is sudden tightness and pain in your back muscles. It may happen from overuse or an injury. Things like sleeping in an awkward way, bending, lifting, standing, or sitting can sometimes cause a spasm. But the cause isn't always clear. Home treatment includes using heat or ice, taking over-the-counter (OTC) pain medicines, and avoiding activities that may cause back pain. For a back spasm that doesn't get better with home care, your doctor may prescribe medicine. Treatments such as massage or manipulation may also help ease a back spasm. Your doctor may also suggest exercise or physical therapy to help improve strength and flexibility in your back muscles. In most cases, getting back to your normal activities is good foryour back. Just make sure to avoid doing things that make your pain worse. Follow-up care is a key part of your treatment and safety. Be sure to make and go to all appointments, and call your doctor if you are having problems. It's also a good idea to know your test results and keep a list of the medicines you take. How can you care for yourself at home? ? Heat, ice, and medicines ? · To relieve pain, use heat or ice (whichever feels better) on the affected area. ¨ Put a warm water bottle, a heating pad set on low, or a warm cloth on your back. Put a thin cloth between the heating pad and your skin. Do not go to sleep with a heating pad on your skin. ¨ Try ice or a cold pack on the area for 10 to 20 minutes at a time. Put a thin cloth between the ice and your skin. ? · Ask your doctor if you can take acetaminophen (such as Tylenol) or nonsteroidal anti-inflammatory drugs, such as ibuprofen or naproxen. Your doctor can prescribe stronger medicines if needed. Be safe with medicines. Read and follow all instructions on the label. ?Body positions and posture ? · Sit or lie in positions that are most comfortable for you and that reduce pain. Try one of these positions when you lie down: ¨ Lie on your back with your knees bent and supported by large pillows. ¨ Lie on the floor with your legs on the seat of a sofa or chair. [place] Left on your side with your knees and hips bent and a pillow between your legs. ¨ Lie on your stomach if it does not make pain worse. ? · Do not sit up in bed. Avoid soft couches and twisted positions. ? · Avoid bed rest after the first day of back pain. Bed rest can help relieve pain at first, but it delays healing. Continued rest without activity is usually not good for your back. ? · If you must sit for long periods of time, take breaks from sitting. Change positions every 30 minutes. Get up and walk around, or lie in a comfortable position. Activity ? · Take short walks several times a day. You can start with 5 to 10 minutes, 3 or 4 times a day, and work up to longer walks. Walk on level surfaces and avoid hills and stairs until your back starts to feel better. ? · After your back spasm starts to feel better, try to stretch your muscles every day, especially before and after exercise and at bedtime. Regular stretching can help relax your muscles.   
? · To prevent future back pain, do exercises to stretch and strengthen your back and stomach. Learn to use good posture, safe lifting techniques, and other ways to move to help you avoid back pain. When should you call for help? Call 911 anytime you think you may need emergency care. For example, call if: 
? · You are unable to move an arm or a leg at all. ?Call your doctor now or seek immediate medical care if: 
? · You have new or worse symptoms in your legs, belly, or buttocks. Symptoms may include: ¨ Numbness or tingling. ¨ Weakness. ¨ Pain. ? · You lose bladder or bowel control. ? Watch closely for changes in your health, and be sure to contact your doctor if: 
? · You do not get better as expected. Where can you learn more? Go to http://hoang-ajyden.info/. Enter E232 in the search box to learn more about \"Back Spasm: Care Instructions. \" Current as of: March 21, 2017 Content Version: 11.4 © 3907-8851 [employer]. Care instructions adapted under license by [employer] (which disclaims liability or warranty for this information). If you have questions about a medical condition or this instruction, always ask your healthcare professional. [place] [address] any warranty or liability for your use of this information. Introducing \[facility] [place] [facility]\"" & HEALTH SERVICES! Alphonse Lance introduces [employer] patient portal. Now you can access parts of your medical record, email your doctor's office, and request medication refills online. 1. In your internet browser, go to [email or web address] 2. Click on the First Time User? Click Here link in the Sign In box. You will see the New Member Sign Up page. 3. Enter your [employer] Access Code exactly as it appears below. You will not need to use this code after youve completed the sign-up process. If you do not sign up before the expiration date, you must request a new code. · [employer] Access Code: [email or web address] Expires: 1/8/2018 10:21 AM 
 
 4. Enter the last four digits of your Social Security Number (xxxx) and Date of Birth (mm/dd/yyyy) as indicated and click Submit. You will be taken to the next sign-up page. 5. Create a [employer] ID. This will be your [employer] login ID and cannot be changed, so think of one that is secure and easy to remember. 6. Create a [employer] password. You can change your password at any time. 7. Enter your Password Reset Question and Answer. This can be used at a later time if you forget your password. 8. Enter your e-mail address. You will receive e-mail notification when new information is available in [address] 9. Click Sign Up. You can now view and download portions of your medical record. 10. Click the Download Summary menu link to download a portable copy of your medical information. If you have questions, please visit the Frequently Asked Questions section of the [employer] website. Remember, [employer] is NOT to be used for urgent needs. For medical emergencies, dial 911. Now available from your iPhone and Android! Please provide this summary of care documentation to your next provider. Your primary care clinician is listed as [address]. If you have any questions after today's visit, please call [phone].
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
## 2017-11-29 ENCOUNTER — OFFICE VISIT (OUTPATIENT)
Dept: FAMILY MEDICINE CLINIC | Age: 57
End: 2017-11-29

## 2017-11-29 VITALS
HEIGHT: 66 IN | OXYGEN SATURATION: 98 % | TEMPERATURE: 97.9 F | DIASTOLIC BLOOD PRESSURE: 86 MMHG | BODY MASS INDEX: 31.18 KG/M2 | RESPIRATION RATE: 22 BRPM | SYSTOLIC BLOOD PRESSURE: 196 MMHG | WEIGHT: 194 LBS | HEART RATE: 95 BPM

## 2017-11-29 DIAGNOSIS — M62.830 BACK SPASM: ICD-10-CM

## 2017-11-29 DIAGNOSIS — I10 HTN (HYPERTENSION), MALIGNANT: ICD-10-CM

## 2017-11-29 DIAGNOSIS — M62.838 NECK MUSCLE SPASM: Primary | ICD-10-CM

## 2017-11-29 NOTE — MR AVS SNAPSHOT
Visit Information Date & Time Provider Department Dept. Phone Encounter #  
 11/29/2017 10:20 AM Lisa Munoz[address] [place] [address] [place] [facility] [id number] Follow-up Instructions Return in about 3 months (around 2/28/2018) for bp/. Your Appointments 1/10/2018  8:20 AM  
Office Visit with Lisa Munoz MD  
[place] [address] [facility]) Appt Note: fu on bp/chol [address] [facility] [address] [address]  
[phone]  
  
   
 [address], [place]  
  
    
 1/11/2018  9:00 AM  
Follow Up with Conrad Osler, NP [facility] [place] [facility] ([facility]) Appt Note: 3 mth f/up [place] [address] [address] [address] [phone] 2300 [facility] [address] 4/13/2018  9:00 AM  
Follow Up with Teri Palumbo MD  
[facility] [place] [facility] ([facility]) Appt Note: 6 month follow up [place] [address] [address] [address] [phone] 23[address] [facility] [address] Upcoming Health Maintenance Date Due HEMOGLOBIN A1C Q6M 1/26/2018* LIPID PANEL Q1 2/28/2018 FOOT EXAM Q1 3/21/2018 MICROALBUMIN Q1 3/21/2018 EYE EXAM RETINAL OR DILATED Q1 5/8/2018 COLONOSCOPY 10/9/2023 DTaP/Tdap/Td series (2 - Td) 3/21/2027 *Topic was postponed. The date shown is not the original due date. Allergies as of 11/29/2017  Review Complete On: 11/29/2017 By: Toshia Castellano LPN Severity Noted Reaction Type Reactions Bactrim [Sulfamethoprim Ds]  06/06/2014   Side Effect Nausea and Vomiting Current Immunizations  Reviewed on 11/2/2017 Name Date Influenza Vaccine (Quad) PF 10/10/2017 Not reviewed this visit You Were Diagnosed With   
  
 Codes Comments Neck muscle spasm    -  Primary ICD-10-CM: L83.964 ICD-9-CM: 728.85 Back spasm     ICD-10-CM: Y49.033 ICD-9-CM: 724.8 HTN (hypertension), malignant     ICD-10-CM: I10 
ICD-9-CM: 401.0 Vitals BP Pulse Temp Resp Height(growth percentile) Weight(growth percentile) 196/86 95 97.9 °F (36.6 °C) (Oral) 22 5' 6\" (1.676 m) 194 lb (88 kg) SpO2 BMI Smoking Status 98% 31.31 kg/m2 Never Smoker Vitals History BMI and BSA Data Body Mass Index Body Surface Area  
 31.31 kg/m 2 2.02 m 2 Preferred Pharmacy Pharmacy Name Phone [facility], [address] [place] [address] [phone] Your Updated Medication List  
  
   
This list is accurate as of: 11/29/17 11:25 AM.  Always use your most recent med list. amLODIPine 10 mg tablet Commonly known as:  Javid Alstrom Take 1 Tab by mouth daily. atropine 1 % ophthalmic solution Apply 1 Drop to eye daily. Left eye only Blood-Glucose Meter monitoring kit  
by Does Not Apply route. One touch ultra2  
  
 bumetanide 2 mg tablet Commonly known as:  Merla Goods Take 0.5 Tabs by mouth two (2) times a day. calcitRIOL 0.25 mcg capsule Commonly known as:  ROCALTROL  
TAKE ONE CAPSULE BY MOUTH EVERY MONDAY, WEDNESDAY, AND FRIDAY  
  
 cloNIDine 0.3 mg/24 hr  
Commonly known as:  CATAPRES  
APPLY 1 PATCH TO SKIN ONCE EVERY 7 DAYS  
  
 COMBIGAN 0.2-0.5 % Drop ophthalmic solution Generic drug:  brimonidine-timolol INSTIL 1 DROP IN [facility] EYE 3 TIMES DAILY  
  
 cyclobenzaprine 10 mg tablet Commonly known as:  FLEXERIL Take 1 Tab by mouth three (3) times daily as needed for Muscle Spasm(s). docusate sodium 100 mg capsule Commonly known as:  Carisa Antis Take 1 Cap by mouth two (2) times a day. DUREZOL 0.05 % ophthalmic emulsion Generic drug:  Difluprednate Administer 1 Drop to both eyes daily.  Indications: 3x day for 2 weeks, 2 times day for 2 weeks, then once daily for 2 weeks  
  
 finasteride 5 mg tablet Commonly known as:  PROSCAR Take 1 Tab by mouth daily. fluticasone 50 mcg/actuation nasal spray Commonly known as:  FLONASE  
1 [place] by Both Nostrils route two (2) times a day. glucose blood VI test strips strip Commonly known as:  ASCENSIA AUTODISC VI, ONE TOUCH ULTRA TEST VI Test twice daily:  Fasting before breakfast and 2 hours after dinner (log readings), One touch ultra 2 test strips please; Dx: 250.02  
  
 hydrALAZINE 50 mg tablet Commonly known as:  APRESOLINE Take 1.5 Tabs by mouth three (3) times daily. ILEVRO 0.3 % Drps Generic drug:  nepafenac Apply 1 Drop to eye daily. Indications: once daily x 4 weeks Insulin Needles (Disposable) 31 gauge x 5/16\" Ndle Use as directed with Levemir Insulin Pen. Iron 325 mg (65 mg iron) tablet Generic drug:  ferrous sulfate Take  by mouth Daily (before breakfast). Take 1 tablet by mouth once a week as per patient.  
  
 ketoconazole 2 % topical cream  
Commonly known as:  NIZORAL Apply  to affected area daily. Lancets Misc Commonly known as: One Touch Primitivo [place] Test twice daily: Once in the morning fasting, then two hours after dinner (log results) OTHER PGIIL/P CRM - Apply 1 -2 grams ( 1-2 pumps) to left foot 3 - 4 times daily. oxyCODONE-acetaminophen 5-325 mg per tablet Commonly known as:  PERCOCET Take 1 Tab by mouth every four (4) hours as needed for Pain for up to 20 doses. Max Daily Amount: 6 Tabs. * rosuvastatin 20 mg tablet Commonly known as:  CRESTOR Take 1 Tab by mouth nightly. * rosuvastatin 20 mg tablet Commonly known as:  CRESTOR Take 1 Tab by mouth daily. timolol 0.5 % ophthalmic solution Commonly known as:  TIMOPTIC  
1 Drop three (3) times daily. Indications: to right eye  
  
 VGO 40 Elinda Plan Generic drug:  sub-q insulin device, 40 unit  
by Does Not Apply route. VIGAMOX 0.5 % ophthalmic solution Generic drug:  moxifloxacin 1 Drop three (3) times daily. Indications: x 1 week VITAMIN B-12 1,000 mcg tablet Generic drug:  cyanocobalamin Take 1,000 mcg by mouth daily. * Notice: This list has 2 medication(s) that are the same as other medications prescribed for you. Read the directions carefully, and ask your doctor or other care provider to review them with you. We Performed the Following REFERRAL TO PHYSICAL THERAPY [GVO25 Custom] Follow-up Instructions Return in about 3 months (around 2/28/2018) for bp/. Referral Information Referral ID Referred By Referred To  
  
 [id number] Wendy Joseph [address] [place], [place] [address] Phone: [phone] Visits Status Start Date End Date 1 New Request 11/29/17 11/29/18 If your referral has a status of pending review or denied, additional information will be sent to support the outcome of this decision. Patient Instructions DASH Diet: Care Instructions Your Care Instructions The DASH diet is an eating plan that can help lower your blood pressure. DASH stands for Dietary Approaches to Stop Hypertension. Hypertension is high blood pressure. The DASH diet focuses on eating foods that are high in calcium, potassium, and magnesium. These nutrients can lower blood pressure. The foods that are highest in these nutrients are fruits, vegetables, low-fat dairy products, nuts, seeds, and legumes. But taking calcium, potassium, and magnesium supplements instead of eating foods that are high in those nutrients does not have the same effect. The DASH diet also includes whole grains, fish, and poultry. The DASH diet is one of several lifestyle changes your doctor may recommend to lower your high blood pressure.  Your doctor may also want you to decrease the amount of sodium in your diet. Lowering sodium while following the DASH diet can lower blood pressure even further than just the DASH diet alone. Follow-up care is a key part of your treatment and safety. Be sure to make and go to all appointments, and call your doctor if you are having problems. It's also a good idea to know your test results and keep a list of the medicines you take. How can you care for yourself at home? Following the DASH diet · Eat 4 to 5 servings of fruit each day. A serving is 1 medium-sized piece of fruit, ½ cup chopped or canned fruit, 1/4 cup dried fruit, or 4 ounces (½ cup) of fruit juice. Choose fruit more often than fruit juice. · Eat 4 to 5 servings of vegetables each day. A serving is 1 cup of lettuce or raw leafy vegetables, ½ cup of chopped or cooked vegetables, or 4 ounces (½ cup) of vegetable juice. Choose vegetables more often than vegetable juice. · Get 2 to 3 servings of low-fat and fat-free dairy each day. A serving is 8 ounces of milk, 1 cup of yogurt, or 1 ½ ounces of cheese. · Eat 6 to 8 servings of grains each day. A serving is 1 slice of bread, 1 ounce of dry cereal, or ½ cup of cooked rice, pasta, or cooked cereal. Try to choose whole-grain products as much as possible. · Limit lean meat, poultry, and fish to 2 servings each day. A serving is 3 ounces, about the size of a deck of cards. · Eat 4 to 5 servings of nuts, seeds, and legumes (cooked dried beans, lentils, and split peas) each week. A serving is 1/3 cup of nuts, 2 tablespoons of seeds, or ½ cup of cooked beans or peas. · Limit fats and oils to 2 to 3 servings each day. A serving is 1 teaspoon of vegetable oil or 2 tablespoons of salad dressing. · Limit sweets and added sugars to 5 servings or less a week. A serving is 1 tablespoon jelly or jam, ½ cup sorbet, or 1 cup of lemonade. · Eat less than 2,300 milligrams (mg) of sodium a day.  If you limit your sodium to 1,500 mg a day, you can lower your blood pressure even more. Tips for success · Start small. Do not try to make dramatic changes to your diet all at once. You might feel that you are missing out on your favorite foods and then be more likely to not follow the plan. Make small changes, and stick with them. Once those changes become habit, add a few more changes. · Try some of the following: ¨ Make it a goal to eat a fruit or vegetable at every meal and at snacks. This will make it easy to get the recommended amount of fruits and vegetables each day. ¨ Try yogurt topped with fruit and nuts for a snack or healthy dessert. ¨ Add lettuce, tomato, cucumber, and onion to sandwiches. ¨ Combine a ready-made pizza crust with low-fat mozzarella cheese and lots of vegetable toppings. Try using tomatoes, squash, spinach, broccoli, carrots, cauliflower, and onions. ¨ Have a variety of cut-up vegetables with a low-fat dip as an appetizer instead of chips and dip. ¨ Sprinkle sunflower seeds or chopped almonds over salads. Or try adding chopped walnuts or almonds to cooked vegetables. ¨ Try some vegetarian meals using beans and peas. Add garbanzo or kidney beans to salads. Make burritos and tacos with mashed estrada beans or black beans. Where can you learn more? Go to http://hoang-jayden.info/. Enter H592 in the search box to learn more about \"DASH Diet: Care Instructions. \" Current as of: September 21, 2016 Content Version: 11.4 © 1903-7756 [employer]. Care instructions adapted under license by [employer] (which disclaims liability or warranty for this information). If you have questions about a medical condition or this instruction, always ask your healthcare professional. [address] any warranty or liability for your use of this information. Introducing [facility]! Luly Munroe introduces "[employer] patient portal. Now you can access parts of your medical record, email your doctor's office, and request medication refills online. 1. In your internet browser, go to [email or web address] 2. Click on the First Time User? Click Here link in the Sign In box. You will see the New Member Sign Up page. 3. Enter your "[employer] Access Code exactly as it appears below. You will not need to use this code after youve completed the sign-up process. If you do not sign up before the expiration date, you must request a new code. · "[employer] Access Code: [email or web address] Expires: 1/8/2018 10:21 AM 
 
4. Enter the last four digits of your Social Security Number (xxxx) and Date of Birth (mm/dd/yyyy) as indicated and click Submit. You will be taken to the next sign-up page. 5. Create a "[employer] ID. This will be your "[employer] login ID and cannot be changed, so think of one that is secure and easy to remember. 6. Create a "[employer] password. You can change your password at any time. 7. Enter your Password Reset Question and Answer. This can be used at a later time if you forget your password. 8. Enter your e-mail address. You will receive e-mail notification when new information is available in [address] 9. Click Sign Up. You can now view and download portions of your medical record. 10. Click the Download Summary menu link to download a portable copy of your medical information. If you have questions, please visit the Frequently Asked Questions section of the "[employer] website. Remember, "[employer] is NOT to be used for urgent needs. For medical emergencies, dial 911. Now available from your iPhone and Android! Please provide this summary of care documentation to your next provider. Your primary care clinician is listed as [address]. If you have any questions after today's visit, please call [phone].
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
## 2017-11-29 NOTE — PATIENT INSTRUCTIONS

## 2017-11-29 NOTE — PROGRESS NOTES
1. Have you been to the ER, urgent care clinic since your last visit? Hospitalized since your last visit? No    2. Have you seen or consulted any other health care providers outside of the [address] [place] [address] since your last visit? Include any pap smears or colon screening.  No

## 2017-12-20 ENCOUNTER — HOSPITAL ENCOUNTER (OUTPATIENT)
Dept: PHYSICAL THERAPY | Age: 57
Discharge: HOME | End: 2017-12-20
Payer: COMMERCIAL

## 2017-12-20 PROCEDURE — 97530 THERAPEUTIC ACTIVITIES: CPT

## 2017-12-20 PROCEDURE — 97140 MANUAL THERAPY 1/> REGIONS: CPT

## 2017-12-20 PROCEDURE — 97162 PT EVAL MOD COMPLEX 30 MIN: CPT
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
## 2017-12-28 ENCOUNTER — OFFICE VISIT (OUTPATIENT)
Dept: FAMILY MEDICINE CLINIC | Age: 57
End: 2017-12-28

## 2017-12-28 ENCOUNTER — HOSPITAL ENCOUNTER (OUTPATIENT)
Dept: GENERAL RADIOLOGY | Age: 57
Discharge: HOME | End: 2017-12-28
Payer: COMMERCIAL

## 2017-12-28 VITALS
DIASTOLIC BLOOD PRESSURE: 80 MMHG | TEMPERATURE: 97.7 F | HEIGHT: 66 IN | WEIGHT: 191 LBS | BODY MASS INDEX: 30.7 KG/M2 | OXYGEN SATURATION: 96 % | SYSTOLIC BLOOD PRESSURE: 180 MMHG | RESPIRATION RATE: 20 BRPM | HEART RATE: 92 BPM

## 2017-12-28 DIAGNOSIS — J22 LOWER RESP. TRACT INFECTION: ICD-10-CM

## 2017-12-28 DIAGNOSIS — J34.89 SINUS DRAINAGE: ICD-10-CM

## 2017-12-28 DIAGNOSIS — I10 ESSENTIAL HYPERTENSION: Primary | ICD-10-CM

## 2017-12-28 DIAGNOSIS — R05.9 COUGH: ICD-10-CM

## 2017-12-28 DIAGNOSIS — H25.89 OTHER AGE-RELATED CATARACT, UNSPECIFIED LATERALITY: ICD-10-CM

## 2017-12-28 PROCEDURE — 71020 XR CHEST PA LAT: CPT

## 2017-12-28 PROCEDURE — 70220 X-RAY EXAM OF SINUSES: CPT

## 2017-12-28 RX ORDER — CARVEDILOL 12.5 MG/1
TABLET ORAL
Refills: 3 | COMMUNITY
Start: 2017-12-05 | End: 2018-03-17

## 2017-12-28 NOTE — MR AVS SNAPSHOT
Visit Information Date & Time Provider Department Dept. Phone Encounter #  
 12/28/2017  2:00 PM Ba Ibarra, [facility] [phone] [id number] Follow-up Instructions Return in about 2 months (around 2/28/2018) for BP. Your Appointments 1/10/2018  8:20 AM  
Office Visit with Ba Ibarra MD  
[place] [address] [facility] [address] [place] [address]) Appt Note: fu on bp/chol 129 [facility] [address] [address]  
[phone]  
  
   
 [address] [facility]  
  
    
 1/11/2018  9:00 AM  
Follow Up with Louann Ya NP [facility] \[facility]\"" ([address] [place] [address]) Appt Note: [facility] [address] [address] [address]  
[phone] [facility] [address]  
  
    
 1/15/2018 12:20 PM  
Office Visit with Ba Ibarra MD  
[address]) Appt Note: pre op clearance, cataract surgery and glaucoma surgery on 02/02/18, Dr. Shannon Gould, [phone] [place] [address] [address]  
[phone]  
  
    
 4/13/2018  9:00 AM  
Follow Up with Zachary Ariza MD  
[facility] \[facility]\"" ([address] [place] [address]) Appt Note: 6 month follow up [facility] [address] [address] [address] [phone] [address] [place] [address] Upcoming Health Maintenance Date Due HEMOGLOBIN A1C Q6M 1/26/2018* LIPID PANEL Q1 2/28/2018 FOOT EXAM Q1 3/21/2018 MICROALBUMIN Q1 3/21/2018 EYE EXAM RETINAL OR DILATED Q1 5/8/2018 COLONOSCOPY 10/9/2023 DTaP/Tdap/Td series (2 - Td) 3/21/2027 *Topic was postponed. The date shown is not the original due date. Allergies as of 12/28/2017  Review Complete On: 12/28/2017 By: Sherron Villatoro Severity Noted Reaction Type Reactions Bactrim [Sulfamethoprim Ds]  06/06/2014   Side Effect Nausea and Vomiting Current Immunizations  Reviewed on 11/2/2017 Name Date Influenza Vaccine (Quad) PF 10/10/2017 Not reviewed this visit You Were Diagnosed With   
  
 Codes Comments Essential hypertension    -  Primary ICD-10-CM: I10 
ICD-9-CM: 401.9 Lower resp. tract infection     ICD-10-CM: Chiquis Nab ICD-9-CM: 519.8 Other age-related cataract, unspecified laterality     ICD-10-CM: H25.89 ICD-9-CM: 366.19 Vitals BP Pulse Temp Resp Height(growth percentile) Weight(growth percentile) 180/80 92 97.7 °F (36.5 °C) (Oral) 20 5' 6\" (1.676 m) 191 lb (86.6 kg) SpO2 BMI Smoking Status 96% 30.83 kg/m2 Never Smoker Vitals History BMI and BSA Data Body Mass Index Body Surface Area  
 30.83 kg/m 2 2.01 m 2 Preferred Pharmacy Pharmacy Name Phone [address] [phone] Your Updated Medication List  
  
   
This list is accurate as of: 12/28/17  2:48 PM.  Always use your most recent med list. amLODIPine 10 mg tablet Commonly known as:  Izetta Harder Take 1 Tab by mouth daily. atropine 1 % ophthalmic solution Apply 1 Drop to eye daily. Left eye only Blood-Glucose Meter monitoring kit  
by Does Not Apply route. One touch ultra2  
  
 bumetanide 2 mg tablet Commonly known as:  Estuardo Luis Take 0.5 Tabs by mouth two (2) times a day. calcitRIOL 0.25 mcg capsule Commonly known as:  ROCALTROL  
TAKE ONE CAPSULE BY MOUTH EVERY MONDAY, WEDNESDAY, AND FRIDAY  
  
 carvedilol 12.5 mg tablet Commonly known as:  COREG  
TAKE 1 TABLET BY MOUTH TWICE A DAY  
  
 cloNIDine 0.3 mg/24 hr  
Commonly known as:  CATAPRES  
APPLY 1 PATCH TO SKIN ONCE EVERY 7 DAYS  
  
 COMBIGAN 0.2-0.5 % Drop ophthalmic solution Generic drug:  brimonidine-timolol INSTIL 1 DROP IN [facility] EYE 3 TIMES DAILY cyclobenzaprine 10 mg tablet Commonly known as:  FLEXERIL Take 1 Tab by mouth three (3) times daily as needed for Muscle Spasm(s). docusate sodium 100 mg capsule Commonly known as:  Clydie Laird Take 1 Cap by mouth two (2) times a day. DUREZOL 0.05 % ophthalmic emulsion Generic drug:  Difluprednate Administer 1 Drop to both eyes daily. Indications: 3x day for 2 weeks, 2 times day for 2 weeks, then once daily for 2 weeks  
  
 finasteride 5 mg tablet Commonly known as:  PROSCAR Take 1 Tab by mouth daily. fluticasone 50 mcg/actuation nasal spray Commonly known as:  FLONASE  
1 [place] by Both Nostrils route two (2) times a day. glucose blood VI test strips strip Commonly known as:  ASCENSIA AUTODISC VI, ONE TOUCH ULTRA TEST VI Test twice daily:  Fasting before breakfast and 2 hours after dinner (log readings), One touch ultra 2 test strips please; Dx: 250.02  
  
 hydrALAZINE 50 mg tablet Commonly known as:  APRESOLINE Take 1.5 Tabs by mouth three (3) times daily. ILEVRO 0.3 % Drps Generic drug:  nepafenac Apply 1 Drop to eye daily. Indications: once daily x 4 weeks Insulin Needles (Disposable) 31 gauge x 5/16\" Ndle Use as directed with Levemir Insulin Pen. Iron 325 mg (65 mg iron) tablet Generic drug:  ferrous sulfate Take  by mouth Daily (before breakfast). Take 1 tablet by mouth once a week as per patient.  
  
 ketoconazole 2 % topical cream  
Commonly known as:  NIZORAL Apply  to affected area daily. Lancets Misc Commonly known as: One Touch Diandra Celena Test twice daily: Once in the morning fasting, then two hours after dinner (log results) OTHER PGIIL/P CRM - Apply 1 -2 grams ( 1-2 pumps) to left foot 3 - 4 times daily. oxyCODONE-acetaminophen 5-325 mg per tablet Commonly known as:  PERCOCET Take 1 Tab by mouth every four (4) hours as needed for Pain for up to 20 doses. Max Daily Amount: 6 Tabs. * rosuvastatin 20 mg tablet Commonly known as:  CRESTOR Take 1 Tab by mouth nightly. * rosuvastatin 20 mg tablet Commonly known as:  CRESTOR Take 1 Tab by mouth daily. timolol 0.5 % ophthalmic solution Commonly known as:  TIMOPTIC  
1 Drop three (3) times daily. Indications: to right eye  
  
 VGO 40 Goyoda Sandhoff Generic drug:  sub-q insulin device, 40 unit  
by Does Not Apply route. VIGAMOX 0.5 % ophthalmic solution Generic drug:  moxifloxacin 1 Drop three (3) times daily. Indications: x 1 week VITAMIN B-12 1,000 mcg tablet Generic drug:  cyanocobalamin Take 1,000 mcg by mouth daily. * Notice: This list has 2 medication(s) that are the same as other medications prescribed for you. Read the directions carefully, and ask your doctor or other care provider to review them with you. Follow-up Instructions Return in about 2 months (around 2/28/2018) for BP. Patient Instructions Cataract Surgery: Before Your Surgery What is cataract surgery? Cataracts are cloudy areas in the lens of your eye. Your lens is behind the colored part of your eye (iris). Its job is to focus light onto the back of your eye. In some people, cataracts prevent light from reaching the back of the eye. This can cause vision problems. Cataract surgery helps you see better. It replaces your natural lens, which has become cloudy, with a clear artificial one. There are two types of cataract surgery. Phacoemulsification (say \"gjxf-hr-mg-cvd-gqz-vwg-LILLIAM-shun\") is the most common type. The doctor makes a small cut in your eye. This cut is called an incision. The doctor uses a special ultrasound tool to break your cloudy lens apart. Sometimes a laser is used too. Then he or she removes the small pieces of the lens through the incision. In most cases, the doctor then inserts an artificial lens through the incision.  Most people do not need stitches, because the incision is so small. If the doctor is not able to put in an artificial lens, you can wear a contact lens or thick glasses in place of your natural lens. Extracapsular extraction is a less common type of cataract surgery. The doctor makes a larger incision to remove the whole lens at once. After the doctor removes the lens, he or she stitches up the incision. Recovery from this type of surgery takes longer. Before either surgery, the doctor puts numbing drops in your eye. Some doctors use a shot instead. You may also get medicine to make you feel relaxed. You probably will not feel much pain. The surgery takes about 20 to 40 minutes. After surgery, you may have a bandage or shield on your eye. You will probably go home from surgery after 1 hour in the recovery room. Most people see better in 1 to 3 days. You may be able to go back to work or your normal routine in a few days. It could take 3 to 10 weeks for your eye to completely heal. After your eye heals, you may still need to wear glasses, especially for reading. Follow-up care is a key part of your treatment and safety. Be sure to make and go to all appointments, and call your doctor if you are having problems. It's also a good idea to know your test results and keep a list of the medicines you take. What happens before surgery? ?Surgery can be stressful. This information will help you understand what you can expect. And it will help you safely prepare for surgery. ? Preparing for surgery ? · Understand exactly what surgery is planned, along with the risks, benefits, and other options. · Tell your doctors ALL the medicines, vitamins, supplements, and herbal remedies you take. Some of these can increase the risk of bleeding or interact with anesthesia. ? · If you take blood thinners, such as warfarin (Coumadin), clopidogrel (Plavix), or aspirin, be sure to talk to your doctor.  He or she will tell you if you should stop taking these medicines before your surgery. Make sure that you understand exactly what your doctor wants you to do.  
? · Your doctor will tell you which medicines to take or stop before your surgery. You may need to stop taking certain medicines a week or more before surgery. So talk to your doctor as soon as you can.  
? · If you have an advance directive, let your doctor know. It may include a living will and a durable power of  for health care. Bring a copy to the hospital. If you don't have one, you may want to prepare one. It lets your doctor and loved ones know your health care wishes. Doctors advise that everyone prepare these papers before any type of surgery or procedure. What happens on the day of surgery? · Follow the instructions exactly about when to stop eating and drinking. If you don't, your surgery may be canceled. If your doctor told you to take your medicines on the day of surgery, take them with only a sip of water. ? · Take a bath or shower before you come in for your surgery. Do not apply lotions, perfumes, deodorants, or nail polish. ? · Take off all jewelry and piercings. And take out contact lenses, if you wear them. ? At the hospital or surgery center · Bring a picture ID. ? · The area for surgery is often marked to make sure there are no errors. ? · You will be kept comfortable and safe by your anesthesia provider. The anesthesia may make you sleep. Or it may just numb the area being worked on. ? · The surgery will take about 20 to 40 minutes. Going home · Be sure you have someone to drive you home. Anesthesia and pain medicine make it unsafe for you to drive. ? · You will be given more specific instructions about recovering from your surgery. They will cover things like diet, wound care, follow-up care, driving, and getting back to your normal routine. ? · You may have a bandage or patch over your eye.  You may also have a clear shield over your eye. This prevents you from rubbing it. When should you call your doctor? · You have questions or concerns. ? · You don't understand how to prepare for your surgery. ? · You become ill before the surgery (such as fever, flu, or a cold). ? · You need to reschedule or have changed your mind about having the surgery. Where can you learn more? Go to http://hoang-jayden.info/. Enter K474 in the search box to learn more about \"Cataract Surgery: Before Your Surgery. \" Current as of: March 3, 2017 Content Version: 11.4 © 4212-4841 [employer]. Care instructions adapted under license by [employer] (which disclaims liability or warranty for this information). If you have questions about a medical condition or this instruction, always ask your healthcare professional. [address] any warranty or liability for your use of this information. Introducing [facility]! Peter Kaur introduces utoopia patient portal. Now you can access parts of your medical record, email your doctor's office, and request medication refills online. 1. In your internet browser, go to [email or web address] 2. Click on the First Time User? Click Here link in the Sign In box. You will see the New Member Sign Up page. 3. Enter your utoopia Access Code exactly as it appears below. You will not need to use this code after youve completed the sign-up process. If you do not sign up before the expiration date, you must request a new code. · utoopia Access Code: [email or web address] Expires: 1/8/2018 10:21 AM 
 
4. Enter the last four digits of your Social Security Number (xxxx) and Date of Birth (mm/dd/yyyy) as indicated and click Submit. You will be taken to the next sign-up page. 5. Create a utoopia ID. This will be your utoopia login ID and cannot be changed, so think of one that is secure and easy to remember. 6. Create a OnLive password. You can change your password at any time. 7. Enter your Password Reset Question and Answer. This can be used at a later time if you forget your password. 8. Enter your e-mail address. You will receive e-mail notification when new information is available in [address] 9. Click Sign Up. You can now view and download portions of your medical record. 10. Click the Download Summary menu link to download a portable copy of your medical information. If you have questions, please visit the Frequently Asked Questions section of the OnLive website. Remember, OnLive is NOT to be used for urgent needs. For medical emergencies, dial 911. Now available from your iPhone and Android! Please provide this summary of care documentation to your next provider. Your primary care clinician is listed as [address]. If you have any questions after today's visit, please call [phone].

## 2017-12-28 NOTE — PATIENT INSTRUCTIONS
Cataract Surgery: Before Your Surgery  What is cataract surgery? Cataracts are cloudy areas in the lens of your eye. Your lens is behind the colored part of your eye (iris). Its job is to focus light onto the back of your eye. In some people, cataracts prevent light from reaching the back of the eye. This can cause vision problems. Cataract surgery helps you see better. It replaces your natural lens, which has become cloudy, with a clear artificial one. There are two types of cataract surgery. Phacoemulsification (say \"zgnp-nv-gj-wld-plv-bvq-LILLIAM-shun\") is the most common type. The doctor makes a small cut in your eye. This cut is called an incision. The doctor uses a special ultrasound tool to break your cloudy lens apart. Sometimes a laser is used too. Then he or she removes the small pieces of the lens through the incision. In most cases, the doctor then inserts an artificial lens through the incision. Most people do not need stitches, because the incision is so small. If the doctor is not able to put in an artificial lens, you can wear a contact lens or thick glasses in place of your natural lens. Extracapsular extraction is a less common type of cataract surgery. The doctor makes a larger incision to remove the whole lens at once. After the doctor removes the lens, he or she stitches up the incision. Recovery from this type of surgery takes longer. Before either surgery, the doctor puts numbing drops in your eye. Some doctors use a shot instead. You may also get medicine to make you feel relaxed. You probably will not feel much pain. The surgery takes about 20 to 40 minutes. After surgery, you may have a bandage or shield on your eye. You will probably go home from surgery after 1 hour in the recovery room. Most people see better in 1 to 3 days. You may be able to go back to work or your normal routine in a few days.  It could take 3 to 10 weeks for your eye to completely heal. After your eye heals, you may still need to wear glasses, especially for reading. Follow-up care is a key part of your treatment and safety. Be sure to make and go to all appointments, and call your doctor if you are having problems. It's also a good idea to know your test results and keep a list of the medicines you take. What happens before surgery? ?Surgery can be stressful. This information will help you understand what you can expect. And it will help you safely prepare for surgery. ? Preparing for surgery  ? · Understand exactly what surgery is planned, along with the risks, benefits, and other options. · Tell your doctors ALL the medicines, vitamins, supplements, and herbal remedies you take. Some of these can increase the risk of bleeding or interact with anesthesia. ? · If you take blood thinners, such as warfarin (Coumadin), clopidogrel (Plavix), or aspirin, be sure to talk to your doctor. He or she will tell you if you should stop taking these medicines before your surgery. Make sure that you understand exactly what your doctor wants you to do.   ? · Your doctor will tell you which medicines to take or stop before your surgery. You may need to stop taking certain medicines a week or more before surgery. So talk to your doctor as soon as you can.   ? · If you have an advance directive, let your doctor know. It may include a living will and a durable power of  for health care. Bring a copy to the hospital. If you don't have one, you may want to prepare one. It lets your doctor and loved ones know your health care wishes. Doctors advise that everyone prepare these papers before any type of surgery or procedure. What happens on the day of surgery? · Follow the instructions exactly about when to stop eating and drinking. If you don't, your surgery may be canceled. If your doctor told you to take your medicines on the day of surgery, take them with only a sip of water.    ? · Take a bath or shower before you come in for your surgery. Do not apply lotions, perfumes, deodorants, or nail polish. ? · Take off all jewelry and piercings. And take out contact lenses, if you wear them. ? At the hospital or surgery center   · Bring a picture ID. ? · The area for surgery is often marked to make sure there are no errors. ? · You will be kept comfortable and safe by your anesthesia provider. The anesthesia may make you sleep. Or it may just numb the area being worked on. ? · The surgery will take about 20 to 40 minutes. Going home   · Be sure you have someone to drive you home. Anesthesia and pain medicine make it unsafe for you to drive. ? · You will be given more specific instructions about recovering from your surgery. They will cover things like diet, wound care, follow-up care, driving, and getting back to your normal routine. ? · You may have a bandage or patch over your eye. You may also have a clear shield over your eye. This prevents you from rubbing it. When should you call your doctor? · You have questions or concerns. ? · You don't understand how to prepare for your surgery. ? · You become ill before the surgery (such as fever, flu, or a cold). ? · You need to reschedule or have changed your mind about having the surgery. Where can you learn more? Go to http://hoang-jayden.info/. Enter K474 in the search box to learn more about \"Cataract Surgery: Before Your Surgery. \"  Current as of: March 3, 2017  Content Version: 11.4  © 0141-8318 [employer]. Care instructions adapted under license by [employer] (which disclaims liability or warranty for this information). If you have questions about a medical condition or this instruction, always ask your healthcare professional. [address] any warranty or liability for your use of this information.

## 2017-12-28 NOTE — PROGRESS NOTES
1. Have you been to the ER, urgent care clinic since your last visit? Hospitalized since your last visit? No    2. Have you seen or consulted any other health care providers outside of the [address] [place] [address] since your last visit? Include any pap smears or colon screening. No        Preoperative Evaluation    Date of Exam: 2017    Rina Linares is a 62 y.o. male (:1960) who presents for preoperative evaluation. Procedure/Surgery:Catarct removal  Date of Procedure/Surgery: 2018  Surgeon: Dr Presley Sofia: [facility]  Primary Physician: Anatoly Morrell MD  Latex Allergy: No    Had a recent cold which has since resolved. ; pt to have a CXR done per Dr. Carlotta Alvarez. Pt has chronically elevated BP. Was prescribed coreg;  Has not started med. Pt encouraged to do so.   BP better at second check today      Problem List:     Patient Active Problem List    Diagnosis Date Noted    Stage 5 chronic kidney disease not on chronic dialysis ([address] 10/11/2017    Diabetes mellitus due to underlying condition, uncontrolled, with diabetic nephropathy, with long-term current use of insulin ([address] 10/11/2017    Hypertensive urgency 2017    Elevated troponin 2017    Orthostatic hypotension 2017    Type 2 diabetes mellitus with complication, with long-term current use of insulin ([address] 2017    Malignant hypertension 10/12/2016    CKD (chronic kidney disease) 10/12/2016    Hyperlipidemia 10/12/2016    Hypertension 2016    Chronic kidney disease, stage IV (severe) ([address] 2016    Renal failure (ARF), acute on chronic ([address] 2016    Diabetic nephropathy ([address] 2016    Diabetic foot ulcer ([address] 2016    Gangrene of toe ([address] 2016    Dry gangrene ([address] 2016    Chest pain 10/26/2012    Noncompliance with treatment 2010    Type II or unspecified type diabetes mellitus without mention of complication, uncontrolled 09/27/2010    Paresthesias/numbness 09/27/2010    Diabetes mellitus with renal manifestations, uncontrolled ([facility] 09/27/2010    Diabetic retinopathy ([facility] 05/04/2010    HLD (hyperlipidemia) 03/12/2010    HTN (hypertension) 03/12/2010     Medical History:     Past Medical History:   Diagnosis Date    Cardiac echocardiogram 10/21/2016    EF 60-65%. No WMA. Mod-marked LVH. Normal diastolic fx. No significant valvular heart disease.  Cardiac treadmill stress test, low risk 11/02/2012    Negative maximal exercise treadmill test.  Ex time 7 min 45 sec.  Cardiovascular LE peripheral arterial testing 03/22/2016    No significant peripheral arterial disease at rest bilaterally. ABIs deferred due to calcified vessels.  Cardiovascular LLE venous duplex 06/06/2012    Left leg:  No DVT.  Cardiovascular renal duplex 10/21/2016    No significant renal artery stenosis.  CKD (chronic kidney disease), stage V ([facility]     Diabetes mellitus ([facility] 3/12/2010    Di[facility] retinopathy [facility] 5/4/2010    Edema of both legs     Eye examination 5/4/10    OU: 20/20; OD: 20/25; OS: 20/25 without correction.  Glaucoma 08/17/2017    Arnaud Leigh    HLD (hyperlipidemia) 3/12/2010    HTN (hypertension) 3/12/2010    Orthostatic hypertension      Allergies: Allergies   Allergen Reactions    Bactrim [Sulfamethoprim Ds] Nausea and Vomiting      Medications:     Current Outpatient Prescriptions   Medication Sig    rosuvastatin (CRESTOR) 20 mg tablet Take 1 Tab by mouth daily.  atropine 1 % ophthalmic solution Apply 1 Drop to eye daily. Left eye only    timolol (TIMOPTIC) 0.5 % ophthalmic solution 1 Drop three (3) times daily. Indications: to right eye    nepafenac (ILEVRO) 0.3 % drps Apply 1 Drop to eye daily. Indications: once daily x 4 weeks    Difluprednate (DUREZOL) 0.05 % ophthalmic emulsion Administer 1 Drop to both eyes daily.  Indications: 3x day for 2 weeks, 2 times day for 2 weeks, then once daily for 2 weeks    moxifloxacin (VIGAMOX) 0.5 % ophthalmic solution 1 Drop three (3) times daily. Indications: x 1 week    cyclobenzaprine (FLEXERIL) 10 mg tablet Take 1 Tab by mouth three (3) times daily as needed for Muscle Spasm(s).  oxyCODONE-acetaminophen (PERCOCET) 5-325 mg per tablet Take 1 Tab by mouth every four (4) hours as needed for Pain for up to 20 doses. Max Daily Amount: 6 Tabs.  cloNIDine (CATAPRES) 0.3 mg/24 hr APPLY 1 PATCH TO SKIN ONCE EVERY 7 DAYS    hydrALAZINE (APRESOLINE) 50 mg tablet Take 1.5 Tabs by mouth three (3) times daily.  bumetanide (BUMEX) 2 mg tablet Take 0.5 Tabs by mouth two (2) times a day.  finasteride (PROSCAR) 5 mg tablet Take 1 Tab by mouth daily.  amLODIPine (NORVASC) 10 mg tablet Take 1 Tab by mouth daily. (Patient taking differently: Take 10 mg by mouth nightly.)    sub-q insulin device, 40 unit (VGO 40) alicia by Does Not Apply route.  brimonidine-timolol (COMBIGAN) 0.2-0.5 % drop ophthalmic solution INSTIL 1 DROP IN EACH EYE 3 TIMES DAILY    ketoconazole (NIZORAL) 2 % topical cream Apply  to affected area daily.  OTHER PGIIL/P CRM - Apply 1 -2 grams ( 1-2 pumps) to left foot 3 - 4 times daily.  calcitRIOL (ROCALTROL) 0.25 mcg capsule TAKE ONE CAPSULE BY MOUTH EVERY MONDAY, WEDNESDAY, AND FRIDAY    rosuvastatin (CRESTOR) 20 mg tablet Take 1 Tab by mouth nightly.  docusate sodium (COLACE) 100 mg capsule Take 1 Cap by mouth two (2) times a day.  glucose blood VI test strips (ASCENSIA AUTODISC VI, ONE TOUCH ULTRA TEST VI) strip Test twice daily:  Fasting before breakfast and 2 hours after dinner (log readings), One touch ultra 2 test strips please; Dx: 250.02    fluticasone (FLONASE) 50 mcg/actuation nasal spray 1 [place] by Both Nostrils route two (2) times a day.  Insulin Needles, Disposable, 31 X 5/16 \" Ndle Use as directed with Levemir Insulin Pen.     ferrous sulfate (IRON) 325 mg (65 mg iron) tablet Take  by mouth Daily (before breakfast). Take 1 tablet by mouth once a week as per patient.  Lancets (ONE TOUCH DELICA) Misc Test twice daily: Once in the morning fasting, then two hours after dinner (log results)    Blood-Glucose Meter monitoring kit by Does Not Apply route. One touch ultra2    cyanocobalamin (VITAMIN B-12) 1,000 mcg tablet Take 1,000 mcg by mouth daily.  carvedilol (COREG) 12.5 mg tablet TAKE 1 TABLET BY MOUTH TWICE A DAY     No current facility-administered medications for this visit. Surgical History:     Past Surgical History:   Procedure Laterality Date    HX AMPUTATION      TOES    HX HERNIA REPAIR  2005    HX OTHER SURGICAL  11/07/2017    glaucoma valve- Ahmed     Social History:     Social History     Social History    Marital status:      Spouse name: N/A    Number of children: N/A    Years of education: N/A     Social History Main Topics    Smoking status: Never Smoker    Smokeless tobacco: Never Used    Alcohol use No    Drug use: No    Sexual activity: Yes     Partners: Female     Other Topics Concern    Caffeine Concern Yes     6 cups a month    Exercise Yes     walking 4 hour a day   [place] Yes     Social History Narrative    Safety issues/concerns: ( none)Domestic Violence, (none)Guns in home,(doesn't use)Sunscreen, (working)Smoke Detectors, (safe)Housing. Recent use of: No recent use of aspirin (ASA), NSAIDS or steroids    Tetanus up to date: Administered 3/21/2017      Anesthesia Complications: None  History of abnormal bleeding : None  History of Blood Transfusions: No  Health Care Directive or Living Will: No    REVIEW OF SYSTEMS:  A comprehensive review of systems was negative. EXAM:   Visit Vitals    /80    Pulse 92    Temp 97.7 °F (36.5 °C) (Oral)    Resp 20    Ht 5' 6\" (1.676 m)    Wt 191 lb (86.6 kg)    SpO2 96%    BMI 30.83 kg/m2     PE ADULT MALE      DIAGNOSTICS:   2.  CXR: pending      Lab Results   Component Value Date/Time Cholesterol, total 214 02/28/2017 09:22 AM    HDL Cholesterol 71 02/28/2017 09:22 AM    LDL, calculated 113.4 02/28/2017 09:22 AM    VLDL, calculated 29.6 02/28/2017 09:22 AM    Triglyceride 148 02/28/2017 09:22 AM    CHOL/HDL Ratio 3.0 02/28/2017 09:22 AM       IMPRESSION:   LRI- resolved  HTN- elevated, chronic  Cataracts    No contraindications to planned surgery pending result of CXR and if BP acceptable on  day of surgery  Follow-up Disposition:  Return in about 2 months (around 2/28/2018) for BP. An After Visit Summary was printed and given to the patient.       Beryl Salcedo MD   12/28/2017

## 2018-01-10 ENCOUNTER — OFFICE VISIT (OUTPATIENT)
Dept: FAMILY MEDICINE CLINIC | Age: 58
End: 2018-01-10

## 2018-01-10 VITALS
HEART RATE: 82 BPM | WEIGHT: 190 LBS | BODY MASS INDEX: 30.53 KG/M2 | OXYGEN SATURATION: 98 % | DIASTOLIC BLOOD PRESSURE: 70 MMHG | RESPIRATION RATE: 16 BRPM | HEIGHT: 66 IN | SYSTOLIC BLOOD PRESSURE: 160 MMHG | TEMPERATURE: 98 F

## 2018-01-10 DIAGNOSIS — E78.5 HYPERLIPIDEMIA, UNSPECIFIED HYPERLIPIDEMIA TYPE: ICD-10-CM

## 2018-01-10 DIAGNOSIS — I10 ESSENTIAL HYPERTENSION: Primary | ICD-10-CM

## 2018-01-10 RX ORDER — ACETAMINOPHEN 500 MG
TABLET ORAL
COMMUNITY

## 2018-01-10 RX ORDER — MUPIROCIN 20 MG/G
OINTMENT TOPICAL
COMMUNITY
Start: 2018-01-09 | End: 2018-05-09 | Stop reason: ALTCHOICE

## 2018-01-10 NOTE — PATIENT INSTRUCTIONS

## 2018-01-10 NOTE — MR AVS SNAPSHOT
Visit Information Date & Time Provider Department Dept. Phone Encounter #  
 1/10/2018  8:20 AM Jalil Alcantar, [address] [place], [place] [address]ulevarlenny Ferreiratone [facility] [phone] [id number] Follow-up Instructions Return in about 4 months (around 5/10/2018) for htn/chol. Your Appointments 1/11/2018  9:00 AM  
Follow Up with Madeline Shearer NP [facility] ([facility]) Appt Note: 3 mth f/up [place] [id number]  
[phone] [address]  
  
    
 1/15/2018 12:20 PM  
Office Visit with Jalil Alcantar MD  
[facility]) Appt Note: pre op clearance, cataract surgery and glaucoma surgery on 02/02/18, Dr. Nghia Martell, [phone] [address]  
[phone]  
  
   
 [address] [facility]  
  
    
 4/13/2018  9:00 AM  
Follow Up with Frieda Halsted, MD  
[facility] ([facility]) Appt Note: 6 month follow up [place] [id number]  
[phone] 2300 [facility] [address] Upcoming Health Maintenance Date Due HEMOGLOBIN A1C Q6M 1/26/2018* LIPID PANEL Q1 2/28/2018 FOOT EXAM Q1 3/21/2018 MICROALBUMIN Q1 3/21/2018 EYE EXAM RETINAL OR DILATED Q1 10/26/2018 COLONOSCOPY 10/9/2023 DTaP/Tdap/Td series (2 - Td) 3/21/2027 *Topic was postponed. The date shown is not the original due date. Allergies as of 1/10/2018  Review Complete On: 1/10/2018 By: Jalil Alcantar MD  
  
 Severity Noted Reaction Type Reactions Bactrim [Sulfamethoprim Ds]  06/06/2014   Side Effect Nausea and Vomiting Current Immunizations  Reviewed on 11/2/2017 Name Date Influenza Vaccine (Quad) PF 10/10/2017 Not reviewed this visit You Were Diagnosed With   
  
 Codes Comments Essential hypertension    -  Primary ICD-10-CM: I10 
ICD-9-CM: 401.9 Hyperlipidemia, unspecified hyperlipidemia type     ICD-10-CM: E78.5 ICD-9-CM: 272.4 Vitals BP Pulse Temp Resp Height(growth percentile) Weight(growth percentile) 160/70 82 98 °F (36.7 °C) (Oral) 16 5' 6\" (1.676 m) 190 lb (86.2 kg) SpO2 BMI Smoking Status (!) 82% 30.67 kg/m2 Never Smoker Vitals History BMI and BSA Data Body Mass Index Body Surface Area  
 30.67 kg/m 2 2 m 2 Preferred Pharmacy Pharmacy Name Phone [facility], [address] [place] [address] [phone] Your Updated Medication List  
  
   
This list is accurate as of: 1/10/18  9:17 AM.  Always use your most recent med list. amLODIPine 10 mg tablet Commonly known as:  Niya Blandon Take 1 Tab by mouth daily. atropine 1 % ophthalmic solution Apply 1 Drop to eye daily. Left eye only Blood-Glucose Meter monitoring kit  
by Does Not Apply route. One touch ultra2  
  
 bumetanide 2 mg tablet Commonly known as:  Minh Taopi Take 0.5 Tabs by mouth two (2) times a day. calcitRIOL 0.25 mcg capsule Commonly known as:  ROCALTROL  
TAKE ONE CAPSULE BY MOUTH EVERY MONDAY, WEDNESDAY, AND FRIDAY  
  
 carvedilol 12.5 mg tablet Commonly known as:  COREG  
TAKE 1 TABLET BY MOUTH TWICE A DAY  
  
 cloNIDine 0.3 mg/24 hr  
Commonly known as:  CATAPRES  
APPLY 1 PATCH TO SKIN ONCE EVERY 7 DAYS  
  
 COMBIGAN 0.2-0.5 % Drop ophthalmic solution Generic drug:  brimonidine-timolol INSTIL 1 DROP IN [facility] EYE 3 TIMES DAILY  
  
 cyclobenzaprine 10 mg tablet Commonly known as:  FLEXERIL Take 1 Tab by mouth three (3) times daily as needed for Muscle Spasm(s). docusate sodium 100 mg capsule Commonly known as:  Montey Roch Take 1 Cap by mouth two (2) times a day. DUREZOL 0.05 % ophthalmic emulsion Generic drug:  Difluprednate Administer 1 Drop to both eyes daily. Indications: 3x day for 2 weeks, 2 times day for 2 weeks, then once daily for 2 weeks  
  
 finasteride 5 mg tablet Commonly known as:  PROSCAR Take 1 Tab by mouth daily. fluticasone 50 mcg/actuation nasal spray Commonly known as:  FLONASE  
1 [place] by Both Nostrils route two (2) times a day. glucose blood VI test strips strip Commonly known as:  ASCENSIA AUTODISC VI, ONE TOUCH ULTRA TEST VI Test twice daily:  Fasting before breakfast and 2 hours after dinner (log readings), One touch ultra 2 test strips please; Dx: 250.02  
  
 hydrALAZINE 50 mg tablet Commonly known as:  APRESOLINE Take 1.5 Tabs by mouth three (3) times daily. ILEVRO 0.3 % Drps Generic drug:  nepafenac Apply 1 Drop to eye daily. Indications: once daily x 4 weeks Insulin Needles (Disposable) 31 gauge x 5/16\" Ndle Use as directed with Levemir Insulin Pen. Iron 325 mg (65 mg iron) tablet Generic drug:  ferrous sulfate Take  by mouth Daily (before breakfast). Take 1 tablet by mouth once a week as per patient.  
  
 ketoconazole 2 % topical cream  
Commonly known as:  NIZORAL Apply  to affected area daily. Lancets Misc Commonly known as: One Touch Bryce Palermo Test twice daily: Once in the morning fasting, then two hours after dinner (log results)  
  
 mupirocin 2 % ointment Commonly known as:  BACTROBAN  
  
 OTHER PGIIL/P CRM - Apply 1 -2 grams ( 1-2 pumps) to left foot 3 - 4 times daily. oxyCODONE-acetaminophen 5-325 mg per tablet Commonly known as:  PERCOCET Take 1 Tab by mouth every four (4) hours as needed for Pain for up to 20 doses. Max Daily Amount: 6 Tabs. * rosuvastatin 20 mg tablet Commonly known as:  CRESTOR Take 1 Tab by mouth nightly. * rosuvastatin 20 mg tablet Commonly known as:  CRESTOR Take 1 Tab by mouth daily. timolol 0.5 % ophthalmic solution Commonly known as:  TIMOPTIC  
1 Drop three (3) times daily. Indications: to right eye TYLENOL EXTRA STRENGTH 500 mg tablet Generic drug:  acetaminophen Take  by mouth every six (6) hours as needed for Pain. [address] Generic drug:  sub-q insulin device, 40 unit  
by Does Not Apply route. VIGAMOX 0.5 % ophthalmic solution Generic drug:  moxifloxacin 1 Drop three (3) times daily. Indications: x 1 week VITAMIN B-12 1,000 mcg tablet Generic drug:  cyanocobalamin Take 1,000 mcg by mouth daily. * Notice: This list has 2 medication(s) that are the same as other medications prescribed for you. Read the directions carefully, and ask your doctor or other care provider to review them with you. Follow-up Instructions Return in about 4 months (around 5/10/2018) for htn/chol. To-Do List   
 01/10/2018 Lab:  ALT   
  
 01/10/2018 Lab:  AST   
  
 01/10/2018 Lab:  LIPID PANEL   
  
 01/10/2018 Lab:  METABOLIC PANEL, BASIC Patient Instructions DASH Diet: Care Instructions Your Care Instructions The DASH diet is an eating plan that can help lower your blood pressure. DASH stands for Dietary Approaches to Stop Hypertension. Hypertension is high blood pressure. The DASH diet focuses on eating foods that are high in calcium, potassium, and magnesium. These nutrients can lower blood pressure. The foods that are highest in these nutrients are fruits, vegetables, low-fat dairy products, nuts, seeds, and legumes. But taking calcium, potassium, and magnesium supplements instead of eating foods that are high in those nutrients does not have the same effect. The DASH diet also includes whole grains, fish, and poultry. The DASH diet is one of several lifestyle changes your doctor may recommend to lower your high blood pressure. Your doctor may also want you to decrease the amount of sodium in your diet.  Lowering sodium while following the DASH diet can lower blood pressure even further than just the DASH diet alone. Follow-up care is a key part of your treatment and safety. Be sure to make and go to all appointments, and call your doctor if you are having problems. It's also a good idea to know your test results and keep a list of the medicines you take. How can you care for yourself at home? Following the DASH diet · Eat 4 to 5 servings of fruit each day. A serving is 1 medium-sized piece of fruit, ½ cup chopped or canned fruit, 1/4 cup dried fruit, or 4 ounces (½ cup) of fruit juice. Choose fruit more often than fruit juice. · Eat 4 to 5 servings of vegetables each day. A serving is 1 cup of lettuce or raw leafy vegetables, ½ cup of chopped or cooked vegetables, or 4 ounces (½ cup) of vegetable juice. Choose vegetables more often than vegetable juice. · Get 2 to 3 servings of low-fat and fat-free dairy each day. A serving is 8 ounces of milk, 1 cup of yogurt, or 1 ½ ounces of cheese. · Eat 6 to 8 servings of grains each day. A serving is 1 slice of bread, 1 ounce of dry cereal, or ½ cup of cooked rice, pasta, or cooked cereal. Try to choose whole-grain products as much as possible. · Limit lean meat, poultry, and fish to 2 servings each day. A serving is 3 ounces, about the size of a deck of cards. · Eat 4 to 5 servings of nuts, seeds, and legumes (cooked dried beans, lentils, and split peas) each week. A serving is 1/3 cup of nuts, 2 tablespoons of seeds, or ½ cup of cooked beans or peas. · Limit fats and oils to 2 to 3 servings each day. A serving is 1 teaspoon of vegetable oil or 2 tablespoons of salad dressing. · Limit sweets and added sugars to 5 servings or less a week. A serving is 1 tablespoon jelly or jam, ½ cup sorbet, or 1 cup of lemonade. · Eat less than 2,300 milligrams (mg) of sodium a day. If you limit your sodium to 1,500 mg a day, you can lower your blood pressure even more. Tips for success · Start small. Do not try to make dramatic changes to your diet all at once. You might feel that you are missing out on your favorite foods and then be more likely to not follow the plan. Make small changes, and stick with them. Once those changes become habit, add a few more changes. · Try some of the following: ¨ Make it a goal to eat a fruit or vegetable at every meal and at snacks. This will make it easy to get the recommended amount of fruits and vegetables each day. ¨ Try yogurt topped with fruit and nuts for a snack or healthy dessert. ¨ Add lettuce, tomato, cucumber, and onion to sandwiches. ¨ Combine a ready-made pizza crust with low-fat mozzarella cheese and lots of vegetable toppings. Try using tomatoes, squash, spinach, broccoli, carrots, cauliflower, and onions. ¨ Have a variety of cut-up vegetables with a low-fat dip as an appetizer instead of chips and dip. ¨ Sprinkle sunflower seeds or chopped almonds over salads. Or try adding chopped walnuts or almonds to cooked vegetables. ¨ Try some vegetarian meals using beans and peas. Add garbanzo or kidney beans to salads. Make burritos and tacos with mashed estrada beans or black beans. Where can you learn more? Go to http://hoang-jayden.info/. Enter C492 in the search box to learn more about \"DASH Diet: Care Instructions. \" Current as of: September 21, 2016 Content Version: 11.4 © 4322-4846 [employer]. Care instructions adapted under license by [employer] (which disclaims liability or warranty for this information). If you have questions about a medical condition or this instruction, always ask your healthcare professional. [place] [address] any warranty or liability for your use of this information. Introducing [facility]!    
 Robin Hensley introduces [employer] patient portal. Now you can access parts of your medical record, email your doctor's office, and request medication refills online. 1. In your internet browser, go to [email or web address] [email or web address] 2. Click on the First Time User? Click Here link in the Sign In box. You will see the New Member Sign Up page. 3. Enter your [employer] Access Code exactly as it appears below. You will not need to use this code after youve completed the sign-up process. If you do not sign up before the expiration date, you must request a new code. · [employer] Access Code: [email or web address] Expires: 4/10/2018  9:17 AM 
 
4. Enter the last four digits of your Social Security Number (xxxx) and Date of Birth (mm/dd/yyyy) as indicated and click Submit. You will be taken to the next sign-up page. 5. Create a [employer] ID. This will be your [employer] login ID and cannot be changed, so think of one that is secure and easy to remember. 6. Create a [employer] password. You can change your password at any time. 7. Enter your Password Reset Question and Answer. This can be used at a later time if you forget your password. 8. Enter your e-mail address. You will receive e-mail notification when new information is available in [address] 9. Click Sign Up. You can now view and download portions of your medical record. 10. Click the Download Summary menu link to download a portable copy of your medical information. If you have questions, please visit the Frequently Asked Questions section of the [employer] website. Remember, [employer] is NOT to be used for urgent needs. For medical emergencies, dial 911. Now available from your iPhone and Android! Please provide this summary of care documentation to your next provider. Your primary care clinician is listed as [address]. If you have any questions after today's visit, please call [phone].

## 2018-01-10 NOTE — PROGRESS NOTES
HISTORY OF PRESENT ILLNESS  Hannah Irizarry is a 62 y.o. male. HPI  Patient is here today for evaluation and treatment of: Hypertension/Cholesterol problem    Hypertension:  Pt still has not started coreg; His wife thinks that they did not start med because of the listed potential side effects; Pt has the medication, he just has not started it. He has been once again advised to do so. Risks reviewed. Will see cardiology tomorrow. Pt has no c/o SOB; does not appear to be in resp distress    Cholesterol:  Pt has eaten. He is on Crestor ; he attempts a lower cholesterol diet. His eye surgery was cancelled due to the weather last week. He is under the care of podiatry for an apparent infection in the foot. He is in a walking boot. Pt is currently unsure of which eye drops he is currently using and those he is preparing to use for surgery ( unable to reconcile). Diabetes is followed by Dr. Kevin Armijo; last saw Dr. Kevin Armijo Dec 5., 2017      Current Outpatient Prescriptions:     acetaminophen (TYLENOL EXTRA STRENGTH) 500 mg tablet, Take  by mouth every six (6) hours as needed for Pain., Disp: , Rfl:     carvedilol (COREG) 12.5 mg tablet, TAKE 1 TABLET BY MOUTH TWICE A DAY, Disp: , Rfl: 3    rosuvastatin (CRESTOR) 20 mg tablet, Take 1 Tab by mouth daily. , Disp: 30 Tab, Rfl: 6    atropine 1 % ophthalmic solution, Apply 1 Drop to eye daily. Left eye only, Disp: , Rfl: 0    Difluprednate (DUREZOL) 0.05 % ophthalmic emulsion, Administer 1 Drop to both eyes daily. Indications: 3x day for 2 weeks, 2 times day for 2 weeks, then once daily for 2 weeks, Disp: , Rfl:     cyclobenzaprine (FLEXERIL) 10 mg tablet, Take 1 Tab by mouth three (3) times daily as needed for Muscle Spasm(s). , Disp: 30 Tab, Rfl: 0    cloNIDine (CATAPRES) 0.3 mg/24 hr, APPLY 1 PATCH TO SKIN ONCE EVERY 7 DAYS, Disp: 12 Patch, Rfl: 1    hydrALAZINE (APRESOLINE) 50 mg tablet, Take 1.5 Tabs by mouth three (3) times daily. , Disp: 135 Tab, Rfl: 6    bumetanide (BUMEX) 2 mg tablet, Take 0.5 Tabs by mouth two (2) times a day. (Patient taking differently: Take 2 mg by mouth daily.), Disp: 1 Tab, Rfl: 0    finasteride (PROSCAR) 5 mg tablet, Take 1 Tab by mouth daily. , Disp: 30 Tab, Rfl: 6    amLODIPine (NORVASC) 10 mg tablet, Take 1 Tab by mouth daily. (Patient taking differently: Take 10 mg by mouth nightly.), Disp: 30 Tab, Rfl: 6    sub-q insulin device, 40 unit (VGO 40) alicia, by Does Not Apply route., Disp: , Rfl:     brimonidine-timolol (COMBIGAN) 0.2-0.5 % drop ophthalmic solution, INSTIL 1 DROP IN EACH EYE 3 TIMES DAILY, Disp: , Rfl:     ketoconazole (NIZORAL) 2 % topical cream, Apply  to affected area daily. , Disp: 15 g, Rfl: 1    OTHER, PGIIL/P CRM - Apply 1 -2 grams ( 1-2 pumps) to left foot 3 - 4 times daily. , Disp: , Rfl:     calcitRIOL (ROCALTROL) 0.25 mcg capsule, TAKE ONE CAPSULE BY MOUTH EVERY MONDAY, WEDNESDAY, AND FRIDAY, Disp: , Rfl: 2    rosuvastatin (CRESTOR) 20 mg tablet, Take 1 Tab by mouth nightly., Disp: 30 Tab, Rfl: 6    docusate sodium (COLACE) 100 mg capsule, Take 1 Cap by mouth two (2) times a day., Disp: 60 Cap, Rfl: 0    glucose blood VI test strips (ASCENSIA AUTODISC VI, ONE TOUCH ULTRA TEST VI) strip, Test twice daily:  Fasting before breakfast and 2 hours after dinner (log readings), One touch ultra 2 test strips please; Dx: 250.02, Disp: 1 Package, Rfl: 11    fluticasone (FLONASE) 50 mcg/actuation nasal spray, 1 [place] by Both Nostrils route two (2) times a day., Disp: 1 Bottle, Rfl: 2    Insulin Needles, Disposable, 31 X 5/16 \" Ndle, Use as directed with Levemir Insulin Pen., Disp: 1 Package, Rfl: 11    ferrous sulfate (IRON) 325 mg (65 mg iron) tablet, Take  by mouth Daily (before breakfast).  Take 1 tablet by mouth once a week as per patient., Disp: , Rfl:     Lancets (ONE TOUCH DELICA) Misc, Test twice daily: Once in the morning fasting, then two hours after dinner (log results), Disp: 1 Package, Rfl: 11    Blood-Glucose Meter monitoring kit, by Does Not Apply route. One touch ultra2, Disp: 1 Kit, Rfl: 0    cyanocobalamin (VITAMIN B-12) 1,000 mcg tablet, Take 1,000 mcg by mouth daily. , Disp: , Rfl:     timolol (TIMOPTIC) 0.5 % ophthalmic solution, 1 Drop three (3) times daily. Indications: to right eye, Disp: , Rfl:     nepafenac (ILEVRO) 0.3 % drps, Apply 1 Drop to eye daily. Indications: once daily x 4 weeks, Disp: , Rfl:     moxifloxacin (VIGAMOX) 0.5 % ophthalmic solution, 1 Drop three (3) times daily. Indications: x 1 week, Disp: , Rfl:     oxyCODONE-acetaminophen (PERCOCET) 5-325 mg per tablet, Take 1 Tab by mouth every four (4) hours as needed for Pain for up to 20 doses. Max Daily Amount: 6 Tabs., Disp: 20 Tab, Rfl: 0      PMH,  Meds, Allergies, Family History, Social history reviewed    Review of Systems   Constitutional: Negative for chills and fever. Cardiovascular: Negative for chest pain and palpitations. Physical Exam   Constitutional: He appears well-developed and well-nourished. No distress. Cardiovascular: Normal rate and regular rhythm. Exam reveals no gallop and no friction rub. No murmur heard. Pulmonary/Chest: Breath sounds normal. No respiratory distress. He has no wheezes. He has no rales. Musculoskeletal:   Walking boot on left foot   Nursing note and vitals reviewed.      Visit Vitals    /70    Pulse 82    Temp 98 °F (36.7 °C) (Oral)    Resp 16    Ht 5' 6\" (1.676 m)    Wt 190 lb (86.2 kg)    SpO2 (!) 82%    BMI 30.67 kg/m2     Lab Results   Component Value Date/Time    Cholesterol, total 214 02/28/2017 09:22 AM    HDL Cholesterol 71 02/28/2017 09:22 AM    LDL, calculated 113.4 02/28/2017 09:22 AM    VLDL, calculated 29.6 02/28/2017 09:22 AM    Triglyceride 148 02/28/2017 09:22 AM    CHOL/HDL Ratio 3.0 02/28/2017 09:22 AM     Lab Results   Component Value Date/Time    Glucose 301 11/15/2017 06:36 PM    Glucose (POC) 216 11/07/2017 09:04 AM    Glucose,  11/07/2017 07:22 AM     Lab Results   Component Value Date/Time    Hemoglobin A1c 9.3 05/04/2016 03:30 PM    Hemoglobin A1c (POC) 10.6 03/21/2017 11:31 AM    Hemoglobin A1c, External 8.3 08/25/2016         ASSESSMENT and PLAN    ICD-10-CM ICD-9-CM    1. Essential hypertension D92 738.0 METABOLIC PANEL, BASIC   2. Hyperlipidemia, unspecified hyperlipidemia type E78.5 272.4 LIPID PANEL      AST      ALT       As above, BP better at second check; still encouraged to take coreg  Labs as ordered  Follow up with consultants ( podiatry, cardiology, endocrine)  Follow-up Disposition:  Return in about 4 months (around 5/10/2018) for htn/chol. An After Visit Summary was printed and given to the patient. This has been fully explained to the patient, who indicates understanding.

## 2018-01-10 NOTE — PROGRESS NOTES
1. Have you been to the ER, urgent care clinic since your last visit? Hospitalized since your last visit? No    2. Have you seen or consulted any other health care providers outside of the [address] [place] [address] since your last visit? Include any pap smears or colon screening.  Yes Where: Bennie Lozano MD

## 2018-01-11 ENCOUNTER — OFFICE VISIT (OUTPATIENT)
Dept: CARDIOLOGY CLINIC | Age: 58
End: 2018-01-11

## 2018-01-11 VITALS
BODY MASS INDEX: 31.5 KG/M2 | DIASTOLIC BLOOD PRESSURE: 64 MMHG | OXYGEN SATURATION: 99 % | SYSTOLIC BLOOD PRESSURE: 120 MMHG | HEART RATE: 72 BPM | HEIGHT: 66 IN | WEIGHT: 196 LBS

## 2018-01-11 DIAGNOSIS — I10 ESSENTIAL HYPERTENSION: Primary | ICD-10-CM

## 2018-01-11 DIAGNOSIS — R60.0 BILATERAL LOWER EXTREMITY EDEMA: ICD-10-CM

## 2018-01-11 DIAGNOSIS — R06.09 DOE (DYSPNEA ON EXERTION): ICD-10-CM

## 2018-01-11 DIAGNOSIS — N18.5 STAGE 5 CHRONIC KIDNEY DISEASE NOT ON CHRONIC DIALYSIS (HCC): ICD-10-CM

## 2018-01-11 RX ORDER — AMLODIPINE BESYLATE 10 MG/1
10 TABLET ORAL EVERY EVENING
Qty: 1 TAB | Refills: 0
Start: 2018-01-11 | End: 2018-09-13 | Stop reason: SDUPTHER

## 2018-01-11 RX ORDER — BUMETANIDE 2 MG/1
2 TABLET ORAL DAILY
Qty: 1 TAB | Refills: 0
Start: 2018-01-11 | End: 2018-03-17

## 2018-01-11 NOTE — PROGRESS NOTES
1. Have you been to the ER, urgent care clinic since your last visit? Hospitalized since your last visit? No     2. Have you seen or consulted any other health care providers outside of the [address] [place] [address] since your last visit? Include any pap smears or colon screening.  No

## 2018-01-11 NOTE — MR AVS SNAPSHOT
Visit Information Date & Time Provider Department Dept. Phone Encounter #  
 1/11/2018  9:00 AM Saman Orozco NP [facility] [address] [id number] Your Appointments 4/13/2018  9:00 AM  
Follow Up with Brandi Yanez MD  
[facility] ([address]) Appt Note: 6 month follow up [place] [address] [address] [id number] [phone] [facility] [address]  
  
    
 5/9/2018  9:00 AM  
Office Visit with Dory Boateng MD  
[facility] [address]) Appt Note: pre op clearance, cataract surgery and glaucoma surgery on 02/02/18, Dr. Andreas Bright, [phone]; 4 m fu chol.htn  
 [address], [place] [address] [address]  
[phone]  
  
   
 [address], [address] Upcoming Health Maintenance Date Due HEMOGLOBIN A1C Q6M 1/26/2018* LIPID PANEL Q1 2/28/2018 FOOT EXAM Q1 3/21/2018 MICROALBUMIN Q1 3/21/2018 EYE EXAM RETINAL OR DILATED Q1 10/26/2018 COLONOSCOPY 10/9/2023 DTaP/Tdap/Td series (2 - Td) 3/21/2027 *Topic was postponed. The date shown is not the original due date. Allergies as of 1/11/2018  Review Complete On: 1/11/2018 By: Saman Orozco NP Severity Noted Reaction Type Reactions Bactrim [Sulfamethoprim Ds]  06/06/2014   Side Effect Nausea and Vomiting Current Immunizations  Reviewed on 11/2/2017 Name Date Influenza Vaccine (Quad) PF 10/10/2017 Not reviewed this visit You Were Diagnosed With   
  
 Codes Comments Essential hypertension    -  Primary ICD-10-CM: I10 
ICD-9-CM: 401.9 Stage 5 chronic kidney disease not on chronic dialysis [facility])     ICD-10-CM: N18.5 ICD-9-CM: 585.5  Diabetes mellitus due to underlying condition, uncontrolled, with diabetic nephropathy, with long-term current use of insulin (HCC)     ICD-10-CM: E08.21, E08.65, Z79.4 ICD-9-CM: 249.41, 583.81, V58.67   
 ABRAMS (dyspnea on exertion)     ICD-10-CM: R06.09 
ICD-9-CM: 786.09 Bilateral lower extremity edema     ICD-10-CM: R60.0 ICD-9-CM: 189. 3 Vitals BP Pulse Height(growth percentile) Weight(growth percentile) SpO2 BMI  
 120/64 72 5' 6\" (1.676 m) 196 lb (88.9 kg) 99% 31.64 kg/m2 Smoking Status Never Smoker Vitals History BMI and BSA Data Body Mass Index Body Surface Area  
 31.64 kg/m 2 2.03 m 2 Preferred Pharmacy Pharmacy Name Phone [facility], [address] [place] [address] [phone] Your Updated Medication List  
  
   
This list is accurate as of: 1/11/18 10:39 AM.  Always use your most recent med list. amLODIPine 10 mg tablet Commonly known as:  Arva Brazen Take 1 Tab by mouth every evening. Blood-Glucose Meter monitoring kit  
by Does Not Apply route. One touch ultra2  
  
 bumetanide 2 mg tablet Commonly known as:  Loretha Ana Laura Take 1 Tab by mouth daily. calcitRIOL 0.25 mcg capsule Commonly known as:  ROCALTROL  
TAKE ONE CAPSULE BY MOUTH EVERY MONDAY, WEDNESDAY, AND FRIDAY  
  
 carvedilol 12.5 mg tablet Commonly known as:  COREG  
TAKE 1 TABLET BY MOUTH TWICE A DAY  
  
 cloNIDine 0.3 mg/24 hr  
Commonly known as:  CATAPRES  
APPLY 1 PATCH TO SKIN ONCE EVERY 7 DAYS  
  
 COMBIGAN 0.2-0.5 % Drop ophthalmic solution Generic drug:  brimonidine-timolol INSTIL 1 DROP IN [facility] EYE 3 TIMES DAILY  
  
 cyclobenzaprine 10 mg tablet Commonly known as:  FLEXERIL Take 1 Tab by mouth three (3) times daily as needed for Muscle Spasm(s). docusate sodium 100 mg capsule Commonly known as:  Shameka Palin Take 1 Cap by mouth two (2) times a day. finasteride 5 mg tablet Commonly known as:  PROSCAR Take 1 Tab by mouth daily. fluticasone 50 mcg/actuation nasal spray Commonly known as:  FLONASE  
1 [place] by Both Nostrils route two (2) times a day. glucose blood VI test strips strip Commonly known as:  ASCENSIA AUTODISC VI, ONE TOUCH ULTRA TEST VI Test twice daily:  Fasting before breakfast and 2 hours after dinner (log readings), One touch ultra 2 test strips please; Dx: 250.02  
  
 hydrALAZINE 50 mg tablet Commonly known as:  APRESOLINE Take 1.5 Tabs by mouth three (3) times daily. Iron 325 mg (65 mg iron) tablet Generic drug:  ferrous sulfate Take  by mouth Daily (before breakfast). Take 1 tablet by mouth once a week as per patient. Lancets Misc Commonly known as: One Touch Parthenia Alger Test twice daily: Once in the morning fasting, then two hours after dinner (log results)  
  
 mupirocin 2 % ointment Commonly known as:  BACTROBAN  
  
 OTHER PGIIL/P CRM - Apply 1 -2 grams ( 1-2 pumps) to left foot 3 - 4 times daily. rosuvastatin 20 mg tablet Commonly known as:  CRESTOR Take 1 Tab by mouth nightly. TYLENOL EXTRA STRENGTH 500 mg tablet Generic drug:  acetaminophen Take  by mouth every six (6) hours as needed for Pain. [address] Generic drug:  sub-q insulin device, 40 unit  
by Does Not Apply route. VITAMIN B-12 1,000 mcg tablet Generic drug:  cyanocobalamin Take 1,000 mcg by mouth daily. Patient Instructions Continue present medication regimen Follow-up with Dr. Twin Galindo as scheduled and as needed Introducing [facility]! [facility] introduces [employer] patient portal. Now you can access parts of your medical record, email your doctor's office, and request medication refills online. 1. In your internet browser, go to [email or web address] 2. Click on the First Time User? Click Here link in the Sign In box. You will see the New Member Sign Up page. 3. Enter your Blackboard Access Code exactly as it appears below. You will not need to use this code after youve completed the sign-up process. If you do not sign up before the expiration date, you must request a new code. · Blackboard Access Code: [email or web address] Expires: 4/10/2018  9:17 AM 
 
4. Enter the last four digits of your Social Security Number (xxxx) and Date of Birth (mm/dd/yyyy) as indicated and click Submit. You will be taken to the next sign-up page. 5. Create a Blackboard ID. This will be your Blackboard login ID and cannot be changed, so think of one that is secure and easy to remember. 6. Create a Blackboard password. You can change your password at any time. 7. Enter your Password Reset Question and Answer. This can be used at a later time if you forget your password. 8. Enter your e-mail address. You will receive e-mail notification when new information is available in [address] 9. Click Sign Up. You can now view and download portions of your medical record. 10. Click the Download Summary menu link to download a portable copy of your medical information. If you have questions, please visit the Frequently Asked Questions section of the Blackboard website. Remember, Blackboard is NOT to be used for urgent needs. For medical emergencies, dial 911. Now available from your iPhone and Android! Please provide this summary of care documentation to your next provider. Your primary care clinician is listed as [address]. If you have any questions after today's visit, please call [phone].

## 2018-01-11 NOTE — PROGRESS NOTES
Hannah Irizarry presents today for a 3 month follow-up of CHF/HTN. He took his medications on an empty stomach this morning and had dry heaves in the office today. He also reports that he was started on carvedilol by Dr. Padmaja Ram yesterday as his blood pressure has been elevated. He is a 51-year-old -American male with history of chronic kidney disease stage III-IV, long-standing poorly controlled diabetes and hypertension, dyslipidemia, and intermittent compliance with his medications. He also has significant autonomic dysfunction as noted on the tilt table study done in March 2017. He was supposed to be [facility] but it is unclear whether or not he followed up with them. In the past, compliance with his medications has been dependent on their finances. He was last seen by Dr. Thalia Weber in October 2017. His last echocardiogram was done in October 2016 which showed a ejection fraction of 65% with significant concentric LVH. His last treadmill stress test was done in November 2012 which was considered and negative maximal stress test.  He had a renal artery duplex study done in Oct. 2016, which showed no significant renal artery stenosis. Per Dr. Carla Delvalle last office note, he stated that a supine blood pressure around 239-541 mmHg systolic would be acceptable due to his history of orthostatic hypotension attributed to autonomic dysfunction. He presented to [facility] ER on 8/10/17, with complaints of dizziness, elevated blood pressures and blood glucose levels at home. He also reported increasing scrotal edema. When see on 7/28/17, he was switched from lasix to Bumex and was restarted on his clonidine patch as he had been without his clonidine patch for about a month due to cost.  He also admitted to occasional non-compliance with his medications and diet.   During his hospital stay, he was diuresed with IV Bumex, his losartan was discontinued and he was started on hydralazine, placed on a sodium and fluid restriction, and followed by nephrology. He was noted to have progressively worsening renal function and it was felt that he may need to begin hemodialysis in the near future. He is normally followed by Dr. Alberto Montgomery and according to  Jaren Fragoso, hemodialysis is being recommended but he has not agreed to proceed with this yet. He complains of fatigue and lower extremity edema. He denies chest pain, tightness, heaviness, and palpitations. He denies shortness of breath at rest, admits to dyspnea on exertion, denies orthopnea and PND. He denies abdominal bloating. He denies lightheadedness, admits to occasional dizziness, and denies syncope. He admits to lower extremity edema and denies claudication. Denies nausea, vomiting, diarrhea, fever, chills. PMH:  Past Medical History:   Diagnosis Date    Cardiac echocardiogram 10/21/2016    EF 60-65%. No WMA. Mod-marked LVH. Normal diastolic fx. No significant valvular heart disease.  Cardiac treadmill stress test, low risk 11/02/2012    Negative maximal exercise treadmill test.  Ex time 7 min 45 sec.  Cardiovascular LE peripheral arterial testing 03/22/2016    No significant peripheral arterial disease at rest bilaterally. ABIs deferred due to calcified vessels.  Cardiovascular LLE venous duplex 06/06/2012    Left leg:  No DVT.  Cardiovascular renal duplex 10/21/2016    No significant renal artery stenosis.  CKD (chronic kidney disease), stage V ([address]     Diabetes mellitus ([address] 3/12/2010    Diabetic retinopathy ([address] 5/4/2010    Edema of both legs     Eye examination 5/4/10    OU: 20/20; OD: 20/25; OS: 20/25 without correction.     Glaucoma 08/17/2017    Ana Laura Borja    HLD (hyperlipidemia) 3/12/2010    HTN (hypertension) 3/12/2010    Orthostatic hypertension        PSH:  Past Surgical History:   Procedure Laterality Date    HX AMPUTATION      TOES    HX HERNIA REPAIR 2005    HX OTHER SURGICAL  11/07/2017    glaucoma valve- Ahmed       MEDS:  Current Outpatient Prescriptions   Medication Sig    bumetanide (BUMEX) 2 mg tablet Take 1 Tab by mouth daily.  amLODIPine (NORVASC) 10 mg tablet Take 1 Tab by mouth every evening.  acetaminophen (TYLENOL EXTRA STRENGTH) 500 mg tablet Take  by mouth every six (6) hours as needed for Pain.  mupirocin (BACTROBAN) 2 % ointment     carvedilol (COREG) 12.5 mg tablet TAKE 1 TABLET BY MOUTH TWICE A DAY    cyclobenzaprine (FLEXERIL) 10 mg tablet Take 1 Tab by mouth three (3) times daily as needed for Muscle Spasm(s).  cloNIDine (CATAPRES) 0.3 mg/24 hr APPLY 1 PATCH TO SKIN ONCE EVERY 7 DAYS    hydrALAZINE (APRESOLINE) 50 mg tablet Take 1.5 Tabs by mouth three (3) times daily.  finasteride (PROSCAR) 5 mg tablet Take 1 Tab by mouth daily.  sub-q insulin device, 40 unit (VGO 40) alicia by Does Not Apply route.  brimonidine-timolol (COMBIGAN) 0.2-0.5 % drop ophthalmic solution INSTIL 1 DROP IN EACH EYE 3 TIMES DAILY    OTHER PGIIL/P CRM - Apply 1 -2 grams ( 1-2 pumps) to left foot 3 - 4 times daily.  calcitRIOL (ROCALTROL) 0.25 mcg capsule TAKE ONE CAPSULE BY MOUTH EVERY MONDAY, WEDNESDAY, AND FRIDAY    rosuvastatin (CRESTOR) 20 mg tablet Take 1 Tab by mouth nightly.  docusate sodium (COLACE) 100 mg capsule Take 1 Cap by mouth two (2) times a day.  glucose blood VI test strips (ASCENSIA AUTODISC VI, ONE TOUCH ULTRA TEST VI) strip Test twice daily:  Fasting before breakfast and 2 hours after dinner (log readings), One touch ultra 2 test strips please; Dx: 250.02    fluticasone (FLONASE) 50 mcg/actuation nasal spray 1 [place] by Both Nostrils route two (2) times a day.  ferrous sulfate (IRON) 325 mg (65 mg iron) tablet Take  by mouth Daily (before breakfast). Take 1 tablet by mouth once a week as per patient.     Lancets (ONE TOUCH DELICA) Misc Test twice daily: Once in the morning fasting, then two hours after dinner (log results)    Blood-Glucose Meter monitoring kit by Does Not Apply route. One touch ultra2    cyanocobalamin (VITAMIN B-12) 1,000 mcg tablet Take 1,000 mcg by mouth daily. No current facility-administered medications for this visit. Allergies and Sensitivities:  Allergies   Allergen Reactions    Bactrim [Sulfamethoprim Ds] Nausea and Vomiting       Family History:  Family History   Problem Relation Age of Onset    Diabetes Sister     Sickle Cell Anemia Sister     Diabetes Sister        Social History:  He  reports that he has never smoked. He has never used smokeless tobacco.  He  reports that he does not drink alcohol. Physical:  Visit Vitals    /64    Pulse 72    Ht 5' 6\" (1.676 m)    Wt 88.9 kg (196 lb)    SpO2 99%    BMI 31.64 kg/m2       His weight is down 9 pounds since his last visit      Exam:  Neck:  Supple, no JVD, no carotid bruits  CV:  Normal S1 and  S2, no murmurs, rubs, or gallops noted  Lungs:  Clear to ausculation throughout, no wheezes or rales  Abd:  Soft, non-tender, non-distended with good bowel sounds. No hepatosplenomegaly  Extremities:  1+2+ softly pitting lower extremity edema bilaterally     Data:  EKG:    Not done today      LABS:  Lab Results   Component Value Date/Time    Sodium 140 11/15/2017 06:36 PM    Potassium 3.9 11/15/2017 06:36 PM    Chloride 108 11/15/2017 06:36 PM    CO2 24 11/15/2017 06:36 PM    Glucose 301 11/15/2017 06:36 PM    BUN 60 11/15/2017 06:36 PM    Creatinine 7.93 11/15/2017 06:36 PM     Lab Results   Component Value Date/Time    Cholesterol, total 214 02/28/2017 09:22 AM    HDL Cholesterol 71 02/28/2017 09:22 AM    LDL, calculated 113.4 02/28/2017 09:22 AM    Triglyceride 148 02/28/2017 09:22 AM    CHOL/HDL Ratio 3.0 02/28/2017 09:22 AM     Lab Results   Component Value Date/Time    ALT (SGPT) 24 11/15/2017 06:36 PM       Impression / Plan:  1. Essential hypertension, blood pressure stable  2.   History of orthostatic hypotension due to autonomic dysfunction as noted during tilt table study in March 2017  3. Hypercholesterolemia, on rosuvastatin 20 mg  4. Diabetes mellitus, recommend Hgb A1c less than 7% from cardiac standpoint  (9.1 recently)  5. Chronic kidney disease, stage 5, followed by Dr. Alberto Montgomery    Mr. Jaren Fragoso was seen today for a 3 month follow-up of CHF/HTN. His blood pressure was elevated upon arrival as he had dry heaves. He states that he took his medications this morning and did not eat breakfast and when he does this, his stomach becomes \"upset. \"  After being allowed to rest for a few minutes, I rechecked his blood pressure and it was down to 120/64. He states that he was started on Coreg yesterday by Dr. Burak Hercules. His breath sounds are clear and he has lower extremity edema. His volume overload may be due to his chronic kidney disease. His wife reports that nephrology has recommended initiation of hemodialysis but Mr. Jaren Fargoso states that he is not ready to do this yet. His weight is down 9 pounds since his last visit and he states that he has a good appetite. Congestive heart failure teaching reinforced today. Advised to limit sodium intake to no more than 1500-2000mg per day and to also limit his fluid intake to 48 ounces per day. Advised to weigh daily every morning and record weights. Instructed to call our office if progressive weight gain is noted over a 2 to 3 day period of time, shortness of breath increases, or if abdominal bloating, nausea, fatigue, or increased lower extremity edema is noted. He will follow-up with Dr. Marisol Mackenzie as scheduled and as needed. Eunice Iniguez MSN, FNP-BC    Please note:  Portions of this chart were created with Dragon medical speech to text program.  Unrecognized errors may be present.

## 2018-01-11 NOTE — LETTER
1/11/2018 10:35 AM 
 
Mr. Robel Leos [address] [place] [id number] To Whom it May Concern, 
 
From a cardiac standpoint, Mr. Yuri Jenkins is cleared to participate in physical therapy. It is acceptable for treatment even if his systolic blood pressure is between  160-180 mmHg. Please be aware that he also has history of orthostatic hypotension attributed to autonomic dysfunction.  
 
 
Sincerely, 
 
 
Christina Arnold NP

## 2018-01-18 ENCOUNTER — HOSPITAL ENCOUNTER (OUTPATIENT)
Dept: PHYSICAL THERAPY | Age: 58
Discharge: HOME | End: 2018-01-18
Payer: COMMERCIAL

## 2018-01-18 ENCOUNTER — TELEPHONE (OUTPATIENT)
Dept: FAMILY MEDICINE CLINIC | Age: 58
End: 2018-01-18

## 2018-01-18 ENCOUNTER — ANESTHESIA EVENT (OUTPATIENT)
Dept: SURGERY | Age: 58
End: 2018-01-18
Payer: COMMERCIAL

## 2018-01-18 PROCEDURE — 97530 THERAPEUTIC ACTIVITIES: CPT

## 2018-01-18 PROCEDURE — 97164 PT RE-EVAL EST PLAN CARE: CPT

## 2018-01-18 RX ORDER — PREDNISOLONE ACETATE 10 MG/ML
1 SUSPENSION/ DROPS OPHTHALMIC 3 TIMES DAILY
COMMUNITY
Start: 2018-01-16 | End: 2018-05-09 | Stop reason: ALTCHOICE

## 2018-01-18 NOTE — TELEPHONE ENCOUNTER
Mrs. Black Kunz called in today to let Dr. Steve Linton know that the Carvedilol Dr. Cuong Garcia placed him on is making him sick and weak. Pt stated that he told Dr. Steve Linton of new medication at last apt and was advised to take as told but he has now taken the med out of his pill box this morning. Last took rx on 1/17/18. Confirmed Pt laying down at home, not in distress or pain. Requesting a call back to speak with nurse. [phone] or [phone]     Offered pt a apt today at 2:40 but they cant come in. Mrs. Black Kunz stated that Dr. Theresa Quan office is going to be calling her back today to schedule fu this week.

## 2018-01-18 NOTE — PROGRESS NOTES
PT DAILY TREATMENT NOTE     Patient Name: Chaka Guerra  Date:2018  : 1960  [x]  Patient  Verified  Payor: Jonathon Grijalva / Plan: [id number] / Product Type: HMO /    In time:  2:18 Out time: 2:58  Total Treatment Time (min): 40  Visit #: 2 of     Treatment Area: Other muscle spasm [M62.838]  Muscle spasm of back [M62.830]    SUBJECTIVE  Pain Level (0-10 scale): 9/10 l/s   Any medication changes, allergies to medications, adverse drug reactions, diagnosis change, or new procedure performed?: [x] No    [] Yes (see summary sheet for update)  Subjective functional status/changes:   [] No changes reported  Pt reports that he followed up with his MD and they put him on 3 blood pressure medications. One of the medications is very strong, and he was feeling he was going to pass out when he was standing/walking d/t his orthostatic hypotension with the lowered BP. He called his MD and they told him to stop taking one of the medications yesterday morning. He is taking his BP 2 times per day (in sitting and standing) but he is not recording it. He gets severely tired after walking 4 feet, which his MDs believe is d/t his kidney failure. He also has a lot of back pain with walking after 4 feet. He is not able to do much and is frustrated with the pain and his poor exertional tolerance. He wears a boot d/t having his toes amputated after getting blisters that turned into gangrene years ago. He has peripheral neuropathy. He has had multiple eye surgeries recently. The first one was on 17 when he had a drainage valve. He had cataract surgery on 17, and now he has a \"cleaning\" surgery on 18. He is going to get a note from his eye surgeon if he has any restrictions for therapy after surgery tomorrow.       OBJECTIVE    Modality rationale: decrease pain and increase tissue extensibility to improve the patients ability to tolerate daily tasks   Min Type Additional Details    [] Estim:  []Unatt       []IFC  []Premod                        []Other:  []w/ice   []w/heat  Position:  Location:    [] Estim: []Att    []TENS instruct  []NMES                    []Other:  []w/US   []w/ice   []w/heat  Position:  Location:    []  Traction: [] Cervical       []Lumbar                       [] Prone          []Supine                       []Intermittent   []Continuous Lbs:  [] before manual  [] after manual    []  Ultrasound: []Continuous   [] Pulsed                           []1MHz   []3MHz W/cm2:  Location:    []  Iontophoresis with dexamethasone         Location: [] Take home patch   [] In clinic   10 []  Ice     [x]  heat  []  Ice massage  []  Laser   []  Anodyne Position: seated  Location: l/s     []  Laser with stim  []  Other:  Position:  Location:    []  Vasopneumatic Device Pressure:       [] lo [] med [] hi   Temperature: [] lo [] med [] hi   [x] Skin assessment post-treatment:  [x]intact []redness- no adverse reaction    []redness  adverse reaction:     17 min []Eval                  [x]Re-Eval       13 min Therapeutic Activity:  []  See flow sheet : monitoring BP, pt education    Rationale: Educated patient regarding importance of lowering BP, keeping BP log at least 2 times per day (measured in standing/sitting), following up with MD with BP log, and danger of exercise when resting BP is elevated in order to ensure pt safety.               With   [] TE   [] TA   [] neuro   [] other: Patient Education: [x] Review HEP    [] Progressed/Changed HEP based on:   [] positioning   [] body mechanics   [] transfers   [] heat/ice application    [] other:      Other Objective/Functional Measures:     /108, HR 75 bpm taken electronically prior to therapy  /100 taken manually   /100 taken manually after session    Verbally assessed goals and pt's functional ability  Held objective measures or interventions d/t elevated BP     Heat for pain relief while pt completed FOTO     FOTO 41    Pain Level (0-10 scale) post treatment: 4/10    ASSESSMENT/Changes in Function: See Progress Note    Patient will continue to benefit from skilled PT services to modify and progress therapeutic interventions, address functional mobility deficits, address ROM deficits, address strength deficits, analyze and address soft tissue restrictions, analyze and cue movement patterns, assess and modify postural abnormalities, address imbalance/dizziness and instruct in home and community integration to attain remaining goals. []  See Plan of Care  [x]  See progress note/recertification  []  See Discharge Summary         Progress towards goals / Updated goals:  Short term goals: To be accomplished within 1 week                        1. Pt will be independent with HEP to maintain progression. Not initiated d/t elevated BP 1/19/18                        2. Pt will be able to tolerate 30 min PT session to improve overall strength and functional mobility. Not initiated d/t elevated BP 1/19/18  Long term goals: To be accomplished within 6 weeks                        1. Pt will improve FOTO score by 21 points to 52/100 to show improvement with functional mobility performance. Progressing. Improved 10 points 1/19/18                        2. Pt will report no more than 7/10 to improve QOL . Not met. Continues to report elevated pain levels and pain reported was 9/10 prior to therapy 1/19/18                        3. Pt will amb for at leat 5 min with no pain so he can navigate household/community with ease. Not met. Ambulatory tolerance is 4 feet before being limited by pain and severe fatigue 1/19/18     PLAN  []  Upgrade activities as tolerated     [x]  Continue plan of care  []  Update interventions per flow sheet       []  Discharge due to:_  [x]  Other: Pt is following up with MD regarding BP.   Will continue to monitor BP and resume therapy pending BP is lowered      Julian Waggoner, PT 1/18/2018  1:26 PM    Future Appointments  Date Time Provider Alyce Daniellei   1/18/2018 3:00 PM Lisa Chang, PT [id number]   4/13/2018 9:00 AM Natasha Hutchins MD [address] [place] [address]   5/9/2018 9:00 AM Annie Leon MD [address] [place] [address]

## 2018-01-18 NOTE — DIABETES MGMT
INSULIN PUMP CONSULT/ Plan Of Care  How long have you had diabetes? About 20 years  How long have you had an insulin pump? 2 years  What is your blood glucose target? Patient states his doctor wants his BG around 70 mg/dl  How often do you usually test your blood glucose in a day?  4 times daily  What was your most recent A1C and date? 10.5%. Improving. Was 12.5% in August 2017  Who is your endocrinologist? Dr. Nesha Aponte  When was your last visit to your endocrinologist?    INSULIN PUMP    Brand of pump and model #: V-[id number]   Type of insulin used Lispro insulin   What are your basal rate(s)? 40 units of lispro delivered over a 24 hour period   What is your insulin to carbohydrate ratio? What is your total daily dose? varies     Do you often have hypoglycemia? Yes. Sometimes daily. How do you treat hypoglycemia? Juice  Do you have any site problems? No  Do you feel able and confident to manage your pump while here? yes  Who helps you manage your insulin pump when you are not able? Patient to arrive to [facility] at [phone] for surgery starting at [phone]. Instructed patient to bring an extra set of pump supplies with him to the hospital.  Patient will need to sign an insulin pump agreement on admission. Patient and wife had no questions or concerns at this time.

## 2018-01-18 NOTE — TELEPHONE ENCOUNTER
Pt is having trouble taking Carvedilol states every time he takes it drops his blood pressure and vomits last time he took it was 1/17/2018 during the morning.

## 2018-01-19 ENCOUNTER — ANESTHESIA (OUTPATIENT)
Dept: SURGERY | Age: 58
End: 2018-01-19
Payer: COMMERCIAL

## 2018-01-19 ENCOUNTER — TELEPHONE (OUTPATIENT)
Dept: FAMILY MEDICINE CLINIC | Age: 58
End: 2018-01-19

## 2018-01-19 ENCOUNTER — HOSPITAL ENCOUNTER (OUTPATIENT)
Age: 58
Setting detail: OUTPATIENT SURGERY
Discharge: HOME | End: 2018-01-19
Attending: OPHTHALMOLOGY | Admitting: OPHTHALMOLOGY
Payer: COMMERCIAL

## 2018-01-19 VITALS
WEIGHT: 202 LBS | HEIGHT: 66 IN | BODY MASS INDEX: 32.47 KG/M2 | HEART RATE: 73 BPM | SYSTOLIC BLOOD PRESSURE: 147 MMHG | TEMPERATURE: 98.2 F | OXYGEN SATURATION: 99 % | DIASTOLIC BLOOD PRESSURE: 76 MMHG | RESPIRATION RATE: 18 BRPM

## 2018-01-19 PROBLEM — H33.42 TRACTION RETINAL DETACHMENT, LEFT: Status: RESOLVED | Noted: 2018-01-19 | Resolved: 2018-01-19

## 2018-01-19 PROBLEM — H33.42 TRACTION RETINAL DETACHMENT, LEFT: Status: ACTIVE | Noted: 2018-01-19

## 2018-01-19 LAB
BUN BLD-MCNC: 56 MG/DL (ref 7–18)
CHLORIDE BLD-SCNC: 108 MMOL/L (ref 100–108)
GLUCOSE BLD STRIP.AUTO-MCNC: 246 MG/DL (ref 74–106)
HCT VFR BLD CALC: 30 % (ref 36–49)
HGB BLD-MCNC: 10.2 G/DL (ref 12–16)
POTASSIUM BLD-SCNC: 5.5 MMOL/L (ref 3.5–5.5)
SODIUM BLD-SCNC: 135 MMOL/L (ref 136–145)

## 2018-01-19 PROCEDURE — 77030013311: Performed by: OPHTHALMOLOGY

## 2018-01-19 PROCEDURE — 77030025717 HC KT PIK VIT CONSTL ALCN -F: Performed by: OPHTHALMOLOGY

## 2018-01-19 PROCEDURE — 74011250637 HC RX REV CODE- 250/637: Performed by: ANESTHESIOLOGY

## 2018-01-19 PROCEDURE — 74011250637 HC RX REV CODE- 250/637

## 2018-01-19 PROCEDURE — 74011250636 HC RX REV CODE- 250/636: Performed by: ANESTHESIOLOGY

## 2018-01-19 PROCEDURE — 77030018846 HC SOL IRR STRL H20 ICUM -A: Performed by: OPHTHALMOLOGY

## 2018-01-19 PROCEDURE — 77030026076 HC LNS MCSCP SPRVW DSP MCGM -B: Performed by: OPHTHALMOLOGY

## 2018-01-19 PROCEDURE — 77030026883 HC SCIS OPTH DISP ALCN -C: Performed by: OPHTHALMOLOGY

## 2018-01-19 PROCEDURE — 77030016693: Performed by: OPHTHALMOLOGY

## 2018-01-19 PROCEDURE — 77030013372: Performed by: OPHTHALMOLOGY

## 2018-01-19 PROCEDURE — 74011250636 HC RX REV CODE- 250/636: Performed by: NURSE ANESTHETIST, CERTIFIED REGISTERED

## 2018-01-19 PROCEDURE — 77030029406 HC COM VLC OPTH PK ALCN -B: Performed by: OPHTHALMOLOGY

## 2018-01-19 PROCEDURE — 84295 ASSAY OF SERUM SODIUM: CPT

## 2018-01-19 PROCEDURE — 74011636637 HC RX REV CODE- 636/637: Performed by: NURSE ANESTHETIST, CERTIFIED REGISTERED

## 2018-01-19 PROCEDURE — 74011000254 HC RX REV CODE- 254: Performed by: OPHTHALMOLOGY

## 2018-01-19 PROCEDURE — 74011000250 HC RX REV CODE- 250: Performed by: OPHTHALMOLOGY

## 2018-01-19 PROCEDURE — 76010000171 HC OR TIME 2 TO 2.5 HR INTENSV-TIER 1: Performed by: OPHTHALMOLOGY

## 2018-01-19 PROCEDURE — 76210000021 HC REC RM PH II 0.5 TO 1 HR: Performed by: OPHTHALMOLOGY

## 2018-01-19 PROCEDURE — 77030029082 HC LEN VITRCTMY DISP DTCH -B: Performed by: OPHTHALMOLOGY

## 2018-01-19 PROCEDURE — 77030018838 HC SOL IRR OPTH ALCN -B: Performed by: OPHTHALMOLOGY

## 2018-01-19 PROCEDURE — 77030032490 HC SLV COMPR SCD KNE COVD -B: Performed by: OPHTHALMOLOGY

## 2018-01-19 PROCEDURE — 76060000035 HC ANESTHESIA 2 TO 2.5 HR: Performed by: OPHTHALMOLOGY

## 2018-01-19 PROCEDURE — 74011250636 HC RX REV CODE- 250/636: Performed by: OPHTHALMOLOGY

## 2018-01-19 PROCEDURE — 74011250636 HC RX REV CODE- 250/636

## 2018-01-19 RX ORDER — PHENYLEPHRINE HYDROCHLORIDE 25 MG/ML
1 SOLUTION/ DROPS OPHTHALMIC
Status: COMPLETED | OUTPATIENT
Start: 2018-01-19 | End: 2018-01-19

## 2018-01-19 RX ORDER — INSULIN LISPRO 100 [IU]/ML
INJECTION, SOLUTION INTRAVENOUS; SUBCUTANEOUS ONCE
Status: COMPLETED | OUTPATIENT
Start: 2018-01-19 | End: 2018-01-19

## 2018-01-19 RX ORDER — HYDRALAZINE HYDROCHLORIDE 20 MG/ML
10 INJECTION INTRAMUSCULAR; INTRAVENOUS ONCE
Status: COMPLETED | OUTPATIENT
Start: 2018-01-19 | End: 2018-01-19

## 2018-01-19 RX ORDER — NEOMYCIN SULFATE, POLYMYXIN B SULFATE, AND DEXAMETHASONE 3.5; 10000; 1 MG/G; [USP'U]/G; MG/G
OINTMENT OPHTHALMIC AS NEEDED
Status: DISCONTINUED | OUTPATIENT
Start: 2018-01-19 | End: 2018-01-19 | Stop reason: HOSPADM

## 2018-01-19 RX ORDER — HYDRALAZINE HYDROCHLORIDE 20 MG/ML
INJECTION INTRAMUSCULAR; INTRAVENOUS AS NEEDED
Status: DISCONTINUED | OUTPATIENT
Start: 2018-01-19 | End: 2018-01-19 | Stop reason: HOSPADM

## 2018-01-19 RX ORDER — PROPOFOL 10 MG/ML
INJECTION, EMULSION INTRAVENOUS AS NEEDED
Status: DISCONTINUED | OUTPATIENT
Start: 2018-01-19 | End: 2018-01-19 | Stop reason: HOSPADM

## 2018-01-19 RX ORDER — PROPARACAINE HYDROCHLORIDE 5 MG/ML
1 SOLUTION/ DROPS OPHTHALMIC
Status: COMPLETED | OUTPATIENT
Start: 2018-01-19 | End: 2018-01-19

## 2018-01-19 RX ORDER — ATROPINE SULFATE 10 MG/ML
SOLUTION/ DROPS OPHTHALMIC AS NEEDED
Status: DISCONTINUED | OUTPATIENT
Start: 2018-01-19 | End: 2018-01-19 | Stop reason: HOSPADM

## 2018-01-19 RX ORDER — DEXAMETHASONE SODIUM PHOSPHATE 4 MG/ML
INJECTION, SOLUTION INTRA-ARTICULAR; INTRALESIONAL; INTRAMUSCULAR; INTRAVENOUS; SOFT TISSUE AS NEEDED
Status: DISCONTINUED | OUTPATIENT
Start: 2018-01-19 | End: 2018-01-19 | Stop reason: HOSPADM

## 2018-01-19 RX ORDER — FAMOTIDINE 20 MG/1
TABLET, FILM COATED ORAL
Status: COMPLETED
Start: 2018-01-19 | End: 2018-01-19

## 2018-01-19 RX ORDER — SODIUM CHLORIDE, SODIUM LACTATE, POTASSIUM CHLORIDE, CALCIUM CHLORIDE 600; 310; 30; 20 MG/100ML; MG/100ML; MG/100ML; MG/100ML
75 INJECTION, SOLUTION INTRAVENOUS CONTINUOUS
Status: DISCONTINUED | OUTPATIENT
Start: 2018-01-20 | End: 2018-01-19 | Stop reason: HOSPADM

## 2018-01-19 RX ORDER — INDOCYANINE GREEN AND WATER 25 MG
KIT INJECTION AS NEEDED
Status: DISCONTINUED | OUTPATIENT
Start: 2018-01-19 | End: 2018-01-19 | Stop reason: HOSPADM

## 2018-01-19 RX ORDER — AMLODIPINE BESYLATE 10 MG/1
10 TABLET ORAL DAILY
Status: COMPLETED | OUTPATIENT
Start: 2018-01-19 | End: 2018-01-19

## 2018-01-19 RX ORDER — CYCLOPENTOLATE HYDROCHLORIDE 10 MG/ML
1 SOLUTION/ DROPS OPHTHALMIC
Status: COMPLETED | OUTPATIENT
Start: 2018-01-19 | End: 2018-01-19

## 2018-01-19 RX ADMIN — PHENYLEPHRINE HYDROCHLORIDE 1 DROP: 25 SOLUTION/ DROPS OPHTHALMIC at 12:20

## 2018-01-19 RX ADMIN — PHENYLEPHRINE HYDROCHLORIDE 1 DROP: 25 SOLUTION/ DROPS OPHTHALMIC at 12:32

## 2018-01-19 RX ADMIN — PROPARACAINE HYDROCHLORIDE 1 DROP: 5 SOLUTION/ DROPS OPHTHALMIC at 12:20

## 2018-01-19 RX ADMIN — SODIUM CHLORIDE, SODIUM LACTATE, POTASSIUM CHLORIDE, AND CALCIUM CHLORIDE 75 ML/HR: 600; 310; 30; 20 INJECTION, SOLUTION INTRAVENOUS at 12:55

## 2018-01-19 RX ADMIN — CYCLOPENTOLATE HYDROCHLORIDE 1 DROP: 10 SOLUTION/ DROPS OPHTHALMIC at 12:40

## 2018-01-19 RX ADMIN — CYCLOPENTOLATE HYDROCHLORIDE 1 DROP: 10 SOLUTION/ DROPS OPHTHALMIC at 12:20

## 2018-01-19 RX ADMIN — INSULIN LISPRO 6 UNITS: 100 INJECTION, SOLUTION INTRAVENOUS; SUBCUTANEOUS at 12:00

## 2018-01-19 RX ADMIN — HYDRALAZINE HYDROCHLORIDE 10 MG: 20 INJECTION INTRAMUSCULAR; INTRAVENOUS at 12:45

## 2018-01-19 RX ADMIN — HYDRALAZINE HYDROCHLORIDE 10 MG: 20 INJECTION INTRAMUSCULAR; INTRAVENOUS at 15:10

## 2018-01-19 RX ADMIN — CYCLOPENTOLATE HYDROCHLORIDE 1 DROP: 10 SOLUTION/ DROPS OPHTHALMIC at 12:32

## 2018-01-19 RX ADMIN — FAMOTIDINE 20 MG: 20 TABLET, FILM COATED ORAL at 12:32

## 2018-01-19 RX ADMIN — PHENYLEPHRINE HYDROCHLORIDE 1 DROP: 25 SOLUTION/ DROPS OPHTHALMIC at 12:40

## 2018-01-19 RX ADMIN — AMLODIPINE BESYLATE 10 MG: 10 TABLET ORAL at 12:51

## 2018-01-19 RX ADMIN — PROPOFOL 60 MG: 10 INJECTION, EMULSION INTRAVENOUS at 13:47

## 2018-01-19 RX ADMIN — HYDRALAZINE HYDROCHLORIDE 10 MG: 20 INJECTION INTRAMUSCULAR; INTRAVENOUS at 13:51

## 2018-01-19 NOTE — ANESTHESIA PREPROCEDURE EVALUATION
Anesthetic History   No history of anesthetic complications            Review of Systems / Medical History  Patient summary reviewed and pertinent labs reviewed    Pulmonary          Undiagnosed apnea         Neuro/Psych   Within defined limits           Cardiovascular    Hypertension: poorly controlled                   GI/Hepatic/Renal         Renal disease: ESRD       Endo/Other    Diabetes: type 2, using insulin    Obesity and anemia     Other Findings   Comments:   Risk Factors for Postoperative nausea/vomiting:       History of postoperative nausea/vomiting? NO       Female? NO       Motion sickness? NO       Intended opioid administration for postoperative analgesia? NO      Smoking Abstinence  Current Smoker? NO  Elective Surgery? YES  Seen preoperatively by anesthesiologist or proxy prior to day of surgery? YES  Pt abstained from smoking 24 hours prior to anesthesia?  N/A           Physical Exam    Airway  Mallampati: III  TM Distance: 4 - 6 cm  Neck ROM: decreased range of motion, short neck   Mouth opening: Diminished (comment)     Cardiovascular    Rhythm: irregular  Rate: normal         Dental    Dentition: Poor dentition     Pulmonary  Breath sounds clear to auscultation               Abdominal  GI exam deferred       Other Findings            Anesthetic Plan    ASA: 4  Anesthesia type: MAC            Anesthetic plan and risks discussed with: Patient

## 2018-01-19 NOTE — TELEPHONE ENCOUNTER
Called patient at [phone] and had no answer, also unable to leave message due to no voicemail. Called patient at  [phone] (home)  (non-secure line) and did not leave a detailed message. Needed to check on patient's status and if patient was able to reach Dr. Ame Crain office since that provider was the prescribing physician.

## 2018-01-19 NOTE — IP AVS SNAPSHOT
[address] [address] Meaghan Mazamichael Cohen 
[phone] Patient: Maureen Felipe MRN: [id number] :1960 About your hospitalization You were admitted on:  2018 You last received care in the:  [facility] PHASE 2 RECOVERY You were discharged on:  2018 Why you were hospitalized Your primary diagnosis was:  Traction Retinal Detachment, Left Follow-up Information Follow up With Details Comments Contact Info Florence Calix MD [address] [id number] [facility] [address] [place] [id number] 
[phone] Discharge Orders None A check feliberto indicates which time of day the medication should be taken. My Medications CHANGE how you take these medications Instructions Each Dose to Equal  
 Morning Noon Evening Bedtime  
 fluticasone 50 mcg/actuation nasal spray Commonly known as:  Darrian Wright What changed:   
- when to take this 
- reasons to take this Your last dose was: Your next dose is:    
   
   
 1 Spray by Both Nostrils route two (2) times a day. 1 Spray CONTINUE taking these medications Instructions Each Dose to Equal  
 Morning Noon Evening Bedtime  
 amLODIPine 10 mg tablet Commonly known as:  Nya Mik Your last dose was: Your next dose is: Take 1 Tab by mouth every evening. 10 mg Blood-Glucose Meter monitoring kit Your last dose was: Your next dose is:    
   
   
 by Does Not Apply route. One touch ultra2  
     
   
   
   
  
 bumetanide 2 mg tablet Commonly known as:  Kiko Choima Your last dose was: Your next dose is: Take 1 Tab by mouth daily. 2 mg  
    
   
   
   
  
 calcitRIOL 0.25 mcg capsule Commonly known as:  ROCALTROL Your last dose was: Your next dose is: TAKE ONE CAPSULE BY MOUTH EVERY MONDAY, WEDNESDAY, AND FRIDAY  
     
   
   
   
  
 carvedilol 12.5 mg tablet Commonly known as:  Pricila Barnes Your last dose was: Your next dose is: TAKE 1 TABLET BY MOUTH TWICE A DAY  
     
   
   
   
  
 cloNIDine 0.3 mg/24 hr  
Commonly known as:  CATAPRES Your last dose was: Your next dose is:    
   
   
 APPLY 1 PATCH TO SKIN ONCE EVERY 7 DAYS  
     
   
   
   
  
 cyclobenzaprine 10 mg tablet Commonly known as:  FLEXERIL Your last dose was: Your next dose is: Take 1 Tab by mouth three (3) times daily as needed for Muscle Spasm(s). 10 mg  
    
   
   
   
  
 docusate sodium 100 mg capsule Commonly known as:  Thedore Dial Your last dose was: Your next dose is: Take 1 Cap by mouth two (2) times a day. 100 mg  
    
   
   
   
  
 finasteride 5 mg tablet Commonly known as:  PROSCAR Your last dose was: Your next dose is: Take 1 Tab by mouth daily. 5 mg  
    
   
   
   
  
 glucose blood VI test strips strip Commonly known as:  ASCENSIA AUTODISC VI, ONE TOUCH ULTRA TEST VI Your last dose was: Your next dose is:    
   
   
 Test twice daily:  Fasting before breakfast and 2 hours after dinner (log readings), One touch ultra 2 test strips please; Dx: 250.02  
     
   
   
   
  
 hydrALAZINE 50 mg tablet Commonly known as:  APRESOLINE Your last dose was: Your next dose is: Take 1.5 Tabs by mouth three (3) times daily. 75 mg Iron 325 mg (65 mg iron) tablet Generic drug:  ferrous sulfate Your last dose was: Your next dose is: Take  by mouth Daily (before breakfast). Take 1 tablet by mouth once a week as per patient. Lancets Misc Commonly known as: One Touch Arnulfo Erps Your last dose was: Your next dose is: Test twice daily: Once in the morning fasting, then two hours after dinner (log results)  
     
   
   
   
  
 mupirocin 2 % ointment Commonly known as:  [employer] Your last dose was: Your next dose is: OTHER Your last dose was: Your next dose is: PGIIL/P CRM - Apply 1 -2 grams ( 1-2 pumps) to left foot 3 - 4 times daily. prednisoLONE acetate 1 % ophthalmic suspension Commonly known as:  PRED FORTE Your last dose was: Your next dose is:    
   
   
 Administer 1 Drop to left eye three (3) times daily. 1 Drop  
    
   
   
   
  
 rosuvastatin 20 mg tablet Commonly known as:  CRESTOR Your last dose was: Your next dose is: Take 1 Tab by mouth nightly. 20 mg  
    
   
   
   
  
 TYLENOL EXTRA STRENGTH 500 mg tablet Generic drug:  acetaminophen Your last dose was: Your next dose is: Take  by mouth every six (6) hours as needed for Pain. [address] Generic drug:  sub-q insulin device, 40 unit Your last dose was: Your next dose is:    
   
   
 by Does Not Apply route. VITAMIN B-12 1,000 mcg tablet Generic drug:  cyanocobalamin Your last dose was: Your next dose is: Take 1,000 mcg by mouth daily. 1000 mcg Discharge Instructions Learning About Retinal Detachment Surgery What is retinal detachment surgery? A retinal detachment usually needs to be repaired quickly. You may not have much time to think about it. But surgery is usually successful. The retina is a thin nerve membrane that lines the back of the eye. You cannot see without it. Detachment means the retina has moved out of its normal place against the back of the eye. Detachment can lead to severe vision loss or blindness. Prompt treatment can restore good vision. A detached retina can be fixed by: · Pneumatic retinopexy. · Scleral buckling. · Vitrectomy. An ophthalmologist does these surgeries. This is a medical doctor who specializes in eye care. This is not the same as an optometrist. 
How are the surgeries done? · In pneumatic retinopexy, your doctor injects a gas bubble into the middle of the eyeball. The gas bubble floats to the detached area and presses lightly against the detached retina. This flattens the retina against the wall of the eye. The retina reattaches. · In scleral buckling, your doctor places a piece of silicone sponge, rubber, or semi-hard plastic on the outer layer of your eye and sews it in place. This relieves the force that is pulling and detaching the retina. The doctor may also use a gas bubble to flatten the retina against the wall of the eye. · In vitrectomy, your doctor inserts small instruments into the eye, cuts the vitreous gel, and suctions it out. Vitreous gel fills the large space in the middle of the eye. At the end of the surgery, silicone oil or a gas bubble is injected into the eye. This keeps the retina in place. If a tear in the retina caused the detachment, your doctor may fix it during your surgery. This can be done in two ways. The doctor may use: · A laser beam that burns around the tear. The burn forms scars that close the tear. This is called laser photocoagulation. · A probe that freezes around the tear to fix it. This is called cryopexy. What can you expect after surgery? You may have some pain for a few days after the surgery. Your eye may be swollen, red, or tender for several weeks. You may have to wear a patch or shield over the eye for a day or more. Your eye doctor may put drops in your eye that prevent infection and keep the pupil from opening wide or closing. Gas and silicone If your doctor used a gas bubble, you'll have to keep your head in a certain position for most of the day and night for 1 to 3 weeks after the surgery. Your doctor will give you instructions. If silicone oil is used during vitrectomy, you'll need a second procedure to remove the oil after the eye has healed. Follow-up care is a key part of your treatment and safety. Be sure to make and go to all appointments, and call your doctor if you are having problems. It's also a good idea to know your test results and keep a list of the medicines you take. Where can you learn more? Go to http://hoang-jayden.info/. Enter S385 in the search box to learn more about \"Learning About Retinal Detachment Surgery. \" Current as of: March 3, 2017 Content Version: 11.4 © 0177-8790 [employer]. Care instructions adapted under license by [employer] (which disclaims liability or warranty for this information). If you have questions about a medical condition or this instruction, always ask your healthcare professional. [address] [place] [address] any warranty or liability for your use of this information. DISCHARGE SUMMARY from Nurse PATIENT INSTRUCTIONS: 
 
After general anesthesia or intravenous sedation, for 24 hours or while taking prescription Narcotics: · Limit your activities · Do not drive and operate hazardous machinery · Do not make important personal or business decisions · Do  not drink alcoholic beverages · If you have not urinated within 8 hours after discharge, please contact your surgeon on call. Report the following to your surgeon: 
· Excessive pain, swelling, redness or odor of or around the surgical area · Temperature over 100.5 · Nausea and vomiting lasting longer than 4 hours or if unable to take medications · Any signs of decreased circulation or nerve impairment to extremity: change in color, persistent  numbness, tingling, coldness or increase pain · Any questions What to do at Home: These are general instructions for a healthy lifestyle: No smoking/ No tobacco products/ Avoid exposure to second hand smoke Surgeon General's Warning:  Quitting smoking now greatly reduces serious risk to your health. Obesity, smoking, and sedentary lifestyle greatly increases your risk for illness A healthy diet, regular physical exercise & weight monitoring are important for maintaining a healthy lifestyle You may be retaining fluid if you have a history of heart failure or if you experience any of the following symptoms:  Weight gain of 3 pounds or more overnight or 5 pounds in a week, increased swelling in our hands or feet or shortness of breath while lying flat in bed. Please call your doctor as soon as you notice any of these symptoms; do not wait until your next office visit. Recognize signs and symptoms of STROKE: 
 
F-face looks uneven A-arms unable to move or move unevenly S-speech slurred or non-existent T-time-call 911 as soon as signs and symptoms begin-DO NOT go Back to bed or wait to see if you get better-TIME IS BRAIN. Warning Signs of HEART ATTACK Call 911 if you have these symptoms: 
? Chest discomfort. Most heart attacks involve discomfort in the center of the chest that lasts more than a few minutes, or that goes away and comes back. It can feel like uncomfortable pressure, squeezing, fullness, or pain. ? Discomfort in other areas of the upper body. Symptoms can include pain or discomfort in one or both arms, the back, neck, jaw, or stomach. ? Shortness of breath with or without chest discomfort. ? Other signs may include breaking out in a cold sweat, nausea, or lightheadedness. Don't wait more than five minutes to call [address]! Fast action can save your life.  Calling 911 is almost always the fastest way to get lifesaving treatment. Emergency Medical Services staff can begin treatment when they arrive  up to an hour sooner than if someone gets to the hospital by car. The discharge information has been reviewed with the patient. The patient verbalized understanding. Discharge medications reviewed with the patient and appropriate educational materials and side effects teaching were provided. ___________________________________________________________________________________________________________________________________ Patient armband removed and given to patient to take home. Patient was informed of the privacy risks if armband lost or stolen Introducing [facility]! [facility] introduces [employer] patient portal. Now you can access parts of your medical record, email your doctor's office, and request medication refills online. 1. In your internet browser, go to [email or web address] 2. Click on the First Time User? Click Here link in the Sign In box. You will see the New Member Sign Up page. 3. Enter your [employer] Access Code exactly as it appears below. You will not need to use this code after youve completed the sign-up process. If you do not sign up before the expiration date, you must request a new code. · [employer] Access Code: [email or web address] Expires: 4/10/2018  9:17 AM 
 
4. Enter the last four digits of your Social Security Number (xxxx) and Date of Birth (mm/dd/yyyy) as indicated and click Submit. You will be taken to the next sign-up page. 5. Create a [employer] ID. This will be your [employer] login ID and cannot be changed, so think of one that is secure and easy to remember. 6. Create a [employer] password. You can change your password at any time. 7. Enter your Password Reset Question and Answer. This can be used at a later time if you forget your password. 8. Enter your e-mail address.  You will receive e-mail notification when new information is available in [employer]. 9. Click Sign Up. You can now view and download portions of your medical record. 10. Click the Download Summary menu link to download a portable copy of your medical information. If you have questions, please visit the Frequently Asked Questions section of the [employer] website. Remember, [employer] is NOT to be used for urgent needs. For medical emergencies, dial 911. Now available from your iPhone and Android! Providers Seen During Your Hospitalization Provider Specialty Primary office phone Lesvia Houser MD Ophthalmology [phone] Your Primary Care Physician (PCP) Primary Care Physician Office Phone Office Fax Elaina Borjas [phone] You are allergic to the following Allergen Reactions Bactrim (Sulfamethoprim Ds) Nausea and Vomiting Recent Documentation Height Weight BMI Smoking Status 1.676 m 91.6 kg 32.6 kg/m2 Never Smoker Emergency Contacts Name Discharge Info Relation Home Work Mobile [address] [facility] CAREGIVER [3] Spouse [3] [phone] Patient Belongings The following personal items are in your possession at time of discharge: 
  Dental Appliances: None  Visual Aid: None      Home Medications: None   Jewelry: None  Clothing: Pants, Undergarments, Shirt, Jacket/Coat, Socks, Footwear    Other Valuables:  (insulin pump on pt) Please provide this summary of care documentation to your next provider. Signatures-by signing, you are acknowledging that this After Visit Summary has been reviewed with you and you have received a copy. Patient Signature:  ____________________________________________________________ Date:  ____________________________________________________________  
  
Marlyse Leaks Provider Signature:  ____________________________________________________________ Date:  ____________________________________________________________

## 2018-01-19 NOTE — DISCHARGE INSTRUCTIONS
Learning About Retinal Detachment Surgery  What is retinal detachment surgery? A retinal detachment usually needs to be repaired quickly. You may not have much time to think about it. But surgery is usually successful. The retina is a thin nerve membrane that lines the back of the eye. You cannot see without it. Detachment means the retina has moved out of its normal place against the back of the eye. Detachment can lead to severe vision loss or blindness. Prompt treatment can restore good vision. A detached retina can be fixed by:  · Pneumatic retinopexy. · Scleral buckling. · Vitrectomy. An ophthalmologist does these surgeries. This is a medical doctor who specializes in eye care. This is not the same as an optometrist.  How are the surgeries done? · In pneumatic retinopexy, your doctor injects a gas bubble into the middle of the eyeball. The gas bubble floats to the detached area and presses lightly against the detached retina. This flattens the retina against the wall of the eye. The retina reattaches. · In scleral buckling, your doctor places a piece of silicone sponge, rubber, or semi-hard plastic on the outer layer of your eye and sews it in place. This relieves the force that is pulling and detaching the retina. The doctor may also use a gas bubble to flatten the retina against the wall of the eye. · In vitrectomy, your doctor inserts small instruments into the eye, cuts the vitreous gel, and suctions it out. Vitreous gel fills the large space in the middle of the eye. At the end of the surgery, silicone oil or a gas bubble is injected into the eye. This keeps the retina in place. If a tear in the retina caused the detachment, your doctor may fix it during your surgery. This can be done in two ways. The doctor may use:  · A laser beam that burns around the tear. The burn forms scars that close the tear. This is called laser photocoagulation.   · A probe that freezes around the tear to fix it. This is called cryopexy. What can you expect after surgery? You may have some pain for a few days after the surgery. Your eye may be swollen, red, or tender for several weeks. You may have to wear a patch or shield over the eye for a day or more. Your eye doctor may put drops in your eye that prevent infection and keep the pupil from opening wide or closing. Gas and silicone  If your doctor used a gas bubble, you'll have to keep your head in a certain position for most of the day and night for 1 to 3 weeks after the surgery. Your doctor will give you instructions. If silicone oil is used during vitrectomy, you'll need a second procedure to remove the oil after the eye has healed. Follow-up care is a key part of your treatment and safety. Be sure to make and go to all appointments, and call your doctor if you are having problems. It's also a good idea to know your test results and keep a list of the medicines you take. Where can you learn more? Go to http://hoang-jayden.info/. Enter S385 in the search box to learn more about \"Learning About Retinal Detachment Surgery. \"  Current as of: March 3, 2017  Content Version: 11.4  © 2769-4069 [employer]. Care instructions adapted under license by [employer] (which disclaims liability or warranty for this information). If you have questions about a medical condition or this instruction, always ask your healthcare professional. [place] [address] any warranty or liability for your use of this information.     DISCHARGE SUMMARY from Nurse    PATIENT INSTRUCTIONS:    After general anesthesia or intravenous sedation, for 24 hours or while taking prescription Narcotics:  · Limit your activities  · Do not drive and operate hazardous machinery  · Do not make important personal or business decisions  · Do  not drink alcoholic beverages  · If you have not urinated within 8 hours after discharge, please contact your surgeon on call. Report the following to your surgeon:  · Excessive pain, swelling, redness or odor of or around the surgical area  · Temperature over 100.5  · Nausea and vomiting lasting longer than 4 hours or if unable to take medications  · Any signs of decreased circulation or nerve impairment to extremity: change in color, persistent  numbness, tingling, coldness or increase pain  · Any questions    What to do at Home:  These are general instructions for a healthy lifestyle:    No smoking/ No tobacco products/ Avoid exposure to second hand smoke  Surgeon General's Warning:  Quitting smoking now greatly reduces serious risk to your health. Obesity, smoking, and sedentary lifestyle greatly increases your risk for illness    A healthy diet, regular physical exercise & weight monitoring are important for maintaining a healthy lifestyle    You may be retaining fluid if you have a history of heart failure or if you experience any of the following symptoms:  Weight gain of 3 pounds or more overnight or 5 pounds in a week, increased swelling in our hands or feet or shortness of breath while lying flat in bed. Please call your doctor as soon as you notice any of these symptoms; do not wait until your next office visit. Recognize signs and symptoms of STROKE:    F-face looks uneven    A-arms unable to move or move unevenly    S-speech slurred or non-existent    T-time-call 911 as soon as signs and symptoms begin-DO NOT go       Back to bed or wait to see if you get better-TIME IS BRAIN. Warning Signs of HEART ATTACK     Call 911 if you have these symptoms:   Chest discomfort. Most heart attacks involve discomfort in the center of the chest that lasts more than a few minutes, or that goes away and comes back. It can feel like uncomfortable pressure, squeezing, fullness, or pain.  Discomfort in other areas of the upper body.  Symptoms can include pain or discomfort in one or both arms, the back, neck, jaw, or stomach.  Shortness of breath with or without chest discomfort.  Other signs may include breaking out in a cold sweat, nausea, or lightheadedness. Don't wait more than five minutes to call 911 - MINUTES MATTER! Fast action can save your life. Calling 911 is almost always the fastest way to get lifesaving treatment. Emergency Medical Services staff can begin treatment when they arrive -- up to an hour sooner than if someone gets to the hospital by car. The discharge information has been reviewed with the patient. The patient verbalized understanding. Discharge medications reviewed with the patient and appropriate educational materials and side effects teaching were provided. ___________________________________________________________________________________________________________________________________  Patient armband removed and given to patient to take home.   Patient was informed of the privacy risks if armband lost or stolen

## 2018-01-19 NOTE — OP NOTES
[facility]  OPERATIVE REPORT    Lata Lynn  MR#: [id number]  : 1960  ACCOUNT #: [de-identified]   DATE OF SERVICE: 2018    PREOPERATIVE DIAGNOSIS:  Traction retinal detachment of the left eye. POSTOPERATIVE DIAGNOSIS:  Traction retinal detachment of the left eye. PROCEDURES:  1.  Pars plana vitrectomy. 2.  Fluid-air exchange. 3.  Membrane peel. 4.  Silicone oil infusion. 5.  Laser endophotocoagulation. SURGEON:  Kalina Del Castillo MD    ASSISTANT:  None. COMPLICATIONS:  None. BLOOD LOSS:  Less than 1 mL. ANESTHESIA:  Local with sedation. SPECIMENS REMOVED:  *none    IMPLANTS:  none. PROCEDURE IN DETAIL:  After understanding the risks, benefits and alternatives of surgery, the patient was brought to the operating room in stable condition. The left eye was blocked using 2% lidocaine and 0.75% Marcaine in a retrobulbar fashion. The left eye was then prepped and draped in the usual sterile fashion for ocular surgery. An [place] 25-gauge vitrectomy port was placed infratemporal.  The infusion cannula was turned on and inserted through this port. Similarly, ports were placed supratemporal and supranasal.  The micro vitrectomy instrument and light pipe were introduced and were used to perform vitrectomy. There was a dense preretinal fibrotic membrane over the superior macula and superior arcades. The retina was noted to be completely ischemic with all blood vessels white. Under flat macular lens visualization, the fibrotic membrane was segmented and delaminated. There was a portion superotemporal to the macula that was found to have a tear secondary to extreme retinal friability. There was also a small tear noted just nasal to the disk. Additional peripheral vitrectomy was performed. Subretinal fluid was aspirated during fluid-air exchange through the tears.   The endolaser probe was used to perform panretinal photocoagulation in addition to partial retinopexy around the tear nasal to the disk. The tear supratemporal to the macula was left un-lasered as to not produce proliferative vitreoretinopathy. Silicone oil 0011 centistokes was injected into the vitreous cavity. A decision will need to be made later as to whether it is feasible to remove the oil, depending on prognosis and course of recovery of the retina. Ports were removed. Subconjunctival dexamethasone and Ancef were instilled. Maxitrol, atropine, a patch and a shield were placed. The patient was brought to the recovery room in stable condition. MD Lorna Rice / Harvey Calderon  D: 01/19/2018 16:11     T: 01/19/2018 17:04  JOB #: [id number]

## 2018-01-19 NOTE — H&P
Date of Surgery Update:  Ha Altamirano was seen and examined. History and physical has been reviewed. The patient has been examined.  There have been no significant clinical changes since the completion of the originally dated History and Physical.    Signed By: Timmy Nuñez MD     January 19, 2018 11:58 AM

## 2018-01-19 NOTE — PROGRESS NOTES
[facility] Nenael Gloria  [address] [facility]  [phone] fax    Physical Therapy Progress Note  Patient name: Lauri Perla Start of Care: 2017   Referral source: Mariam Kan MD : 1960   Medical/Treatment Diagnosis: Other muscle spasm [T04.959]  Muscle spasm of back [M62.830] Onset Date:11/15/17     Prior Hospitalization: see medical history Provider#: [id number]   Medications: Verified on Patient Summary List    Comorbidities: depression, diabetes, arthritis, HTN, visual impaired   Prior Level of Function: amb with [facility]PC about 2 years ago, living with wife, 1 story, 2 steps with no rail, will put up some rail soon       Visits from Start of Care: 2    Missed Visits: 0    Established Goals:         Excellent           Good         Limited           None  [] Increased ROM   []  []  []  []  [] Increased Strength  []  []  []  []  [x] Increased Mobility  []  []  []  [x]   [x] Decreased Pain   []  []  []  [x]  [] Increased endurance   []  []  []  [x]    Key Functional Changes: no functional changes     Updated Goals: to be achieved in 4 weeks:  1. Pt will be compliant with initial HEP once initiated to improve therapy outcomes   2. Pt will be able to tolerate 30 min PT session to improve overall strength and functional mobility. 3. Pt will improve FOTO score by 21 points to 52/100 to show improvement with functional mobility performance. 4. Pt will report average of </= 7/10 to improve QOL . 5. Pt will ambulate 150' with <7/10 pain in order to improve ease of household negotiation    ASSESSMENT/RECOMMENDATIONS:  Pt has not progressed in therapy as he has not been able to attend therapy d/t elevated BP. He was placed on hold after initial evaluation until he followed up with cardiologist.  Cardiologist cleared pt to return to therapy after medication change pending systolic BP <647.   Pt continues to present with systolic BP of >/=559 and is not safe to participate in therapy. Lumbar pain levels continue to be elevated, and ambulatory tolerance is limited to 4 feet before difficulty with ambulation d/t pain and severe fatigue, which pt reports that MD has attributed to chronic kidney failure. Pt is being placed on hold once more until BP is managed for safe participation in therapy. Pending cleared for therapy, pt will benefit from skilled PT to address core stability, strength, flexibility, and functional mobility deficits in order to improve ease of ADLS, increase ability with household negotiation, and improved QOL. [x]Continue therapy per initial plan/protocol at a frequency of  2 x per week for 4 weeks  []Continue therapy with the following recommended changes:_____________________      _____________________________________________________________________  []Discontinue therapy progressing towards or have reached established goals  []Discontinue therapy due to lack of appreciable progress towards goals  []Discontinue therapy due to lack of attendance or compliance  []Await Physician's recommendations/decisions regarding therapy  []Other:________________________________________________________________    Thank you for this referral.   Kamryn Mackenzie, PT 1/19/2018 3:16 PM    NOTE TO PHYSICIAN:  [address] [facility] [phone]  If you are unable to process this request in 24 hours please contact our office: [phone] I have read the above report and request that my patient continue as recommended. ? I have read the above report and request that my patient continue therapy with the following changes/special instructions:____________________________________  ? I have read the above report and request that my patient be discharged from therapy.     Physicians signature: ______________________________Date: ______Time:______

## 2018-01-19 NOTE — BRIEF OP NOTE
BRIEF OPERATIVE NOTE    Date of Procedure: 1/19/2018   Preoperative Diagnosis: e11.3522 retinal detachment  Postoperative Diagnosis: e11.3522 retinal detachment    Procedure(s):  VITRECTOMY POSTERIOR 25 GAUGE with membrane peel,air exchange possible silicon oil placment,laser treatment left eye,ic green  Surgeon(s) and Role:     * Ramone Kate MD - Primary         Assistant Staff:       Surgical Staff:  Circ-1: Isaiah Bourgeois RN  Scrub Tech-1: Cindy Gaines  Surg Asst-1: Jovanni Henry  Event Time In   Incision Start 1401   Incision Close 1545     Anesthesia: MAC   Estimated Blood Loss: min  Specimens: * No specimens in log *   Findings: extensive trd; severe retinal ischemia.  Tear S to mac, N to disc   Complications: none  Implants: * No implants in log *

## 2018-01-20 NOTE — ANESTHESIA POSTPROCEDURE EVALUATION
Post-Anesthesia Evaluation and Assessment    Patient: Valerie Villafuerte MRN: [id number]  SSN: [id number]    YOB: 1960  Age: 62 y.o. Sex: male       Cardiovascular Function/Vital Signs  Visit Vitals    /76 (BP 1 Location: Right arm, BP Patient Position: At rest)    Pulse 73    Temp 36.8 °C (98.2 °F)    Resp 18    Ht 5' 6\" (1.676 m)    Wt 91.6 kg (202 lb)    SpO2 99%    BMI 32.6 kg/m2       Patient is status post MAC anesthesia for Procedure(s):  VITRECTOMY POSTERIOR 25 GAUGE with membrane peel,air exchange possible silicon oil placment,laser treatment left eye,ic green. Nausea/Vomiting: None    Postoperative hydration reviewed and adequate. Pain:  Pain Scale 1: Numeric (0 - 10) (01/19/18 1642)  Pain Intensity 1: 0 (01/19/18 1642)   Managed    Neurological Status: At baseline    Mental Status and Level of Consciousness: Arousable    Pulmonary Status:   O2 Device: Room air (01/19/18 1549)   Adequate oxygenation and airway patent    Complications related to anesthesia: None    Post-anesthesia assessment completed.  No concerns    Signed By: Nneka Howard MD     January 20, 2018

## 2018-01-25 ENCOUNTER — TELEPHONE (OUTPATIENT)
Dept: FAMILY MEDICINE CLINIC | Age: 58
End: 2018-01-25

## 2018-01-25 NOTE — TELEPHONE ENCOUNTER
Alethea Byrne from Dr. Diane Rudolph office called to get med clr notes. Alethea Byrne stated that the note must state that pt is medically cleared for surgery. Note from 1/10/18 lack that note. Alethea Byrne will be faxing over a form to have pcp to complete. Please be on the look out for the form.

## 2018-01-26 NOTE — TELEPHONE ENCOUNTER
Of note patient's visit on 1/10 was not a preop so  assessment for clearance for surgery was not determined at that visit. The paperwork faxed here  on 1/25/2018 appears to be a clearance for a surgery scheduled for 2/1/2018.   This requires clarification ( ie, is pt having a new surgery on 2/1/2018?)

## 2018-02-02 ENCOUNTER — HOSPITAL ENCOUNTER (OUTPATIENT)
Dept: LAB | Age: 58
Discharge: HOME | End: 2018-02-02

## 2018-02-02 LAB
HBV SURFACE AG SER QL: <0.1 INDEX
HBV SURFACE AG SER QL: NEGATIVE
HCV AB SER IA-ACNC: 0.05 INDEX
HCV AB SERPL QL IA: NEGATIVE
HCV COMMENT,HCGAC: NORMAL
HIV 1+2 AB+HIV1 P24 AG SERPL QL IA: NONREACTIVE
HIV1 P24 AG SERPL QL IA: NONREACTIVE
HIV1+2 AB SERPL QL IA: NONREACTIVE
HIV12 RESULT COMMENT, HHIVC: NORMAL

## 2018-02-02 PROCEDURE — 86803 HEPATITIS C AB TEST: CPT | Performed by: ANESTHESIOLOGY

## 2018-02-02 PROCEDURE — 87389 HIV-1 AG W/HIV-1&-2 AB AG IA: CPT | Performed by: ANESTHESIOLOGY

## 2018-02-02 PROCEDURE — 87340 HEPATITIS B SURFACE AG IA: CPT | Performed by: ANESTHESIOLOGY

## 2018-02-02 PROCEDURE — 36415 COLL VENOUS BLD VENIPUNCTURE: CPT | Performed by: ANESTHESIOLOGY

## 2018-03-06 NOTE — PROGRESS NOTES
[facility] Hutson  [address] [facility]  [phone] fax    Physical Therapy Discharge Summary    Patient name: Ofelia Hernandez Start of Care: 2017   Referral source: Humphrey Monsalve MD : 1960   Medical/Treatment Diagnosis: Other muscle spasm [O20.653]  Muscle spasm of back [M62.830] Onset Date:11/15/17   Prior Hospitalization: see medical history Provider#: [id number]   Medications: Verified on Patient Summary List     Comorbidities: depression, diabetes, arthritis, HTN, visual impaired   Prior Level of Function: amb with [facility] about 2 years ago, living with wife, 1 story, 2 steps with no rail, will put up some rail soon           Visits from Start of Care: 2    Missed Visits: 0    Reporting Period : 2018 to 2018    Summary of Care:  Goal:  Pt will be compliant with initial HEP once initiated to improve therapy outcomes   Status at last note/certification: n/a, unable to access due to pt's BP status and non-compliance  Status at discharge: not met    Goal: Pt will be able to tolerate 30 min PT session to improve overall strength and functional mobility. Status at last note/certification:  n/a, unable to access due to pt's BP status and non-compliance  Status at discharge: not met    Goal: Pt will improve FOTO score by 21 points to 52/100 to show improvement with functional mobility performance. Status at last note/certification:  n/a, unable to access due to pt's BP status and non-compliance  Status at discharge: not met    Goal: Pt will report average of </= 7/10 to improve QOL .    Status at last note/certification:  n/a, unable to access due to pt's BP status and non-compliance  Status at discharge: not met    Goal: Pt will ambulate 150' with <7/10 pain in order to improve ease of household negotiation  Status at last note/certification:  n/a, unable to access due to pt's BP status and non-compliance  Status at discharge: not met      ASSESSMENT/RECOMMENDATIONS: pt present last on 1- with highly elevated BP (200s/100s mmHg range). Edu pt considering risks of current BP status and  pt was placed on hold once more time due to safety for physical activities. Pt didn't report back for PT, no clearance from MD for cont PT, will discharge at this time.      [x]Discontinue therapy: []Patient has reached or is progressing toward set goals      [x]Patient is non-compliant or has abdicated and BP status      []Due to lack of appreciable progress towards set goals    Mandy Rousseau, PT 3/6/2018 1:37 PM

## 2018-03-06 NOTE — PROGRESS NOTES
[facility] Siva Sake  [address] [facility]  [phone] fax    Physical Therapy Discharge Summary    Patient name: Nisreen Escudero Start of Care: 2017   Referral source: Rosie Thornton MD : 1960   Medical/Treatment Diagnosis: Other muscle spasm [G77.068]  Muscle spasm of back [M62.830] Onset Date:11/15/17   Prior Hospitalization: see medical history Provider#: [id number]   Medications: Verified on Patient Summary List     Comorbidities: depression, diabetes, arthritis, HTN, visual impaired   Prior Level of Function: amb with [facility]PC about 2 years ago, living with wife, 1 story, 2 steps with no rail, will put up some rail soon           Visits from Start of Care: 2    Missed Visits: 0    Reporting Period : 2018 to 2018    Summary of Care:  Goal:  Pt will be compliant with initial HEP once initiated to improve therapy outcomes   Status at last note/certification: n/a, unable to access due to pt's BP status and non-compliance  Status at discharge: not met    Goal: Pt will be able to tolerate 30 min PT session to improve overall strength and functional mobility. Status at last note/certification:  n/a, unable to access due to pt's BP status and non-compliance  Status at discharge: not met    Goal: Pt will improve FOTO score by 21 points to 52/100 to show improvement with functional mobility performance. Status at last note/certification:  n/a, unable to access due to pt's BP status and non-compliance  Status at discharge: not met    Goal: Pt will report average of </= 7/10 to improve QOL .    Status at last note/certification:  n/a, unable to access due to pt's BP status and non-compliance  Status at discharge: not met    Goal: Pt will ambulate 150' with <7/10 pain in order to improve ease of household negotiation  Status at last note/certification:  n/a, unable to access due to pt's BP status and non-compliance  Status at discharge: not met      ASSESSMENT/RECOMMENDATIONS: pt present last on 1- with highly elevated BP (200s/100s mmHg range). Edu pt considering risks of current BP status and  pt was placed on hold once more time due to safety for physical activities. Pt didn't report back for PT, no clearance from MD for cont PT, will discharge at this time.      [x]Discontinue therapy: []Patient has reached or is progressing toward set goals      [x]Patient is non-compliant or has abdicated and BP status      []Due to lack of appreciable progress towards set goals    Pino Whitney, PT 3/6/2018 1:37 PM

## 2018-03-08 RX ORDER — FINASTERIDE 5 MG/1
TABLET, FILM COATED ORAL
Qty: 30 TAB | Refills: 6 | Status: SHIPPED | OUTPATIENT
Start: 2018-03-08 | End: 2018-03-29 | Stop reason: SDUPTHER

## 2018-03-12 ENCOUNTER — HOSPITAL ENCOUNTER (INPATIENT)
Age: 58
LOS: 5 days | Discharge: HOME HEALTH | DRG: 682 | End: 2018-03-17
Attending: EMERGENCY MEDICINE | Admitting: INTERNAL MEDICINE
Payer: COMMERCIAL

## 2018-03-12 ENCOUNTER — APPOINTMENT (OUTPATIENT)
Dept: GENERAL RADIOLOGY | Age: 58
DRG: 682 | End: 2018-03-12
Attending: EMERGENCY MEDICINE
Payer: COMMERCIAL

## 2018-03-12 DIAGNOSIS — N28.9 KIDNEY DISEASE: Primary | ICD-10-CM

## 2018-03-12 DIAGNOSIS — I10 HYPERTENSION, UNSPECIFIED TYPE: ICD-10-CM

## 2018-03-12 LAB
ALBUMIN SERPL-MCNC: 2.3 G/DL (ref 3.4–5)
ALBUMIN/GLOB SERPL: 0.7 {RATIO} (ref 0.8–1.7)
ALP SERPL-CCNC: 87 U/L (ref 45–117)
ALT SERPL-CCNC: 18 U/L (ref 16–61)
ANION GAP SERPL CALC-SCNC: 12 MMOL/L (ref 3–18)
AST SERPL-CCNC: 21 U/L (ref 15–37)
BASOPHILS # BLD: 0 K/UL (ref 0–0.1)
BASOPHILS NFR BLD: 1 % (ref 0–2)
BILIRUB SERPL-MCNC: 0.3 MG/DL (ref 0.2–1)
BUN SERPL-MCNC: 60 MG/DL (ref 7–18)
BUN/CREAT SERPL: 5 (ref 12–20)
CALCIUM SERPL-MCNC: 8.4 MG/DL (ref 8.5–10.1)
CHLORIDE SERPL-SCNC: 108 MMOL/L (ref 100–108)
CO2 SERPL-SCNC: 21 MMOL/L (ref 21–32)
CREAT SERPL-MCNC: 12 MG/DL (ref 0.6–1.3)
DIFFERENTIAL METHOD BLD: ABNORMAL
EOSINOPHIL # BLD: 0.2 K/UL (ref 0–0.4)
EOSINOPHIL NFR BLD: 4 % (ref 0–5)
ERYTHROCYTE [DISTWIDTH] IN BLOOD BY AUTOMATED COUNT: 13.3 % (ref 11.6–14.5)
GLOBULIN SER CALC-MCNC: 3.3 G/DL (ref 2–4)
GLUCOSE SERPL-MCNC: 154 MG/DL (ref 74–99)
HBV SURFACE AG SER QL: <0.1 INDEX
HBV SURFACE AG SER QL: NEGATIVE
HCT VFR BLD AUTO: 29.4 % (ref 36–48)
HGB BLD-MCNC: 9.4 G/DL (ref 13–16)
INR PPP: 1 (ref 0.8–1.2)
LYMPHOCYTES # BLD: 1 K/UL (ref 0.9–3.6)
LYMPHOCYTES NFR BLD: 17 % (ref 21–52)
MCH RBC QN AUTO: 27.8 PG (ref 24–34)
MCHC RBC AUTO-ENTMCNC: 32 G/DL (ref 31–37)
MCV RBC AUTO: 87 FL (ref 74–97)
MONOCYTES # BLD: 0.8 K/UL (ref 0.05–1.2)
MONOCYTES NFR BLD: 14 % (ref 3–10)
NEUTS SEG # BLD: 3.8 K/UL (ref 1.8–8)
NEUTS SEG NFR BLD: 64 % (ref 40–73)
PLATELET # BLD AUTO: 314 K/UL (ref 135–420)
PMV BLD AUTO: 10.4 FL (ref 9.2–11.8)
POTASSIUM SERPL-SCNC: 4.1 MMOL/L (ref 3.5–5.5)
PROT SERPL-MCNC: 5.6 G/DL (ref 6.4–8.2)
PROTHROMBIN TIME: 13 SEC (ref 11.5–15.2)
RBC # BLD AUTO: 3.38 M/UL (ref 4.7–5.5)
SODIUM SERPL-SCNC: 141 MMOL/L (ref 136–145)
WBC # BLD AUTO: 5.9 K/UL (ref 4.6–13.2)

## 2018-03-12 PROCEDURE — 74011250636 HC RX REV CODE- 250/636: Performed by: EMERGENCY MEDICINE

## 2018-03-12 PROCEDURE — 74011250637 HC RX REV CODE- 250/637: Performed by: INTERNAL MEDICINE

## 2018-03-12 PROCEDURE — 99284 EMERGENCY DEPT VISIT MOD MDM: CPT

## 2018-03-12 PROCEDURE — 93005 ELECTROCARDIOGRAM TRACING: CPT

## 2018-03-12 PROCEDURE — 71045 X-RAY EXAM CHEST 1 VIEW: CPT

## 2018-03-12 PROCEDURE — 65270000029 HC RM PRIVATE

## 2018-03-12 PROCEDURE — 80053 COMPREHEN METABOLIC PANEL: CPT

## 2018-03-12 PROCEDURE — 87340 HEPATITIS B SURFACE AG IA: CPT | Performed by: INTERNAL MEDICINE

## 2018-03-12 PROCEDURE — 96374 THER/PROPH/DIAG INJ IV PUSH: CPT

## 2018-03-12 PROCEDURE — 85610 PROTHROMBIN TIME: CPT | Performed by: EMERGENCY MEDICINE

## 2018-03-12 PROCEDURE — 74011250637 HC RX REV CODE- 250/637: Performed by: EMERGENCY MEDICINE

## 2018-03-12 PROCEDURE — 86706 HEP B SURFACE ANTIBODY: CPT | Performed by: INTERNAL MEDICINE

## 2018-03-12 PROCEDURE — 85025 COMPLETE CBC W/AUTO DIFF WBC: CPT

## 2018-03-12 RX ORDER — HYDRALAZINE HYDROCHLORIDE 20 MG/ML
10 INJECTION INTRAMUSCULAR; INTRAVENOUS ONCE
Status: COMPLETED | OUTPATIENT
Start: 2018-03-12 | End: 2018-03-12

## 2018-03-12 RX ORDER — HYDRALAZINE HYDROCHLORIDE 50 MG/1
50 TABLET, FILM COATED ORAL
Status: COMPLETED | OUTPATIENT
Start: 2018-03-12 | End: 2018-03-12

## 2018-03-12 RX ORDER — CLONIDINE HYDROCHLORIDE 0.1 MG/1
0.2 TABLET ORAL
Status: COMPLETED | OUTPATIENT
Start: 2018-03-12 | End: 2018-03-12

## 2018-03-12 RX ORDER — CARVEDILOL 12.5 MG/1
12.5 TABLET ORAL
Status: COMPLETED | OUTPATIENT
Start: 2018-03-12 | End: 2018-03-12

## 2018-03-12 RX ADMIN — CARVEDILOL 12.5 MG: 12.5 TABLET, FILM COATED ORAL at 23:51

## 2018-03-12 RX ADMIN — HYDRALAZINE HYDROCHLORIDE 50 MG: 50 TABLET, FILM COATED ORAL at 23:51

## 2018-03-12 RX ADMIN — CLONIDINE HYDROCHLORIDE 0.2 MG: 0.1 TABLET ORAL at 23:06

## 2018-03-12 RX ADMIN — HYDRALAZINE HYDROCHLORIDE 10 MG: 20 INJECTION INTRAMUSCULAR; INTRAVENOUS at 20:37

## 2018-03-12 NOTE — IP AVS SNAPSHOT
Onea Ayana 
 
 
 [address] [facility] [address] [place] [address] Patient: Lucien Snowden MRN: [id number] :1960 A check feliberto indicates which time of day the medication should be taken. My Medications START taking these medications Instructions Each Dose to Equal  
 Morning Noon Evening Bedtime  
 b complex-vitamin c-folic acid 1 mg capsule Commonly known as:  Abiel Roxiecleoff Start taking on:  3/18/2018 Your last dose was: Your next dose is: Take 1 Cap by mouth daily. 1 Cap  
    
   
   
   
  
 calcium acetate 667 mg Cap Commonly known as:  PHOSLO Your last dose was: Your next dose is: Take 1 Cap by mouth three (3) times daily (with meals). 1 Cap  
    
   
   
   
  
 insulin glargine 100 unit/mL injection Commonly known as:  LANTUS Your last dose was: Your next dose is:    
   
   
 8 Units by SubCUTAneous route nightly. 8 Units  
    
   
   
   
  
 insulin lispro 100 unit/mL injection Commonly known as:  HUMALOG Your last dose was: Your next dose is:    
   
   
 Blood Sugar (mg/dL): <150 =0 units; 150 -199 =2 units; 200 -249 =4 units; 250 -299 =6 units; 300 -349 =8 units; 350 and above =10 units. Insulin Syringe-Needle U-100 0.5 mL 29 gauge x 1/2\" Syrg Commonly known as:  INSULIN SYRINGE ULTRAFINE Your last dose was: Your next dose is:    
   
   
 1 Each by Does Not Apply route Before breakfast, lunch, dinner and at bedtime. And for use with lantus qhs.  
 1 Each  
    
   
   
   
  
 losartan 50 mg tablet Commonly known as:  COZAAR Start taking on:  3/18/2018 Your last dose was: Your next dose is: Take 1 Tab by mouth daily. 50 mg CHANGE how you take these medications Instructions Each Dose to Equal  
 Morning Noon Evening Bedtime fluticasone 50 mcg/actuation nasal spray Commonly known as:  Ariana Perez What changed:   
- when to take this 
- reasons to take this Your last dose was: Your next dose is:    
   
   
 1 Spray by Both Nostrils route two (2) times a day. 1 Spray CONTINUE taking these medications Instructions Each Dose to Equal  
 Morning Noon Evening Bedtime  
 amLODIPine 10 mg tablet Commonly known as:  Niya Blandon Your last dose was: Your next dose is: Take 1 Tab by mouth every evening. 10 mg Blood-Glucose Meter monitoring kit Your last dose was: Your next dose is:    
   
   
 by Does Not Apply route. One touch ultra2  
     
   
   
   
  
 cloNIDine 0.3 mg/24 hr  
Commonly known as:  CATAPRES Your last dose was: Your next dose is:    
   
   
 APPLY 1 PATCH TO SKIN ONCE EVERY 7 DAYS  
     
   
   
   
  
 cyclobenzaprine 10 mg tablet Commonly known as:  FLEXERIL Your last dose was: Your next dose is: Take 1 Tab by mouth three (3) times daily as needed for Muscle Spasm(s). 10 mg  
    
   
   
   
  
 docusate sodium 100 mg capsule Commonly known as:  Montemarcell Roch Your last dose was: Your next dose is: Take 1 Cap by mouth two (2) times a day. 100 mg  
    
   
   
   
  
 finasteride 5 mg tablet Commonly known as:  PROSCAR Your last dose was: Your next dose is: TAKE 1 TABLET BY MOUTH DAILY. glucose blood VI test strips strip Commonly known as:  ASCENSIA AUTODISC VI, ONE TOUCH ULTRA TEST VI Your last dose was: Your next dose is:    
   
   
 Test twice daily:  Fasting before breakfast and 2 hours after dinner (log readings), One touch ultra 2 test strips please; Dx: 250.02 Iron 325 mg (65 mg iron) tablet Generic drug:  ferrous sulfate Your last dose was: Your next dose is: Take  by mouth Daily (before breakfast). Take 1 tablet by mouth once a week as per patient. Lancets Misc Commonly known as: One Touch [employer] Your last dose was: Your next dose is:    
   
   
 Test twice daily: Once in the morning fasting, then two hours after dinner (log results)  
     
   
   
   
  
 mupirocin 2 % ointment Commonly known as:  [employer] Your last dose was: Your next dose is: OTHER Your last dose was: Your next dose is: PGIIL/P CRM - Apply 1 -2 grams ( 1-2 pumps) to left foot 3 - 4 times daily. prednisoLONE acetate 1 % ophthalmic suspension Commonly known as:  PRED FORTE Your last dose was: Your next dose is:    
   
   
 Administer 1 Drop to left eye three (3) times daily. 1 Drop  
    
   
   
   
  
 rosuvastatin 20 mg tablet Commonly known as:  CRESTOR Your last dose was: Your next dose is: Take 1 Tab by mouth nightly. 20 mg  
    
   
   
   
  
 TYLENOL EXTRA STRENGTH 500 mg tablet Generic drug:  acetaminophen Your last dose was: Your next dose is: Take  by mouth every six (6) hours as needed for Pain. VITAMIN B-12 1,000 mcg tablet Generic drug:  cyanocobalamin Your last dose was: Your next dose is: Take 1,000 mcg by mouth daily. 1000 mcg STOP taking these medications   
 bumetanide 2 mg tablet Commonly known as:  BUMEX  
   
  
 calcitRIOL 0.25 mcg capsule Commonly known as:  ROCALTROL  
   
  
 carvedilol 12.5 mg tablet Commonly known as:  COREG  
   
  
 hydrALAZINE 50 mg tablet Commonly known as:  APRESOLINE  
   
  
 VGO 36 Jacqueline Generic drug:  sub-q insulin device, 40 unit Where to Get Your Medications Information on where to get these meds will be given to you by the nurse or doctor. ! Ask your nurse or doctor about these medications  
  b complex-vitamin c-folic acid 1 mg capsule  
 calcium acetate 667 mg Cap  
 insulin glargine 100 unit/mL injection  
 insulin lispro 100 unit/mL injection Insulin Syringe-Needle U-100 0.5 mL 29 gauge x 1/2\" Syrg  
 losartan 50 mg tablet

## 2018-03-12 NOTE — IP AVS SNAPSHOT
[address] [place] [address] Patient: Gage Moore MRN: [id number] :1960 About your hospitalization You were admitted on:  2018 You last received care in the:  [facility] [address] You were discharged on:  2018 Why you were hospitalized Your primary diagnosis was:  Not on File Your diagnoses also included:  Kidney Disease, Esrd (End Stage Renal Disease) (Hcc), Diabetes Mellitus Due To Underlying Condition, Uncontrolled, With Diabetic Nephropathy, With Long-Term Current Use Of Insulin (Hcc), Diabetic Nephropathy (Hcc), Htn (Hypertension), Hld (Hyperlipidemia), Anemia, Hypoalbuminemia Follow-up Information Follow up With Details Comments Contact Info Maicol Mujica MD On 3/20/2018 @1:00PM [facility] [address] [place] [address] [address] [address] 
[phone] Maxime Shon On 2018 @9:20AM lito/Agueda  [address] 
[phone] Your Scheduled Appointments 2018  1:00 PM EDT TRANSITIONAL CARE MANAGEMENT with Maicol Mujica MD  
[facility] [address] [place] [address]) [address] [address]  
[phone] 2018  9:00 AM EDT Follow Up with Moira Boles MD  
[facility] ([address]) [facility] [address] [address] [address] [phone] Wednesday May 09, 2018  9:00 AM EDT Office Visit with Maicol Mujica MD  
[facility] [address] [place] [address] [address] [address] [address] [address]  
[phone] Discharge Orders None A check feliberto indicates which time of day the medication should be taken. My Medications START taking these medications Instructions Each Dose to Equal  
 Morning Noon Evening Bedtime b complex-vitamin c-folic acid 1 mg capsule Commonly known as:  Feliciano Villalba Start taking on:  3/18/2018 Your last dose was: Your next dose is: Take 1 Cap by mouth daily. 1 Cap  
    
   
   
   
  
 calcium acetate 667 mg Cap Commonly known as:  PHOSLO Your last dose was: Your next dose is: Take 1 Cap by mouth three (3) times daily (with meals). 1 Cap  
    
   
   
   
  
 insulin glargine 100 unit/mL injection Commonly known as:  LANTUS Your last dose was: Your next dose is:    
   
   
 8 Units by SubCUTAneous route nightly. 8 Units  
    
   
   
   
  
 insulin lispro 100 unit/mL injection Commonly known as:  HUMALOG Your last dose was: Your next dose is:    
   
   
 Blood Sugar (mg/dL): <150 =0 units; 150 -199 =2 units; 200 -249 =4 units; 250 -299 =6 units; 300 -349 =8 units; 350 and above =10 units. Insulin Syringe-Needle U-100 0.5 mL 29 gauge x 1/2\" Syrg Commonly known as:  INSULIN SYRINGE ULTRAFINE Your last dose was: Your next dose is:    
   
   
 1 Each by Does Not Apply route Before breakfast, lunch, dinner and at bedtime. And for use with lantus qhs.  
 1 Each  
    
   
   
   
  
 losartan 50 mg tablet Commonly known as:  COZAAR Start taking on:  3/18/2018 Your last dose was: Your next dose is: Take 1 Tab by mouth daily. 50 mg CHANGE how you take these medications Instructions Each Dose to Equal  
 Morning Noon Evening Bedtime  
 fluticasone 50 mcg/actuation nasal spray Commonly known as:  Parish Mcclain What changed:   
- when to take this 
- reasons to take this Your last dose was: Your next dose is:    
   
   
 1 Spray by Both Nostrils route two (2) times a day. 1 Spray CONTINUE taking these medications  Instructions Each Dose to Equal  
 Morning Noon Evening Bedtime  
 amLODIPine 10 mg tablet Commonly known as:  Felicia Bull Your last dose was: Your next dose is: Take 1 Tab by mouth every evening. 10 mg Blood-Glucose Meter monitoring kit Your last dose was: Your next dose is:    
   
   
 by Does Not Apply route. One touch ultra2  
     
   
   
   
  
 cloNIDine 0.3 mg/24 hr  
Commonly known as:  CATAPRES Your last dose was: Your next dose is:    
   
   
 APPLY 1 PATCH TO SKIN ONCE EVERY 7 DAYS  
     
   
   
   
  
 cyclobenzaprine 10 mg tablet Commonly known as:  FLEXERIL Your last dose was: Your next dose is: Take 1 Tab by mouth three (3) times daily as needed for Muscle Spasm(s). 10 mg  
    
   
   
   
  
 docusate sodium 100 mg capsule Commonly known as:  Jacqueline Lundborg Your last dose was: Your next dose is: Take 1 Cap by mouth two (2) times a day. 100 mg  
    
   
   
   
  
 finasteride 5 mg tablet Commonly known as:  PROSCAR Your last dose was: Your next dose is: TAKE 1 TABLET BY MOUTH DAILY. glucose blood VI test strips strip Commonly known as:  ASCENSIA AUTODISC VI, ONE TOUCH ULTRA TEST VI Your last dose was: Your next dose is:    
   
   
 Test twice daily:  Fasting before breakfast and 2 hours after dinner (log readings), One touch ultra 2 test strips please; Dx: 250.02 Iron 325 mg (65 mg iron) tablet Generic drug:  ferrous sulfate Your last dose was: Your next dose is: Take  by mouth Daily (before breakfast). Take 1 tablet by mouth once a week as per patient. Lancets Misc Commonly known as: One Touch Ju Destorm Your last dose was:     
   
Your next dose is:    
   
   
 Test twice daily: Once in the morning fasting, then two hours after dinner (log results)  
     
   
   
   
  
 mupirocin 2 % ointment Commonly known as:  [facility] Your last dose was: Your next dose is: OTHER Your last dose was: Your next dose is: PGIIL/P CRM - Apply 1 -2 grams ( 1-2 pumps) to left foot 3 - 4 times daily. prednisoLONE acetate 1 % ophthalmic suspension Commonly known as:  PRED FORTE Your last dose was: Your next dose is:    
   
   
 Administer 1 Drop to left eye three (3) times daily. 1 Drop  
    
   
   
   
  
 rosuvastatin 20 mg tablet Commonly known as:  CRESTOR Your last dose was: Your next dose is: Take 1 Tab by mouth nightly. 20 mg  
    
   
   
   
  
 TYLENOL EXTRA STRENGTH 500 mg tablet Generic drug:  acetaminophen Your last dose was: Your next dose is: Take  by mouth every six (6) hours as needed for Pain. VITAMIN B-12 1,000 mcg tablet Generic drug:  cyanocobalamin Your last dose was: Your next dose is: Take 1,000 mcg by mouth daily. 1000 mcg STOP taking these medications   
 bumetanide 2 mg tablet Commonly known as:  BUMEX  
   
  
 calcitRIOL 0.25 mcg capsule Commonly known as:  ROCALTROL  
   
  
 carvedilol 12.5 mg tablet Commonly known as:  COREG  
   
  
 hydrALAZINE 50 mg tablet Commonly known as:  APRESOLINE  
   
  
 VGO 36 Jacqueline Generic drug:  sub-q insulin device, 40 unit Where to Get Your Medications Information on where to get these meds will be given to you by the nurse or doctor. ! Ask your nurse or doctor about these medications  
  b complex-vitamin c-folic acid 1 mg capsule  
 calcium acetate 667 mg Cap  
 insulin glargine 100 unit/mL injection  
 insulin lispro 100 unit/mL injection Insulin Syringe-Needle U-100 0.5 mL 29 gauge x 1/2\" Syrg  
 losartan 50 mg tablet Discharge Instructions Learning About Diabetes Food Guidelines Your Care Instructions Meal planning is important to manage diabetes. It helps keep your blood sugar at a target level (which you set with your doctor). You don't have to eat special foods. You can eat what your family eats, including sweets once in a while. But you do have to pay attention to how often you eat and how much you eat of certain foods. You may want to work with a dietitian or a certified diabetes educator (CDE) to help you plan meals and snacks. A dietitian or CDE can also help you lose weight if that is one of your goals. What should you know about eating carbs? Managing the amount of carbohydrate (carbs) you eat is an important part of healthy meals when you have diabetes. Carbohydrate is found in many foods. · Learn which foods have carbs. And learn the amounts of carbs in different foods. ¨ Bread, cereal, pasta, and rice have about 15 grams of carbs in a serving. A serving is 1 slice of bread (1 ounce), ½ cup of cooked cereal, or 1/3 cup of cooked pasta or rice. ¨ Fruits have 15 grams of carbs in a serving. A serving is 1 small fresh fruit, such as an apple or orange; ½ of a banana; ½ cup of cooked or canned fruit; ½ cup of fruit juice; 1 cup of melon or raspberries; or 2 tablespoons of dried fruit. ¨ Milk and no-sugar-added yogurt have 15 grams of carbs in a serving. A serving is 1 cup of milk or 2/3 cup of no-sugar-added yogurt. ¨ Starchy vegetables have 15 grams of carbs in a serving. A serving is ½ cup of mashed potatoes or sweet potato; 1 cup winter squash; ½ of a small baked potato; ½ cup of cooked beans; or ½ cup cooked corn or green peas. · Learn how much carbs to eat each day and at each meal. A dietitian or CDE can teach you how to keep track of the amount of carbs you eat.  This is called carbohydrate counting. · If you are not sure how to count carbohydrate grams, use the Plate Method to plan meals. It is a good, quick way to make sure that you have a balanced meal. It also helps you spread carbs throughout the day. ¨ Divide your plate by types of foods. Put non-starchy vegetables on half the plate, meat or other protein food on one-quarter of the plate, and a grain or starchy vegetable in the final quarter of the plate. To this you can add a small piece of fruit and 1 cup of milk or yogurt, depending on how many carbs you are supposed to eat at a meal. 
· Try to eat about the same amount of carbs at each meal. Do not \"save up\" your daily allowance of carbs to eat at one meal. 
· Proteins have very little or no carbs per serving. Examples of proteins are beef, chicken, turkey, fish, eggs, tofu, cheese, cottage cheese, and peanut butter. A serving size of meat is 3 ounces, which is about the size of a deck of cards. Examples of meat substitute serving sizes (equal to 1 ounce of meat) are 1/4 cup of cottage cheese, 1 egg, 1 tablespoon of peanut butter, and ½ cup of tofu. How can you eat out and still eat healthy? · Learn to estimate the serving sizes of foods that have carbohydrate. If you measure food at home, it will be easier to estimate the amount in a serving of restaurant food. · If the meal you order has too much carbohydrate (such as potatoes, corn, or baked beans), ask to have a low-carbohydrate food instead. Ask for a salad or green vegetables. · If you use insulin, check your blood sugar before and after eating out to help you plan how much to eat in the future. · If you eat more carbohydrate at a meal than you had planned, take a walk or do other exercise. This will help lower your blood sugar. What else should you know? · Limit saturated fat, such as the fat from meat and dairy products.  This is a healthy choice because people who have diabetes are at higher risk of heart disease. So choose lean cuts of meat and nonfat or low-fat dairy products. Use olive or canola oil instead of butter or shortening when cooking. · Don't skip meals. Your blood sugar may drop too low if you skip meals and take insulin or certain medicines for diabetes. · Check with your doctor before you drink alcohol. Alcohol can cause your blood sugar to drop too low. Alcohol can also cause a bad reaction if you take certain diabetes medicines. Follow-up care is a key part of your treatment and safety. Be sure to make and go to all appointments, and call your doctor if you are having problems. It's also a good idea to know your test results and keep a list of the medicines you take. Where can you learn more? Go to http://hoang-jayden.info/. Enter I018 in the search box to learn more about \"Learning About Diabetes Food Guidelines. \" Current as of: March 13, 2017 Content Version: 11.4 © 1065-0142 [employer]. Care instructions adapted under license by [employer] (which disclaims liability or warranty for this information). If you have questions about a medical condition or this instruction, always ask your healthcare professional. [place] [address] any warranty or liability for your use of this information. High Blood Pressure: Care Instructions Your Care Instructions If your blood pressure is usually above 140/90, you have high blood pressure, or hypertension. That means the top number is 140 or higher or the bottom number is 90 or higher, or both. Despite what a lot of people think, high blood pressure usually doesn't cause headaches or make you feel dizzy or lightheaded. It usually has no symptoms. But it does increase your risk for heart attack, stroke, and kidney or eye damage. The higher your blood pressure, the more your risk increases. Your doctor will give you a goal for your blood pressure.  Your goal will be based on your health and your age. An example of a goal is to keep your blood pressure below 140/90. Lifestyle changes, such as eating healthy and being active, are always important to help lower blood pressure. You might also take medicine to reach your blood pressure goal. 
Follow-up care is a key part of your treatment and safety. Be sure to make and go to all appointments, and call your doctor if you are having problems. It's also a good idea to know your test results and keep a list of the medicines you take. How can you care for yourself at home? Medical treatment · If you stop taking your medicine, your blood pressure will go back up. You may take one or more types of medicine to lower your blood pressure. Be safe with medicines. Take your medicine exactly as prescribed. Call your doctor if you think you are having a problem with your medicine. · Talk to your doctor before you start taking aspirin every day. Aspirin can help certain people lower their risk of a heart attack or stroke. But taking aspirin isn't right for everyone, because it can cause serious bleeding. · See your doctor regularly. You may need to see the doctor more often at first or until your blood pressure comes down. · If you are taking blood pressure medicine, talk to your doctor before you take decongestants or anti-inflammatory medicine, such as ibuprofen. Some of these medicines can raise blood pressure. · Learn how to check your blood pressure at home. Lifestyle changes · Stay at a healthy weight. This is especially important if you put on weight around the waist. Losing even 10 pounds can help you lower your blood pressure. · If your doctor recommends it, get more exercise. Walking is a good choice. Bit by bit, increase the amount you walk every day. Try for at least 30 minutes on most days of the week. You also may want to swim, bike, or do other activities. · Avoid or limit alcohol. Talk to your doctor about whether you can drink any alcohol. · Try to limit how much sodium you eat to less than 2,300 milligrams (mg) a day. Your doctor may ask you to try to eat less than 1,500 mg a day. · Eat plenty of fruits (such as bananas and oranges), vegetables, legumes, whole grains, and low-fat dairy products. · Lower the amount of saturated fat in your diet. Saturated fat is found in animal products such as milk, cheese, and meat. Limiting these foods may help you lose weight and also lower your risk for heart disease. · Do not smoke. Smoking increases your risk for heart attack and stroke. If you need help quitting, talk to your doctor about stop-smoking programs and medicines. These can increase your chances of quitting for good. When should you call for help? Call 911 anytime you think you may need emergency care. This may mean having symptoms that suggest that your blood pressure is causing a serious heart or blood vessel problem. Your blood pressure may be over 180/110. ? For example, call 911 if: 
? · You have symptoms of a heart attack. These may include: ¨ Chest pain or pressure, or a strange feeling in the chest. 
¨ Sweating. ¨ Shortness of breath. ¨ Nausea or vomiting. ¨ Pain, pressure, or a strange feeling in the back, neck, jaw, or upper belly or in one or both shoulders or arms. ¨ Lightheadedness or sudden weakness. ¨ A fast or irregular heartbeat. ? · You have symptoms of a stroke. These may include: 
¨ Sudden numbness, tingling, weakness, or loss of movement in your face, arm, or leg, especially on only one side of your body. ¨ Sudden vision changes. ¨ Sudden trouble speaking. ¨ Sudden confusion or trouble understanding simple statements. ¨ Sudden problems with walking or balance. ¨ A sudden, severe headache that is different from past headaches. ? · You have severe back or belly pain. ?Do not wait until your blood pressure comes down on its own. Get help right away. ?Call your doctor now or seek immediate care if: 
? · Your blood pressure is much higher than normal (such as 180/110 or higher), but you don't have symptoms. ? · You think high blood pressure is causing symptoms, such as: ¨ Severe headache. ¨ Blurry vision. ? Watch closely for changes in your health, and be sure to contact your doctor if: 
? · Your blood pressure measures 140/90 or higher at least 2 times. That means the top number is 140 or higher or the bottom number is 90 or higher, or both. ? · You think you may be having side effects from your blood pressure medicine. ? · Your blood pressure is usually normal, but it goes above normal at least 2 times. Where can you learn more? Go to http://hoang-jayden.info/. Enter G013 in the search box to learn more about \"High Blood Pressure: Care Instructions. \" Current as of: September 21, 2016 Content Version: 11.4 © 6855-8905 [employer]. Care instructions adapted under license by [employer] (which disclaims liability or warranty for this information). If you have questions about a medical condition or this instruction, always ask your healthcare professional. [place] [address] any warranty or liability for your use of this information. Kidney Dialysis: Care Instructions Your Care Instructions Dialysis is a process that filters wastes from the blood when your kidneys can no longer do the job. It is not a cure, but it can help you live longer and feel better. It is a lifesaving treatment when you have kidney failure. Normal kidneys work 24 hours a day to clean wastes from your blood. Your kidneys are not able to do this job, so a process called dialysis will do some of the work for your kidneys.  You and your doctor will decide which type of dialysis you should have. Peritoneal dialysis uses the lining of your belly (peritoneum) to filter your blood. You can do it at home, on a daily basis. Hemodialysis uses a man-made filter called a dialyzer to clean your blood. Most people need to go to a hospital or clinic 3 days a week for several hours each time. Sometimes hemodialysis can be done at home. It is normal to have questions about your treatment, and you have a right to know what is happening to you. Learning about dialysis can help you take an active role in your treatment. Dialysis does not cure kidney disease, but it can help you live longer and feel better. You will need to follow your diet and treatment schedule carefully. Follow-up care is a key part of your treatment and safety. Be sure to make and go to all appointments, and call your doctor if you are having problems. It's also a good idea to know your test results and keep a list of the medicines you take. What do you need to know about peritoneal dialysis? Peritoneal dialysis uses the lining of your belly (or peritoneal membrane) to filter your blood. Before you can begin peritoneal dialysis, your doctor will need to place a thin tube called a catheter in your belly. This is the dialysis access. · Peritoneal dialysis can be done at home or in any clean place. You may be able to do it while you sleep. · You can do it by yourself. You do not have to rely on help from others. · You can do it at the times you choose as long as you do the right number of treatments. · It has to be done every day of the week. · Some people find it hard to do all the required steps. · It increases your chance for a serious infection of the lining of the belly (peritoneum). What do you need to know about hemodialysis? Hemodialysis uses a man-made membrane called a dialyzer to clean your blood.  You are connected to the dialyzer by tubes attached to your blood vessels. Before you start hemodialysis, your doctor will create a site where the blood can flow in and out of your body during your dialysis sessions. This site is called the vascular access. It may be a fistula, made by connecting an artery and a vein. Or it may be a graft, which is a tube implanted under your skin. · Hemodialysis is done mainly by trained health workers who can watch for any problems. · It allows you to be in contact with other people having dialysis. This can help provide emotional support. · You can schedule your treatments in the evenings so you can keep working. · You may be able to do home hemodialysis, which gives you more control over your schedule. · It usually needs to be done on a set schedule 3 times a week. · It can cause side effects. The most common side effects are low blood pressure and muscle cramps. These can often be treated easily. · It requires needle sticks during every treatment, which bothers some people. Others get used to it and even do the needle sticks themselves. How can you care for yourself at home? · Be sure to have all of your dialysis sessions. Do not try to shorten or skip your sessions. You have a better chance of a longer and healthier life by getting your full treatment. · Your doctor or health care team will show you the steps you need to go through each day before, during, and after dialysis. Be sure to follow these steps. If you do not understand a step, talk to your team. 
· Your doctor and dietitian will help you design menus that follow your diet. Be sure to follow your diet guidelines. ¨ You will need to limit fluids and certain foods that contain salt (sodium), potassium, and phosphorus. ¨ You may need to follow a heart-healthy diet to keep the fat (cholesterol) in your blood under control. ¨ You may need higher levels of protein in your diet. · Your doctor may recommend certain vitamins.  But do not take any other medicine, including over-the-counter medicines, vitamins, and herbal products, without talking to your doctor first. 
· Do not smoke. Smoking raises your risk of many health problems, including more kidney damage. If you need help quitting, talk to your doctor about stop-smoking programs and medicines. These can increase your chances of quitting for good. · Do not take ibuprofen (Advil, Motrin), naproxen (Aleve), or similar medicines, unless your doctor tells you to. These medicines may make kidney problems worse. When should you call for help? Call your doctor now or seek immediate medical care if: 
? · You have a fever. ? · You are dizzy or lightheaded, or you feel like you may faint. ? · You are confused or cannot think clearly. ? · You have new or worse nausea or vomiting. ? · You have new or more blood in your urine. ? · You have new swelling. ? Watch closely for changes in your health, and be sure to contact your doctor if: 
? · You do not get better as expected. Where can you learn more? Go to http://hoang-jayden.info/. Enter D341 in the search box to learn more about \"Kidney Dialysis: Care Instructions. \" Current as of: May 12, 2017 Content Version: 11.4 © 4594-6477 [employer]. Care instructions adapted under license by [employer] (which disclaims liability or warranty for this information). If you have questions about a medical condition or this instruction, always ask your healthcare professional. [place] [address] any warranty or liability for your use of this information. [employer] Announcement We are excited to announce that we are making your provider's discharge notes available to you in CloudCheckr. You will see these notes when they are completed and signed by the physician that discharged you from your recent hospital stay.   If you have any questions or concerns about any information you see in [facility], please call the Health Information Department where you were seen or reach out to your Primary Care Provider for more information about your plan of care. Introducing \[facility] [place] [facility]\"" & HEALTH SERVICES! Mykel Miller introduces [facility] patient portal. Now you can access parts of your medical record, email your doctor's office, and request medication refills online. 1. In your internet browser, go to [email or web address] 2. Click on the First Time User? Click Here link in the Sign In box. You will see the New Member Sign Up page. 3. Enter your Motwin Access Code exactly as it appears below. You will not need to use this code after youve completed the sign-up process. If you do not sign up before the expiration date, you must request a new code. · Motwin Access Code: [email or web address] Expires: 4/10/2018 10:17 AM 
 
4. Enter the last four digits of your Social Security Number (xxxx) and Date of Birth (mm/dd/yyyy) as indicated and click Submit. You will be taken to the next sign-up page. 5. Create a Motwin ID. This will be your Motwin login ID and cannot be changed, so think of one that is secure and easy to remember. 6. Create a Motwin password. You can change your password at any time. 7. Enter your Password Reset Question and Answer. This can be used at a later time if you forget your password. 8. Enter your e-mail address. You will receive e-mail notification when new information is available in [address] 9. Click Sign Up. You can now view and download portions of your medical record. 10. Click the Download Summary menu link to download a portable copy of your medical information. If you have questions, please visit the Frequently Asked Questions section of the Motwin website. Remember, Motwin is NOT to be used for urgent needs. For medical emergencies, dial 911. Now available from your iPhone and Android! Providers Seen During Your Hospitalization Provider Specialty Primary office phone Ivonne Parr MD Emergency Medicine [phone] Geeta Joseph MD Internal Medicine [phone] Cristian Saucedo MD Internal Medicine [phone] Kirstie Leavitt MD Internal Medicine [phone] Your Primary Care Physician (PCP) Primary Care Physician Office Phone Office Fax Comfort Gutiérrez [phone] You are allergic to the following Allergen Reactions Bactrim (Sulfamethoprim Ds) Nausea and Vomiting Recent Documentation Height Weight BMI Smoking Status 1.676 m 88.6 kg 31.53 kg/m2 Never Smoker Emergency Contacts Name Discharge Info Relation Home Work Mobile [address] [facility] CAREGIVER [3] Spouse [3] [phone] Patient Belongings The following personal items are in your possession at time of discharge: 
  Dental Appliances: None  Visual Aid: None      Home Medications: None   Jewelry: None  Clothing: Footwear, Hat, Jacket/Coat, Pants, Shirt, Undergarments, Socks, With patient    Other Valuables: None Please provide this summary of care documentation to your next provider. Signatures-by signing, you are acknowledging that this After Visit Summary has been reviewed with you and you have received a copy. Patient Signature:  ____________________________________________________________ Date:  ____________________________________________________________  
  
[place] Weber Provider Signature:  ____________________________________________________________ Date:  ____________________________________________________________

## 2018-03-12 NOTE — ED NOTES
Pt is a patient of Dr. Alysa Li and was told to come to the ED for admission for Dialysis. I performed a brief evaluation, including history and physical, of the patient here in triage and I have determined that pt will need further treatment and evaluation from the main side ER physician. I have placed initial orders to help in expediting patients care.      March 12, 2018 at 7:31 PM - Alma Rosa Crenshaw        Visit Vitals    BP (!) 236/126 (BP 1 Location: Left arm, BP Patient Position: At rest)    Pulse 90    Temp 97.4 °F (36.3 °C)    Resp 20    Ht 5' 6\" (1.676 m)    Wt 90.7 kg (200 lb)    SpO2 98%    BMI 32.28 kg/m2

## 2018-03-12 NOTE — ED TRIAGE NOTES
Patient states that he was sent by [facility] to get dialysis. Patient informed the nurse that he is to get admitted.

## 2018-03-13 ENCOUNTER — APPOINTMENT (OUTPATIENT)
Dept: CARDIAC CATH/INVASIVE PROCEDURES | Age: 58
DRG: 682 | End: 2018-03-13
Attending: EMERGENCY MEDICINE
Payer: COMMERCIAL

## 2018-03-13 LAB
ALBUMIN SERPL-MCNC: 1.9 G/DL (ref 3.4–5)
ALBUMIN/GLOB SERPL: 0.5 {RATIO} (ref 0.8–1.7)
ALP SERPL-CCNC: 77 U/L (ref 45–117)
ALT SERPL-CCNC: 16 U/L (ref 16–61)
ANION GAP SERPL CALC-SCNC: 10 MMOL/L (ref 3–18)
APTT PPP: 29.7 SEC (ref 23–36.4)
AST SERPL-CCNC: 18 U/L (ref 15–37)
BASOPHILS # BLD: 0 K/UL (ref 0–0.1)
BASOPHILS NFR BLD: 0 % (ref 0–2)
BILIRUB SERPL-MCNC: 0.2 MG/DL (ref 0.2–1)
BNP SERPL-MCNC: ABNORMAL PG/ML (ref 0–900)
BUN SERPL-MCNC: 61 MG/DL (ref 7–18)
BUN/CREAT SERPL: 5 (ref 12–20)
CALCIUM SERPL-MCNC: 7.9 MG/DL (ref 8.5–10.1)
CHLORIDE SERPL-SCNC: 110 MMOL/L (ref 100–108)
CO2 SERPL-SCNC: 22 MMOL/L (ref 21–32)
CREAT SERPL-MCNC: 12.2 MG/DL (ref 0.6–1.3)
DIFFERENTIAL METHOD BLD: ABNORMAL
EOSINOPHIL # BLD: 0.2 K/UL (ref 0–0.4)
EOSINOPHIL NFR BLD: 5 % (ref 0–5)
ERYTHROCYTE [DISTWIDTH] IN BLOOD BY AUTOMATED COUNT: 13.2 % (ref 11.6–14.5)
EST. AVERAGE GLUCOSE BLD GHB EST-MCNC: 194 MG/DL
EST. AVERAGE GLUCOSE BLD GHB EST-MCNC: 197 MG/DL
GLOBULIN SER CALC-MCNC: 3.6 G/DL (ref 2–4)
GLUCOSE BLD STRIP.AUTO-MCNC: 104 MG/DL (ref 70–110)
GLUCOSE BLD STRIP.AUTO-MCNC: 117 MG/DL (ref 70–110)
GLUCOSE BLD STRIP.AUTO-MCNC: 138 MG/DL (ref 70–110)
GLUCOSE BLD STRIP.AUTO-MCNC: 166 MG/DL (ref 70–110)
GLUCOSE SERPL-MCNC: 145 MG/DL (ref 74–99)
HBA1C MFR BLD: 8.4 % (ref 4.2–5.6)
HBA1C MFR BLD: 8.5 % (ref 4.2–5.6)
HBV SURFACE AB SER QL IA: POSITIVE
HBV SURFACE AB SERPL IA-ACNC: 115.08 MIU/ML
HCT VFR BLD AUTO: 26.8 % (ref 36–48)
HEP BS AB COMMENT,HBSAC: NORMAL
HGB BLD-MCNC: 8.3 G/DL (ref 13–16)
INR PPP: 1 (ref 0.8–1.2)
LYMPHOCYTES # BLD: 0.8 K/UL (ref 0.9–3.6)
LYMPHOCYTES NFR BLD: 16 % (ref 21–52)
MAGNESIUM SERPL-MCNC: 1.9 MG/DL (ref 1.6–2.6)
MCH RBC QN AUTO: 27 PG (ref 24–34)
MCHC RBC AUTO-ENTMCNC: 31 G/DL (ref 31–37)
MCV RBC AUTO: 87.3 FL (ref 74–97)
MONOCYTES # BLD: 0.8 K/UL (ref 0.05–1.2)
MONOCYTES NFR BLD: 16 % (ref 3–10)
NEUTS SEG # BLD: 3.1 K/UL (ref 1.8–8)
NEUTS SEG NFR BLD: 63 % (ref 40–73)
PHOSPHATE SERPL-MCNC: 6.9 MG/DL (ref 2.5–4.9)
PLATELET # BLD AUTO: 271 K/UL (ref 135–420)
PMV BLD AUTO: 10 FL (ref 9.2–11.8)
POTASSIUM SERPL-SCNC: 4.2 MMOL/L (ref 3.5–5.5)
PROT SERPL-MCNC: 5.5 G/DL (ref 6.4–8.2)
PROTHROMBIN TIME: 13 SEC (ref 11.5–15.2)
RBC # BLD AUTO: 3.07 M/UL (ref 4.7–5.5)
SODIUM SERPL-SCNC: 142 MMOL/L (ref 136–145)
WBC # BLD AUTO: 4.9 K/UL (ref 4.6–13.2)

## 2018-03-13 PROCEDURE — 85025 COMPLETE CBC W/AUTO DIFF WBC: CPT | Performed by: INTERNAL MEDICINE

## 2018-03-13 PROCEDURE — 77030010507 HC ADH SKN DERMBND J&J -B

## 2018-03-13 PROCEDURE — 77030002986 HC SUT PROL J&J -A

## 2018-03-13 PROCEDURE — 99152 MOD SED SAME PHYS/QHP 5/>YRS: CPT

## 2018-03-13 PROCEDURE — 74011250637 HC RX REV CODE- 250/637: Performed by: INTERNAL MEDICINE

## 2018-03-13 PROCEDURE — 85610 PROTHROMBIN TIME: CPT | Performed by: INTERNAL MEDICINE

## 2018-03-13 PROCEDURE — 74011636637 HC RX REV CODE- 636/637: Performed by: INTERNAL MEDICINE

## 2018-03-13 PROCEDURE — C1894 INTRO/SHEATH, NON-LASER: HCPCS

## 2018-03-13 PROCEDURE — 87340 HEPATITIS B SURFACE AG IA: CPT | Performed by: INTERNAL MEDICINE

## 2018-03-13 PROCEDURE — 02H633Z INSERTION OF INFUSION DEVICE INTO RIGHT ATRIUM, PERCUTANEOUS APPROACH: ICD-10-PCS | Performed by: SURGERY

## 2018-03-13 PROCEDURE — 36415 COLL VENOUS BLD VENIPUNCTURE: CPT | Performed by: INTERNAL MEDICINE

## 2018-03-13 PROCEDURE — 36558 INSERT TUNNELED CV CATH: CPT

## 2018-03-13 PROCEDURE — 84100 ASSAY OF PHOSPHORUS: CPT | Performed by: INTERNAL MEDICINE

## 2018-03-13 PROCEDURE — 65270000029 HC RM PRIVATE

## 2018-03-13 PROCEDURE — 77030018719 HC DRSG PTCH ANTIMIC J&J -A

## 2018-03-13 PROCEDURE — 83880 ASSAY OF NATRIURETIC PEPTIDE: CPT | Performed by: INTERNAL MEDICINE

## 2018-03-13 PROCEDURE — 5A1D70Z PERFORMANCE OF URINARY FILTRATION, INTERMITTENT, LESS THAN 6 HOURS PER DAY: ICD-10-PCS | Performed by: INTERNAL MEDICINE

## 2018-03-13 PROCEDURE — 77030002933 HC SUT MCRYL J&J -A

## 2018-03-13 PROCEDURE — 83036 HEMOGLOBIN GLYCOSYLATED A1C: CPT | Performed by: INTERNAL MEDICINE

## 2018-03-13 PROCEDURE — 76937 US GUIDE VASCULAR ACCESS: CPT

## 2018-03-13 PROCEDURE — 82962 GLUCOSE BLOOD TEST: CPT

## 2018-03-13 PROCEDURE — 85730 THROMBOPLASTIN TIME PARTIAL: CPT | Performed by: INTERNAL MEDICINE

## 2018-03-13 PROCEDURE — 74011250636 HC RX REV CODE- 250/636: Performed by: SURGERY

## 2018-03-13 PROCEDURE — 74011250636 HC RX REV CODE- 250/636: Performed by: INTERNAL MEDICINE

## 2018-03-13 PROCEDURE — C1750 CATH, HEMODIALYSIS,LONG-TERM: HCPCS

## 2018-03-13 PROCEDURE — 80053 COMPREHEN METABOLIC PANEL: CPT | Performed by: INTERNAL MEDICINE

## 2018-03-13 PROCEDURE — 83735 ASSAY OF MAGNESIUM: CPT | Performed by: INTERNAL MEDICINE

## 2018-03-13 PROCEDURE — 90935 HEMODIALYSIS ONE EVALUATION: CPT

## 2018-03-13 PROCEDURE — 74011000250 HC RX REV CODE- 250: Performed by: INTERNAL MEDICINE

## 2018-03-13 PROCEDURE — 74011250636 HC RX REV CODE- 250/636

## 2018-03-13 PROCEDURE — 74011000250 HC RX REV CODE- 250: Performed by: SURGERY

## 2018-03-13 RX ORDER — AMLODIPINE BESYLATE 10 MG/1
10 TABLET ORAL EVERY EVENING
Status: DISCONTINUED | OUTPATIENT
Start: 2018-03-13 | End: 2018-03-17 | Stop reason: HOSPADM

## 2018-03-13 RX ORDER — CALCIUM ACETATE 667 MG/1
1 CAPSULE ORAL
Status: DISCONTINUED | OUTPATIENT
Start: 2018-03-13 | End: 2018-03-17 | Stop reason: HOSPADM

## 2018-03-13 RX ORDER — SODIUM CHLORIDE 0.9 % (FLUSH) 0.9 %
5-10 SYRINGE (ML) INJECTION AS NEEDED
Status: DISCONTINUED | OUTPATIENT
Start: 2018-03-13 | End: 2018-03-17 | Stop reason: HOSPADM

## 2018-03-13 RX ORDER — ACETAMINOPHEN 325 MG/1
650 TABLET ORAL
Status: DISCONTINUED | OUTPATIENT
Start: 2018-03-13 | End: 2018-03-14

## 2018-03-13 RX ORDER — MUPIROCIN 20 MG/G
OINTMENT TOPICAL DAILY
Status: DISCONTINUED | OUTPATIENT
Start: 2018-03-13 | End: 2018-03-17 | Stop reason: HOSPADM

## 2018-03-13 RX ORDER — DOCUSATE SODIUM 100 MG/1
100 CAPSULE, LIQUID FILLED ORAL 2 TIMES DAILY
Status: DISCONTINUED | OUTPATIENT
Start: 2018-03-13 | End: 2018-03-17 | Stop reason: HOSPADM

## 2018-03-13 RX ORDER — INSULIN GLARGINE 100 [IU]/ML
32 INJECTION, SOLUTION SUBCUTANEOUS
Status: DISCONTINUED | OUTPATIENT
Start: 2018-03-13 | End: 2018-03-13

## 2018-03-13 RX ORDER — FLUTICASONE PROPIONATE 50 MCG
1 SPRAY, SUSPENSION (ML) NASAL DAILY PRN
Status: DISCONTINUED | OUTPATIENT
Start: 2018-03-13 | End: 2018-03-13

## 2018-03-13 RX ORDER — FLUTICASONE PROPIONATE 50 MCG
2 SPRAY, SUSPENSION (ML) NASAL DAILY
Status: DISCONTINUED | OUTPATIENT
Start: 2018-03-13 | End: 2018-03-17 | Stop reason: HOSPADM

## 2018-03-13 RX ORDER — LANOLIN ALCOHOL/MO/W.PET/CERES
1 CREAM (GRAM) TOPICAL
Status: DISCONTINUED | OUTPATIENT
Start: 2018-03-13 | End: 2018-03-17 | Stop reason: HOSPADM

## 2018-03-13 RX ORDER — HYDRALAZINE HYDROCHLORIDE 20 MG/ML
INJECTION INTRAMUSCULAR; INTRAVENOUS
Status: COMPLETED
Start: 2018-03-13 | End: 2018-03-13

## 2018-03-13 RX ORDER — SODIUM CHLORIDE 0.9 % (FLUSH) 0.9 %
5-10 SYRINGE (ML) INJECTION EVERY 8 HOURS
Status: DISCONTINUED | OUTPATIENT
Start: 2018-03-13 | End: 2018-03-17 | Stop reason: HOSPADM

## 2018-03-13 RX ORDER — HEPARIN SODIUM 5000 [USP'U]/ML
5000 INJECTION, SOLUTION INTRAVENOUS; SUBCUTANEOUS ONCE
Status: COMPLETED | OUTPATIENT
Start: 2018-03-13 | End: 2018-03-13

## 2018-03-13 RX ORDER — LIDOCAINE HYDROCHLORIDE 10 MG/ML
30 INJECTION, SOLUTION EPIDURAL; INFILTRATION; INTRACAUDAL; PERINEURAL
Status: DISCONTINUED | OUTPATIENT
Start: 2018-03-13 | End: 2018-03-13 | Stop reason: HOSPADM

## 2018-03-13 RX ORDER — HYDRALAZINE HYDROCHLORIDE 50 MG/1
50 TABLET, FILM COATED ORAL 3 TIMES DAILY
Status: DISCONTINUED | OUTPATIENT
Start: 2018-03-13 | End: 2018-03-14

## 2018-03-13 RX ORDER — CALCITRIOL 0.25 UG/1
0.25 CAPSULE ORAL
Status: DISCONTINUED | OUTPATIENT
Start: 2018-03-14 | End: 2018-03-13

## 2018-03-13 RX ORDER — LOSARTAN POTASSIUM 50 MG/1
50 TABLET ORAL DAILY
Status: DISCONTINUED | OUTPATIENT
Start: 2018-03-13 | End: 2018-03-17 | Stop reason: HOSPADM

## 2018-03-13 RX ORDER — ROSUVASTATIN CALCIUM 20 MG/1
20 TABLET, COATED ORAL
Status: DISCONTINUED | OUTPATIENT
Start: 2018-03-13 | End: 2018-03-17 | Stop reason: HOSPADM

## 2018-03-13 RX ORDER — PREDNISOLONE ACETATE 10 MG/ML
1 SUSPENSION/ DROPS OPHTHALMIC 3 TIMES DAILY
Status: DISCONTINUED | OUTPATIENT
Start: 2018-03-13 | End: 2018-03-17 | Stop reason: HOSPADM

## 2018-03-13 RX ORDER — DOCUSATE SODIUM 100 MG/1
100 CAPSULE, LIQUID FILLED ORAL 2 TIMES DAILY
Status: DISCONTINUED | OUTPATIENT
Start: 2018-03-13 | End: 2018-03-14

## 2018-03-13 RX ORDER — INSULIN GLARGINE 100 [IU]/ML
8 INJECTION, SOLUTION SUBCUTANEOUS
Status: DISCONTINUED | OUTPATIENT
Start: 2018-03-13 | End: 2018-03-17 | Stop reason: HOSPADM

## 2018-03-13 RX ORDER — HEPARIN SODIUM 1000 [USP'U]/ML
INJECTION, SOLUTION INTRAVENOUS; SUBCUTANEOUS
Status: DISPENSED
Start: 2018-03-13 | End: 2018-03-13

## 2018-03-13 RX ORDER — MIDAZOLAM HYDROCHLORIDE 1 MG/ML
1-4 INJECTION, SOLUTION INTRAMUSCULAR; INTRAVENOUS
Status: DISCONTINUED | OUTPATIENT
Start: 2018-03-13 | End: 2018-03-13 | Stop reason: HOSPADM

## 2018-03-13 RX ORDER — MORPHINE SULFATE 2 MG/ML
2 INJECTION, SOLUTION INTRAMUSCULAR; INTRAVENOUS
Status: DISCONTINUED | OUTPATIENT
Start: 2018-03-13 | End: 2018-03-14

## 2018-03-13 RX ORDER — CYCLOBENZAPRINE HCL 10 MG
10 TABLET ORAL
Status: DISCONTINUED | OUTPATIENT
Start: 2018-03-13 | End: 2018-03-17 | Stop reason: HOSPADM

## 2018-03-13 RX ORDER — INSULIN LISPRO 100 [IU]/ML
INJECTION, SOLUTION INTRAVENOUS; SUBCUTANEOUS
Status: DISCONTINUED | OUTPATIENT
Start: 2018-03-13 | End: 2018-03-17 | Stop reason: HOSPADM

## 2018-03-13 RX ORDER — FLUTICASONE PROPIONATE 50 MCG
1 SPRAY, SUSPENSION (ML) NASAL AS NEEDED
Status: DISCONTINUED | OUTPATIENT
Start: 2018-03-13 | End: 2018-03-13

## 2018-03-13 RX ORDER — HYDROCODONE BITARTRATE AND ACETAMINOPHEN 5; 325 MG/1; MG/1
1 TABLET ORAL
Status: DISCONTINUED | OUTPATIENT
Start: 2018-03-13 | End: 2018-03-14

## 2018-03-13 RX ORDER — HEPARIN SODIUM 200 [USP'U]/100ML
2000 INJECTION, SOLUTION INTRAVENOUS ONCE
Status: COMPLETED | OUTPATIENT
Start: 2018-03-13 | End: 2018-03-13

## 2018-03-13 RX ORDER — DOXERCALCIFEROL 4 UG/2ML
1 INJECTION INTRAVENOUS
Status: DISCONTINUED | OUTPATIENT
Start: 2018-03-13 | End: 2018-03-17 | Stop reason: HOSPADM

## 2018-03-13 RX ORDER — FINASTERIDE 5 MG/1
5 TABLET, FILM COATED ORAL DAILY
Status: DISCONTINUED | OUTPATIENT
Start: 2018-03-13 | End: 2018-03-17 | Stop reason: HOSPADM

## 2018-03-13 RX ORDER — BUMETANIDE 1 MG/1
2 TABLET ORAL DAILY
Status: DISCONTINUED | OUTPATIENT
Start: 2018-03-13 | End: 2018-03-13

## 2018-03-13 RX ORDER — CLONIDINE 0.3 MG/24H
1 PATCH, EXTENDED RELEASE TRANSDERMAL
Status: DISCONTINUED | OUTPATIENT
Start: 2018-03-13 | End: 2018-03-17 | Stop reason: HOSPADM

## 2018-03-13 RX ORDER — CLONIDINE HYDROCHLORIDE 0.1 MG/1
0.2 TABLET ORAL 2 TIMES DAILY
Status: DISCONTINUED | OUTPATIENT
Start: 2018-03-13 | End: 2018-03-13

## 2018-03-13 RX ORDER — DEXTROSE 50 % IN WATER (D50W) INTRAVENOUS SYRINGE
25-50 AS NEEDED
Status: DISCONTINUED | OUTPATIENT
Start: 2018-03-13 | End: 2018-03-17 | Stop reason: HOSPADM

## 2018-03-13 RX ORDER — CARVEDILOL 12.5 MG/1
12.5 TABLET ORAL 2 TIMES DAILY WITH MEALS
Status: DISCONTINUED | OUTPATIENT
Start: 2018-03-13 | End: 2018-03-14

## 2018-03-13 RX ORDER — LANOLIN ALCOHOL/MO/W.PET/CERES
1000 CREAM (GRAM) TOPICAL DAILY
Status: DISCONTINUED | OUTPATIENT
Start: 2018-03-13 | End: 2018-03-17 | Stop reason: HOSPADM

## 2018-03-13 RX ORDER — INSULIN LISPRO 100 [IU]/ML
6 INJECTION, SOLUTION INTRAVENOUS; SUBCUTANEOUS
Status: DISCONTINUED | OUTPATIENT
Start: 2018-03-13 | End: 2018-03-13

## 2018-03-13 RX ORDER — ONDANSETRON 2 MG/ML
4 INJECTION INTRAMUSCULAR; INTRAVENOUS
Status: DISCONTINUED | OUTPATIENT
Start: 2018-03-13 | End: 2018-03-17 | Stop reason: HOSPADM

## 2018-03-13 RX ORDER — MAGNESIUM SULFATE 100 %
4 CRYSTALS MISCELLANEOUS AS NEEDED
Status: DISCONTINUED | OUTPATIENT
Start: 2018-03-13 | End: 2018-03-17 | Stop reason: HOSPADM

## 2018-03-13 RX ORDER — CARVEDILOL 12.5 MG/1
12.5 TABLET ORAL ONCE
Status: COMPLETED | OUTPATIENT
Start: 2018-03-13 | End: 2018-03-13

## 2018-03-13 RX ORDER — FENTANYL CITRATE 50 UG/ML
100 INJECTION, SOLUTION INTRAMUSCULAR; INTRAVENOUS
Status: DISCONTINUED | OUTPATIENT
Start: 2018-03-13 | End: 2018-03-13 | Stop reason: HOSPADM

## 2018-03-13 RX ADMIN — HYDRALAZINE HYDROCHLORIDE 50 MG: 50 TABLET, FILM COATED ORAL at 18:00

## 2018-03-13 RX ADMIN — FERROUS SULFATE TAB 325 MG (65 MG ELEMENTAL FE) 325 MG: 325 (65 FE) TAB at 05:06

## 2018-03-13 RX ADMIN — CARVEDILOL 12.5 MG: 12.5 TABLET, FILM COATED ORAL at 15:42

## 2018-03-13 RX ADMIN — ROSUVASTATIN CALCIUM 20 MG: 20 TABLET, FILM COATED ORAL at 22:16

## 2018-03-13 RX ADMIN — HYDRALAZINE HYDROCHLORIDE: 20 INJECTION INTRAMUSCULAR; INTRAVENOUS at 16:00

## 2018-03-13 RX ADMIN — AMLODIPINE BESYLATE 10 MG: 10 TABLET ORAL at 18:33

## 2018-03-13 RX ADMIN — Medication 10 ML: at 14:00

## 2018-03-13 RX ADMIN — LOSARTAN POTASSIUM 50 MG: 50 TABLET ORAL at 15:50

## 2018-03-13 RX ADMIN — CALCIUM ACETATE 667 MG: 667 CAPSULE ORAL at 22:15

## 2018-03-13 RX ADMIN — HEPARIN SODIUM 2000 UNITS: 200 INJECTION, SOLUTION INTRAVENOUS at 09:53

## 2018-03-13 RX ADMIN — LIDOCAINE HYDROCHLORIDE 13 ML: 10 INJECTION, SOLUTION EPIDURAL; INFILTRATION; INTRACAUDAL; PERINEURAL at 09:59

## 2018-03-13 RX ADMIN — Medication 10 ML: at 22:23

## 2018-03-13 RX ADMIN — HYDRALAZINE HYDROCHLORIDE 50 MG: 50 TABLET, FILM COATED ORAL at 22:15

## 2018-03-13 RX ADMIN — DOXERCALCIFEROL 1 MCG: 4 INJECTION, SOLUTION INTRAVENOUS at 11:36

## 2018-03-13 RX ADMIN — HEPARIN SODIUM 10000 UNITS: 5000 INJECTION, SOLUTION INTRAVENOUS; SUBCUTANEOUS at 10:03

## 2018-03-13 RX ADMIN — CARVEDILOL 12.5 MG: 12.5 TABLET, FILM COATED ORAL at 18:34

## 2018-03-13 RX ADMIN — CARVEDILOL 12.5 MG: 12.5 TABLET, FILM COATED ORAL at 07:52

## 2018-03-13 RX ADMIN — DOCUSATE SODIUM 100 MG: 100 CAPSULE, LIQUID FILLED ORAL at 18:33

## 2018-03-13 RX ADMIN — ERYTHROPOIETIN 10000 UNITS: 10000 INJECTION, SOLUTION INTRAVENOUS; SUBCUTANEOUS at 12:43

## 2018-03-13 RX ADMIN — MIDAZOLAM HYDROCHLORIDE 1 MG: 1 INJECTION, SOLUTION INTRAMUSCULAR; INTRAVENOUS at 09:56

## 2018-03-13 RX ADMIN — CALCIUM ACETATE 667 MG: 667 CAPSULE ORAL at 18:33

## 2018-03-13 RX ADMIN — PREDNISOLONE ACETATE 1 DROP: 10 SUSPENSION/ DROPS OPHTHALMIC at 22:22

## 2018-03-13 RX ADMIN — FENTANYL CITRATE 50 MCG: 50 INJECTION INTRAMUSCULAR; INTRAVENOUS at 09:56

## 2018-03-13 RX ADMIN — INSULIN GLARGINE 8 UNITS: 100 INJECTION, SOLUTION SUBCUTANEOUS at 22:17

## 2018-03-13 RX ADMIN — HYDRALAZINE HYDROCHLORIDE 75 MG: 50 TABLET, FILM COATED ORAL at 07:53

## 2018-03-13 NOTE — PROGRESS NOTES
Admitted with ESRD,gone  for [facility] ,will start dialysis today & daily dialysis for 3 days then will arrange OP dialysis.

## 2018-03-13 NOTE — DIALYSIS
ACUTE HEMODIALYSIS FLOW SHEET    HEMODIALYSIS ORDERS: Physician: Vickie Hardy: Elder        Duration: 2.3   hr  BFR: 300   DFR: 600   Dialysate:  Temp 37 K+   3    Ca+  3 Na 140 Bicarb 30   Weight:  90.7 kg    Bed Scale []     Unable to Obtain []      Dry weight/UF Goal: 2000 Access CVC  Needle Gauge     Heparin []  Bolus      Units    [x] Hourly  1000     Units    []None      Catheter locking solution    Pre BP: 216/115    Pulse:     69     Temperature:   97.6  Respirations: 18  Tx: NS  250    ml/Bolus  Other        [] N/A   Labs: Pre        Post:        [x] N/A   Additional Orders(medications, blood products, hypotension management):      [x] N/A     [x] [facility] Consent Verified     CATHETER ACCESS: []N/A   [x]Right   []Left   [x]IJ     []Fem   [] First use X-ray verified     [x]Tunnel                [] Non Tunneled   [x]No S/S infection  []Redness  []Drainage []Cultured []Swelling []Pain   [x]Medical Aseptic Prep Utilized   []Dressing Changed  [] Biopatch  Date: 3/13/18      []Clotted   [x]Patent   Flows: [x]Good  []Poor  []Reversed   If access problem,  notified: []Yes    [x]N/A  Date:           GRAFT/FISTULA ACCESS:  [x]N/A     []Right     []Left     []UE     []LE   []AVG   []AVF        []Buttonhole    []Medical Aseptic Prep Utilized   []No S/S infection  []Redness  []Drainage []Cultured []Swelling []Pain    Bruit:   [] Strong    [] Weak       Thrill :   [] Strong    [] Weak       Needle Gauge:    Length:     If access problem,  notified: []Yes     []N/A  Date:        Please describe access if present and not used:       GENERAL ASSESSMENT:    LUNGS:  Rate 18 SaO2%        [] N/A    [] Clear  [] Coarse  [] Crackles  [] Wheezing        [x] Diminished     Location : []RLL   []LLL    []RUL  []FELICIANO   Cough: []Productive  []Dry  [x]N/A   Respirations:  []If access problem,  notified: []Yes     []N/A  Date:        Please describe access if present and not used:Easy     Therapy:  [x]RA  []NC l/min    Mask: []NRB []Venti       O2%                  []Ventilator  []Intubated  [] Trach  [] BiPaP   CARDIAC: [x]Regular      [] Irregular   [] Pericardial Rub  [] JVD        []  Monitored  [] Bedside  [] Remotely monitored [] N/A  Rhythm: Sinus   EDEMA: [] None  [x]Generalized  [] Pitting [] 1    [] 2    [] 3    [] 4                 [] Facial  [] Pedal  []  UE  [] LE   SKIN:   [x] Warm  [] Hot     [] Cold   [x] Dry     [] Pale   [] Diaphoretic                  [] Flushed  [] Jaundiced  [] Cyanotic  [] Rash  [] Weeping   LOC:    [x] Alert      [x]Oriented:    [x] Person     [x] Place  []Time               [] Confused  [] Lethargic  [] Medicated  [] Non-responsive     GI / ABDOMEN:  [x] Flat    [] Distended    [x] Soft    [] Firm   []  Obese                             [] Diarrhea  [x] Bowel Sounds  [] Nausea  [] Vomiting       / URINE ASSESSMENT:[] Voiding   [x] Oliguria  [] Anuria   []  Pa     [] Incontinent    []  Incontinent Brief      []  Bathroom Privileges     PAIN: [x] 0 []1  []2   []3   []4   []5   []6   []7   []8   []9   []10            Scale 0-10  Action/Follow Up:    MOBILITY:  [] Amb    [] Amb/Assist    [x] Bed    [] Wheelchair  [] Stretcher      All Vitals and Treatment Details on Attached [address]: [facility]          Room # 451     [x] 1st Time Acute  [] Stat  [] Routine  [] Urgent     [x] Acute Room  []  Bedside  [] ICU/CCU  [] ER   Isolation Precautions:  [x] Dialysis   [] Airborne   [] Contact    [] Reverse   Special Considerations:         [] Blood Consent Verified []N/A     ALLERGIES:   [x] Bactrim       Code Status:  [x] Full Code  [] DNR  [] Other           HBsAg ONLY: Date Drawn 3/12/18        [x]Negative []Positive []Unknown   HBsAb: Date 3/12/18    [x] Susceptible   [] [id number] []Not Drawn  [] Drawn     Current Labs:    Date of Labs:           Today [x]      Please see printed lab results DIET: [x] Renal    [] Other     [] NPO     []  Diabetic      PRIMARY NURSE REPORT: First initial/Last name/Title      Pre Dialysis: Malcolm Cade    Time: 1000      EDUCATION:    [x] Patient [] Other         Knowledge Basis: []None []Minimal [x] Substantial   Barriers to learning  [x]N/A   [x] Access Care     [x] S&S of infection     [x] Fluid Management     []K+     [x]Procedural    []Albumin     [] Medications     [] Tx Options     [] Transplant     [] Diet     [] Other   Teaching Tools:  [x] Explain  [] Demo  [] Handouts [] Video  Patient response:  [x] Verbalized understanding  [] Teach back  [] Return demonstration [] Requires follow up   Inappropriate due to            [x] Time Out/Safety Check       [address] [place] [address] Before each treatment:     Machine Number:                   1000 [facility]                                   [x] Unit Machine # 5 with centralized RO                                  [] Portable Machine #1/RO serial # [id number]                                  [] Portable Machine #2/RO serial # [id number]                                  [] Portable Machine #3/RO serial # [id number]                                                                                                       [facility]                                  [] Portable Machine #11/RO serial # [id number]                                   [] Portable Machine #12/RO serial # [id number]                                  [] Portable Machine #13/RO serial #  [id number]      Alarm Test:  Pass time 0900         Other:         [x] RO/Machine Log Complete      Temp    37            [x]Extracorporeal Circuit Tested for integrity   Dialysate: pH  7.4 Conductivity: Meter        HD Machine   5               TCD: 14  Dialyzer Lot # [id number]           Blood Tubing Lot # [id number]         Saline Lot #  [id number]     CHLORINE TESTING-Before each treatment and every 4 hours    Total Chlorine: [x] less than 0.1 ppm  Time: 0900     4 Hr/2nd Check Time: 1300   (if greater than 0.1 ppm from Primary then every 30 minutes from Secondary)     TREATMENT INITIATION  with Dialysis Precautions:   [x] All Connections Secured                 [x] Saline Line Double Clamped   [x] Venous Parameters Set                  [x] Arterial Parameters Set    [x] Prime Given  250                              [x]Air Foam Detector Engaged      Treatment Initiation Note:   6148: Consent for HD obtain  1045:Received patient from Cath Lab, patient alert and oriented x3. CVC to right chest patent. Education provide with infection control, cleaning site, and HD procedure provided. Patient verbalize understanding. Patient tolerating HD without difficulties. 1100:   1200: Nephrologist at bedside to reassess patient, no distress noted at this time. Medication Dose Volume Route Initials Dialyzer Cleared: [] Good [x] Fair  [] Poor    Blood processed:  42.1 L  UF Removed  1500 Ml    Post Wt: 89.2    kg  POst BP:   142/87      Pulse: 62      Respirations: 16  Temperature: 97.8     Hectorol      0.5mcg      2ml      IV      MS     Post Tx Vascular Access: AVF/AVG: Bleeding stopped Art  min. Tashi. Min   N/A     Epogen      [id number]  unit      1ml      IV       MS     Catheter: Locking solution: Heparin 1:1000 Art. Tashi. N/A                                 Post Assessment:                                    Skin:  [x] Warm  [x] Dry [] Diaphoretic    [] Flushed  [] Pale [] Cyanotic   [facility] Signatures Title Initials  Time Lungs: [] Clear    [] Course  [] Crackles  [] Wheezing [x] Diminished   Madeleine Fulling RN MS 1330 Cardiac: [x] Regular   [] Irregular   [] Monitor  [] N/A  Rhythm:   Sinus        Edema:  [] None    [x] General     [] Facial   [] Pedal    [] UE    [] LE       Pain: [x]0  []1  []2   []3  []4   []5   []6   []7   []8   []9   []10     Post Treatment Note:   Patient completed total treatment. Fluids removed = 1500ml.  Patient alert and oriented x3. Nurse to nurse report given.  Transferred patient back to room 451     POST TREATMENT PRIMARY NURSE HANDOFF REPORT:     First initial/Last name/Title         Post Dialysis: Rao Hay RN Time:  1330     Abbreviations: AVG-arterial venous graft, AVF-arterial venous fistula, IJ-Internal Jugular, Subcl-Subclavian, Fem-Femoral, Tx-treatment, AP/HR-apical heart rate, DFR-dialysate flow rate, BFR-blood flow rate, AP-arterial pressure, -venous pressure, UF-ultrafiltrate, TMP-transmembrane pressure, Tashi-Venous, Art-Arterial, RO-Reverse Osmosis

## 2018-03-13 NOTE — INTERVAL H&P NOTE
H&P Update:  Armando Ayon was seen and examined. History and physical has been reviewed. The patient has been examined.  There have been no significant clinical changes since the completion of the originally dated History and Physical.    Signed By: Guerda Esposito MD     March 13, 2018 9:40 AM

## 2018-03-13 NOTE — ROUTINE PROCESS
TRANSFER - OUT REPORT:    Verbal report given to [facility], RN. (name) on [facility]  being transferred to [facility] (unit) for routine post - op       Report consisted of patients Situation, Background, Assessment and   Recommendations(SBAR). Information from the following report(s) SBAR, Procedure Summary, Intake/Output, MAR and Recent Results was reviewed with the receiving nurse. Lines:       Opportunity for questions and clarification was provided.       Patient transported with:   [employer]

## 2018-03-13 NOTE — ED NOTES
Pt resting sleeping on and off on stretcher, wife at bedside. HTN noted. Other vital signs stable. Will continue to monitor pt and await further orders. Pt's wife given bags to place pt's belongings in.

## 2018-03-13 NOTE — DIABETES MGMT
Diabetes Patient/Family Education Record    Factors That  May Influence Patients Ability  to Learn or  Comply with Recommendations   []   Language barrier    []   Cultural needs   []   Motivation    []   Cognitive limitation    []   Physical   [x]   Education    []   Physiological factors   []   Hearing/vision/speaking impairment   []   Mandaeism beliefs    []   Financial factors   []  Other:   []  No factors identified at this time. Person Instructed:   [x]   Patient   []   Family   []  Other     Preference for Learning:   [x]   Verbal   [x]   Written   []  Demonstration     Level of Comprehension & Competence:    [x]  Good                                      [] Fair                                     []  Poor                             []  Needs Reinforcement   [x]  Teachback completed    Education Component:   [x]  Medication management, including how to administer insulin (if appropriate) and potential medication interactions: Received report that he is on VG0 40 disposable insulin delivery device. He is using humalog insulin. Patient has not required 2 clicks of VG0 lately during meal time because of pattern of low blood sugar. [x]  Nutritional management: Received report that patient is not eating 3 meals daily. Discussed importance of eating 3 meals because he is on VG0 insulin delivery device. Patient reported recent episodes of low blood sugar due to lack of food. Patient stated that he's hoping to feel better once his overall medical condition improved. Patient reported that he has no problem with serving size/portion control of carbs (starches,fruits, dairy) when he is able to eat 3 meals. He has no problem avoiding concentrated sweets. [x]  Exercise: Patient stated that he's able to tolerate walking exercise when not feeling sick. [x]  Signs, symptoms, and treatment of hyperglycemia and hypoglycemia: Patient able to recognize symptoms of low blood sugar.    [x] Prevention, recognition and treatment of hyperglycemia and hypoglycemia: Patient was able to state on how to treat low blood sugar. [x]  Importance of blood glucose monitoring and how to obtain a blood glucose meter: Received report that he has BG meter and testing supplies at home. He is checking his blood sugar regularly as recommended by his doctor.    []  Instruction on use of the blood glucose meter   [x]  Discuss the importance of HbA1C monitoring:  Discussed current A1c of 8.4% (03/13/2018) is equivalent to average blood glucose of 194 mg/dL during the past 2-3 months. Received report that his A1c was previously 14%.  Encouraged patient to continue to follow his diabetes treatment plan.   []  Sick day guidelines   [x]  Proper use and disposal of lancets, needles, syringes or insulin pens (if appropriate)   [x]  Potential long-term complications (retinopathy, kidney disease, neuropathy, foot care)   [x] Information about whom to contact in case of emergency or for more information    [x]  Goal:  Patient/family will demonstrate understanding of Diabetes Self Management Skills by: 03/20/2018  Plan for post-discharge education or self-management support:    [x] Outpatient class schedule provided            [] Patient Declined    [] Scheduled for outpatient classes (date) _______         Tad Credit, YESICA TOTH

## 2018-03-13 NOTE — OP NOTES
[address] [place] [address] Dr  OPERATIVE REPORT    Stone Sharp  MR#: [id number]  : 1960  ACCOUNT #: [de-identified]   DATE OF SERVICE: 2018    PREOPERATIVE DIAGNOSIS:  End-stage renal disease needing dialysis access. POSTOPERATIVE DIAGNOSIS:  End-stage renal disease needing dialysis access. PROCEDURES PERFORMED:  1. Ultrasound-guided access of right internal jugular vein. 2.  Moderate conscious sedation of 11 minutes. 3.  Permanent dialysis catheter placement, tunneled 19 cm Palindrome through right internal jugular approach. ATTENDING SURGEON:  Nora Nugent MD     CULTURES:  None. SPECIMENS REMOVED:  None. DRAINS:  None. ESTIMATED BLOOD LOSS:  Less than 50 mL. ANESTHESIA:  Monitored conscious sedation of 11 minutes. This was provided by an independent provider, giving the medication per my discretion and monitoring the vital signs throughout the case. COMPLICATIONS:  none    INDICATION FOR THE PROCEDURE:  The patient is a 80-year-old gentleman with end-stage renal disease needing dialysis access. The patient was given the risks and benefits of the procedure, including but not limited to bleeding, infection, damage to adjacent structures, hemorrhage, stroke and death, as well as loss of access and need for further surgery. The patient voiced understanding of all the risks and underwent the procedure. DESCRIPTION OF PROCEDURE:  The patient was correctly identified in the pre-cath area and taken to the cath lab in stable condition. The patient had a preincision time-out prior to the incision. The patient was prepped and draped in normal sterile fashion according to CDC guidelines for aseptic technique. We then used an ultrasound to visualize the right internal jugular vein. A picture was taken to be kept with the patient's chart. We numbed him up appropriately, used 1% lidocaine, took a single wall entry needle to gain access into the internal jugular vein. Once the needle tip was identified within the vein and there was positive blood return, a wire was placed. A small skin incision was created using an 11 blade. We dilated the tract x2 and a peel-away sheath was then placed. We then picked out an area that was 2 fingerbreadths below the clavicle, numbed this up, numbed up the tract, made a small skin on the chest, and then we tunneled our catheter appropriately, and our dilator and wire were removed. The catheter was then placed down. Peel-away was then removed. X-ray was then performed. The tip of the catheter was at the sinoatrial junction. There was good curvature of the catheter without any kinking. No pneumothorax was identified. We then were able to make sure that the cuff was approximately 1-2 cm within the patient. We secured the catheter with 2-0 Prolene suture, at both butterfly holes and the catheter insertion site. Biopatch and Tegaderm were placed. We then closed the IJ incision with a 4-0 Monocryl subcuticular stitch and Dermabond. Both ports were easily flushed and aspirated, and 1.6 mL of hot heparin were placed in each one. Catheter wrapped with a 4 x 4 and tape. The patient tolerated the procedure well without any issues.       MD DAVID Sibley / LYRIC  D: 03/13/2018 14:23     T: 03/13/2018 15:14  JOB #: [id number]

## 2018-03-13 NOTE — DIABETES MGMT
Glycemic Control Plan of Care    POC BG report on 03/12/2018: None. POC BG report on 03/13/2018 at time of review: 138, 118 mg/dL. Completed assessment of home diabetes management and education. See separate notes, 03/13/2018. Patient reported that he is followed by Dr. Osmel Valerio, endocr, and has been on using VG0 36 which is disposable insulin delivery device. He is using humalog insulin. Patient reported poor po intake lately due to lack of appetite and has been having pattern of low blood sugar. Patient verbalized understanding that he need to eat 3 meals daily to keep his blood sugar from dropping. Recommendation(s):  1.) Continue on current basal and correctional lispro insulin. 2.) Discontinue prandial insulin at this time. Called Dr. Erma Roy and obtained order. 3.) Continue to monitor POC BG pattern. Assessment:  Patient is 62year old with past medical history including type 2 diabetes mellitus, diabetic retinopathy and recent surgery for detached retina, hypertension, diabetic foot ulcer with history of toe amputation, hyperlipidemia, and CKD stage 5 - was admitted on 03/12/2018 with report of shortness of breath and swelling on bilateral legs. Noted:  ESRD. Status post temporary dialysis catheter placement. Type 2 diabetes mellitus with current A1c of 8.4% (03/13/2018). Most recent blood glucose values:    No POC BG report for 03/12/2018. Results for Romayne Pellant (MRN [id number]) as of 3/13/2018 12:51   Ref. Range 3/13/2018 07:37 3/13/2018 11:03   GLUCOSE,FAST - POC Latest Ref Range: 70 - 110 mg/dL 138 (H) 117 (H)     Current A1C: 8.4% (03/13/2018) is equivalent to average blood glucose of 194 mg/dL during the past 2-3 months. Current hospital diabetes medications:  Basal lantus insulin 32 units daily at bedtime. Start 03/13/2018. Correctional lispro insulin ACHS. Normal sensitivity dose. Start 03/13/2018.     Total daily dose insulin requirement previous day: 03/12/2018  None. Home diabetes medications: Patient reported on 03/13/2018 that he is on VG0 40 which is a disposable insulin delivery device. This is changed daily. He is using humalog insulin. Diet: Renal regular; nutr suppl: nepro with all meals.     Goals:  Blood glucose will be within target range of  mg/dL by 03/16/2018     Education:  _X__  Refer to Diabetes Education Record: 03/13/2018             ___  Education not indicated at this time    Newton Swain RN CCM

## 2018-03-13 NOTE — PROGRESS NOTES
RENAL PROGRESS NOTE: Pt seen on dialysis. Follow up of ESRD            Subjective: Feels good, has Right IJ TDC. Patient is on Dialysis.      Objective:    Patient Vitals for the past 12 hrs:   Temp Pulse Resp BP SpO2   03/13/18 1130 - 66 18 (!) 210/113 -   03/13/18 1100 - 65 18 (!) 181/107 -   03/13/18 1037 - 66 18 (!) 200/113 -   03/13/18 1030 97.6 °F (36.4 °C) 69 18 (!) 216/115 -   03/13/18 0742 97.1 °F (36.2 °C) 74 18 (!) 222/111 99 %   03/13/18 0500 97 °F (36.1 °C) 67 18 (!) 212/96 99 %   03/13/18 0300 97.1 °F (36.2 °C) 74 18 (!) 198/96 97 %   03/13/18 0215 - 71 13 164/84 99 %   03/13/18 0200 - 73 13 166/86 99 %   03/13/18 0145 - 78 12 (!) 183/92 99 %   03/13/18 0103 - 91 17 178/90 98 %   03/13/18 0100 - 86 12 (!) 240/116 100 %   03/13/18 0045 - 84 14 (!) 235/103 98 %   03/13/18 0030 - 86 13 (!) 229/107 97 %   03/13/18 0015 - 88 13 (!) 246/102 98 %        Intake/Output Summary (Last 24 hours) at 03/13/18 1211  Last data filed at 03/13/18 1011   Gross per 24 hour   Intake               10 ml   Output                0 ml   Net               10 ml       Physical Assessment:     General Appearance: NAD  Lung: clear to auscultation  Heart: regular rate and rhythm and no murmurs, clicks or gallops  Lower Extremities: 3 plus: edema   Access: HD catheter, qb 300     Labs    CBC w/Diff    Recent Labs      03/13/18   0325  03/12/18 1952   WBC  4.9  5.9   RBC  3.07*  3.38*   HGB  8.3*  9.4*   HCT  26.8*  29.4*   MCV  87.3  87.0   MCH  27.0  27.8   MCHC  31.0  32.0   RDW  13.2  13.3    Recent Labs      03/13/18   0325  03/12/18 1952   MONOS  16*  14*   EOS  5  4   BASOS  0  1   RDW  13.2  13.3        Comprehensive Metabolic Profile    Recent Labs      03/13/18 0325 03/12/18 1952   NA  142  141   K  4.2  4.1   CL  110*  108   CO2  22  21   BUN  61*  60*   CREA  12.20*  12.00*    Recent Labs      03/13/18 0325  03/12/18 1952   CA  7.9*  8.4*   PHOS  6.9*   --    ALB  1.9*  2.3*   TP  5.5*  5.6*   SGOT  18 21   TBILI  0.2  0.3          Basic Metabolic Profile       results  reviwed. MEDS:Reviwed.   Current Facility-Administered Medications   Medication Dose Route Frequency Provider Last Rate Last Dose    acetaminophen (TYLENOL) tablet 650 mg  650 mg Oral Q6H PRN Cristina Lynch MD        amLODIPine (NORVASC) tablet 10 mg  10 mg Oral QPM Cristina Lynch MD        carvedilol (COREG) tablet 12.5 mg  12.5 mg Oral BID WITH MEALS Cristina Lynch MD   12.5 mg at 03/13/18 5063    cloNIDine (CATAPRES) 0.3 mg/24 hr patch 1 Patch  1 Patch TransDERmal Q7D Cristina Lynch MD        cyanocobalamin tablet 1,000 mcg  1,000 mcg Oral DAILY Cristina Lynch MD        cyclobenzaprine (FLEXERIL) tablet 10 mg  10 mg Oral TID PRN Cristina Lynch MD        docusate sodium (COLACE) capsule 100 mg  100 mg Oral BID Cristina Lynch MD        ferrous sulfate tablet 325 mg  1 Tab Oral Q7D Cristina Lynch MD   325 mg at 03/13/18 0506    finasteride (PROSCAR) tablet 5 mg  5 mg Oral DAILY Cristina Lynch MD        mupirocin (BACTROBAN) 2 % ointment   Topical DAILY Cristina Lynch MD        prednisoLONE acetate (PRED FORTE) 1 % ophthalmic suspension 1 Drop  1 Drop Left Eye TID Cristina Lynch MD        rosuvastatin (CRESTOR) tablet 20 mg  20 mg Oral QHS Cristina Lynch MD        sodium chloride (NS) flush 5-10 mL  5-10 mL IntraVENous Q8H Cristina Lynch MD   Stopped at 03/13/18 0506    sodium chloride (NS) flush 5-10 mL  5-10 mL IntraVENous PRN Cristina Lynch MD        HYDROcodone-acetaminophen (NORCO) 5-325 mg per tablet 1 Tab  1 Tab Oral Q4H PRN Cristina Lynch MD        morphine injection 2 mg  2 mg IntraVENous Q4H PRN Cristina Lynch MD        insulin lispro (HUMALOG) injection   SubCUTAneous June Pfeiffer MD        glucose chewable tablet 16 g  4 Tab Oral PRN Cristina Lynch MD        glucagon (GLUCAGEN) injection 1 mg  1 mg IntraMUSCular PRN Claudine Lou Winsome Flores MD        dextrose (D50W) injection syrg 12.5-25 g  25-50 mL IntraVENous PRN Brittani Antunez MD        [facility]dansetron [facility] injection 4 mg  4 mg IntraVENous Q4H PRN Brittani Antunez MD        docusate sodium (COLACE) capsule 100 mg  100 mg Oral BID Brittani Antunez MD        insulin glargine (LANTUS) injection 32 Units  32 Units SubCUTAneous QHS Brittani Antunez MD        insulin lispro (HUMALOG) injection 6 Units  6 Units SubCUTAneous TID WITH MEALS Brittani Antunez MD        . PHARMACY TO SUBSTITUTE PER PROTOCOL    Per Protocol Brittani Antunez MD        fluticasone (FLONASE) 50 mcg/actuation nasal spray 2 Spray  2 Spray Both Nostrils DAILY Brittani Antunez MD        fentaNYL citrate (PF) injection 100 mcg  100 mcg IntraVENous Multiple Nikki Zavala MD   50 mcg at 03/13/18 0956    lidocaine (PF) (XYLOCAINE) 10 mg/mL (1 %) injection 30 mL  30 mL IntraDERMal Multiple Nikki Zavala MD   13 mL at 03/13/18 0959    midazolam (VERSED) injection 1-4 mg  1-4 mg IntraVENous Multiple Nikki Zavala MD   1 mg at 03/13/18 0956    carvedilol (COREG) tablet 12.5 mg  12.5 mg Oral ONCE Kenneth Odonnell MD        losartan (COZAAR) tablet 50 mg  50 mg Oral DAILY Cielo Melchor MD        epoetin aleksandra (EPOGEN;PROCRIT) injection 10,000 Units  10,000 Units IntraVENous DIALYSIS [facility] ANGELICA Melchor MD        doxercalciferol (HECTOROL) 4 mcg/2 mL injection 1 mcg  1 mcg IntraVENous DIALYSIS [facility] ANGELICA Melchor MD   1 mcg at 03/13/18 1136    heparin (porcine) 1,000 unit/mL injection                Impression:    ESRD: Tolerating HD well. Anemia of CKD: on  Epogen. Hyperparathyroidism Yes. Hypertension. Plan  Dialysis for volume and solute management  Epogen 32013 units. Hectorol: 1 microgram.  Adjut Medicine, will DC Rocaltrol, Hydralazine, all Diuretics. Start on Losartan,Hectorol, Phosphorus binders, Nephrocap, Epogen. Supplement Nepro.  Daily dialysis for 3 days with gradual increase in time.         Fiorella Leavitt MD

## 2018-03-13 NOTE — PROGRESS NOTES
Pt admitted for ESRD in need of HD. Plan for placement of [facility] today in cath lab. Keep NPO.

## 2018-03-13 NOTE — ED NOTES
Pt requested 2 blankets. 2 warmed blankets applied to pt. Wife at bedside. No acute distress noted. Pt resting on stretcher with eyes closed. HTN noted. Will continue to monitor pt and await further orders.

## 2018-03-13 NOTE — H&P (VIEW-ONLY)
Surgery Consult      Patient: Hannah Irizarry MRN: [id number]  CSN: [id number]      YOB: 1960    Age: 62 y.o. Sex: male      DOA: 3/12/2018       HPI:     Hannah Irizarry is a 62 y.o. male who presents with ESRD in need of dialysis access. He is right handed. No previous dialysis but has had a previous catheter on left side for what sounds like iv abx. Past Medical History:   Diagnosis Date    Cardiac echocardiogram 10/21/2016    EF 60-65%. No WMA. Mod-marked LVH. Normal diastolic fx. No significant valvular heart disease.  Cardiac treadmill stress test, low risk 11/02/2012    Negative maximal exercise treadmill test.  Ex time 7 min 45 sec.  Cardiovascular LE peripheral arterial testing 03/22/2016    No significant peripheral arterial disease at rest bilaterally. ABIs deferred due to calcified vessels.  Cardiovascular LLE venous duplex 06/06/2012    Left leg:  No DVT.  Cardiovascular renal duplex 10/21/2016    No significant renal artery stenosis.  Chronic kidney disease     CKD (chronic kidney disease), stage V ([address]     Diabetes mellitus ([address] 3/12/2010    Diabetic retinopathy ([address] 5/4/2010    Edema of both legs     Eye examination 5/4/10    OU: 20/20; OD: 20/25; OS: 20/25 without correction.     Glaucoma 08/17/2017    NORA muro    HLD (hyperlipidemia) 3/12/2010    HTN (hypertension) 3/12/2010    Orthostatic hypertension        Past Surgical History:   Procedure Laterality Date    HX AMPUTATION      TOES    HX HERNIA REPAIR  2005    HX OTHER SURGICAL  11/07/2017    glaucoma valve- Ahmed       Family History   Problem Relation Age of Onset    Diabetes Sister     Sickle Cell Anemia Sister     Diabetes Sister        Social History     Social History    Marital status:      Spouse name: N/A    Number of children: N/A    Years of education: N/A     Social History Main Topics    Smoking status: Never Smoker    Smokeless tobacco: Never Used    Alcohol use No    Drug use: No    Sexual activity: Yes     Partners: Female     Other Topics Concern    Caffeine Concern Yes     6 cups a month    Exercise Yes     walking 4 hour a day   [place] Yes     Social History Narrative    Safety issues/concerns: ( none)Domestic Violence, (none)Guns in home,(doesn't use)Sunscreen, (working)Smoke Detectors, (safe)Housing. Prior to Admission medications    Medication Sig Start Date End Date Taking? Authorizing Provider   finasteride (PROSCAR) 5 mg tablet TAKE 1 TABLET BY MOUTH DAILY. 3/8/18   Kenan Kim MD   prednisoLONE acetate (PRED FORTE) 1 % ophthalmic suspension Administer 1 Drop to left eye three (3) times daily. 1/16/18   Historical Provider   bumetanide (BUMEX) 2 mg tablet Take 1 Tab by mouth daily. 1/11/18   Rosine Holter [place], NP   amLODIPine (NORVASC) 10 mg tablet Take 1 Tab by mouth every evening. 1/11/18   Priscilla Grande NP   acetaminophen (TYLENOL EXTRA STRENGTH) 500 mg tablet Take  by mouth every six (6) hours as needed for Pain. Historical Provider   mupirocin (BACTROBAN) 2 % ointment  1/9/18   Historical Provider   carvedilol (COREG) 12.5 mg tablet TAKE 1 TABLET BY MOUTH TWICE A DAY 12/5/17   Historical Provider   cyclobenzaprine (FLEXERIL) 10 mg tablet Take 1 Tab by mouth three (3) times daily as needed for Muscle Spasm(s). 11/20/17   Kenan Kim MD   cloNIDine (CATAPRES) 0.3 mg/24 hr APPLY 1 PATCH TO SKIN ONCE EVERY 7 DAYS 10/2/17   Mana Albrecht MD   hydrALAZINE (APRESOLINE) 50 mg tablet Take 1.5 Tabs by mouth three (3) times daily. 8/17/17   Priscilla Grande NP   sub-q insulin device, 40 unit (VGO 40) alicia by Does Not Apply route. Historical Provider   OTHER PGIIL/P CRM - Apply 1 -2 grams ( 1-2 pumps) to left foot 3 - 4 times daily.     Historical Provider   calcitRIOL (ROCALTROL) 0.25 mcg capsule TAKE ONE CAPSULE BY MOUTH EVERY MONDAY, [address], AND FRIDAY 8/23/16   Historical Provider   rosuvastatin (CRESTOR) 20 mg tablet Take 1 Tab by mouth nightly. 8/31/16   Christian Cochran MD   docusate sodium (COLACE) 100 mg capsule Take 1 Cap by mouth two (2) times a day. 3/30/16   Kay Griffin MD   glucose blood VI test strips (ASCENSIA AUTODISC VI, ONE TOUCH ULTRA TEST VI) strip Test twice daily:  Fasting before breakfast and 2 hours after dinner (log readings), One touch ultra 2 test strips please; Dx: 250.02 7/21/14   Brent Lee NP   fluticasone (FLONASE) 50 mcg/actuation nasal spray 1 [place] by Both Nostrils route two (2) times a day. Patient taking differently: 1 Spray by Both Nostrils route as needed. 3/4/13   Brent Lee NP   ferrous sulfate (IRON) 325 mg (65 mg iron) tablet Take  by mouth Daily (before breakfast). Take 1 tablet by mouth once a week as per patient. Historical Provider   Lancets (ONE TOUCH [facility]) Misc Test twice daily: Once in the morning fasting, then two hours after dinner (log results) 10/31/12   Gretta Sloan DO   Blood-Glucose Meter monitoring kit by Does Not Apply route. One touch ultra2 9/27/10   Maura De Oliveira MD   cyanocobalamin (VITAMIN B-12) 1,000 mcg tablet Take 1,000 mcg by mouth daily. Historical Provider       Allergies   Allergen Reactions    Bactrim [Sulfamethoprim Ds] Nausea and Vomiting       Physical Exam:      Visit Vitals    BP (!) 222/111 (BP 1 Location: Right arm, BP Patient Position: At rest)    Pulse 74    Temp 97.1 °F (36.2 °C)    Resp 18    Ht 5' 6\" (1.676 m)    Wt 200 lb (90.7 kg)    SpO2 99%    BMI 32.28 kg/m2       GENERAL: alert, cooperative, no distress, appears stated age, THROAT & NECK: normal and no erythema or exudates noted. , LUNG: clear to auscultation bilaterally, HEART: regular rate and rhythm, S1, S2 normal, no murmur, click, rub or gallop, ABDOMEN: soft, non-tender. Bowel sounds normal. No masses,  no organomegaly, NEUROLOGIC: AOx3.  Cranial nerves 2-12 and sensation grossly intact. ROS:  Constitutional: negative  Eyes: negative  Ears, nose, mouth, throat, and face: negative  Respiratory: negative  Cardiovascular: negative  Gastrointestinal: negative  Hematologic/lymphatic: negative  Musculoskeletal:negative  Neurological: negative  Behavioral/Psych: negative  Endocrine: negative  Allergic/Immunologic: negative  Unless otherwise mentioned in the HPI. Data Review:    CBC:   Lab Results   Component Value Date/Time    WBC 4.9 03/13/2018 03:25 AM    RBC 3.07 (L) 03/13/2018 03:25 AM    HGB 8.3 (L) 03/13/2018 03:25 AM    HCT 26.8 (L) 03/13/2018 03:25 AM    PLATELET 428 04/77/0285 03:25 AM      BMP:   Lab Results   Component Value Date/Time    Glucose 145 (H) 03/13/2018 03:25 AM    Sodium 142 03/13/2018 03:25 AM    Potassium 4.2 03/13/2018 03:25 AM    Chloride 110 (H) 03/13/2018 03:25 AM    CO2 22 03/13/2018 03:25 AM    BUN 61 (H) 03/13/2018 03:25 AM    Creatinine 12.20 (H) 03/13/2018 03:25 AM    Calcium 7.9 (L) 03/13/2018 03:25 AM     Coagulation:   Lab Results   Component Value Date/Time    Prothrombin time 13.0 03/13/2018 03:25 AM    INR 1.0 03/13/2018 03:25 AM    aPTT 29.7 03/13/2018 03:25 AM         Assessment/Plan     61 y/o male with ESRD now in need of dialysis catheter placement    --Will place permcath  --will need follow up for discussion of more permanent dialysis access  --Please call with any further questions or concerns.     Active Problems:    Kidney disease (3/12/2018)        Charli Jones MD  March 13, 2018

## 2018-03-13 NOTE — CDMP QUERY
Please clarify type of HTN:    =>HTN emergency: blood pressure is so high that organ damage can occur. Blood pressure must be reduced immediately to prevent imminent organ  damage. This is done in an intensive care unit of a hospital.  =>HTN urgency: occurs when blood pressure spikes -- blood pressure readings are 180/110 or higher -- but there is no damage to the body's organs. Blood pressure can be brought down safely within a few hours with blood pressure medication  =>HTN crisis: umbrella term for hypertensive urgency and hypertensive emergency  =>Benign HTN: high blood pressure without symptoms  =>Other Explanation of clinical findings  =>Unable to Determine (no explanation of clinical findings)  The medical record reflects the following:    Clinical Indicators:   03/13/18 0100 - 86 12 (!) 240/116 100 %  03/13/18 0045 - 84 14 (!) 235/103 98 %  03/13/18 0030 - 86 13 (!) 229/107 97 %  03/13/18 0015 - 88 13 (!) 246/102 98 %  03/13/18 0000 - 91 14 (!) 252/111 99 %  03/12/18 2345 - 91 15 (!) 257/109 100 %  03/12/18 2330 - - - (!) 265/115 -  03/12/18 2315 - 90 12 (!) 249/117 99 %  03/12/18 2307 - - - (!) 247/124 -  03/12/18 2245 - - - (!) 251/114 -  03/12/18 2230 - - - (!) 229/110 -  03/12/18 2215 - - - (!) 238/111 -  03/12/18 2200 - 86 13 (!) 240/106 99 %  03/12/18 2145 - 87 11 (!) 228/103 99 %  03/12/18 2045 - 92 15 (!) 261/115 100 %  03/12/18 2030 - 86 15 (!) 266/117 98 %  03/12/18 2015 - 87 15 (!) 268/130 100 %  03/12/18 1930 97.4 °F  90 20 (!) 236/126 98 %    Treatment: hydralazine; catapres; coreg    If you DECLINE this query or would like to communicate with [employer], please utilize the \"CDMP message box\" at the TOP of the Progress Note on the right.       Thank you,  Kamryn Fontana RN/CCDS  [phone]

## 2018-03-13 NOTE — PROGRESS NOTES
[facility] Hospitalist Group  Progress Note    Patient: Lucien Snowden Age: 62 y.o. : 1960 MR#: [id number] SSN: [id number]  Date: 3/13/2018     Subjective:   Pt has no specific complaints. Per nursing has been having elevated bp. Assessment/Plan:   - ESRD-pt admitted for initiation of HD. S/p TDC placement and s/p HD today. Per nephrology notes, needs HD X3 days and will need arrangement for outpt hd.  -hypertensive urgency: difficult to control. Will add hydralazine. Can also increase bb  -DM takes ssi in outpt setting denies taking lantus. A1C 8.5, blood sugars are acceptable. Will decrease lantus dosing for now as pt denies taking this med in outpt setting. .  -Normocytic anemia likely multifactorial.    Additional Notes:      Case discussed with:  [x]Patient  [x]Family  [x]Nursing  []Case Management  DVT Prophylaxis:  []Lovenox  []Hep SQ  [x]SCDs  []Coumadin   []On Heparin gtt    Objective:   VS:   Visit Vitals    /78    Pulse 64    Temp 97.8 °F (36.6 °C) (Oral)    Resp 18    Ht 5' 6\" (1.676 m)    Wt 90.7 kg (200 lb)    SpO2 99%    BMI 32.28 kg/m2      Tmax/24hrs: Temp (24hrs), Av.3 °F (36.3 °C), Min:97 °F (36.1 °C), Max:97.8 °F (36.6 °C)    Intake/Output Summary (Last 24 hours) at 18 1605  Last data filed at 18 1312   Gross per 24 hour   Intake               10 ml   Output             1000 ml   Net             -990 ml       General:  Alert, awake in nad  Cardiovascular:  Rrr, no murmurs  Pulmonary:  ctab  GI:  Soft, nt, nd  Extremities:  No edema  Additional:      Labs:    Recent Results (from the past 24 hour(s))   CBC WITH AUTOMATED DIFF    Collection Time: 18  7:52 PM   Result Value Ref Range    WBC 5.9 4.6 - 13.2 K/uL    RBC 3.38 (L) 4.70 - 5.50 M/uL    HGB 9.4 (L) 13.0 - 16.0 g/dL    HCT 29.4 (L) 36.0 - 48.0 %    MCV 87.0 74.0 - 97.0 FL    MCH 27.8 24.0 - 34.0 PG    MCHC 32.0 31.0 - 37.0 g/dL    RDW 13.3 11.6 - 14.5 %    PLATELET 314 135 - 420 K/uL    MPV 10.4 9.2 - 11.8 FL    NEUTROPHILS 64 40 - 73 %    LYMPHOCYTES 17 (L) 21 - 52 %    MONOCYTES 14 (H) 3 - 10 %    EOSINOPHILS 4 0 - 5 %    BASOPHILS 1 0 - 2 %    ABS. NEUTROPHILS 3.8 1.8 - 8.0 K/UL    ABS. LYMPHOCYTES 1.0 0.9 - 3.6 K/UL    ABS. MONOCYTES 0.8 0.05 - 1.2 K/UL    ABS. EOSINOPHILS 0.2 0.0 - 0.4 K/UL    ABS. BASOPHILS 0.0 0.0 - 0.1 K/UL    DF AUTOMATED     METABOLIC PANEL, COMPREHENSIVE    Collection Time: 03/12/18  7:52 PM   Result Value Ref Range    Sodium 141 136 - 145 mmol/L    Potassium 4.1 3.5 - 5.5 mmol/L    Chloride 108 100 - 108 mmol/L    CO2 21 21 - 32 mmol/L    Anion gap 12 3.0 - 18 mmol/L    Glucose 154 (H) 74 - 99 mg/dL    BUN 60 (H) 7.0 - 18 MG/DL    Creatinine 12.00 (H) 0.6 - 1.3 MG/DL    BUN/Creatinine ratio 5 (L) 12 - 20      GFR est AA 5 (L) >60 ml/min/1.73m2    GFR est non-AA 4 (L) >60 ml/min/1.73m2    Calcium 8.4 (L) 8.5 - 10.1 MG/DL    Bilirubin, total 0.3 0.2 - 1.0 MG/DL    ALT (SGPT) 18 16 - 61 U/L    AST (SGOT) 21 15 - 37 U/L    Alk. phosphatase 87 45 - 117 U/L    Protein, total 5.6 (L) 6.4 - 8.2 g/dL    Albumin 2.3 (L) 3.4 - 5.0 g/dL    Globulin 3.3 2.0 - 4.0 g/dL    A-G Ratio 0.7 (L) 0.8 - 1.7     PROTHROMBIN TIME + INR    Collection Time: 03/12/18  7:52 PM   Result Value Ref Range    Prothrombin time 13.0 11.5 - 15.2 sec    INR 1.0 0.8 - 1.2     HEP B SURFACE AB    Collection Time: 03/12/18  7:52 PM   Result Value Ref Range    Hepatitis B surface Ab 115.08 >10.0 mIU/mL    Hep B surface Ab Interp. POSITIVE POS      Hep B surface Ab comment        Samples with a  value of 10 mIU/mL or greater are considered positive (protective immunity) in accordance with the CDC guidelines. HEP B SURFACE AG    Collection Time: 03/12/18  7:52 PM   Result Value Ref Range    Hepatitis B surface Ag <0.10 <1.00 Index    Hep B surface Ag Interp.  NEGATIVE  NEG     EKG, 12 LEAD, INITIAL    Collection Time: 03/12/18  8:47 PM   Result Value Ref Range    Ventricular Rate 90 BPM Atrial Rate 90 BPM    P-R Interval 134 ms    QRS Duration 92 ms    Q-T Interval 398 ms    QTC Calculation (Bezet) 486 ms    Calculated P Axis 43 degrees    Calculated R Axis 59 degrees    Calculated T Axis -179 degrees    Diagnosis       Normal sinus rhythm  Nonspecific T wave abnormality  Prolonged QT  Abnormal ECG  When compared with ECG of 04-JUN-2012 11:26,  No significant change was found     HEMOGLOBIN A1C WITH EAG    Collection Time: 03/13/18  3:25 AM   Result Value Ref Range    Hemoglobin A1c 8.5 (H) 4.2 - 5.6 %    Est. average glucose 143 mg/dL   METABOLIC PANEL, COMPREHENSIVE    Collection Time: 03/13/18  3:25 AM   Result Value Ref Range    Sodium 142 136 - 145 mmol/L    Potassium 4.2 3.5 - 5.5 mmol/L    Chloride 110 (H) 100 - 108 mmol/L    CO2 22 21 - 32 mmol/L    Anion gap 10 3.0 - 18 mmol/L    Glucose 145 (H) 74 - 99 mg/dL    BUN 61 (H) 7.0 - 18 MG/DL    Creatinine 12.20 (H) 0.6 - 1.3 MG/DL    BUN/Creatinine ratio 5 (L) 12 - 20      GFR est AA 5 (L) >60 ml/min/1.73m2    GFR est non-AA 4 (L) >60 ml/min/1.73m2    Calcium 7.9 (L) 8.5 - 10.1 MG/DL    Bilirubin, total 0.2 0.2 - 1.0 MG/DL    ALT (SGPT) 16 16 - 61 U/L    AST (SGOT) 18 15 - 37 U/L    Alk.  phosphatase 77 45 - 117 U/L    Protein, total 5.5 (L) 6.4 - 8.2 g/dL    Albumin 1.9 (L) 3.4 - 5.0 g/dL    Globulin 3.6 2.0 - 4.0 g/dL    A-G Ratio 0.5 (L) 0.8 - 1.7     MAGNESIUM    Collection Time: 03/13/18  3:25 AM   Result Value Ref Range    Magnesium 1.9 1.6 - 2.6 mg/dL   PHOSPHORUS    Collection Time: 03/13/18  3:25 AM   Result Value Ref Range    Phosphorus 6.9 (H) 2.5 - 4.9 MG/DL   NT-PRO BNP    Collection Time: 03/13/18  3:25 AM   Result Value Ref Range    NT pro-BNP 10575 (H) 0 - 900 PG/ML   CBC WITH AUTOMATED DIFF    Collection Time: 03/13/18  3:25 AM   Result Value Ref Range    WBC 4.9 4.6 - 13.2 K/uL    RBC 3.07 (L) 4.70 - 5.50 M/uL    HGB 8.3 (L) 13.0 - 16.0 g/dL    HCT 26.8 (L) 36.0 - 48.0 %    MCV 87.3 74.0 - 97.0 FL    MCH 27.0 24.0 - 34.0 PG MCHC 31.0 31.0 - 37.0 g/dL    RDW 13.2 11.6 - 14.5 %    PLATELET 268 994 - 417 K/uL    MPV 10.0 9.2 - 11.8 FL    NEUTROPHILS 63 40 - 73 %    LYMPHOCYTES 16 (L) 21 - 52 %    MONOCYTES 16 (H) 3 - 10 %    EOSINOPHILS 5 0 - 5 %    BASOPHILS 0 0 - 2 %    ABS. NEUTROPHILS 3.1 1.8 - 8.0 K/UL    ABS. LYMPHOCYTES 0.8 (L) 0.9 - 3.6 K/UL    ABS. MONOCYTES 0.8 0.05 - 1.2 K/UL    ABS. EOSINOPHILS 0.2 0.0 - 0.4 K/UL    ABS.  BASOPHILS 0.0 0.0 - 0.1 K/UL    DF AUTOMATED     PROTHROMBIN TIME + INR    Collection Time: 03/13/18  3:25 AM   Result Value Ref Range    Prothrombin time 13.0 11.5 - 15.2 sec    INR 1.0 0.8 - 1.2     PTT    Collection Time: 03/13/18  3:25 AM   Result Value Ref Range    aPTT 29.7 23.0 - 36.4 SEC   HEMOGLOBIN A1C WITH EAG    Collection Time: 03/13/18  3:25 AM   Result Value Ref Range    Hemoglobin A1c 8.4 (H) 4.2 - 5.6 %    Est. average glucose 194 mg/dL   GLUCOSE, POC    Collection Time: 03/13/18  7:37 AM   Result Value Ref Range    Glucose (POC) 138 (H) 70 - 110 mg/dL   GLUCOSE, POC    Collection Time: 03/13/18 11:03 AM   Result Value Ref Range    Glucose (POC) 117 (H) 70 - 110 mg/dL   GLUCOSE, POC    Collection Time: 03/13/18  3:20 PM   Result Value Ref Range    Glucose (POC) 104 70 - 110 mg/dL       Signed By: Geno Tong MD     March 13, 2018 4:05 PM

## 2018-03-13 NOTE — PROGRESS NOTES
I have assumed care of Mr. Mejia La. He was assessed after bedside report. A MEWs score of 3 was reported by KAMALA Goldstein due to a BP of 210's/110's. MD was called; ordered PO BP medications were given. IV access was attempted by 3 floor nurses unsuccessfully; Nurse Supervisor was called and was successful in obtaining IV access. Mr. Mejia La was then taken to Cath lab the right to dialysis.

## 2018-03-13 NOTE — H&P
History and Physical    Hospitalist Admission History and Physical    NAME:  Fidel Simeon   :   1960   MRN:   [id number]     PCP:  Ravinder Ralph MD  Date/Time:  3/12/2018 10:15 PM  Subjective:   CHIEF COMPLAINT:      HISTORY OF PRESENT ILLNESS:     Maura Art is a 62 y.o.  male with medical history significant for CKD V with progression in last 6 months to m/l ESRD, DM2, with neuropathy, retinopathy, and nephropathy, HTN,  HLD, glaucoma s/p retinal detachment (2018) who presents to the ED for HD catheter placement. Per pt, he has been followed by Nephrology (Dr. Ricardo Christianson) for the last 2 years, but in the last 6 months has had progressive worsening of his renal function and last labs done as outpt demonstrated a SCr of 10, up from baseline of 4. Pt and wife states that he is feeling is usual self. Pt stats tht he has SOB with even the slightest exertion. Able to walk ~ 4 ft, then becomes SOB. + edema in lower extremities that improve with elevation of feet. Pt recently (2018) had surgery for retinal detachment and followed by Opthalmology (Dr. Laz Levy). He utilized a VGo 40 for insulin regimen (sq release of 40 units of rapid acting insulin over 24 hours for basal need and additional bolus and correction insulin given in increments of 2 units) and states last A1c was 8.4% (last A1c here 3/2017 of 10.6%) which he states is an improvement. He is having some early morning hypoglycemia with sugars in the 60s. He states he feels shaky and sweaty with low sugars. Occurs ~ 3-4x/week. Blood sugars in 100s unitl bedtime, when in 200s. He is followed by Endocrinology (Dr. Calvin Rendon). He had elevated bp, on 4 bp meds with systolic pressures in the 150-180s. Here bp has been elevated with SBP in the 200s (but had not taken evening bp medications). Past Medical History:   Diagnosis Date    Cardiac echocardiogram 10/21/2016    EF 60-65%. No WMA. Mod-marked LVH. Normal diastolic fx.   No significant valvular heart disease.  Cardiac treadmill stress test, low risk 11/02/2012    Negative maximal exercise treadmill test.  Ex time 7 min 45 sec.  Cardiovascular LE peripheral arterial testing 03/22/2016    No significant peripheral arterial disease at rest bilaterally. ABIs deferred due to calcified vessels.  Cardiovascular LLE venous duplex 06/06/2012    Left leg:  No DVT.  Cardiovascular renal duplex 10/21/2016    No significant renal artery stenosis.  Chronic kidney disease     CKD (chronic kidney disease), stage V ([address]     Diabetes mellitus ([address] 3/12/2010    Diabetic retinopathy ([address] 5/4/2010    Edema of both legs     Eye examination 5/4/10    OU: 20/20; OD: 20/25; OS: 20/25 without correction.  Glaucoma 08/17/2017    [place] [place]    HLD (hyperlipidemia) 3/12/2010    HTN (hypertension) 3/12/2010    Orthostatic hypertension         Past Surgical History:   Procedure Laterality Date    HX AMPUTATION      TOES    HX HERNIA REPAIR  2005    HX OTHER SURGICAL  11/07/2017    glaucoma valve- Ahmed       Social History   Substance Use Topics    Smoking status: Never Smoker    Smokeless tobacco: Never Used    Alcohol use No        Family History   Problem Relation Age of Onset    Diabetes Sister     Sickle Cell Anemia Sister     Diabetes Sister         Allergies   Allergen Reactions    Bactrim [Sulfamethoprim Ds] Nausea and Vomiting        Prior to Admission Medications   Prescriptions Last Dose Informant Patient Reported? Taking? Blood-Glucose Meter monitoring kit   Yes No   Sig: by Does Not Apply route. One touch ultra2   Lancets (ONE TOUCH DELICA) Misc   No No   Sig: Test twice daily: Once in the morning fasting, then two hours after dinner (log results)   OTHER   Yes No   Sig: PGIIL/P CRM - Apply 1 -2 grams ( 1-2 pumps) to left foot 3 - 4 times daily.    acetaminophen (TYLENOL EXTRA STRENGTH) 500 mg tablet   Yes No   Sig: Take  by mouth every six (6) hours as needed for Pain. amLODIPine (NORVASC) 10 mg tablet   No No   Sig: Take 1 Tab by mouth every evening. bumetanide (BUMEX) 2 mg tablet   No No   Sig: Take 1 Tab by mouth daily. calcitRIOL (ROCALTROL) 0.25 mcg capsule   Yes No   Sig: TAKE ONE CAPSULE BY MOUTH EVERY MONDAY, WEDNESDAY, AND FRIDAY   carvedilol (COREG) 12.5 mg tablet   Yes No   Sig: TAKE 1 TABLET BY MOUTH TWICE A DAY   cloNIDine (CATAPRES) 0.3 mg/24 hr   No No   Sig: APPLY 1 PATCH TO SKIN ONCE EVERY 7 DAYS   cyanocobalamin (VITAMIN B-12) 1,000 mcg tablet   Yes No   Sig: Take 1,000 mcg by mouth daily. cyclobenzaprine (FLEXERIL) 10 mg tablet   No No   Sig: Take 1 Tab by mouth three (3) times daily as needed for Muscle Spasm(s). docusate sodium (COLACE) 100 mg capsule   No No   Sig: Take 1 Cap by mouth two (2) times a day. ferrous sulfate (IRON) 325 mg (65 mg iron) tablet   Yes No   Sig: Take  by mouth Daily (before breakfast). Take 1 tablet by mouth once a week as per patient. finasteride (PROSCAR) 5 mg tablet   No No   Sig: TAKE 1 TABLET BY MOUTH DAILY. fluticasone (FLONASE) 50 mcg/actuation nasal spray   No No   [place] by Both Nostrils route two (2) times a day. Patient taking differently: 1 Spray by Both Nostrils route as needed. glucose blood VI test strips (ASCENSIA AUTODISC VI, ONE TOUCH ULTRA TEST VI) strip   No No   Sig: Test twice daily:  Fasting before breakfast and 2 hours after dinner (log readings), One touch ultra 2 test strips please; Dx: 250.02   hydrALAZINE (APRESOLINE) 50 mg tablet   No No   Sig: Take 1.5 Tabs by mouth three (3) times daily. mupirocin (BACTROBAN) 2 % ointment   Yes No   prednisoLONE acetate (PRED FORTE) 1 % ophthalmic suspension   Yes No   Sig: Administer 1 Drop to left eye three (3) times daily. rosuvastatin (CRESTOR) 20 mg tablet   No No   Sig: Take 1 Tab by mouth nightly. sub-q insulin device, 40 unit (VGO 40) alicia   Yes No   Sig: by Does Not Apply route.       Facility-Administered Medications: None       REVIEW OF SYSTEMS:     [] Unable to obtain  ROS due to  []mental status change  []sedated   []intubated   [x]as per HPI, otherwise negative    Objective:   VITALS:    Visit Vitals    BP (!) 240/106    Pulse 86    Temp 97.4 °F (36.3 °C)    Resp 13    Ht 5' 6\" (1.676 m)    Wt 90.7 kg (200 lb)    SpO2 99%    BMI 32.28 kg/m2     Temp (24hrs), Av.4 °F (36.3 °C), Min:97.4 °F (36.3 °C), Max:97.4 °F (36.3 °C)      PHYSICAL EXAM:   General:    Sleepy but arousable cooperative, no distress, . Head:   Normocephalic, atraumatic. Eyes:   Conjunctivae clear, anicteric sclerae. Lungs:   Clear to auscultation bilaterally. But decreased inspiratory effort. Radha Mate Heart:   Tachy RR  + 2/6 systolic murmur best appreciated at LUSB. Abdomen:   Soft, non-tender. Obese  Bowel sounds +  Extremities: Good power and strength in all 4 ext.+++edema in LE b/l  Skin:     No rashes or lesions. Not Jaundiced  Lymph nodes:(-) LAD  Psych:  Flat affect  Not depressed. Not anxious or agitated. Neurologic: EOMs intact. LAB DATA REVIEWED:    No components found for: [place]  Recent Labs      18   NA  141   K  4.1   CL  108   CO2  21   BUN  60*   CREA  12.00*   GLU  154*   CA  8.4*   ALB  2.3*   WBC  5.9   HGB  9.4*   HCT  29.4*   PLT  314         IMAGING RESULTS:   []  I have personally reviewed the actual   []CXR  []CT scan    CXR: 3/12/2018  FINDINGS:      Compared with 2017     Patient is lordotic in positioning. Multiple wires overlie the patient. Hazy  opacity at the left base with some obscuration of the diaphragm and costophrenic  angle blunting. Minimal right costophrenic angle blunting. No pulmonary edema or  pneumothorax. Heart size and mediastinal contours are stable and within normal  limits. Arthritic change of the acromioclavicular joints. .     IMPRESSION  IMPRESSION:     Opacity at the left base which may represent a combination of airspace disease  and small effusion.     Small right pleural effusion. Assessment/Plan:      Active Problems:    Kidney disease (3/12/2018)     CKD V with progression  HTN  DM2    ___________________________________________________  PLAN:  This is a 61 yo AAM , admitted with significant elevation in SCr from baseline of 4 to now 12. Pt is stable and still makes urine. He and wife have been reluctant to start HD, but now with significant decline in renal function are amenable to HD.     -Nephrology consulted, appreciate recommendations  -Vascular Surgery (Dr. Ada Cho) aware (Dr. Maren Edwards spoke with him)  -NPO MN for OR in the AM  -basic labs in the am  -will restart bp medications and bring bp under better control prior to tunnel catheter placement  --once catheter placed, pt will receive initial HD.  -will take pt off VGo while inpt and start Lantus 32 units sq qHS (20% decrease from VGO basal seeting) and Humalog 6 units sq tidac and correctional insulin scale for correction    Risk of deterioration:  []Low    [x]Moderate  []High    Prophylaxis:  []Lovenox  []Coumadin  []Hep SQ  [x]SCDs  []H2B/PPI    Disposition:  [x]Home w/ Family   []HH PT,OT,RN   []SNF/LTC   []SAH/Rehab    Discussed Code Status:    [x]Full Code      []DNR     ___________________________________________________    Care Plan discussed with:    [x]Patient   [x]Family    []ED Care Manager  []ED Doc   []Specialist :    Total Time Coordinating Admission:  75    minutes    []Total Critical Care Time:     ___________________________________________________  Admitting Physician: Kyler Lopez MD

## 2018-03-13 NOTE — ED NOTES
Pt resting on stretcher with no acute distress noted. HTN noted. Wife at bedside. Will continue to monitor pt and await further orders.

## 2018-03-13 NOTE — PROGRESS NOTES
Pt has BP of 212/96 and 222/100. Dr. Milagros Jensen paged and aware. She stated that pt is a renal patient.

## 2018-03-13 NOTE — PROGRESS NOTES
Mr. Elva Krishnan, his wife and I shared prayer for healing as MrTimur Krishnan begins his journey with dialysis. We affirmed that God is a healer and is with us at all times. They appreciated my prayer and visit, and I encouraged them to call us at any time.      [address], M.Div, CPE Resident   Pager: [phone]  Phone: [phone]

## 2018-03-13 NOTE — ED PROVIDER NOTES
EMERGENCY DEPARTMENT HISTORY AND PHYSICAL EXAM    8:21 PM      Date: 3/12/2018  Patient Name: Damien Navas    History of Presenting Illness     Chief Complaint   Patient presents with    Abnormal Lab Results         History Provided By: Patient    Chief Complaint: Abnormal Lab Results   Duration:  N/A  Timing:  N/A  Location: N/A  Quality: N/A  Severity: N/A  Modifying Factors: None   Associated Symptoms: denies any other associated signs or symptoms      Additional History (Context): Damien Navas is a 62 y.o. male with hx of HTN, DM, HLD, CKD and diabetic retinopathy presenting to the ED with c/o abnormal lab results. Pt was instructed to come to the ED to be admitted and for a dialysis port to be placed. Lab results from 3/9/2018 showed a creatine of 10. Pt denies any CP, SOB, fever, chills, abdominal pain, nausea, vomiting, diarrhea or urinary sx. Notes he is still able to urinate. As the patient is without physical symptoms or complaints of pain, there is no severity of pain, quality of pain, duration, modifying factors, or associated signs and symptoms regarding the pt's presenting complaint. No other sx or complaints given at this time. PCP: Jessika Sheriff MD    Current Outpatient Prescriptions   Medication Sig Dispense Refill    finasteride (PROSCAR) 5 mg tablet TAKE 1 TABLET BY MOUTH DAILY. 30 Tab 6    prednisoLONE acetate (PRED FORTE) 1 % ophthalmic suspension Administer 1 Drop to left eye three (3) times daily.  bumetanide (BUMEX) 2 mg tablet Take 1 Tab by mouth daily. 1 Tab 0    amLODIPine (NORVASC) 10 mg tablet Take 1 Tab by mouth every evening. 1 Tab 0    acetaminophen (TYLENOL EXTRA STRENGTH) 500 mg tablet Take  by mouth every six (6) hours as needed for Pain.       mupirocin (BACTROBAN) 2 % ointment       carvedilol (COREG) 12.5 mg tablet TAKE 1 TABLET BY MOUTH TWICE A DAY  3    cyclobenzaprine (FLEXERIL) 10 mg tablet Take 1 Tab by mouth three (3) times daily as needed for Muscle Spasm(s). 30 Tab 0    cloNIDine (CATAPRES) 0.3 mg/24 hr APPLY 1 PATCH TO SKIN ONCE EVERY 7 DAYS 12 Patch 1    hydrALAZINE (APRESOLINE) 50 mg tablet Take 1.5 Tabs by mouth three (3) times daily. 135 Tab 6    sub-q insulin device, 40 unit (VGO 40) alicia by Does Not Apply route.  OTHER PGIIL/P CRM - Apply 1 -2 grams ( 1-2 pumps) to left foot 3 - 4 times daily.  calcitRIOL (ROCALTROL) 0.25 mcg capsule TAKE ONE CAPSULE BY MOUTH EVERY MONDAY, WEDNESDAY, AND FRIDAY  2    rosuvastatin (CRESTOR) 20 mg tablet Take 1 Tab by mouth nightly. 30 Tab 6    docusate sodium (COLACE) 100 mg capsule Take 1 Cap by mouth two (2) times a day. 60 Cap 0    glucose blood VI test strips (ASCENSIA AUTODISC VI, ONE TOUCH ULTRA TEST VI) strip Test twice daily:  Fasting before breakfast and 2 hours after dinner (log readings), One touch ultra 2 test strips please; Dx: 250.02 1 Package 11    fluticasone (FLONASE) 50 mcg/actuation nasal spray 1 [place] by Both Nostrils route two (2) times a day. (Patient taking differently: 1 Spray by Both Nostrils route as needed.) 1 Bottle 2    ferrous sulfate (IRON) 325 mg (65 mg iron) tablet Take  by mouth Daily (before breakfast). Take 1 tablet by mouth once a week as per patient.  Lancets (ONE TOUCH DELICA) Misc Test twice daily: Once in the morning fasting, then two hours after dinner (log results) 1 Package 11    Blood-Glucose Meter monitoring kit by Does Not Apply route. One touch ultra2 1 Kit 0    cyanocobalamin (VITAMIN B-12) 1,000 mcg tablet Take 1,000 mcg by mouth daily. Past History     Past Medical History:  Past Medical History:   Diagnosis Date    Cardiac echocardiogram 10/21/2016    EF 60-65%. No WMA. Mod-marked LVH. Normal diastolic fx. No significant valvular heart disease.  Cardiac treadmill stress test, low risk 11/02/2012    Negative maximal exercise treadmill test.  Ex time 7 min 45 sec.       Cardiovascular LE peripheral arterial testing 03/22/2016    No significant peripheral arterial disease at rest bilaterally. ABIs deferred due to calcified vessels.  Cardiovascular LLE venous duplex 06/06/2012    Left leg:  No DVT.  Cardiovascular renal duplex 10/21/2016    No significant renal artery stenosis.  Chronic kidney disease     CKD (chronic kidney disease), stage V ([address]     Diabetes mellitus ([facility] 3/12/2010    Diabetic retinopathy ([facility] 5/4/2010    Edema of both legs     Eye examination 5/4/10    OU: 20/20; OD: 20/25; OS: 20/25 without correction.  Glaucoma 08/17/2017    NORA muro    HLD (hyperlipidemia) 3/12/2010    HTN (hypertension) 3/12/2010    Orthostatic hypertension        Past Surgical History:  Past Surgical History:   Procedure Laterality Date    HX AMPUTATION      TOES    HX HERNIA REPAIR  2005    HX OTHER SURGICAL  11/07/2017    glaucoma valve- Ahmed       Family History:  Family History   Problem Relation Age of Onset    Diabetes Sister     Sickle Cell Anemia Sister     Diabetes Sister        Social History:  Social History   Substance Use Topics    Smoking status: Never Smoker    Smokeless tobacco: Never Used    Alcohol use No       Allergies: Allergies   Allergen Reactions    Bactrim [Sulfamethoprim Ds] Nausea and Vomiting         Review of Systems       Review of Systems   Constitutional: Negative for chills and fever. HENT: Negative for rhinorrhea. Respiratory: Negative for shortness of breath. Cardiovascular: Negative for chest pain. Gastrointestinal: Negative for abdominal pain, nausea and vomiting. Endocrine: Negative for polyuria. Genitourinary: Negative for dysuria. Musculoskeletal: Negative for back pain. Skin: Negative for rash. Neurological: Negative for headaches. All other systems reviewed and are negative.         Physical Exam     Patient Vitals for the past 12 hrs:   Temp Pulse Resp BP SpO2   03/13/18 0145 - 78 12 (!) 183/92 99 %   03/13/18 0103 - 91 17 178/90 98 % 03/13/18 0100 - 86 12 (!) 240/116 100 %   03/13/18 0045 - 84 14 (!) 235/103 98 %   03/13/18 0030 - 86 13 (!) 229/107 97 %   03/13/18 0015 - 88 13 (!) 246/102 98 %   03/13/18 0000 - 91 14 (!) 252/111 99 %   03/12/18 2345 - 91 15 (!) 257/109 100 %   03/12/18 2330 - - - (!) 265/115 -   03/12/18 2315 - 90 12 (!) 249/117 99 %   03/12/18 2308 - 94 13 - 99 %   03/12/18 2307 - - - (!) 247/124 -   03/12/18 2245 - - - (!) 251/114 -   03/12/18 2230 - - - (!) 229/110 -   03/12/18 2215 - - - (!) 238/111 -   03/12/18 2200 - 86 13 (!) 240/106 99 %   03/12/18 2145 - 87 11 (!) 228/103 99 %   03/12/18 2130 - 88 12 - 99 %   03/12/18 2115 - 82 15 - 90 %   03/12/18 2100 - 88 13 - 99 %   03/12/18 2045 - 92 15 (!) 261/115 100 %   03/12/18 2030 - 86 15 (!) 266/117 98 %   03/12/18 2015 - 87 15 (!) 268/130 100 %   03/12/18 1930 97.4 °F (36.3 °C) 90 20 (!) 236/126 98 %         Physical Exam   Constitutional: He is oriented to person, place, and time. He appears well-developed and well-nourished. Speaking in full sentences   HENT:   Head: Normocephalic and atraumatic. Eyes: Conjunctivae are normal. Pupils are equal, round, and reactive to light. Neck: Normal range of motion. Neck supple. Cardiovascular: Normal rate and regular rhythm. Pulmonary/Chest: Effort normal and breath sounds normal. No respiratory distress. He has no wheezes. Abdominal: Soft. Bowel sounds are normal. He exhibits no distension. There is no tenderness. There is no rebound and no guarding. Musculoskeletal: Normal range of motion. Neurological: He is alert and oriented to person, place, and time. Skin: Skin is warm and dry. Psychiatric: He has a normal mood and affect. Thought content normal.   Nursing note and vitals reviewed.         Diagnostic Study Results     Labs -  Recent Results (from the past 12 hour(s))   CBC WITH AUTOMATED DIFF    Collection Time: 03/12/18  7:52 PM   Result Value Ref Range    WBC 5.9 4.6 - 13.2 K/uL    RBC 3.38 (L) 4.70 - 5.50 M/uL    HGB 9.4 (L) 13.0 - 16.0 g/dL    HCT 29.4 (L) 36.0 - 48.0 %    MCV 87.0 74.0 - 97.0 FL    MCH 27.8 24.0 - 34.0 PG    MCHC 32.0 31.0 - 37.0 g/dL    RDW 13.3 11.6 - 14.5 %    PLATELET 619 677 - 384 K/uL    MPV 10.4 9.2 - 11.8 FL    NEUTROPHILS 64 40 - 73 %    LYMPHOCYTES 17 (L) 21 - 52 %    MONOCYTES 14 (H) 3 - 10 %    EOSINOPHILS 4 0 - 5 %    BASOPHILS 1 0 - 2 %    ABS. NEUTROPHILS 3.8 1.8 - 8.0 K/UL    ABS. LYMPHOCYTES 1.0 0.9 - 3.6 K/UL    ABS. MONOCYTES 0.8 0.05 - 1.2 K/UL    ABS. EOSINOPHILS 0.2 0.0 - 0.4 K/UL    ABS. BASOPHILS 0.0 0.0 - 0.1 K/UL    DF AUTOMATED     METABOLIC PANEL, COMPREHENSIVE    Collection Time: 03/12/18  7:52 PM   Result Value Ref Range    Sodium 141 136 - 145 mmol/L    Potassium 4.1 3.5 - 5.5 mmol/L    Chloride 108 100 - 108 mmol/L    CO2 21 21 - 32 mmol/L    Anion gap 12 3.0 - 18 mmol/L    Glucose 154 (H) 74 - 99 mg/dL    BUN 60 (H) 7.0 - 18 MG/DL    Creatinine 12.00 (H) 0.6 - 1.3 MG/DL    BUN/Creatinine ratio 5 (L) 12 - 20      GFR est AA 5 (L) >60 ml/min/1.73m2    GFR est non-AA 4 (L) >60 ml/min/1.73m2    Calcium 8.4 (L) 8.5 - 10.1 MG/DL    Bilirubin, total 0.3 0.2 - 1.0 MG/DL    ALT (SGPT) 18 16 - 61 U/L    AST (SGOT) 21 15 - 37 U/L    Alk. phosphatase 87 45 - 117 U/L    Protein, total 5.6 (L) 6.4 - 8.2 g/dL    Albumin 2.3 (L) 3.4 - 5.0 g/dL    Globulin 3.3 2.0 - 4.0 g/dL    A-G Ratio 0.7 (L) 0.8 - 1.7     PROTHROMBIN TIME + INR    Collection Time: 03/12/18  7:52 PM   Result Value Ref Range    Prothrombin time 13.0 11.5 - 15.2 sec    INR 1.0 0.8 - 1.2     HEP B SURFACE AG    Collection Time: 03/12/18  7:52 PM   Result Value Ref Range    Hepatitis B surface Ag <0.10 <1.00 Index    Hep B surface Ag Interp.  NEGATIVE  NEG     EKG, 12 LEAD, INITIAL    Collection Time: 03/12/18  8:47 PM   Result Value Ref Range    Ventricular Rate 90 BPM    Atrial Rate 90 BPM    P-R Interval 134 ms    QRS Duration 92 ms    Q-T Interval 398 ms    QTC Calculation (Bezet) 486 ms    Calculated P Axis 43 degrees    Calculated R Axis 59 degrees    Calculated T Axis -179 degrees    Diagnosis       Normal sinus rhythm  Nonspecific T wave abnormality  Prolonged QT  Abnormal ECG  When compared with ECG of 04-JUN-2012 11:26,  No significant change was found         Radiologic Studies -   Xr Chest Port    Result Date: 3/12/2018  Portable Chest CPT CODE: 11478 HISTORY: Preop. FINDINGS: Compared with 12/28/2017 Patient is lordotic in positioning. Multiple wires overlie the patient. Hazy opacity at the left base with some obscuration of the diaphragm and costophrenic angle blunting. Minimal right costophrenic angle blunting. No pulmonary edema or pneumothorax. Heart size and mediastinal contours are stable and within normal limits. Arthritic change of the acromioclavicular joints. .     IMPRESSION: Opacity at the left base which may represent a combination of airspace disease and small effusion. Small right pleural effusion. Medical Decision Making   I am the first provider for this patient. I reviewed the vital signs, available nursing notes, past medical history, past surgical history, family history and social history. Vital Signs-Reviewed the patient's vital signs. Pulse Oximetry Analysis -  98% on room air, stable     Cardiac Monitor:  Rate: 90  Rhythm:  Normal Sinus Rhythm     EKG: Interpreted by the EP. Time Interpreted: 20:47    Rate: 90    Rhythm: Normal Sinus Rhythm    Interpretation: No STEMI. Records Reviewed: Nursing Notes and Old Medical Records (Time of Review: 8:21 PM)    Provider Notes (Medical Decision Making): Pt with no current sx. Was sent to the ER for dialysis catheter placement for tomorrow. Dr. Carlitos Rivera had called and spoke with prior ED physician to discuss plan to admit for HD catheter placement tomorrow. Reportedly Dr. Carlitos Rivera spoke with Dr. Ric Gibbs of vascular for placement.      ED Course: Progress Notes, Reevaluation, and Consults:    9:19 PM Consult: I discussed care with Dr. Saúl Cobb (Hospitalist). It was a standard discussion including patient history, chief complaint, available diagnostic results, and predicted treatment course. Agrees to admit to a medical bed. For Hospitalized Patients:    1. Hospitalization Decision Time:  The decision to hospitalize the patient was made by Dr. Saúl Cobb at [facility] on 3/12/2018      Diagnosis     Clinical Impression:   1. Kidney disease    2. Hypertension, unspecified type        Disposition: Admit     Follow-up Information     None           Patient's Medications   Start Taking    No medications on file   Continue Taking    ACETAMINOPHEN (TYLENOL EXTRA STRENGTH) 500 MG TABLET    Take  by mouth every six (6) hours as needed for Pain. AMLODIPINE (NORVASC) 10 MG TABLET    Take 1 Tab by mouth every evening. BLOOD-GLUCOSE METER MONITORING KIT    by Does Not Apply route. One touch ultra2    BUMETANIDE (BUMEX) 2 MG TABLET    Take 1 Tab by mouth daily. CALCITRIOL (ROCALTROL) 0.25 MCG CAPSULE    TAKE ONE CAPSULE BY MOUTH EVERY MONDAY, WEDNESDAY, AND FRIDAY    CARVEDILOL (COREG) 12.5 MG TABLET    TAKE 1 TABLET BY MOUTH TWICE A DAY    CLONIDINE (CATAPRES) 0.3 MG/24 HR    APPLY 1 PATCH TO SKIN ONCE EVERY 7 DAYS    CYANOCOBALAMIN (VITAMIN B-12) 1,000 MCG TABLET    Take 1,000 mcg by mouth daily. CYCLOBENZAPRINE (FLEXERIL) 10 MG TABLET    Take 1 Tab by mouth three (3) times daily as needed for Muscle Spasm(s). DOCUSATE SODIUM (COLACE) 100 MG CAPSULE    Take 1 Cap by mouth two (2) times a day. FERROUS SULFATE (IRON) 325 MG (65 MG IRON) TABLET    Take  by mouth Daily (before breakfast). Take 1 tablet by mouth once a week as per patient. FINASTERIDE (PROSCAR) 5 MG TABLET    TAKE 1 TABLET BY MOUTH DAILY. FLUTICASONE (FLONASE) 50 MCG/ACTUATION NASAL SPRAY    1 [place] by Both Nostrils route two (2) times a day.     GLUCOSE BLOOD VI TEST STRIPS (ASCENSIA AUTODISC VI, ONE TOUCH ULTRA TEST VI) STRIP    Test twice daily:  Fasting before breakfast and 2 hours after dinner (log readings), One touch ultra 2 test strips please; Dx: 250.02    HYDRALAZINE (APRESOLINE) 50 MG TABLET    Take 1.5 Tabs by mouth three (3) times daily. LANCETS (ONE TOUCH DELICA) MISC    Test twice daily: Once in the morning fasting, then two hours after dinner (log results)    MUPIROCIN (BACTROBAN) 2 % OINTMENT        OTHER    PGIIL/P CRM - Apply 1 -2 grams ( 1-2 pumps) to left foot 3 - 4 times daily. PREDNISOLONE ACETATE (PRED FORTE) 1 % OPHTHALMIC SUSPENSION    Administer 1 Drop to left eye three (3) times daily. ROSUVASTATIN (CRESTOR) 20 MG TABLET    Take 1 Tab by mouth nightly. SUB-Q INSULIN DEVICE, 40 UNIT (VGO 40) OZ    by Does Not Apply route. These Medications have changed    No medications on file   Stop Taking    No medications on file     _______________________________    Attestations:  Scribe Attestation     Bill Hanley acting as a scribe for and in the presence of Zechariah Naidu MD      March 12, 2018 at 8:21 PM       Provider Attestation:      I personally performed the services described in the documentation, reviewed the documentation, as recorded by the scribe in my presence, and it accurately and completely records my words and actions.  March 12, 2018 at 8:21 PM - Zechariah Naidu MD    _______________________________

## 2018-03-13 NOTE — ED NOTES
TRANSFER - OUT REPORT:    Verbal report given to Dre Coleman RN(name) on [place]  being transferred to N(unit) for routine progression of care       Report consisted of patients Situation, Background, Assessment and   Recommendations(SBAR). Information from the following report(s) SBAR, Kardex, ED Summary, Intake/Output, MAR, Recent Results and Cardiac Rhythm NSR was reviewed with the receiving nurse. Lines:   Peripheral IV 03/12/18 Right Hand (Active)   Site Assessment Clean, dry, & intact 3/12/2018  7:57 PM   Phlebitis Assessment 0 3/12/2018  7:57 PM   Infiltration Assessment 0 3/12/2018  7:57 PM   Dressing Status Clean, dry, & intact 3/12/2018  7:57 PM   Dressing Type Transparent;Tape 3/12/2018  7:57 PM   Hub Color/Line Status Pink;Patent; Flushed 3/12/2018  7:57 PM   Action Taken Blood drawn 3/12/2018  7:57 PM        Opportunity for questions and clarification was provided.       Patient transported with:   Transport  Wife

## 2018-03-13 NOTE — ROUTINE PROCESS
Bedside and Verbal shift change report given to Emily Sparks RN (oncoming nurse) by Ara Sorensen RN (offgoing nurse). Report included the following information SBAR, Kardex, MAR and Recent Results. SITUATION:  Code Status: Full Code  Reason for Admission: Kidney disease  Hospital day: 1  Problem List:       Hospital Problems  Date Reviewed: 1/19/2018          Codes Class Noted POA    Kidney disease ICD-10-CM: N28.9  ICD-9-CM: 593.9  3/12/2018 Unknown              BACKGROUND:   Past Medical History:   Past Medical History:   Diagnosis Date    Cardiac echocardiogram 10/21/2016    EF 60-65%. No WMA. Mod-marked LVH. Normal diastolic fx. No significant valvular heart disease.  Cardiac treadmill stress test, low risk 11/02/2012    Negative maximal exercise treadmill test.  Ex time 7 min 45 sec.  Cardiovascular LE peripheral arterial testing 03/22/2016    No significant peripheral arterial disease at rest bilaterally. ABIs deferred due to calcified vessels.  Cardiovascular LLE venous duplex 06/06/2012    Left leg:  No DVT.  Cardiovascular renal duplex 10/21/2016    No significant renal artery stenosis.  Chronic kidney disease     CKD (chronic kidney disease), stage V ([address]     Diabetes mellitus ([address] 3/12/2010    Diabetic retinopathy ([address] 5/4/2010    Edema of both legs     Eye examination 5/4/10    OU: 20/20; OD: 20/25; OS: 20/25 without correction.     Glaucoma 08/17/2017    Vipul Mcmahan    HLD (hyperlipidemia) 3/12/2010    HTN (hypertension) 3/12/2010    Orthostatic hypertension       Patient taking anticoagulants no    Patient has a defibrillator: no    History of shots YES for example, flu, pneumonia, tetanus   Isolation History NO for example, MRSA, CDiff    ASSESSMENT:  Changes in Assessment Throughout Shift: None  Significant Changes in 24 hours (for example, RR/code, fall)  Patient has Central Line: no Reasons if yes: N/A  Patient has Pa Cath: no Reasons if yes: N/A   Mobility Issues  PT  IV Patency  OR Checklist  Pending Tests    Last Vitals:  Vitals w/ MEWS Score (last day)     Date/Time MEWS Score Pulse Resp Temp BP Level of Consciousness SpO2    03/13/18 0742 3 74 18 97.1 °F (36.2 °C) (!)  222/111 Alert 99 %    03/13/18 0500 3 67 18 97 °F (36.1 °C) (!)  212/96 Alert 99 %    03/13/18 0300 1 74 18 97.1 °F (36.2 °C) (!)  198/96 Alert 97 %    03/13/18 0215 -- 71 13 -- 164/84 -- 99 %    03/13/18 0200 -- 73 13 -- 166/86 -- 99 %    03/13/18 0145 -- 78 12 -- (!)  183/92 -- 99 %    03/13/18 0103 -- 91 17 -- 178/90 -- 98 %    03/13/18 0100 -- 86 12 -- (!)  240/116 -- 100 %    03/13/18 0045 -- 84 14 -- (!)  235/103 -- 98 %    03/13/18 0030 -- 86 13 -- (!)  229/107 -- 97 %    03/13/18 0015 -- 88 13 -- (!)  246/102 -- 98 %    03/13/18 0000 -- 91 14 -- (!)  252/111 -- 99 %    03/12/18 2345 -- 91 15 -- (!)  257/109 -- 100 %    03/12/18 2330 -- -- -- -- (!)  265/115 -- --    03/12/18 2315 -- 90 12 -- (!)  249/117 -- 99 %    03/12/18 2308 -- 94 13 -- -- -- 99 %    03/12/18 2307 -- -- -- -- (!)  247/124 -- --    03/12/18 2245 -- -- -- -- (!)  251/114 -- --    03/12/18 2230 -- -- -- -- (!)  229/110 -- --    03/12/18 2215 -- -- -- -- (!)  238/111 -- --    03/12/18 2200 -- 86 13 -- (!)  240/106 -- 99 %    03/12/18 2145 -- 87 11 -- (!)  228/103 -- 99 %    03/12/18 2130 -- 88 12 -- -- -- 99 %    03/12/18 2115 -- 82 15 -- -- -- 90 %    03/12/18 2100 -- 88 13 -- -- -- 99 %    03/12/18 2045 -- 92 15 -- (!)  261/115 -- 100 %    03/12/18 2030 -- 86 15 -- (!)  266/117 -- 98 %    03/12/18 2015 -- 87 15 -- (!)  268/130 -- 100 %    03/12/18 1930 3 90 20 97.4 °F (36.3 °C) (!)  236/126 Alert 98 %            PAIN    Pain Assessment    Pain Intensity 1: 0 (03/13/18 0500)              Patient Stated Pain Goal: 0  Intervention effective: N/A  Time of last intervention: N/A Reassessment Completed: N/A   Other actions taken for pain: N/A    Last 3 Weights:  Last 3 Recorded Weights in this Encounter    03/12/18 1930 Weight: 90.7 kg (200 lb)   Weight change:     INTAKE/OUPUT    Current Shift:      Last three shifts:      RECOMMENDATIONS AND DISCHARGE PLANNING  Patient needs and requests: None    Pending tests/procedures: Temp cath for HD     Discharge plan for patient: Home    Discharge planning Needs or Barriers: TBD    Estimated Discharge Date: TBD Posted on Whiteboard in Patients Room: no       \"HEALS\" SAFETY CHECK  A safety check occurred in the patient's room between off going nurse and oncoming nurse listed above. The safety check included the below items:    H  High Alert Medications Verify all high alert medication drips (heparin, PCA, etc.)  E  Equipment Suction is set up for ALL patients (with noel)  Red plugs utilized for all equipment (IV pumps, etc.)  WOWs wiped down at end of shift. Room stocked with oxygen, suction, and other unit-specific supplies  A  Alarms Bed alarm is set for fall risk patients  Ensure chair alarm is in place and activated if patient is up in a chair  L  Lines Check IV for any infiltration  Pa bag is empty if patient has a Pa   Tubing and IV bags are labeled  S  Safety  Room is clean, patient is clean, and equipment is clean. Hallways are clear from equipment besides carts. Fall bracelet on for fall risk patients  Ensure room is clear and free of clutter  Suction is set up for ALL patients (with noel)  Hallways are clear from equipment besides carts.    Isolation precautions followed, supplies available outside room, sign posted    Homa Ramos RN

## 2018-03-13 NOTE — PROGRESS NOTES
The patient's BP was 200/99. MD was informed. IV hydralzine was given per order; PO BP medication that was missed during dialysis was also given. BP was retaken post administration; now 190/88. PRN protocol will be followed and BP retaken post administration.

## 2018-03-13 NOTE — CONSULTS
Surgery Consult      Patient: Fidel Simeon MRN: [id number]  CSN: [id number]      YOB: 1960    Age: 62 y.o. Sex: male      DOA: 3/12/2018       HPI:     Fidel Simeon is a 62 y.o. male who presents with ESRD in need of dialysis access. He is right handed. No previous dialysis but has had a previous catheter on left side for what sounds like iv abx. Past Medical History:   Diagnosis Date    Cardiac echocardiogram 10/21/2016    EF 60-65%. No WMA. Mod-marked LVH. Normal diastolic fx. No significant valvular heart disease.  Cardiac treadmill stress test, low risk 11/02/2012    Negative maximal exercise treadmill test.  Ex time 7 min 45 sec.  Cardiovascular LE peripheral arterial testing 03/22/2016    No significant peripheral arterial disease at rest bilaterally. ABIs deferred due to calcified vessels.  Cardiovascular LLE venous duplex 06/06/2012    Left leg:  No DVT.  Cardiovascular renal duplex 10/21/2016    No significant renal artery stenosis.  Chronic kidney disease     CKD (chronic kidney disease), stage V ([address]     Diabetes mellitus ([address] 3/12/2010    Diabetic retinopathy ([address] 5/4/2010    Edema of both legs     Eye examination 5/4/10    OU: 20/20; OD: 20/25; OS: 20/25 without correction.     Glaucoma 08/17/2017    NORA muro    HLD (hyperlipidemia) 3/12/2010    HTN (hypertension) 3/12/2010    Orthostatic hypertension        Past Surgical History:   Procedure Laterality Date    HX AMPUTATION      TOES    HX HERNIA REPAIR  2005    HX OTHER SURGICAL  11/07/2017    glaucoma valve- Ahmed       Family History   Problem Relation Age of Onset    Diabetes Sister     Sickle Cell Anemia Sister     Diabetes Sister        Social History     Social History    Marital status:      Spouse name: N/A    Number of children: N/A    Years of education: N/A     Social History Main Topics    Smoking status: Never Smoker    Smokeless tobacco: Never Used    Alcohol use No    Drug use: No    Sexual activity: Yes     Partners: Female     Other Topics Concern    Caffeine Concern Yes     6 cups a month    Exercise Yes     walking 4 hour a day   [place] Yes     Social History Narrative    Safety issues/concerns: ( none)Domestic Violence, (none)Guns in home,(doesn't use)Sunscreen, (working)Smoke Detectors, (safe)Housing. Prior to Admission medications    Medication Sig Start Date End Date Taking? Authorizing Provider   finasteride (PROSCAR) 5 mg tablet TAKE 1 TABLET BY MOUTH DAILY. 3/8/18   Vitaly Alexander MD   prednisoLONE acetate (PRED FORTE) 1 % ophthalmic suspension Administer 1 Drop to left eye three (3) times daily. 1/16/18   Historical Provider   bumetanide (BUMEX) 2 mg tablet Take 1 Tab by mouth daily. 1/11/18   Сергей Salazar NP   amLODIPine (NORVASC) 10 mg tablet Take 1 Tab by mouth every evening. 1/11/18   Chinmay Banuelos NP   acetaminophen (TYLENOL EXTRA STRENGTH) 500 mg tablet Take  by mouth every six (6) hours as needed for Pain. Historical Provider   mupirocin (BACTROBAN) 2 % ointment  1/9/18   Historical Provider   carvedilol (COREG) 12.5 mg tablet TAKE 1 TABLET BY MOUTH TWICE A DAY 12/5/17   Historical Provider   cyclobenzaprine (FLEXERIL) 10 mg tablet Take 1 Tab by mouth three (3) times daily as needed for Muscle Spasm(s). 11/20/17   Vitaly Alexander MD   cloNIDine (CATAPRES) 0.3 mg/24 hr APPLY 1 PATCH TO SKIN ONCE EVERY 7 DAYS 10/2/17   Beth Miller MD   hydrALAZINE (APRESOLINE) 50 mg tablet Take 1.5 Tabs by mouth three (3) times daily. 8/17/17   Chinmay Banuelos NP   sub-q insulin device, 40 unit (VGO 40) alicia by Does Not Apply route. Historical Provider   OTHER PGIIL/P CRM - Apply 1 -2 grams ( 1-2 pumps) to left foot 3 - 4 times daily.     Historical Provider   calcitRIOL (ROCALTROL) 0.25 mcg capsule TAKE ONE CAPSULE BY MOUTH EVERY MONDAY, [facility], AND FRIDAY 8/23/16   Historical Provider   rosuvastatin (CRESTOR) 20 mg tablet Take 1 Tab by mouth nightly. 8/31/16   Loreta Elena MD   docusate sodium (COLACE) 100 mg capsule Take 1 Cap by mouth two (2) times a day. 3/30/16   Nolvia Manriquez MD   glucose blood VI test strips (ASCENSIA AUTODISC VI, ONE TOUCH ULTRA TEST VI) strip Test twice daily:  Fasting before breakfast and 2 hours after dinner (log readings), One touch ultra 2 test strips please; Dx: 250.02 7/21/14   Sheldon Murillo NP   fluticasone (FLONASE) 50 mcg/actuation nasal spray 1 [place] by Both Nostrils route two (2) times a day. Patient taking differently: 1 Spray by Both Nostrils route as needed. 3/4/13   Sheldon Murillo NP   ferrous sulfate (IRON) 325 mg (65 mg iron) tablet Take  by mouth Daily (before breakfast). Take 1 tablet by mouth once a week as per patient. Historical Provider   Lancets (ONE TOUCH [facility]) Misc Test twice daily: Once in the morning fasting, then two hours after dinner (log results) 10/31/12   Robin Diaz DO   Blood-Glucose Meter monitoring kit by Does Not Apply route. One touch ultra2 9/27/10   Di Rojas MD   cyanocobalamin (VITAMIN B-12) 1,000 mcg tablet Take 1,000 mcg by mouth daily. Historical Provider       Allergies   Allergen Reactions    Bactrim [Sulfamethoprim Ds] Nausea and Vomiting       Physical Exam:      Visit Vitals    BP (!) 222/111 (BP 1 Location: Right arm, BP Patient Position: At rest)    Pulse 74    Temp 97.1 °F (36.2 °C)    Resp 18    Ht 5' 6\" (1.676 m)    Wt 200 lb (90.7 kg)    SpO2 99%    BMI 32.28 kg/m2       GENERAL: alert, cooperative, no distress, appears stated age, THROAT & NECK: normal and no erythema or exudates noted. , LUNG: clear to auscultation bilaterally, HEART: regular rate and rhythm, S1, S2 normal, no murmur, click, rub or gallop, ABDOMEN: soft, non-tender. Bowel sounds normal. No masses,  no organomegaly, NEUROLOGIC: AOx3.  Cranial nerves 2-12 and sensation grossly intact. ROS:  Constitutional: negative  Eyes: negative  Ears, nose, mouth, throat, and face: negative  Respiratory: negative  Cardiovascular: negative  Gastrointestinal: negative  Hematologic/lymphatic: negative  Musculoskeletal:negative  Neurological: negative  Behavioral/Psych: negative  Endocrine: negative  Allergic/Immunologic: negative  Unless otherwise mentioned in the HPI. Data Review:    CBC:   Lab Results   Component Value Date/Time    WBC 4.9 03/13/2018 03:25 AM    RBC 3.07 (L) 03/13/2018 03:25 AM    HGB 8.3 (L) 03/13/2018 03:25 AM    HCT 26.8 (L) 03/13/2018 03:25 AM    PLATELET 692 59/83/7069 03:25 AM      BMP:   Lab Results   Component Value Date/Time    Glucose 145 (H) 03/13/2018 03:25 AM    Sodium 142 03/13/2018 03:25 AM    Potassium 4.2 03/13/2018 03:25 AM    Chloride 110 (H) 03/13/2018 03:25 AM    CO2 22 03/13/2018 03:25 AM    BUN 61 (H) 03/13/2018 03:25 AM    Creatinine 12.20 (H) 03/13/2018 03:25 AM    Calcium 7.9 (L) 03/13/2018 03:25 AM     Coagulation:   Lab Results   Component Value Date/Time    Prothrombin time 13.0 03/13/2018 03:25 AM    INR 1.0 03/13/2018 03:25 AM    aPTT 29.7 03/13/2018 03:25 AM         Assessment/Plan     61 y/o male with ESRD now in need of dialysis catheter placement    --Will place permcath  --will need follow up for discussion of more permanent dialysis access  --Please call with any further questions or concerns.     Active Problems:    Kidney disease (3/12/2018)        Juarez Quiroga MD  March 13, 2018

## 2018-03-13 NOTE — PROGRESS NOTES
NUTRITION    Nutrition Consult: Diabetic Diet Education     RECOMMENDATIONS / PLAN:     - Nutrition education provided today  - Continue RD inpatient monitoring and evaluation. NUTRITION INTERVENTIONS & DIAGNOSIS:     [x] Meals/snacks: modified composition  [x] Medical food supplement therapy: Nepro TID    Nutrition Diagnosis:  Food and nutrition related knowledge deficit related to diabetic diet as evidenced by wife report. Increased nutrient needs (protein, energy) related to increased expenditure as evidenced by pt on HD 3 times weekly    ASSESSMENT:     Pt off unit at time of visit. Had decreased appetite and meal intake x 3-4 days PTA per wife report; appetite improving/ good since admission. Pt with hx of DM. Provided diet education on diabetic diet/ CHO counting to patient's wife. Educational handouts provided/reviewed with her. She verbalized understanding. Pt with ESRD on HD; receiving Nepro    Average po intake adequate to meet patients estimated nutritional needs:   [] Yes     [] No   [x] Unable to determine at this time    Diet: DIET NUTRITIONAL SUPPLEMENTS All Meals; NEPRO  DIET RENAL Regular      Food Allergies:  None known   Current Appetite:   [x] Good     [] Fair     [] Poor     [] Other:  Appetite/meal intake prior to admission:   [] Good     [x] Fair: x 3-4 days PTA    [] Poor     [] Other:  Feeding Limitations:  [] Swallowing difficulty    [] Chewing difficulty    [] Other:  Current Meal Intake: No data found.       BM:  3/12  Skin Integrity: \"Wounds removed\"  Edema:  2+ pitting UEs;  3+ pitting LEs  Pertinent Medications: Reviewed: phoslo, cyanocobalamin, colace, hectorol, ferrous sulfate,     Recent Labs      03/13/18   0325  03/12/18 1952   NA  142  141   K  4.2  4.1   CL  110*  108   CO2  22  21   GLU  145*  154*   BUN  61*  60*   CREA  12.20*  12.00*   CA  7.9*  8.4*   MG  1.9   --    PHOS  6.9*   --    ALB  1.9*  2.3*   SGOT  18  21   ALT  16  18       Intake/Output Summary (Last 24 hours) at 03/13/18 1724  Last data filed at 03/13/18 1312   Gross per 24 hour   Intake               10 ml   Output             1000 ml   Net             -990 ml       Anthropometrics:  Ht Readings from Last 1 Encounters:   03/12/18 5' 6\" (1.676 m)     Last 3 Recorded Weights in this Encounter    03/12/18 1930   Weight: 90.7 kg (200 lb)     Body mass index is 32.28 kg/(m^2). Weight History:  Pt had weight gain of 20 lb x 3 months due to fluid retention per wife report    Weight Metrics 3/12/2018 1/19/2018 1/11/2018 1/10/2018 12/28/2017 11/29/2017 11/20/2017   Weight 200 lb 202 lb 196 lb 190 lb 191 lb 194 lb 194 lb   BMI 32.28 kg/m2 32.6 kg/m2 31.64 kg/m2 30.67 kg/m2 30.83 kg/m2 31.31 kg/m2 31.31 kg/m2        Admitting Diagnosis: Kidney disease  Pertinent PMHx: CKD, DM, HLD, HTN    Education Needs:        [] None identified  [] Identified - Not appropriate at this time  [x]  Identified and addressed - refer to education log  Learning Limitations:   [x] None identified  [] Identified    Cultural, Baptism & ethnic food preferences:  [x] None identified    [] Identified and addressed     ESTIMATED NUTRITION NEEDS:     Calories: 7306-8803 kcal (30-35 kcal/kg) based on  [] Actual BW      [x] SBW: 91 kg   Protein: 109-137 gm (1.2-1.5 gm/kg) based on  [x] Actual BW      [] IBW   Fluid: 1 mL/kcal     MONITORING & EVALUATION:     Nutrition Goal(s):   1. Po intake of meals will meet >75% of patient estimated nutritional needs within the next 7 days. Outcome:  [] Met/Ongoing    []  Not Met    [x] New/Initial Goal   2. Patient will increase knowledge of appropriate food choices on a diabetic diet within 7 days.   Outcome:  [] Met    []  Not Met    [x] New/Initial Goal       Monitoring:   [x] Food and beverage intake   [x] Diet order   [x] Nutrition-focused physical findings   [x] Treatment/therapy   [] Weight   [] Enteral nutrition intake        Previous Recommendations (for follow-up assessments only):     [] Implemented       []   Not Implemented (RD to address)      [] No Longer Appropriate     [] No Recommendation Made     Discharge Planning:  Renal, diabetic diet   [x] Participated in care planning, discharge planning, & interdisciplinary rounds as appropriate      Carlotta Reyes, [address] [place] [address]   Pager: [phone]

## 2018-03-13 NOTE — CONSULTS
[address]  MR#: [id number]  : 1960  ACCOUNT #: [de-identified]   DATE OF SERVICE: 2018    REQUESTING PROVIDER:  Hospitalist service. REASON FOR CONSULTATION:  Initiation of dialysis. HISTORY OF PRESENT ILLNESS:  This 15-year-old -American male who was a patient of mine for a long period of time, who was sent to the emergency room by me yesterday after I had a long discussion with him and his wife in need for long-term dialysis. This patient has reached ESRD from his underlying type 2 diabetes mellitus and hypertension. He comes to my office time to time and has reached ESRD for a while and was not too willing to accept the reality of dialysis. Nowadays, he is getting short of breath, poor appetite, tiredness, fatigued, weak, and off and on nausea. Given the symptoms, he was in denial but after a long discussion we decided he wants to start dialysis, saying that something needs to be done. He is only 62 and also concerned that he does not have enough insurance to cover the cost of dialysis. That was explained to him also, and after understanding all the pros and cons of dialysis he agreed to start dialysis. PAST MEDICAL HISTORY:  He has history of hypertension, type 2 diabetes mellitus, hyperlipidemia, diabetic nephropathy and neuropathy, and autonomic dysfunction from type 2 diabetes mellitus, also has anemia, secondary hyperparathyroidism. Recently he did undergo surgery of his eye for retinal detachment and followed by ophthalmology, Dr Erin Overton. He also follows with endocrinology for his diabetes control. Nowadays also he is having some low sugars in the low 60s, and he feels shaky and sweaty with low sugars and it happens 3-4 times per week, and his blood pressure is not controlled. He is on 2 diuretics and not on ACE inhibitors given his advanced renal problem.   He also has a diastolic dysfunction with ejection fraction around 60-65%, moderate left ventricular hypertrophy, normal diastolic dysfunction as per the echo report that was done in October of last year, no significant valvular heart disease. PAST SURGICAL HISTORY:  Includes history of amputations of toes and hernia repair. SOCIAL HISTORY:  , never smoked. No drugs, no alcoholic problem. FAMILY HISTORY:  Significant for diabetes in a sister, sickle cell anemia in a sister, diabetes in a sister. ALLERGIES:  ALLERGIC TO BACTRIM, WHICH CAUSE NAUSEA AND VOMITING, BUT I AM NOT SURE EXACTLY WHAT HE MEANT BY ALLERGY. MEDICATIONS:  Norvasc 10 mg p.o. daily, Bumex 2 mg tablet one tablet twice daily, Rocaltrol 0.25 mcg every Monday/Wednesday/Friday, Coreg 12.5 mg twice daily, clonidine 0.3 mg per 24 hour patch once a week, vitamin B12 1000 mcg tablet daily, Flexeril 10 mg 1 tablet three times daily, Colace 100 mg 1 capsule two times daily, ferrous sulfate one tablet p.o. daily, Proscar 5 mg tablet daily, Flonase 50 mcg nasal spray two times a day, hydralazine 50 mg 1-1/2 tablets three times daily, Bactroban ointment, prednisone eyedrops, prednisolone 1% ophthalmic drops, Crestor 20 mg daily and sliding scale insulin. REVIEW OF SYSTEMS:    CONSTITUTIONAL:  As per history of present illness. CARDIOVASCULAR:  No chest pain, no palpitations, but mild shortness of breath with leg swelling. RESPIRATORY:  No cough, no wheezing but short of breath. GASTROINTESTINAL:  Nausea, loss of appetite without any vomiting. No constipation. GENITOURINARY:  Makes urine. No dysuria, no urgency, no hematuria, no flank pain. MUSCULOSKELETAL:  No weakness but has difficulty in moving around. NEUROLOGIC:  No dizziness. No headache, no seizure. PHYSICAL EXAMINATION:  VITAL SIGNS:  On admission, temperature 97.4, heart rate 90, blood pressure 236/126, respirations 20, oxygen saturation 98% at room air. Weight is 97.7 kg or 200 pounds. NECK:  Supple.   Jugular venous pressure slightly distended. LUNGS:  Decreased breath sound at the bases without any obvious crackles. HEART:  S1, S2, no gallop, no murmur. ABDOMEN:  Obese. EXTREMITIES:  Ankle 3+ edema. LABORATORY DATA:  On admission, WBC of 5.9 with a hemoglobin of 9.4, hematocrit of 29.4, platelets of 105,126. Chemistry on admission:  Sodium of 141, potassium 4.1, chloride 100, CO2 of 21, anion gap of 12, glucose of 154, BUN of 60, creatinine of 12.0, calcium of 8.4, GFR of 5 mL per minute, total protein of 5.6 with albumin of 2.3, which has decreased further today to 1.9, BNP of 13,261. Hemoglobin A1c is 8.5. Average blood sugar is 197. No urinalysis has been done. Last urine protein creatinine ratio was 3.1. Intact PTH as of May of last year was 268 pg/mL. One from this admission being ordered, results not back. Renal ultrasound was done in 03/2016, right kidney size 12.6 x 5.2 x 6.3 cm, no calculi, no mass, echotexture was normal; left kidney size 12.1 x 4.8 x 5.7 cm, echotexture was normal, no hydronephrosis; nonobstructive kidneys with normal echotexture. Duplex of arterial and veins was done. That was done in 10/2016, no significant renal artery stenosis. Renal artery ostial velocities were 76 cm per second, and right kidney measured 11.5 cm, left kidney measured 11.7 cm, bilaterally intrinsic medical renal disease identified, mild plaquing of the abdominal aorta with no focal stenosis or aneurysm noted, patent renal veins noted bilaterally. IMPRESSION:  1. The patient has reached endstage renal disease. 2.  Uncontrolled hypertension. 3.  Type 2 diabetes mellitus. 4.  Anemia. 5.  Secondary hyperparathyroidism. 6.  Hyperphosphatemia. 7.  Hypoalbuminemia. 8.  Hypercholesterolemia. PLAN:  The patient needs long-term dialysis, having symptoms from the ESRD including nausea, poor appetite, shortness of breath. The patient will have dialysis catheter placement by vascular surgery.   That has been consulted with them and once it has been done we will stay dialyzing him in the hospital, and after a few dialyses in hospital and controlling his blood pressure he will start outpatient dialysis. Also, we will change his oral Rocaltrol to IV Hectorol. Blood pressure medications will be adjusted. We will start him on angiotensin II receptor blockers for his proteinuria and kidney disease, and also for his left ventricular hypertrophy that will help to decrease the left ventricular hypertrophy. We will also stop the diuretics. He will have some Nepro supplementation to improve his albumin. Blood sugar also will be monitored. We will start him on phosphorus binder. Once all these things are done, we will arrange his outpatient dialysis center and then he will get dialysis 3 times a week as outpatient. CODE STATUS:  FULL. He is only 62. Definitely, after discharge we will pursue for possible transplant evaluation if the family and the patient wants. Further recommendations will be given based on the hospital course.       Mariaa Motley MD       [facility] [place] / [place]  D: 03/13/2018 18:11     T: 03/13/2018 19:30  JOB #: [id number]

## 2018-03-14 LAB
ATRIAL RATE: 90 BPM
CALCULATED P AXIS, ECG09: 43 DEGREES
CALCULATED R AXIS, ECG10: 59 DEGREES
CALCULATED T AXIS, ECG11: -179 DEGREES
DIAGNOSIS, 93000: NORMAL
GLUCOSE BLD STRIP.AUTO-MCNC: 127 MG/DL (ref 70–110)
GLUCOSE BLD STRIP.AUTO-MCNC: 128 MG/DL (ref 70–110)
HBV SURFACE AG SER QL: <0.1 INDEX
HBV SURFACE AG SER QL: NEGATIVE
P-R INTERVAL, ECG05: 134 MS
Q-T INTERVAL, ECG07: 398 MS
QRS DURATION, ECG06: 92 MS
QTC CALCULATION (BEZET), ECG08: 486 MS
VENTRICULAR RATE, ECG03: 90 BPM

## 2018-03-14 PROCEDURE — 82962 GLUCOSE BLOOD TEST: CPT

## 2018-03-14 PROCEDURE — 74011250636 HC RX REV CODE- 250/636: Performed by: HOSPITALIST

## 2018-03-14 PROCEDURE — 90935 HEMODIALYSIS ONE EVALUATION: CPT

## 2018-03-14 PROCEDURE — 74011250637 HC RX REV CODE- 250/637: Performed by: HOSPITALIST

## 2018-03-14 PROCEDURE — 74011250637 HC RX REV CODE- 250/637: Performed by: INTERNAL MEDICINE

## 2018-03-14 PROCEDURE — 74011636637 HC RX REV CODE- 636/637: Performed by: INTERNAL MEDICINE

## 2018-03-14 PROCEDURE — 65270000029 HC RM PRIVATE

## 2018-03-14 PROCEDURE — 5A1D70Z PERFORMANCE OF URINARY FILTRATION, INTERMITTENT, LESS THAN 6 HOURS PER DAY: ICD-10-PCS | Performed by: INTERNAL MEDICINE

## 2018-03-14 RX ORDER — HYDROCODONE BITARTRATE AND ACETAMINOPHEN 5; 325 MG/1; MG/1
1 TABLET ORAL
Status: DISCONTINUED | OUTPATIENT
Start: 2018-03-14 | End: 2018-03-17 | Stop reason: HOSPADM

## 2018-03-14 RX ORDER — MORPHINE SULFATE 2 MG/ML
1 INJECTION, SOLUTION INTRAMUSCULAR; INTRAVENOUS
Status: DISCONTINUED | OUTPATIENT
Start: 2018-03-14 | End: 2018-03-17 | Stop reason: ALTCHOICE

## 2018-03-14 RX ORDER — HYDRALAZINE HYDROCHLORIDE 20 MG/ML
10 INJECTION INTRAMUSCULAR; INTRAVENOUS
Status: DISCONTINUED | OUTPATIENT
Start: 2018-03-14 | End: 2018-03-17 | Stop reason: HOSPADM

## 2018-03-14 RX ORDER — ACETAMINOPHEN 500 MG
500 TABLET ORAL
Status: DISCONTINUED | OUTPATIENT
Start: 2018-03-14 | End: 2018-03-17 | Stop reason: HOSPADM

## 2018-03-14 RX ADMIN — ACETAMINOPHEN 500 MG: 500 TABLET ORAL at 16:47

## 2018-03-14 RX ADMIN — CALCIUM ACETATE 667 MG: 667 CAPSULE ORAL at 16:48

## 2018-03-14 RX ADMIN — HYDRALAZINE HYDROCHLORIDE 10 MG: 20 INJECTION INTRAMUSCULAR; INTRAVENOUS at 21:39

## 2018-03-14 RX ADMIN — FINASTERIDE 5 MG: 5 TABLET, FILM COATED ORAL at 16:47

## 2018-03-14 RX ADMIN — LOSARTAN POTASSIUM 50 MG: 50 TABLET ORAL at 16:47

## 2018-03-14 RX ADMIN — PREDNISOLONE ACETATE 1 DROP: 10 SUSPENSION/ DROPS OPHTHALMIC at 16:52

## 2018-03-14 RX ADMIN — Medication 10 ML: at 05:31

## 2018-03-14 RX ADMIN — ROSUVASTATIN CALCIUM 20 MG: 20 TABLET, FILM COATED ORAL at 21:38

## 2018-03-14 RX ADMIN — Medication 10 ML: at 14:57

## 2018-03-14 RX ADMIN — DOCUSATE SODIUM 100 MG: 100 CAPSULE, LIQUID FILLED ORAL at 18:28

## 2018-03-14 RX ADMIN — INSULIN GLARGINE 8 UNITS: 100 INJECTION, SOLUTION SUBCUTANEOUS at 21:37

## 2018-03-14 RX ADMIN — AMLODIPINE BESYLATE 10 MG: 10 TABLET ORAL at 18:28

## 2018-03-14 NOTE — PROGRESS NOTES
[facility] Hospitalist Group  Progress Note    Patient: Jose July Age: 62 y.o. : 1960 MR#: [id number] SSN: [id number]  Date: 3/14/2018     Subjective:     He just completed HD #2. bp high, he reports HA. Denies chest pain, shortness of breath, constipation. He had one episode of vomiting on HD, non bloody per patient. Patient and wife have not had education regarding renal diet. Patient states he still feels dizzy with standing, reports hx of orthostatic hypotension. States ankle swelling is improving. Assessment/Plan:     1. ESRD-pt admitted for initiation of HD. S/p TDC placement and HD #2 today. Arrangements for outpatient HD underway. Nutritionist for education regarding renal diet. 2. Hypertensive urgency - bp meds to be given now as held this am for HD. Avoid hydralazine and coreg (tolerates other beta blockers). Check orthostatics. 3. DM takes ssi in outpt setting denies taking lantus. A1C 8.5. Consult diabetes educator. 4. Anemia of ckd - epogen per nephrology  5. 2ndary hyperparathyroidism of ckd - hectoral per nephrology  6. obesity Body mass index is 32.28 kg/(m^2). 7. dvt prophylaxis  8. Full code pt and ot. Additional Notes:      Case discussed with:  [x]Patient  [x]Family  [x]Nursing  [x]Case Management  DVT Prophylaxis:  []Lovenox  []Hep SQ  [x]SCDs  []Coumadin   []On Heparin gtt    Objective:   VS:   Visit Vitals    /86    Pulse 73    Temp 98.2 °F (36.8 °C) (Oral)    Resp 18    Ht 5' 6\" (1.676 m)    Wt 90.7 kg (200 lb)    SpO2 98%    BMI 32.28 kg/m2      Tmax/24hrs: Temp (24hrs), Av.4 °F (36.9 °C), Min:98 °F (36.7 °C), Max:98.7 °F (37.1 °C)      Intake/Output Summary (Last 24 hours) at 18 1525  Last data filed at 18 1445   Gross per 24 hour   Intake                0 ml   Output             2000 ml   Net            -2000 ml     General:  Alert, awake, oriented. Wife at bedside  Heent: ncat. Perrl.    Cardiovascular: RRR, no murmurs  Pulmonary: cta b.l  GI: Soft, nt, nd. Nabs. Extremities: + pitting ankle edema. Neuro: no focal deficit  Skin: no rash    Labs:    Recent Results (from the past 24 hour(s))   HEP B SURFACE AG    Collection Time: 03/13/18  3:40 PM   Result Value Ref Range    Hepatitis B surface Ag <0.10 <1.00 Index    Hep B surface Ag Interp.  NEGATIVE  NEG     GLUCOSE, POC    Collection Time: 03/13/18  9:57 PM   Result Value Ref Range    Glucose (POC) 166 (H) 70 - 110 mg/dL   GLUCOSE, POC    Collection Time: 03/14/18  7:34 AM   Result Value Ref Range    Glucose (POC) 128 (H) 70 - 110 mg/dL       Signed By: Elvin Etienne MD     March 14, 2018 4:05 PM

## 2018-03-14 NOTE — DIABETES MGMT
Glycemic Control Plan of Care    POC BG range on 03/13/2018: 104-166 mg/dL. POC BG report on 03/14/2018 at time of review: 128 mg/dL. Patient stated that he is still having poor appetite due to current illness. Noted MD already decreased basal lantus insulin dose to 8 units daily at bedtime. Patient is on disposable insulin delivery device called VG0 40 at home and using humalog insulin. He is under the care of Dr. Rashel Curtis, endocr. Discussed this AM with both patient and wife and encouraged to continue home BG monitoring and notify Dr. Chika Young for trends for low blood sugar to help prevent hypoglycemia. Recommendation(s):  1.) Continue on current basal and correctional lispro insulin. 2.) Continue to monitor POC BG pattern. 3.) Completed assessment of home diabetes management and education. See separate notes, 03/13/2018.   4.) May need to consider SC insulin (basal and correctional) at discharge until patient is able to f/u with Dr. Chika Young after discharge. Wife is the primary caregiver and stated that she previously administer SC insulin to patient. She will be able to administer insulin to patient if discharge on SC insulin. Assessment:  Patient is 62year old with past medical history including type 2 diabetes mellitus, diabetic retinopathy and recent surgery for detached retina, hypertension, diabetic foot ulcer with history of toe amputation, hyperlipidemia, and CKD stage 5 - was admitted on 03/12/2018 with report of shortness of breath and swelling on bilateral legs. Noted:  ESRD. Status post temporary dialysis catheter placement. Type 2 diabetes mellitus with current A1c of 8.4% (03/13/2018). Most recent blood glucose values:    Results for Mandy Lopez (MRN [id number]) as of 3/14/2018 10:43   Ref.  Range 3/13/2018 07:37 3/13/2018 11:03 3/13/2018 15:20 3/13/2018 21:57   GLUCOSE,FAST - POC Latest Ref Range: 70 - 110 mg/dL 138 (H) 117 (H) 104 166 (H)     Results for David Chantel Cordova (MRN [id number]) as of 3/14/2018 10:43   Ref. Range 3/14/2018 07:34   GLUCOSE,FAST - POC Latest Ref Range: 70 - 110 mg/dL 128 (H)     Current A1C: 8.4% (03/13/2018) is equivalent to average blood glucose of 194 mg/dL during the past 2-3 months. Current hospital diabetes medications:  Basal lantus insulin 8 units daily at bedtime since 03/13/2018. Correctional lispro insulin ACHS. Normal sensitivity dose. Start 03/13/2018. Total daily dose insulin requirement previous day: 03/13/2018  Lantus: 8 units  Lispro: None. Patient did not require correctional insulin coverage. Home diabetes medications: Patient reported on 03/13/2018 that he is on VG0 40 which is a disposable insulin delivery device. This is changed daily. He is using humalog insulin. Diet: Renal regular; nutr suppl: nepro with all meals.     Goals:  Blood glucose will be within target range of  mg/dL by 03/17/2018     Education:  _X__  Refer to Diabetes Education Record: 03/13/2018             ___  Education not indicated at this time    Jose J Calix RN CCM

## 2018-03-14 NOTE — DIALYSIS
ACUTE HEMODIALYSIS FLOW SHEET    HEMODIALYSIS ORDERS: Physician: Noe Peace: Revaclear        Duration: 3.30 hr  BFR: 300   DFR: 600   Dialysate:  Temp 37 K+   3    Ca+  3 Na 140 Bicarb 30   Weight:  90.7 kg    Bed Scale []     Unable to Obtain []      Dry weight/UF Goal: 2000 Access CVL  Needle Gauge     Heparin []  Bolus      Units    [x] Hourly   1000    Units    []None      Catheter locking solution Heparin   Pre BP:   202/92   Pulse:    77     Temperature:   98.1  Respirations: 18  Tx: NS     ml/Bolus  Other        [] N/A   Labs: Pre        Post:        [x] N/A   Additional Orders(medications, blood products, hypotension management):      [x] N/A     [x] [facility] Consent Verified     CATHETER ACCESS: []N/A   [x]Right   []Left   [x]IJ     []Fem   [] First use X-ray verified     [x]Tunnel                [] Non Tunneled   [x]No S/S infection  []Redness  []Drainage []Cultured []Swelling []Pain   [x]Medical Aseptic Prep Utilized   [x]Dressing Changed  [x] Biopatch  Date: 3/14/18      []Clotted   [x]Patent   Flows: [x]Good  []Poor  []Reversed   If access problem,  notified: []Yes    []N/A  Date:           GRAFT/FISTULA ACCESS:  [x]N/A     []Right     []Left     []UE     []LE   []AVG   []AVF        []Buttonhole    []Medical Aseptic Prep Utilized   []No S/S infection  []Redness  []Drainage []Cultured []Swelling []Pain    Bruit:   [] Strong    [] Weak       Thrill :   [] Strong    [] Weak       Needle Gauge:    Length:     If access problem,  notified: []Yes     [x]N/A  Date:        Please describe access if present and not used:       GENERAL ASSESSMENT:    LUNGS:  Rate 18 SaO2%        [] N/A    [] Clear  [] Coarse  [] Crackles  [] Wheezing        [x] Diminished     Location : []RLL   []LLL    []RUL  []FELICIANO   Cough: []Productive  []Dry  [x]N/A   Respirations:  [x]Easy  []Labored   Therapy:  [x]RA  []NC  l/min    Mask: []NRB []Venti       O2%                  []Ventilator  []Intubated  [] Trach  [] BiPaP   CARDIAC: [x]Regular      [] Irregular   [] Pericardial Rub  [] JVD        []  Monitored  [] Bedside  [] Remotely monitored [] N/A  Rhythm: Sinus   EDEMA: [] None  [x]Generalized  [] Pitting [] 1    [] 2    [] 3    [] 4                 [] Facial  [] Pedal  []  UE  [] LE   SKIN:   [x] Warm  [] Hot     [] Cold   [x] Dry     [] Pale   [] Diaphoretic                  [] Flushed  [] Jaundiced  [] Cyanotic  [] Rash  [] Weeping   LOC:    [x] Alert      [x]Oriented:    [x] Person     [x] Place  []Time               [] Confused  [] Lethargic  [] Medicated  [] Non-responsive     GI / ABDOMEN: [x] Flat    [] Distended    [x] Soft    [] Firm   []  Obese                             [] Diarrhea  [x] Bowel Sounds  [] Nausea  [] Vomiting       / URINE ASSESSMENT:[] Voiding   [x] Oliguria  [] Anuria   []  Pa     [] Incontinent    []  Incontinent Brief      []  Bathroom Privileges     PAIN: [x] 0 []1  []2   []3   []4   []5   []6   []7   []8   []9   []10            Scale 0-10  Action/Follow Up:    MOBILITY:  [] Amb    [] Amb/Assist    [x] Bed    [] Wheelchair  [] Stretcher      All Vitals and Treatment Details on Attached [address]: [facility]          Room # 451     [] 1st Time Acute  [] Stat  [x] Routine  [] Urgent     [x] Acute Room  []  Bedside  [] ICU/CCU  [] ER   Isolation Precautions:  [x] Dialysis   [] Airborne   [] Contact    [] Reverse   Special Considerations:         [] Blood Consent Verified []N/A     ALLERGIES:  [x] Bactrim, Sulfur         Code Status:  [x] Full Code  [] DNR  [] Other           HBsAg ONLY: Date Drawn 3/12/18        [x]Negative []Positive []Unknown   HBsAb: Date 3/12/18    [] Susceptible   [x] [id number] []Not Drawn  [] Drawn     Current Labs:    Date of Labs:            Today []      Please see printed lab results                                                                                                                              DIET:  [x] Renal    [] Other     [] NPO     [] Diabetic      PRIMARY NURSE REPORT: First initial/Last name/Title      Pre Dialysis: Alf Cortez RN   Time: 46     EDUCATION:    [x] Patient [] Other         Knowledge Basis: []None []Minimal [x] Substantial   Barriers to learning  [x]N/A   [] Access Care     [] S&S of infection     [] Fluid Management     []K+     [x]Procedural    []Albumin     [] Medications     [] Tx Options     [] Transplant     [] Diet     [] Other   Teaching Tools:  [x] Explain  [] Demo  [] Handouts [] Video  Patient response:   [x] Verbalized understanding  [] Teach back  [] Return demonstration [] Requires follow up   Inappropriate due to            [x] Time Out/Safety Check       [address] [place] [address] Before each treatment:     Machine Number:                   [address] [place] [address]                                   [x] Unit Machine # 8 with centralized RO                                  [] Portable Machine #1/RO serial # [id number]                                  [] Portable Machine #2/RO serial # [id number]                                  [] Portable Machine #3/RO serial # [id number]                                                                                                       [facility]                                  [] Portable Machine #11/RO serial # [id number]                                   [] Portable Machine #12/RO serial # [id number]                                  [] Portable Machine #13/RO serial #  [id number]      Alarm Test:  Pass time 0900         Other:         [x] RO/Machine Log Complete      Temp    37          [x]Extracorporeal Circuit Tested for integrity   Dialysate: pH  7.4 Conductivity: Meter       HD Machine   8                 TCD: 14  Dialyzer Lot # [id number]            Blood Tubing Lot # [id number]          Saline Lot #       CHLORINE TESTING-Before each treatment and every 4 hours    Total Chlorine:* [x] less than 0.1 ppm  Time: 0900   4 Hr/2nd Check Time: 1300   (if greater than 0.1 ppm from Primary then every 30 minutes from Secondary)     TREATMENT INITIATION  with Dialysis Precautions:   [x] All Connections Secured                 [x] Saline Line Double Clamped   [x] Venous Parameters Set                  [x] Arterial Parameters Set    [x] Prime Given  250                              [x]Air Foam Detector Engaged      Treatment Initiation Note:   4529: Received patient via bed. Spouse at bedside. Patient alert and oriented x3. Education provided to patient and spouse on infection control, HD procedure, cleaning techniques, and observation and assessments. 1100: CVL to right chest patent. Patient tolerating treatment without difficulties. 1250: Patient had one episode of emesis, Nephrologist notified and dressing changed  1300: Nephrologist at bedside to reassess patient. No acute distress noted at this time. Medication Dose Volume Route Initials Dialyzer Cleared: [] Good [x] Fair  [] Poor    Blood processed:  62.5 L  UF Removed  2500 Ml    Post Wt: 88.7    kg  POst BP:   177/86      Pulse: 66      Respirations: 18  Temperature: 98.2      No medication scheduled with this HD treatment. Post Tx Vascular Access: AVF/AVG: Bleeding stopped Art  min. Tashi. Min   N/A                                   Catheter: Locking solution: Heparin 1:1000 Art. Tashi. N/A                                 Post Assessment:                                    Skin:  [x] Warm  [x] Dry [] Diaphoretic    [] Flushed  [] Pale [] Cyanotic   [facility] Signatures Title Initials  Time Lungs: [] Clear    [] Course  [] Crackles  [] Wheezing [x] Diminished   Alexandro Mcardle RN MS 4629 Cardiac: [x] Regular   [] Irregular   [] Monitor  [] N/A  Rhythm: Sinus          Edema:  [] None    [x] General     [] Facial   [] Pedal    [] UE    [] LE       Pain: [x]0  []1  []2   []3  []4   []5   []6   []7   []8   []9   []10     Post Treatment Note:Patient completed total treatment. Fluids removed = 1500 ml. Dressing changed to CVL. Patient A&Ox3 and verbalize to distress. Nurse to nurse given. Transferred patient back to room 451. POST TREATMENT PRIMARY NURSE HANDOFF REPORT:     First initial/Last name/Title         Post Dialysis: LEILA Mayo RN Time:  0622     Abbreviations: AVG-arterial venous graft, AVF-arterial venous fistula, IJ-Internal Jugular, Subcl-Subclavian, Fem-Femoral, Tx-treatment, AP/HR-apical heart rate, DFR-dialysate flow rate, BFR-blood flow rate, AP-arterial pressure, -venous pressure, UF-ultrafiltrate, TMP-transmembrane pressure, Tashi-Venous, Art-Arterial, RO-Reverse Osmosis

## 2018-03-14 NOTE — PROGRESS NOTES
RENAL PROGRESS NOTE: Pt seen on dialysis. Follow up of ESRD            Subjective: Had 1 episode of Emesis. Last night had discussion with him & his Wife, they don't want Coreg , they had bad experience with Coreg, almost passed ou in the past.    Patient is on Dialysis. Objective:    Patient Vitals for the past 12 hrs:   Temp Pulse Resp BP SpO2   03/14/18 1200 - 69 18 169/78 -   03/14/18 1130 - 71 18 175/90 -   03/14/18 1100 - 73 18 (!) 203/91 -   03/14/18 1055 98.1 °F (36.7 °C) 77 18 (!) 202/92 -   03/14/18 0733 98.5 °F (36.9 °C) 69 18 (!) 191/96 98 %   03/14/18 0343 98 °F (36.7 °C) 71 18 189/89 97 %        Intake/Output Summary (Last 24 hours) at 03/14/18 1306  Last data filed at 03/13/18 1312   Gross per 24 hour   Intake                0 ml   Output             1000 ml   Net            -1000 ml       Physical Assessment:     General Appearance: NAD  Lung: clear to auscultation  Heart: regular rate and rhythm and no murmurs, clicks or gallops  Lower Extremities : 1 : edema   Access: TDC, qb 400. Labs    CBC w/Diff    Recent Labs      03/13/18 0325 03/12/18 1952   WBC  4.9  5.9   RBC  3.07*  3.38*   HGB  8.3*  9.4*   HCT  26.8*  29.4*   MCV  87.3  87.0   MCH  27.0  27.8   MCHC  31.0  32.0   RDW  13.2  13.3    Recent Labs      03/13/18 0325 03/12/18 1952   MONOS  16*  14*   EOS  5  4   BASOS  0  1   RDW  13.2  13.3        Comprehensive Metabolic Profile    Recent Labs      03/13/18 0325 03/12/18 1952   NA  142  141   K  4.2  4.1   CL  110*  108   CO2  22  21   BUN  61*  60*   CREA  12.20*  12.00*    Recent Labs      03/13/18 0325  03/12/18 1952   CA  7.9*  8.4*   PHOS  6.9*   --    ALB  1.9*  2.3*   TP  5.5*  5.6*   SGOT  18  21   TBILI  0.2  0.3          Basic Metabolic Profile       results  reviwed. MEDS:Reviwed.   Current Facility-Administered Medications   Medication Dose Route Frequency Provider Last Rate Last Dose    acetaminophen (TYLENOL) tablet 650 mg  650 mg Oral Q6H PRN Nilay Whitten MD        amLODIPine (NORVASC) tablet 10 mg  10 mg Oral QPM Nilay Whitten MD   10 mg at 03/13/18 1833    cloNIDine (CATAPRES) 0.3 mg/24 hr patch 1 Patch  1 Patch TransDERmal Q7D Nilay Whitten MD   1 Patch at 03/13/18 2215    cyanocobalamin tablet 1,000 mcg  1,000 mcg Oral DAILY Nilay Whitten MD   Stopped at 03/13/18 0900    cyclobenzaprine (FLEXERIL) tablet 10 mg  10 mg Oral TID PRN Nilay Whitten MD        docusate sodium (COLACE) capsule 100 mg  100 mg Oral BID Nilay Whitten MD   100 mg at 03/13/18 1833    ferrous sulfate tablet 325 mg  1 Tab Oral Q7D Nilay Whitten MD   325 mg at 03/13/18 0506    finasteride (PROSCAR) tablet 5 mg  5 mg Oral DAILY Nilay Whitten MD   Stopped at 03/13/18 0900    mupirocin (BACTROBAN) 2 % ointment   Topical DAILY Nilay Whitten MD        prednisoLONE acetate (PRED FORTE) 1 % ophthalmic suspension 1 Drop  1 Drop Left Eye TID Nilay Whitten MD   1 Drop at 03/13/18 2222    rosuvastatin (CRESTOR) tablet 20 mg  20 mg Oral QHS Nilay Whitten MD   20 mg at 03/13/18 2216    sodium chloride (NS) flush 5-10 mL  5-10 mL IntraVENous Q8H Nilay Whitten MD   10 mL at 03/14/18 0531    sodium chloride (NS) flush 5-10 mL  5-10 mL IntraVENous PRN Nilay Whitten MD        HYDROcodone-acetaminophen (NORCO) 5-325 mg per tablet 1 Tab  1 Tab Oral Q4H PRN Nilay Whitten MD        morphine injection 2 mg  2 mg IntraVENous Q4H PRN Nilay Whitten MD        insulin lispro (HUMALOG) injection   SubCUTAneous Syed Ramirez MD   Stopped at 03/13/18 0730    glucose chewable tablet 16 g  4 Tab Oral PRN Nilay Whitten MD        glucagon (GLUCAGEN) injection 1 mg  1 mg IntraMUSCular PRN Nilay Whitten MD        dextrose (D50W) injection syrg 12.5-25 g  25-50 mL IntraVENous PRN Nilay Whitten MD        [facility]dansetron [facility]) injection 4 mg  4 mg IntraVENous Q4H PRN Nilay Whitten MD       [facility] docusate sodium (COLACE) capsule 100 mg  100 mg Oral BID Beth Mckeon MD   Stopped at 03/13/18 0900    . PHARMACY TO SUBSTITUTE PER PROTOCOL    Per Protocol Beth Mckeon MD        fluticasone (FLONASE) 50 mcg/actuation nasal spray 2 Spray  2 Spray Both Nostrils DAILY Beth Mckeon MD   Stopped at 03/13/18 0900    losartan (COZAAR) tablet 50 mg  50 mg Oral DAILY Padmaja Ram MD   50 mg at 03/13/18 1550    epoetin aleksandra (EPOGEN;PROCRIT) injection 10,000 Units  10,000 Units IntraVENous DIALYSIS [facility] ANGELICA Ram MD   10,000 Units at 03/13/18 1243    doxercalciferol (HECTOROL) 4 mcg/2 mL injection 1 mcg  1 mcg IntraVENous DIALYSIS [facility]ICA Ram MD   1 mcg at 03/13/18 1136    B complex-vitaminC-folic acid (NEPHROCAP) cap  1 Cap Oral DAILY Padmaja Ram MD        calcium acetate (PHOSLO) capsule 667 mg  1 Cap Oral TID WITH MEALS Padmaja Ram MD   667 mg at 03/13/18 2215    insulin glargine (LANTUS) injection 8 Units  8 Units SubCUTAneous QHS Franchesca Mccormick MD   8 Units at 03/13/18 2217    hydrALAZINE (APRESOLINE) tablet 50 mg  50 mg Oral TID Franchesca Mccormick MD   50 mg at 03/13/18 2215       Impression:    ESRD: Tolerating HD well. Anemia of CKD: on  Epogen. Hyperparathyroidism Yes  Hypertension  Plan  Dialysis for volume and solute management  Epogen : not today  Hectorol: 1 microgram .,  UF 2500 today. Dialysis tomorrow, start OT /PT. DC Coreg, Again change Hydralazine to Losartan.   Susan Bass MD

## 2018-03-14 NOTE — ROUTINE PROCESS
Bedside and Verbal shift change report given to Catherine (oncoming nurse) by Migdalia Graves (offgoing nurse). Report included the following information SBAR, Kardex, Intake/Output and MAR.

## 2018-03-14 NOTE — ROUTINE PROCESS
Bedside and Verbal shift change report given to Emily Sparks RN (oncoming nurse) by Ara Sorensen RN (offgoing nurse). Report included the following information SBAR, Kardex, MAR and Recent Results. SITUATION:  Code Status: Full Code  Reason for Admission: Kidney disease  Hospital day: 2  Problem List:       Hospital Problems  Date Reviewed: 1/19/2018          Codes Class Noted POA    Kidney disease ICD-10-CM: N28.9  ICD-9-CM: 593.9  3/12/2018 Unknown              BACKGROUND:   Past Medical History:   Past Medical History:   Diagnosis Date    Cardiac echocardiogram 10/21/2016    EF 60-65%. No WMA. Mod-marked LVH. Normal diastolic fx. No significant valvular heart disease.  Cardiac treadmill stress test, low risk 11/02/2012    Negative maximal exercise treadmill test.  Ex time 7 min 45 sec.  Cardiovascular LE peripheral arterial testing 03/22/2016    No significant peripheral arterial disease at rest bilaterally. ABIs deferred due to calcified vessels.  Cardiovascular LLE venous duplex 06/06/2012    Left leg:  No DVT.  Cardiovascular renal duplex 10/21/2016    No significant renal artery stenosis.  Chronic kidney disease     CKD (chronic kidney disease), stage V ([address]     Diabetes mellitus ([address] 3/12/2010    Diabetic retinopathy ([address] 5/4/2010    Edema of both legs     Eye examination 5/4/10    OU: 20/20; OD: 20/25; OS: 20/25 without correction.     Glaucoma 08/17/2017    Vipul Mcmahan    HLD (hyperlipidemia) 3/12/2010    HTN (hypertension) 3/12/2010    Orthostatic hypertension       Patient taking anticoagulants no    Patient has a defibrillator: no    History of shots YES for example, flu, pneumonia, tetanus   Isolation History NO for example, MRSA, CDiff    ASSESSMENT:  Changes in Assessment Throughout Shift: None  Significant Changes in 24 hours (for example, RR/code, fall)  Patient has Central Line: yes Reasons if yes: Dialysis  Patient has Pa Cath: no Reasons if yes: N/A Mobility Issues  PT  IV Patency  OR Checklist  Pending Tests    Last Vitals:  Vitals w/ MEWS Score (last day)     Date/Time MEWS Score Pulse Resp Temp BP Level of Consciousness SpO2    03/14/18 0733 1 69 18 98.5 °F (36.9 °C) (!)  191/96 Alert 98 %    03/14/18 0343 1 71 18 98 °F (36.7 °C) 189/89 Alert 97 %    03/13/18 2311 1 87 18 98.7 °F (37.1 °C) 169/88 Alert 94 %    03/13/18 2000 3 84 18 98.6 °F (37 °C) (!)  212/107 Alert 99 %    03/13/18 1701 -- -- -- -- 198/88 -- --    03/13/18 1315 -- 64 18 97.8 °F (36.6 °C) 152/78 -- --    03/13/18 1312 -- 62 18 -- 142/87 -- --    03/13/18 1300 -- 72 18 -- (!)  201/108 -- --    03/13/18 1230 -- 65 18 -- (!)  141/91 -- --    03/13/18 1200 -- 73 18 -- (!)  180/103 -- --    03/13/18 1130 -- 66 18 -- (!)  210/113 -- --    03/13/18 1100 -- 65 18 -- (!)  181/107 -- --    03/13/18 1037 -- 66 18 -- (!)  200/113 -- --    03/13/18 1030 -- 69 18 97.6 °F (36.4 °C) (!)  216/115 -- --    03/13/18 0742 3 74 18 97.1 °F (36.2 °C) (!)  222/111 Alert 99 %    03/13/18 0500 3 67 18 97 °F (36.1 °C) (!)  212/96 Alert 99 %    03/13/18 0300 1 74 18 97.1 °F (36.2 °C) (!)  198/96 Alert 97 %    03/13/18 0215 -- 71 13 -- 164/84 -- 99 %    03/13/18 0200 -- 73 13 -- 166/86 -- 99 %    03/13/18 0145 -- 78 12 -- (!)  183/92 -- 99 %    03/13/18 0103 -- 91 17 -- 178/90 -- 98 %    03/13/18 0100 -- 86 12 -- (!)  240/116 -- 100 %    03/13/18 0045 -- 84 14 -- (!)  235/103 -- 98 %    03/13/18 0030 -- 86 13 -- (!)  229/107 -- 97 %    03/13/18 0015 -- 88 13 -- (!)  246/102 -- 98 %    03/13/18 0000 -- 91 14 -- (!)  252/111 -- 99 %            PAIN    Pain Assessment    Pain Intensity 1: 0 (03/14/18 0343)              Patient Stated Pain Goal: 0  Intervention effective: N/A  Time of last intervention: N/A Reassessment Completed: N/A  Other actions taken for pain: None    Last 3 Weights:  Last 3 Recorded Weights in this Encounter    03/12/18 1930   Weight: 90.7 kg (200 lb)   Weight change:     INTAKE/OUPUT    Current Shift:      Last three shifts: 03/12 1901 - 03/14 0700  In: 10 [I.V.:10]  Out: 1000     RECOMMENDATIONS AND DISCHARGE PLANNING  Patient needs and requests: None    Pending tests/procedures: Dialysis     Discharge plan for patient: Home    Discharge planning Needs or Barriers: TBD     Estimated Discharge Date: TBD Posted on Whiteboard in Patients Room: no       \"HEALS\" SAFETY CHECK  A safety check occurred in the patient's room between off going nurse and oncoming nurse listed above. The safety check included the below items:    H  High Alert Medications Verify all high alert medication drips (heparin, PCA, etc.)  E  Equipment Suction is set up for ALL patients (with noel)  Red plugs utilized for all equipment (IV pumps, etc.)  WOWs wiped down at end of shift. Room stocked with oxygen, suction, and other unit-specific supplies  A  Alarms Bed alarm is set for fall risk patients  Ensure chair alarm is in place and activated if patient is up in a chair  L  Lines Check IV for any infiltration  Pa bag is empty if patient has a Pa   Tubing and IV bags are labeled  S  Safety  Room is clean, patient is clean, and equipment is clean. Hallways are clear from equipment besides carts. Fall bracelet on for fall risk patients  Ensure room is clear and free of clutter  Suction is set up for ALL patients (with noel)  Hallways are clear from equipment besides carts.    Isolation precautions followed, supplies available outside room, sign posted    Sapphire Daniel RN

## 2018-03-14 NOTE — PROGRESS NOTES
I have assumed care of Timur Roxanne John. He was assessed after bedside report. He is currently resting in bed.

## 2018-03-14 NOTE — PROGRESS NOTES
Care Management Interventions  PCP Verified by CM: Yes  Current Support Network: Lives with Spouse  Confirm Follow Up Transport: Family  Plan discussed with Pt/Family/Caregiver: Yes     Pt is a 62year old male admitted for kidney disease. Pt is alert and oriented in room with spouse Monet Huffman ([phone] or [phone]) at his bedside. Pt is new on HD. CM submitted pt's information to [address] for review and scheduling of pt's outpatient HD at [facility] on [address] [facility].   CM met with pt to obtain his preference of shift for treatment. CM informed pt and spouse to begin seeking assist for transportation to and from treatment. CM contacted [employer] to request screening on pt for Medicaid.

## 2018-03-15 LAB
ANION GAP SERPL CALC-SCNC: 8 MMOL/L (ref 3–18)
BASOPHILS # BLD: 0 K/UL (ref 0–0.1)
BASOPHILS NFR BLD: 0 % (ref 0–2)
BUN SERPL-MCNC: 19 MG/DL (ref 7–18)
BUN/CREAT SERPL: 3 (ref 12–20)
CALCIUM SERPL-MCNC: 8.1 MG/DL (ref 8.5–10.1)
CALCIUM SERPL-MCNC: 8.3 MG/DL (ref 8.5–10.1)
CHLORIDE SERPL-SCNC: 105 MMOL/L (ref 100–108)
CO2 SERPL-SCNC: 28 MMOL/L (ref 21–32)
CREAT SERPL-MCNC: 5.68 MG/DL (ref 0.6–1.3)
DIFFERENTIAL METHOD BLD: ABNORMAL
EOSINOPHIL # BLD: 0.2 K/UL (ref 0–0.4)
EOSINOPHIL NFR BLD: 2 % (ref 0–5)
ERYTHROCYTE [DISTWIDTH] IN BLOOD BY AUTOMATED COUNT: 13.5 % (ref 11.6–14.5)
GLUCOSE BLD STRIP.AUTO-MCNC: 140 MG/DL (ref 70–110)
GLUCOSE BLD STRIP.AUTO-MCNC: 142 MG/DL (ref 70–110)
GLUCOSE BLD STRIP.AUTO-MCNC: 143 MG/DL (ref 70–110)
GLUCOSE BLD STRIP.AUTO-MCNC: 198 MG/DL (ref 70–110)
GLUCOSE SERPL-MCNC: 104 MG/DL (ref 74–99)
HCT VFR BLD AUTO: 29.1 % (ref 36–48)
HGB BLD-MCNC: 9.2 G/DL (ref 13–16)
LYMPHOCYTES # BLD: 1.1 K/UL (ref 0.9–3.6)
LYMPHOCYTES NFR BLD: 14 % (ref 21–52)
MCH RBC QN AUTO: 28 PG (ref 24–34)
MCHC RBC AUTO-ENTMCNC: 31.6 G/DL (ref 31–37)
MCV RBC AUTO: 88.7 FL (ref 74–97)
MONOCYTES # BLD: 1.2 K/UL (ref 0.05–1.2)
MONOCYTES NFR BLD: 15 % (ref 3–10)
NEUTS SEG # BLD: 5.3 K/UL (ref 1.8–8)
NEUTS SEG NFR BLD: 69 % (ref 40–73)
PHOSPHATE SERPL-MCNC: 3.8 MG/DL (ref 2.5–4.9)
PLATELET # BLD AUTO: 289 K/UL (ref 135–420)
PMV BLD AUTO: 10.8 FL (ref 9.2–11.8)
POTASSIUM SERPL-SCNC: 3.4 MMOL/L (ref 3.5–5.5)
PTH-INTACT SERPL-MCNC: 338.1 PG/ML (ref 18.4–88)
RBC # BLD AUTO: 3.28 M/UL (ref 4.7–5.5)
SODIUM SERPL-SCNC: 141 MMOL/L (ref 136–145)
WBC # BLD AUTO: 7.8 K/UL (ref 4.6–13.2)

## 2018-03-15 PROCEDURE — 74011250637 HC RX REV CODE- 250/637: Performed by: INTERNAL MEDICINE

## 2018-03-15 PROCEDURE — 74011250636 HC RX REV CODE- 250/636

## 2018-03-15 PROCEDURE — 36415 COLL VENOUS BLD VENIPUNCTURE: CPT | Performed by: INTERNAL MEDICINE

## 2018-03-15 PROCEDURE — 82962 GLUCOSE BLOOD TEST: CPT

## 2018-03-15 PROCEDURE — 5A1D70Z PERFORMANCE OF URINARY FILTRATION, INTERMITTENT, LESS THAN 6 HOURS PER DAY: ICD-10-PCS | Performed by: INTERNAL MEDICINE

## 2018-03-15 PROCEDURE — 74011636637 HC RX REV CODE- 636/637: Performed by: INTERNAL MEDICINE

## 2018-03-15 PROCEDURE — 65270000029 HC RM PRIVATE

## 2018-03-15 PROCEDURE — 85025 COMPLETE CBC W/AUTO DIFF WBC: CPT | Performed by: INTERNAL MEDICINE

## 2018-03-15 PROCEDURE — 83970 ASSAY OF PARATHORMONE: CPT | Performed by: INTERNAL MEDICINE

## 2018-03-15 PROCEDURE — 90935 HEMODIALYSIS ONE EVALUATION: CPT

## 2018-03-15 PROCEDURE — 84100 ASSAY OF PHOSPHORUS: CPT | Performed by: INTERNAL MEDICINE

## 2018-03-15 PROCEDURE — 74011250636 HC RX REV CODE- 250/636: Performed by: INTERNAL MEDICINE

## 2018-03-15 PROCEDURE — 80048 BASIC METABOLIC PNL TOTAL CA: CPT | Performed by: INTERNAL MEDICINE

## 2018-03-15 PROCEDURE — 74011250637 HC RX REV CODE- 250/637: Performed by: HOSPITALIST

## 2018-03-15 PROCEDURE — 97166 OT EVAL MOD COMPLEX 45 MIN: CPT

## 2018-03-15 RX ORDER — HEPARIN SODIUM 1000 [USP'U]/ML
INJECTION, SOLUTION INTRAVENOUS; SUBCUTANEOUS
Status: COMPLETED
Start: 2018-03-15 | End: 2018-03-15

## 2018-03-15 RX ORDER — HEPARIN SODIUM 1000 [USP'U]/ML
1000 INJECTION, SOLUTION INTRAVENOUS; SUBCUTANEOUS
Status: DISCONTINUED | OUTPATIENT
Start: 2018-03-17 | End: 2018-03-17 | Stop reason: HOSPADM

## 2018-03-15 RX ADMIN — FLUTICASONE PROPIONATE 2 SPRAY: 50 SPRAY, METERED NASAL at 09:30

## 2018-03-15 RX ADMIN — DOCUSATE SODIUM 100 MG: 100 CAPSULE, LIQUID FILLED ORAL at 09:19

## 2018-03-15 RX ADMIN — ROSUVASTATIN CALCIUM 20 MG: 20 TABLET, FILM COATED ORAL at 21:18

## 2018-03-15 RX ADMIN — Medication 10 ML: at 06:49

## 2018-03-15 RX ADMIN — DOCUSATE SODIUM 100 MG: 100 CAPSULE, LIQUID FILLED ORAL at 18:33

## 2018-03-15 RX ADMIN — AMLODIPINE BESYLATE 10 MG: 10 TABLET ORAL at 18:33

## 2018-03-15 RX ADMIN — PREDNISOLONE ACETATE 1 DROP: 10 SUSPENSION/ DROPS OPHTHALMIC at 09:28

## 2018-03-15 RX ADMIN — CALCIUM ACETATE 667 MG: 667 CAPSULE ORAL at 14:07

## 2018-03-15 RX ADMIN — CALCIUM ACETATE 667 MG: 667 CAPSULE ORAL at 09:18

## 2018-03-15 RX ADMIN — FINASTERIDE 5 MG: 5 TABLET, FILM COATED ORAL at 09:18

## 2018-03-15 RX ADMIN — LOSARTAN POTASSIUM 50 MG: 50 TABLET ORAL at 14:07

## 2018-03-15 RX ADMIN — Medication 10 ML: at 23:05

## 2018-03-15 RX ADMIN — DOXERCALCIFEROL 1 MCG: 4 INJECTION, SOLUTION INTRAVENOUS at 12:24

## 2018-03-15 RX ADMIN — HYDROCODONE BITARTRATE AND ACETAMINOPHEN 1 TABLET: 5; 325 TABLET ORAL at 09:27

## 2018-03-15 RX ADMIN — CYANOCOBALAMIN TAB 1000 MCG 1000 MCG: 1000 TAB at 09:18

## 2018-03-15 RX ADMIN — HEPARIN SODIUM 1000 UNITS: 1000 INJECTION, SOLUTION INTRAVENOUS; SUBCUTANEOUS at 12:26

## 2018-03-15 RX ADMIN — Medication 10 ML: at 14:16

## 2018-03-15 RX ADMIN — ERYTHROPOIETIN 10000 UNITS: 10000 INJECTION, SOLUTION INTRAVENOUS; SUBCUTANEOUS at 12:24

## 2018-03-15 RX ADMIN — INSULIN GLARGINE 8 UNITS: 100 INJECTION, SOLUTION SUBCUTANEOUS at 21:18

## 2018-03-15 RX ADMIN — Medication 10 ML: at 01:01

## 2018-03-15 RX ADMIN — PREDNISOLONE ACETATE 1 DROP: 10 SUSPENSION/ DROPS OPHTHALMIC at 18:43

## 2018-03-15 RX ADMIN — NEPHROCAP 1 CAPSULE: 1 CAP ORAL at 09:18

## 2018-03-15 RX ADMIN — CALCIUM ACETATE 667 MG: 667 CAPSULE ORAL at 18:33

## 2018-03-15 NOTE — DIABETES MGMT
Glycemic Control Plan of Care    BG range on target with inpatient glycemic intervention using basal lantus and SSI. POC BG range on 03/14/2018: 127-128 mg/dL. POC BG report on 03/15/2018 at time of review: 142 mg/dL. Current inpatient insulin orders: lantus 8 units QHS and correctional lispro insulin. Noted patient did not require correctional insulin on 03/14/2018. Current Meal Intake:  Patient Vitals for the past 100 hrs:   % Diet Eaten   03/15/18 0930 100 %     Home diabetes meds:  Daily disposable insulin delivery device called VG0 40 (humalog). He is under the care of cade Sanchez. See assessment of home diabetes management and education entered on 03/14/2018. Patient reported episodes of hypoglycemia prior to hospital admission due to poor po intake and stated that VG0 40 is a high dose. 03/14/2018: Patient is currently having dialysis treatment. Wife is the primary caregiver and discussed that her  is currently on lantus insulin 8 units daily at bedtime while inpatient. She verbalized understanding that this is a long acting insulin. Patient should not start on VG0 until bedtime if decision is made to resume use at discharge. Patient is currently on VG0 40 which is a high dose. It is important to consider prevention of hypoglycemia. Encouraged wife to f/u with Dr. Rashel Curtis regarding use of VG 40 and she agreed. Wife also stated prior experiencing in giving patient SC lantus insulin and humalog sliding scale insulin. She will be able to help administer SC insulin if prescribed for her . Called Dr. Clarice Delgadillo and discussed above. Recommendation(s):  1.) Continue on current basal and correctional lispro insulin. 2.) Continue to monitor POC BG pattern. 3.) May need to consider SC insulin (basal and correctional) at discharge until patient is able to f/u with Dr. Chika Young after discharge.  Wife is the primary caregiver and stated that she previously administer SC insulin to patient. She will be able to administer insulin to patient if discharge on SC insulin. Assessment:  Patient is 62year old with past medical history including type 2 diabetes mellitus, diabetic retinopathy and recent surgery for detached retina, hypertension, diabetic foot ulcer with history of toe amputation, hyperlipidemia, and CKD stage 5 - was admitted on 03/12/2018 with report of shortness of breath and swelling on bilateral legs. Noted:  ESRD. Status post temporary dialysis catheter placement. Type 2 diabetes mellitus with current A1c of 8.4% (03/13/2018). Most recent blood glucose values:    Results for Annice Rubinstein (MRN [id number]) as of 3/15/2018 10:28   Ref. Range 3/14/2018 07:34 3/14/2018 16:46   GLUCOSE,FAST - POC Latest Ref Range: 70 - 110 mg/dL 128 (H) 127 (H)     Results for Annice Rubinstein (MRN [id number]) as of 3/15/2018 10:28   Ref. Range 3/15/2018 07:45   GLUCOSE,FAST - POC Latest Ref Range: 70 - 110 mg/dL 142 (H)     Current A1C: 8.4% (03/13/2018) is equivalent to average blood glucose of 194 mg/dL during the past 2-3 months. Current hospital diabetes medications:  Basal lantus insulin 8 units daily at bedtime since 03/13/2018. Correctional lispro insulin ACHS. Normal sensitivity dose. Total daily dose insulin requirement previous day: 03/14/2018  Lantus: 8 units  Lispro: None. Patient did not require correctional insulin coverage. Home diabetes medications: Patient reported on 03/13/2018 that he is on VG0 40 which is a disposable insulin delivery device. This is changed daily. He is using humalog insulin. Diet: Renal regular; nutr suppl: nepro with all meals.     Goals:  Blood glucose will be within target range of  mg/dL by 03/18/2018     Education:  _X__  Refer to Diabetes Education Record: 03/13/2018             ___  Education not indicated at this time    Amy Gaytan RN CCM

## 2018-03-15 NOTE — PROGRESS NOTES
RENAL PROGRESS NOTE: Pt seen on dialysis. Follow up of ESRD            Subjective: Feels good,on 3rd Dialysis    Patient is on Dialysis.      Objective:    Patient Vitals for the past 12 hrs:   Temp Pulse Resp BP SpO2   03/15/18 1406 98.7 °F (37.1 °C) 77 18 179/83 96 %   03/15/18 1327 98.2 °F (36.8 °C) 73 18 181/82 -   03/15/18 1319 - 70 18 183/84 -   03/15/18 1300 - 72 18 (!) 184/91 -   03/15/18 1230 - 72 18 162/83 -   03/15/18 1200 - 71 18 175/88 -   03/15/18 1130 - 74 18 190/86 -   03/15/18 1100 - 70 18 178/85 -   03/15/18 1030 - 72 18 (!) 182/92 -   03/15/18 1000 - 74 18 (!) 205/102 -   03/15/18 0947 - 76 18 (!) 205/100 -   03/15/18 0941 98.2 °F (36.8 °C) 76 18 (!) 214/97 -   03/15/18 0502 99.2 °F (37.3 °C) 75 19 179/84 -        Intake/Output Summary (Last 24 hours) at 03/15/18 1627  Last data filed at 03/15/18 1319   Gross per 24 hour   Intake              357 ml   Output             2000 ml   Net            -1643 ml       Physical Assessment:     General Appearance: NAD  Lung: clear to auscultation  Heart: regular rate and rhythm and no murmurs, clicks or gallops  Lower Extremities: 1 to 2  edema , also has scrotal edema  Access: HD catheter on right ij, [id number]    Labs    CBC w/Diff    Recent Labs      03/15/18   0232  03/13/18   0325  03/12/18   1952   WBC  7.8  4.9  5.9   RBC  3.28*  3.07*  3.38*   HGB  9.2*  8.3*  9.4*   HCT  29.1*  26.8*  29.4*   MCV  88.7  87.3  87.0   MCH  28.0  27.0  27.8   MCHC  31.6  31.0  32.0   RDW  13.5  13.2  13.3    Recent Labs      03/15/18   0232  03/13/18   0325  03/12/18   1952   MONOS  15*  16*  14*   EOS  2  5  4   BASOS  0  0  1   RDW  13.5  13.2  13.3        Comprehensive Metabolic Profile    Recent Labs      03/15/18   0232  03/13/18   0325  03/12/18   1952   NA  141  142  141   K  3.4*  4.2  4.1   CL  105  110*  108   CO2  28  22  21   BUN  19*  61*  60*   CREA  5.68*  12.20*  12.00*    Recent Labs      03/15/18   0232  03/13/18   0325  03/12/18 1952   CA  8.3* 8. 1*  7.9*  8.4*   PHOS  3.8  6.9*   --    ALB   --   1.9*  2.3*   TP   --   5.5*  5.6*   SGOT   --   18  21   TBILI   --   0.2  0.3          Basic Metabolic Profile       results  reviwed. MEDS:Reviwed.   Current Facility-Administered Medications   Medication Dose Route Frequency Provider Last Rate Last Dose    [START ON 3/17/2018] heparin (porcine) 1,000 unit/mL injection 1,000 Units  1,000 Units Hemodialysis [facility] Oliveros MD        morphine injection 1 mg  1 mg IntraVENous Q4H PRN Juaquin Wadsworth MD        HYDROcodone-acetaminophen (NORCO) 5-325 mg per tablet 1 Tab  1 Tab Oral Q6H PRN Juaquin Wadsworth MD   1 Tab at 03/15/18 0927    acetaminophen (TYLENOL) tablet 500 mg  500 mg Oral Q6H PRN Juaquin Wadsworth MD   500 mg at 03/14/18 1647    hydrALAZINE (APRESOLINE) 20 mg/mL injection 10 mg  10 mg IntraVENous Q6H PRN Juaquin Wadsworth MD   10 mg at 03/14/18 2139    amLODIPine (NORVASC) tablet 10 mg  10 mg Oral QPM James Bass MD   10 mg at 03/14/18 1828    cloNIDine (CATAPRES) 0.3 mg/24 hr patch 1 Patch  1 Patch TransDERmal Q7D James Bass MD   1 Patch at 03/13/18 2215    cyanocobalamin tablet 1,000 mcg  1,000 mcg Oral DAILY James Bass MD   1,000 mcg at 03/15/18 0975    cyclobenzaprine (FLEXERIL) tablet 10 mg  10 mg Oral TID PRN James Bass MD        docusate sodium (COLACE) capsule 100 mg  100 mg Oral BID James Bass MD   100 mg at 03/15/18 0919    ferrous sulfate tablet 325 mg  1 Tab Oral Q7D James Bass MD   325 mg at 03/13/18 0506    finasteride (PROSCAR) tablet 5 mg  5 mg Oral DAILY James aBss MD   5 mg at 03/15/18 9722    mupirocin (BACTROBAN) 2 % ointment   Topical DAILY James Bass MD        prednisoLONE acetate (PRED FORTE) 1 % ophthalmic suspension 1 Drop  1 Drop Left Eye TID James Bass MD   1 Drop at 03/15/18 0928    rosuvastatin (CRESTOR) tablet 20 mg  20 mg Oral QHS James Bass MD   20 mg at 03/14/18 2138    sodium chloride (NS) flush 5-10 mL  5-10 mL IntraVENous Q8H Elsie Monge MD   10 mL at 03/15/18 1416    sodium chloride (NS) flush 5-10 mL  5-10 mL IntraVENous PRN Elsie Monge MD        insulin lispro (HUMALOG) injection   SubCUTAneous Melva Guerra MD   Stopped at 03/13/18 0730    glucose chewable tablet 16 g  4 Tab Oral PRN Elsie Monge, MD        glucagon (GLUCAGEN) injection 1 mg  1 mg IntraMUSCular PRN Elsie Monge MD        dextrose (D50W) injection syrg 12.5-25 g  25-50 mL IntraVENous PRN Elsie Monge MD        ondansetron [facility]) injection 4 mg  4 mg IntraVENous Q4H PRN Elsie Monge, MD        PGIIL/P Cream (Patient Supplied)  1-2 Pump(s) Topical QID PRN Elsie Monge MD        fluticasone (FLONASE) 50 mcg/actuation nasal spray 2 Spray  2 Spray Both Nostrils DAILY Elsie Monge MD   2 [place] at 03/15/18 0930    losartan (COZAAR) tablet 50 mg  50 mg Oral DAILY Joanell Dance, MD   50 mg at 03/15/18 1407    epoetin aleksandra (EPOGEN;PROCRIT) injection 10,000 Units  10,000 Units IntraVENous [facility] Joanell Dance, MD   10,000 Units at 03/15/18 1224    doxercalciferol (HECTOROL) 4 mcg/2 mL injection 1 mcg  1 mcg IntraVENous [facility] Joanell Dance, MD   1 mcg at 03/15/18 1224    B complex-vitaminC-folic acid (NEPHROCAP) cap  1 Cap Oral DAILY Joanell Dance, MD   1 Cap at 03/15/18 0918    calcium acetate (PHOSLO) capsule 667 mg  1 Cap Oral TID WITH MEALS Joanell Dance, MD   667 mg at 03/15/18 1407    insulin glargine (LANTUS) injection 8 Units  8 Units SubCUTAneous QHS Fan Henry MD   8 Units at 03/14/18 2137       Impression:    ESRD: Tolerating HD well., tolerated HD well. Anemia of CKD: on  Epogen. Hyperparathyroidism Yes  Hypertension  Plan  Dialysis for volume and solute management  Epogen  344306 units.   Hectorol : 1 microgram.  Start OT/PT, no Dialysis tomorrow, re dialyze on 03/17 /18 & then DC, start OP dialysis from next [place], Wed & Friday.         Renard Arteaga MD

## 2018-03-15 NOTE — ROUTINE PROCESS
Bedside and Verbal shift change report given to YESICA Davis (oncoming nurse) by Constanza Gordon RN (offgoing nurse). Report included the following information SBAR, Kardex, MAR and Recent Results. SITUATION:  Code Status: Full Code  Reason for Admission: Kidney disease  Hospital day: 3  Problem List:       Hospital Problems  Date Reviewed: 1/19/2018          Codes Class Noted POA    Kidney disease ICD-10-CM: N28.9  ICD-9-CM: 593.9  3/12/2018 Unknown              BACKGROUND:   Past Medical History:   Past Medical History:   Diagnosis Date    Cardiac echocardiogram 10/21/2016    EF 60-65%. No WMA. Mod-marked LVH. Normal diastolic fx. No significant valvular heart disease.  Cardiac treadmill stress test, low risk 11/02/2012    Negative maximal exercise treadmill test.  Ex time 7 min 45 sec.  Cardiovascular LE peripheral arterial testing 03/22/2016    No significant peripheral arterial disease at rest bilaterally. ABIs deferred due to calcified vessels.  Cardiovascular LLE venous duplex 06/06/2012    Left leg:  No DVT.  Cardiovascular renal duplex 10/21/2016    No significant renal artery stenosis.  Chronic kidney disease     CKD (chronic kidney disease), stage V ([address]     Diabetes mellitus ([address] 3/12/2010    Diabetic retinopathy ([address] 5/4/2010    Edema of both legs     Eye examination 5/4/10    OU: 20/20; OD: 20/25; OS: 20/25 without correction.     Glaucoma 08/17/2017    Linus Munson    HLD (hyperlipidemia) 3/12/2010    HTN (hypertension) 3/12/2010    Orthostatic hypertension       Patient taking anticoagulants no    Patient has a defibrillator: no    History of shots YES for example, flu, pneumonia, tetanus   Isolation History NO for example, MRSA, CDiff    ASSESSMENT:  Changes in Assessment Throughout Shift: None  Significant Changes in 24 hours (for example, RR/code, fall)  Patient has Central Line: yes Reasons if yes: Dialysis  Patient has Pa Cath: no Reasons if yes: N/A Mobility Issues  PT  IV Patency  OR Checklist  Pending Tests    Last Vitals:  Vitals w/ MEWS Score (last day)     Date/Time MEWS Score Pulse Resp Temp BP Level of Consciousness SpO2    03/15/18 1600 1 72 18 98.9 °F (37.2 °C) 155/79 Alert 95 %    03/15/18 1406 -- 77 18 98.7 °F (37.1 °C) 179/83 -- 96 %    03/15/18 1327 -- 73 18 98.2 °F (36.8 °C) 181/82 -- --    03/15/18 1319 -- 70 18 -- 183/84 -- --    03/15/18 1300 -- 72 18 -- (!)  184/91 -- --    03/15/18 1230 -- 72 18 -- 162/83 -- --    03/15/18 1200 -- 71 18 -- 175/88 -- --    03/15/18 1130 -- 74 18 -- 190/86 -- --    03/15/18 1100 -- 70 18 -- 178/85 -- --    03/15/18 1030 -- 72 18 -- (!)  182/92 -- --    03/15/18 1000 -- 74 18 -- (!)  205/102 -- --    03/15/18 0947 -- 76 18 -- (!)  205/100 -- --    03/15/18 0941 -- 76 18 98.2 °F (36.8 °C) (!)  214/97 -- --    03/15/18 0502 1 75 19 99.2 °F (37.3 °C) 179/84 Alert --    03/15/18 0059 1 99 18 98.8 °F (37.1 °C) 160/78 Alert 99 %    03/14/18 2000 3 76 18 99 °F (37.2 °C) (!)  220/96 Alert 96 %    03/14/18 1832 -- -- -- -- (!)  215/94 Alert --    03/14/18 1525 3 84 18 98.3 °F (36.8 °C) (!)  224/95 Alert 98 %    03/14/18 1445 -- -- 18 98.2 °F (36.8 °C) 177/86 -- --    03/14/18 1430 -- 73 18 -- 180/90 -- --    03/14/18 1400 -- 76 18 -- 175/80 -- --    03/14/18 1330 -- 77 18 -- 173/78 -- --    03/14/18 1300 -- 71 18 -- (!)  186/97 -- --    03/14/18 1230 -- 80 18 -- 159/90 -- --    03/14/18 1200 -- 69 18 -- 169/78 -- --    03/14/18 1130 -- 71 18 -- 175/90 -- --    03/14/18 1100 -- 73 18 -- (!)  203/91 -- --    03/14/18 1055 -- 77 18 98.1 °F (36.7 °C) (!)  202/92 -- --    03/14/18 0733 1 69 18 98.5 °F (36.9 °C) (!)  191/96 Alert 98 %    03/14/18 0343 1 71 18 98 °F (36.7 °C) 189/89 Alert 97 %            PAIN    Pain Assessment    Pain Intensity 1: 0 (03/15/18 1640)    Pain Location 1: Flank    Pain Intervention(s) 1: Medication (see MAR)    Patient Stated Pain Goal: 0  Intervention effective: yes  Time of last intervention: 1194   Reassessment Completed: yes  Other actions taken for pain: None    Last 3 Weights:  Last 3 Recorded Weights in this Encounter    03/12/18 1930 03/15/18 1917   Weight: 90.7 kg (200 lb) 88.6 kg (195 lb 6.4 oz)   Weight change:     INTAKE/OUPUT    Current Shift:      Last three shifts: 03/14 0701 - 03/15 1900  In: 357 [P.O.:357]  Out: 4000     RECOMMENDATIONS AND DISCHARGE PLANNING  Patient needs and requests: None    Pending tests/procedures: Dialysis on Saturday, for U/S scrotum     Discharge plan for patient: Home    Discharge planning Needs or Barriers: TBD     Estimated Discharge Date: 3/17/18 Posted on Whiteboard in Patients Room: yes     \"HEALS\" SAFETY CHECK  A safety check occurred in the patient's room between off going nurse and oncoming nurse listed above. The safety check included the below items:    H  High Alert Medications Verify all high alert medication drips (heparin, PCA, etc.)  E  Equipment Suction is set up for ALL patients (with noel)  Red plugs utilized for all equipment (IV pumps, etc.)  WOWs wiped down at end of shift. Room stocked with oxygen, suction, and other unit-specific supplies  A  Alarms Bed alarm is set for fall risk patients  Ensure chair alarm is in place and activated if patient is up in a chair  L  Lines Check IV for any infiltration  Pa bag is empty if patient has a Pa   Tubing and IV bags are labeled  S  Safety  Room is clean, patient is clean, and equipment is clean. Hallways are clear from equipment besides carts. Fall bracelet on for fall risk patients  Ensure room is clear and free of clutter  Suction is set up for ALL patients (with noel)  Hallways are clear from equipment besides carts.    Isolation precautions followed, supplies available outside room, sign posted    Yelena Miller RN

## 2018-03-15 NOTE — PROGRESS NOTES
NUTRITION    Nutrition Consult: Diabetic Diet Education, Diet Education     RECOMMENDATIONS / PLAN:     - Nutrition education provided today  - Continue RD inpatient monitoring and evaluation. NUTRITION INTERVENTIONS & DIAGNOSIS:     [x] Meals/snacks: modified composition  [x] Medical food supplement therapy: Nepro TID  [x] Nutrition education: Diabetic diet on 3/13. Renal and low sodium diet on 3/15    Nutrition Diagnosis:  Food and nutrition related knowledge deficit related to diabetic diet as evidenced by wife report. Increased nutrient needs (protein, energy) related to increased expenditure as evidenced by pt on HD 3 times weekly    ASSESSMENT:     3/15: Pt off unit at time of visit. Has fair appetite and meal intake per wife report. Consuming Nepro supplements. Discussed renal and low sodium diet with wife. Educational handouts provided/reviewed with her. Questions answered to her satisfaction; wife reported understanding. 3/13: Pt off unit at time of visit. Had decreased appetite and meal intake x 3-4 days PTA per wife report; appetite improving/ good since admission. Pt with hx of DM. Provided diet education on diabetic diet/ CHO counting to patient's wife. Educational handouts provided/reviewed with her. She verbalized understanding.  Pt with ESRD on HD; receiving Nepro    Average po intake adequate to meet patients estimated nutritional needs:   [] Yes     [x] No   [] Unable to determine at this time    Diet: DIET NUTRITIONAL SUPPLEMENTS All Meals; NEPRO  DIET RENAL Regular      Food Allergies:  None known   Current Appetite:   [x] Good     [x] Fair     [] Poor     [] Other:  Appetite/meal intake prior to admission:   [] Good     [x] Fair: x 3-4 days PTA    [] Poor     [] Other:  Feeding Limitations:  [] Swallowing difficulty    [] Chewing difficulty    [] Other:  Current Meal Intake:   Patient Vitals for the past 100 hrs:   % Diet Eaten   03/15/18 0930 100 %       BM:  3/14  Skin Integrity: \"Wounds removed\"  Edema:  2+ UEs;  1+ LEs  Pertinent Medications: Reviewed: phoslo, cyanocobalamin, colace, hectorol, ferrous sulfate,     Recent Labs      03/15/18   0232  03/13/18   0325  03/12/18 1952   NA  141  142  141   K  3.4*  4.2  4.1   CL  105  110*  108   CO2  28  22  21   GLU  104*  145*  154*   BUN  19*  61*  60*   CREA  5.68*  12.20*  12.00*   CA  8.3*  8.1*  7.9*  8.4*   MG   --   1.9   --    PHOS  3.8  6.9*   --    ALB   --   1.9*  2.3*   SGOT   --   18  21   ALT   --   16  18       Intake/Output Summary (Last 24 hours) at 03/15/18 1215  Last data filed at 03/15/18 0930   Gross per 24 hour   Intake              357 ml   Output             2000 ml   Net            -1643 ml       Anthropometrics:  Ht Readings from Last 1 Encounters:   03/12/18 5' 6\" (1.676 m)     Last 3 Recorded Weights in this Encounter    03/12/18 1930   Weight: 90.7 kg (200 lb)     Body mass index is 32.28 kg/(m^2). Weight History:  Pt had weight gain of 20 lb x 3 months due to fluid retention per wife report    Weight Metrics 3/12/2018 1/19/2018 1/11/2018 1/10/2018 12/28/2017 11/29/2017 11/20/2017   Weight 200 lb 202 lb 196 lb 190 lb 191 lb 194 lb 194 lb   BMI 32.28 kg/m2 32.6 kg/m2 31.64 kg/m2 30.67 kg/m2 30.83 kg/m2 31.31 kg/m2 31.31 kg/m2        Admitting Diagnosis: Kidney disease  Pertinent PMHx: CKD, DM, HLD, HTN    Education Needs:        [] None identified  [] Identified - Not appropriate at this time  [x]  Identified and addressed - refer to education log  Learning Limitations:   [x] None identified  [] Identified    Cultural, Latter day & ethnic food preferences:  [x] None identified    [] Identified and addressed     ESTIMATED NUTRITION NEEDS:     Calories: 0394-1430 kcal (30-35 kcal/kg) based on  [] Actual BW      [x] SBW: 91 kg   Protein: 109-137 gm (1.2-1.5 gm/kg) based on  [x] Actual BW      [] IBW   Fluid: 1 mL/kcal     MONITORING & EVALUATION:     Nutrition Goal(s):   1.  Po intake of meals will meet >75% of patient estimated nutritional needs within the next 7 days. Outcome:  [] Met/Ongoing    [x]  Not Met/Progressing    [] New/Initial Goal   2. Patient will increase knowledge of appropriate food choices on a diabetic diet within 7 days. Outcome:  [x] Met    []  Not Met    [] New/Initial Goal  3. Patient will increase knowledge of appropriate food choices on a renal and low sodium diet within 7 days.   Outcome:  [] Met    []  Not Met    [x] New/Initial Goal       Monitoring:   [x] Food and beverage intake   [x] Diet order   [x] Nutrition-focused physical findings   [x] Treatment/therapy   [] Weight   [] Enteral nutrition intake        Previous Recommendations (for follow-up assessments only):     [x]   Implemented       []   Not Implemented (RD to address)      [] No Longer Appropriate     [] No Recommendation Made     Discharge Planning:  Renal, diabetic diet   [x] Participated in care planning, discharge planning, & interdisciplinary rounds as appropriate      Sydni Ny, [address] [place] [address]   Pager: [phone]

## 2018-03-15 NOTE — PROGRESS NOTES
Problem: Self Care Deficits Care Plan (Adult)  Goal: *Acute Goals and Plan of Care (Insert Text)  Occupational Therapy Goals  Initiated 3/15/2018 within 7 day(s). 1.  Patient will perform grooming with supervision/set-up   2. Patient will perform upper body dressing with supervision/set-up. 3.  Patient will perform lower body dressing with supervision/set-up. 4.  Patient will participate in upper extremity therapeutic exercise/activities with supervision/set-up in preparation for self care tasks. 5.  Patient will utilize energy conservation techniques during functional activities independently   6. Patient will maneuver in bed with supervision in preparation for toilet transfer  Outcome: Progressing Towards Goal  Occupational Therapy EVALUATION    Patient: Rosa Rodarte (82 y.o. male)  Date: 3/15/2018  Primary Diagnosis: Kidney disease        Precautions:   Fall    ASSESSMENT :  Based on the objective data described below, the patient was in bed upon arrival. Patient was lethargic, but wanted to work with OT at this time. Patients' BP was 183/83. Patient has decreased but functional BUE AROM and strength. Patient needed min/mod A during bed mobility tasks with additional time. Patient reported he was too \"dizzy-headed\" which was consistent throughout eval; returned to supine and scooted up to [facility] with min A. Patient left comfortable in no distress. Patient will benefit from skilled intervention to address the above impairments.   Patients rehabilitation potential is considered to be Good  Factors which may influence rehabilitation potential include:   []             None noted  []             Mental ability/status  [x]             Medical condition  []             Home/family situation and support systems  []             Safety awareness  []             Pain tolerance/management  []             Other:      PLAN :  Recommendations and Planned Interventions:  [x]               Self Care Training [x]        Therapeutic Activities  [x]               Functional Mobility Training    []        Cognitive Retraining  [x]               Therapeutic Exercises           [x]        Endurance Activities  [x]               Balance Training                   []        Neuromuscular Re-Education  []               Visual/Perceptual Training     [x]   Home Safety Training  [x]               Patient Education                 []        Family Training/Education  []               Other (comment):    Frequency/Duration: Patient will be followed by occupational therapy 1-2 times per day/4-7 days per week to address goals. Discharge Recommendations: [facility] (depending on progress)  Further Equipment Recommendations for Discharge: bedside commode and shower chair     Barriers to Learning/Limitations: yes;  physical  Compensate with: visual, verbal, tactile, kinesthetic cues/model     PATIENT COMPLEXITY      Eval Complexity: History: MEDIUM Complexity : Expanded review of history including physical, cognitive and psychosocial  history ; Examination: MEDIUM Complexity : 3-5 performance deficits relating to physical, cognitive , or psychosocial skils that result in activity limitations and / or participation restrictions; Decision Making:MEDIUM Complexity : Patient may present with comorbidities that affect occupational performnce. Miniml to moderate modification of tasks or assistance (eg, physical or verbal ) with assesment(s) is necessary to enable patient to complete evaluation  Assessment: Moderate Complexity     G-CODES:     Self Care  Current  CK= 40-59%   Goal  CI= 1-19%. The severity rating is based on the Level of Assistance required for Functional Mobility and ADLs. SUBJECTIVE:   Patient stated I'm just dizzy-headed.     OBJECTIVE DATA SUMMARY:     Past Medical History:   Diagnosis Date    Cardiac echocardiogram 10/21/2016    EF 60-65%. No WMA. Mod-marked LVH.   Normal diastolic fx.  No significant valvular heart disease.  Cardiac treadmill stress test, low risk 11/02/2012    Negative maximal exercise treadmill test.  Ex time 7 min 45 sec.  Cardiovascular LE peripheral arterial testing 03/22/2016    No significant peripheral arterial disease at rest bilaterally. ABIs deferred due to calcified vessels.  Cardiovascular LLE venous duplex 06/06/2012    Left leg:  No DVT.  Cardiovascular renal duplex 10/21/2016    No significant renal artery stenosis.  Chronic kidney disease     CKD (chronic kidney disease), stage V ([address]     Diabetes mellitus ([address] 3/12/2010    Diabetic retinopathy [address] [address] 5/4/2010    Edema of both legs     Eye examination 5/4/10    OU: 20/20; OD: 20/25; OS: 20/25 without correction.  Glaucoma 08/17/2017    [place]    HLD (hyperlipidemia) 3/12/2010    HTN (hypertension) 3/12/2010    Orthostatic hypertension      Past Surgical History:   Procedure Laterality Date    HX AMPUTATION      TOES    HX HERNIA REPAIR  2005    HX OTHER SURGICAL  11/07/2017    glaucoma valve- Ahmed     Prior Level of Function/Home Situation: Patient reported he was modified independent in basic self care tasks and functional mobility PTA, with cane, with some asisstance for LE dressing tasks from wife.   Home Situation  Home Environment: Private residence  # Steps to Enter: 2  One/Two Story Residence: One story  Living Alone: No  Support Systems: Spouse/Significant Other/Partner  Patient Expects to be Discharged to[de-identified] Private residence  Current DME Used/Available at Home: Cane, straight  [x]  Right hand dominant   []  Left hand dominant  Cognitive/Behavioral Status:  Neurologic State: Alert  Orientation Level: Oriented X4  Cognition: Follows commands    Skin: No skin changes noted    Edema: No edema noted    Vision/Perceptual:       Acuity: Patient has recent eye surgeries, see's colors and shadows/outlines    Coordination:  Coordination: Within functional limits (BUEs) Balance:  Sitting: Impaired; With support  Sitting - Static: Fair (occasional)  Sitting - Dynamic: Fair (occasional)    Strength:  Strength: Generally decreased, functional (BUEs,  strength)     Tone & Sensation:  Tone: Normal (BUEs)  Sensation: Impaired (bilateral fingertips)     Range of Motion:  AROM: Within functional limits (BUEs)     Functional Mobility and Transfers for ADLs:  Bed Mobility:  Supine to Sit: Minimum assistance  Sit to Supine: Minimum assistance  Scooting: Minimum assistance  Transfers:  Sit to Stand:  (N/A)     ADL Assessment:(clinical judgement)  Feeding: Independent    Oral Facial Hygiene/Grooming: Minimum assistance    Bathing: Maximum assistance    Upper Body Dressing: Minimum assistance    Lower Body Dressing: Maximum assistance    Toileting: Moderate assistance     Pain:  Pt reports 0/10 pain or discomfort prior to treatment.    Pt reports 0/10 pain or discomfort post treatment. Activity Tolerance:  Fair    Please refer to the flowsheet for vital signs taken during this treatment. After treatment:   [] Patient left in no apparent distress sitting up in chair  [x] Patient left in no apparent distress in bed  [x] Call bell left within reach  [] Nursing notified  [x] Caregiver present  [] Bed alarm activated    COMMUNICATION/EDUCATION:   [] Home safety education was provided and the patient/caregiver indicated understanding. [] Patient/family have participated as able in goal setting and plan of care. [x] Patient/family agree to work toward stated goals and plan of care. [] Patient understands intent and goals of therapy, but is neutral about his/her participation. [] Patient is unable to participate in goal setting and plan of care.     Thank you for this referral.  Carmen Rodriguez OTR/L  Time Calculation: 23 mins

## 2018-03-15 NOTE — PROGRESS NOTES
Second attempt to see patient for PT evaluation, patient continues to be off the floor.   Cindy Hutchison, PT

## 2018-03-15 NOTE — ROUTINE PROCESS
Bedside and Verbal shift change report given to  Louisa Brennan 33 (oncoming nurse) by Christiana Marcus RN (offgoing nurse). Report included the following information SBAR, Kardex, MAR and Recent Results. SITUATION:  Code Status: Full Code  Reason for Admission: Kidney disease  Hospital day: 3  Problem List:       Hospital Problems  Date Reviewed: 1/19/2018          Codes Class Noted POA    Kidney disease ICD-10-CM: N28.9  ICD-9-CM: 593.9  3/12/2018 Unknown              BACKGROUND:   Past Medical History:   Past Medical History:   Diagnosis Date    Cardiac echocardiogram 10/21/2016    EF 60-65%. No WMA. Mod-marked LVH. Normal diastolic fx. No significant valvular heart disease.  Cardiac treadmill stress test, low risk 11/02/2012    Negative maximal exercise treadmill test.  Ex time 7 min 45 sec.  Cardiovascular LE peripheral arterial testing 03/22/2016    No significant peripheral arterial disease at rest bilaterally. ABIs deferred due to calcified vessels.  Cardiovascular LLE venous duplex 06/06/2012    Left leg:  No DVT.  Cardiovascular renal duplex 10/21/2016    No significant renal artery stenosis.  Chronic kidney disease     CKD (chronic kidney disease), stage V ([address]     Diabetes mellitus ([address] 3/12/2010    Diabetic retinopathy ([facility] [address] 5/4/2010    Edema of both legs     Eye examination 5/4/10    OU: 20/20; OD: 20/25; OS: 20/25 without correction.     Glaucoma 08/17/2017    [facility]    HLD (hyperlipidemia) 3/12/2010    HTN (hypertension) 3/12/2010    Orthostatic hypertension       Patient taking anticoagulants no    Patient has a defibrillator: no    History of shots YES for example, flu, pneumonia, tetanus   Isolation History NO for example, MRSA, CDiff    ASSESSMENT:  Changes in Assessment Throughout Shift: None  Significant Changes in 24 hours (for example, RR/code, fall)  Patient has Central Line: yes Reasons if yes: Dialysis  Patient has Pa Cath: no Reasons if yes: N/A Mobility Issues  PT  IV Patency  OR Checklist  Pending Tests    Last Vitals:  Vitals w/ MEWS Score (last day)     Date/Time MEWS Score Pulse Resp Temp BP Level of Consciousness SpO2    03/15/18 0502 1 75 19 99.2 °F (37.3 °C) 179/84 Alert --    03/15/18 0059 1 99 18 98.8 °F (37.1 °C) 160/78 Alert 99 %    03/14/18 2000 3 76 18 99 °F (37.2 °C) (!)  220/96 Alert 96 %    03/14/18 1832 -- -- -- -- (!)  215/94 Alert --    03/14/18 1525 3 84 18 98.3 °F (36.8 °C) (!)  224/95 Alert 98 %    03/14/18 1445 -- -- 18 98.2 °F (36.8 °C) 177/86 -- --    03/14/18 1430 -- 73 18 -- 180/90 -- --    03/14/18 1400 -- 76 18 -- 175/80 -- --    03/14/18 1330 -- 77 18 -- 173/78 -- --    03/14/18 1300 -- 71 18 -- (!)  186/97 -- --    03/14/18 1230 -- 80 18 -- 159/90 -- --    03/14/18 1200 -- 69 18 -- 169/78 -- --    03/14/18 1130 -- 71 18 -- 175/90 -- --    03/14/18 1100 -- 73 18 -- (!)  203/91 -- --    03/14/18 1055 -- 77 18 98.1 °F (36.7 °C) (!)  202/92 -- --    03/14/18 0733 1 69 18 98.5 °F (36.9 °C) (!)  191/96 Alert 98 %    03/14/18 0343 1 71 18 98 °F (36.7 °C) 189/89 Alert 97 %            PAIN    Pain Assessment    Pain Intensity 1: 0 (03/14/18 2030)    Pain Location 1: Head    Pain Intervention(s) 1: Medication (see MAR)    Patient Stated Pain Goal: 0  Intervention effective: N/A  Time of last intervention: N/A Reassessment Completed: N/A  Other actions taken for pain: None    Last 3 Weights:  Last 3 Recorded Weights in this Encounter    03/12/18 1930   Weight: 90.7 kg (200 lb)   Weight change:     INTAKE/OUPUT    Current Shift:      Last three shifts: 03/13 1901 - 03/15 0700  In: -   Out: 2000     RECOMMENDATIONS AND DISCHARGE PLANNING  Patient needs and requests: None    Pending tests/procedures: Dialysis     Discharge plan for patient: Home    Discharge planning Needs or Barriers: TBD     Estimated Discharge Date: TBD Posted on Whiteboard in Patients Room: no       \"HEALS\" SAFETY CHECK  A safety check occurred in the patient's room between off going nurse and oncoming nurse listed above. The safety check included the below items:    H  High Alert Medications Verify all high alert medication drips (heparin, PCA, etc.)  E  Equipment Suction is set up for ALL patients (with noel)  Red plugs utilized for all equipment (IV pumps, etc.)  WOWs wiped down at end of shift. Room stocked with oxygen, suction, and other unit-specific supplies  A  Alarms Bed alarm is set for fall risk patients  Ensure chair alarm is in place and activated if patient is up in a chair  L  Lines Check IV for any infiltration  Pa bag is empty if patient has a Pa   Tubing and IV bags are labeled  S  Safety  Room is clean, patient is clean, and equipment is clean. Hallways are clear from equipment besides carts. Fall bracelet on for fall risk patients  Ensure room is clear and free of clutter  Suction is set up for ALL patients (with noel)  Hallways are clear from equipment besides carts.    Isolation precautions followed, supplies available outside room, sign posted    Merari Arevalo RN

## 2018-03-15 NOTE — PROGRESS NOTES
[facility]  Progress Note    Patient: Brionna Galvin Age: 62 y.o. : 1960 MR#: [id number] SSN: [id number]  Date: 3/15/2018     Subjective:     He just completed HD #3. Swelling gradually improving, in particular his ankles. Nutritionist worked with him and his wife regarding renal diet. He denies chest pain or shortness of breath. He notes scrotal swelling, denies dysuria. He was having low blood sugar on VGO. patient did not require correctional insulin on 2018. Assessment/Plan:     1. ESRD-pt admitted for initiation of HD. S/p TDC placement and HD #2 today. Arrangements for outpatient HD underway. Nutritionist for education regarding renal diet. 2. Hypertensive urgency gradual improvement. Avoid coreg (tolerates other beta blockers). Check orthostatics. 3. DM2. A1C 8.5. diabetes educator worked with him. Plan discharge on lantus / ssi. Outpatient f/u with Dr. Max Galan, endocrinology. Hold VGO at discharge. 4. Anemia of ckd - epogen per nephrology  5. 2ndary hyperparathyroidism of ckd - hectoral per nephrology  6. obesity Body mass index is 32.28 kg/(m^2). 7. Scrotal swelling - US. 8. dvt prophylaxis  9. Full code pt and ot. Wife at bedside, all questions answered to the best of my ability.      Additional Notes:      Case discussed with:  [x]Patient  [x]Family  [x]Nursing  [x]Case Management  DVT Prophylaxis:  []Lovenox  []Hep SQ  [x]SCDs  []Coumadin   []On Heparin gtt    Objective:   VS:   Visit Vitals    /83    Pulse 77    Temp 98.7 °F (37.1 °C)    Resp 18    Ht 5' 6\" (1.676 m)    Wt 90.7 kg (200 lb)    SpO2 96%    BMI 32.28 kg/m2      Tmax/24hrs: Temp (24hrs), Av.7 °F (37.1 °C), Min:98.2 °F (36.8 °C), Max:99.2 °F (37.3 °C)      Intake/Output Summary (Last 24 hours) at 03/15/18 1542  Last data filed at 03/15/18 1319   Gross per 24 hour   Intake              357 ml   Output             2000 ml   Net            -1643 ml Chaperone: asad Rai Feast. General:  Alert, awake, oriented. Wife at bedside  Heent: ncat. Perrl. Cardiovascular:  RRR, no murmurs  Pulmonary: cta b.l  GI: Soft, nt, nd. Nabs. : scrotal swelling, no masses, no ttp. Extremities: + pitting ankle edema. Neuro: no focal deficit  Skin: no rash    Labs:    Recent Results (from the past 24 hour(s))   GLUCOSE, POC    Collection Time: 03/14/18  4:46 PM   Result Value Ref Range    Glucose (POC) 127 (H) 70 - 110 mg/dL   CBC WITH AUTOMATED DIFF    Collection Time: 03/15/18  2:32 AM   Result Value Ref Range    WBC 7.8 4.6 - 13.2 K/uL    RBC 3.28 (L) 4.70 - 5.50 M/uL    HGB 9.2 (L) 13.0 - 16.0 g/dL    HCT 29.1 (L) 36.0 - 48.0 %    MCV 88.7 74.0 - 97.0 FL    MCH 28.0 24.0 - 34.0 PG    MCHC 31.6 31.0 - 37.0 g/dL    RDW 13.5 11.6 - 14.5 %    PLATELET 987 729 - 841 K/uL    MPV 10.8 9.2 - 11.8 FL    NEUTROPHILS 69 40 - 73 %    LYMPHOCYTES 14 (L) 21 - 52 %    MONOCYTES 15 (H) 3 - 10 %    EOSINOPHILS 2 0 - 5 %    BASOPHILS 0 0 - 2 %    ABS. NEUTROPHILS 5.3 1.8 - 8.0 K/UL    ABS. LYMPHOCYTES 1.1 0.9 - 3.6 K/UL    ABS. MONOCYTES 1.2 0.05 - 1.2 K/UL    ABS. EOSINOPHILS 0.2 0.0 - 0.4 K/UL    ABS.  BASOPHILS 0.0 0.0 - 0.1 K/UL    DF AUTOMATED     METABOLIC PANEL, BASIC    Collection Time: 03/15/18  2:32 AM   Result Value Ref Range    Sodium 141 136 - 145 mmol/L    Potassium 3.4 (L) 3.5 - 5.5 mmol/L    Chloride 105 100 - 108 mmol/L    CO2 28 21 - 32 mmol/L    Anion gap 8 3.0 - 18 mmol/L    Glucose 104 (H) 74 - 99 mg/dL    BUN 19 (H) 7.0 - 18 MG/DL    Creatinine 5.68 (H) 0.6 - 1.3 MG/DL    BUN/Creatinine ratio 3 (L) 12 - 20      GFR est AA 13 (L) >60 ml/min/1.73m2    GFR est non-AA 10 (L) >60 ml/min/1.73m2    Calcium 8.3 (L) 8.5 - 10.1 MG/DL   PHOSPHORUS    Collection Time: 03/15/18  2:32 AM   Result Value Ref Range    Phosphorus 3.8 2.5 - 4.9 MG/DL   PTH INTACT    Collection Time: 03/15/18  2:32 AM   Result Value Ref Range    Calcium 8.1 (L) 8.5 - 10.1 MG/DL    PTH, Intact 338.1 (H) 18.4 - 88.0 pg/mL   GLUCOSE, POC    Collection Time: 03/15/18  7:45 AM   Result Value Ref Range    Glucose (POC) 142 (H) 70 - 110 mg/dL   GLUCOSE, POC    Collection Time: 03/15/18  2:10 PM   Result Value Ref Range    Glucose (POC) 140 (H) 70 - 110 mg/dL       Signed By: Kelley Huggins MD     March 15, 2018 4:05 PM

## 2018-03-15 NOTE — PROGRESS NOTES
Care Management Interventions  PCP Verified by CM: Yes  Current Support Network: Lives with Spouse  Confirm Follow Up Transport: Family  Plan discussed with Pt/Family/Caregiver: Yes     Call received from Shankar Sanchez with [facility] who reports that pt has been scheduled to receive outpatient dialysis at [facility] on [address] [facility] with chair time of 10:45 am on T-Th-S.  hSankar Sanchez shares that if pt is not open to this schedule, they have an option of M-W-F at 3:00pm.    Pt is in dialysis and information shared with his spouse Romayne Ream in room. Pt's spouse mentioned that T-Th-S schedule will not work work them and willing to accept the M-W-F instead. Message left for Shankar Sanchez with [facility] with information. CM contacted Shankar Sanchez with [facility] to inform and request to reserve M-W-F chair time for pt.

## 2018-03-15 NOTE — PROGRESS NOTES
PT orders received and chart was reviewed. Pt is currently off the floor at dialysis. Will continue to follow.   Javier Juarez, PT

## 2018-03-15 NOTE — DIALYSIS
ACUTE HEMODIALYSIS FLOW SHEET    HEMODIALYSIS ORDERS: Physician: Asaf Fulling: revaclear   Duration: 3.5 hr  BFR: 300   DFR: 600   Dialysate:  Temp 37 K+   3    Ca+  2.5 Na 140 Bicarb 35   Weight:  90.7KG kg    Bed Scale []     Unable to Obtain [x]      Dry weight/UF Goal: 2000ml Access cvc  Needle Gauge na    Heparin [x]  [id number] Units    [] Hourly       Units    []None      Catheter locking solution Heparin 1.6ml   Pre BP:   214/97    Pulse:     79     Temperature:   98.2  Respirations: 18  Tx: NS       ml/Bolus  Other        [x] N/A   Labs: Pre        Post:        [x] N/A   Additional Orders(medications, blood products, hypotension management):       [] N/A     [x] [facility] Consent Verified     CATHETER ACCESS: []N/A   [x]Right   []Left   [x]IJ     []Fem   [] First use X-ray verified     [x]Tunnel                [] Non Tunneled   [x]No S/S infection  []Redness  []Drainage []Cultured []Swelling []Pain   []Medical Aseptic Prep Utilized   []Dressing Changed  [] Biopatch  Date:3/14/2018   []Clotted   [x]Patent   Flows: [x]Good  []Poor  []Reversed   If access problem,  notified: []Yes    [x]N/A  Date:           GRAFT/FISTULA ACCESS:  [x]N/A     []Right     []Left     []UE     []LE   []AVG   []AVF        []Buttonhole    []Medical Aseptic Prep Utilized   []No S/S infection  []Redness  []Drainage []Cultured []Swelling []Pain    Bruit:   [] Strong    [] Weak       Thrill :   [] Strong    [] Weak       Needle Gauge:    Length:     If access problem,  notified: []Yes     []N/A  Date:        Please describe access if present and not used:       GENERAL ASSESSMENT:    LUNGS:  Rate 18 SaO2%        [] N/A    [x] Clear  [] Coarse  [] Crackles  [] Wheezing        [] Diminished     Location : []RLL   []LLL    []RUL  []FELICIANO   Cough: []Productive  []Dry  [x]N/A   Respirations:  [x]Easy  []Labored   Therapy:  [x]RA  []NC  l/min    Mask: []NRB []Venti       O2%                  []Ventilator  []Intubated  [] Trach  [] BiPaP CARDIAC: [x]Regular      [] Irregular   [] Pericardial Rub  [] JVD        []  Monitored  [] Bedside  [] Remotely monitored [] N/A  Rhythm:    EDEMA: [] None  [x]Generalized  [] Pitting [] 1    [] 2    [] 3    [] 4                 [] Facial  [] Pedal  []  UE  [] LE   SKIN:   [x] Warm  [] Hot     [] Cold   [x] Dry     [] Pale   [] Diaphoretic                  [] Flushed  [] Jaundiced  [] Cyanotic  [] Rash  [] Weeping   LOC:    [x] Alert      [x]Oriented:    [x] Person     [x] Place  [x]Time               [] Confused  [] Lethargic  [] Medicated  [] Non-responsive     GI / ABDOMEN:  [x] Flat    [] Distended    [] Soft    [] Firm   []  Obese                             [] Diarrhea  [x] Bowel Sounds  [] Nausea  [] Vomiting       / URINE ASSESSMENT:[x] Voiding   [] Oliguria  [] Anuria   []  Pa     [] Incontinent    []  Incontinent Brief      [x]  Bathroom Privileges     PAIN: [x] 0 []1  []2   []3   []4   []5   []6   []7   []8   []9   []10            Scale 0-10  Action/Follow Up:    MOBILITY:  [] Amb    [] Amb/Assist    [x] Bed    [] Wheelchair  [] Stretcher      All Vitals and Treatment Details on Attached [facility] [place]          Room # 451     [] 1st Time Acute  [] Stat  [x] Routine  [] Urgent     [x] Acute Room  []  Bedside  [] ICU/CCU  [] ER   Isolation Precautions:  [x] Dialysis   [] Airborne   [] Contact    [] Reverse   Special Considerations:         [] Blood Consent Verified [x]N/A     ALLERGIES:   [x]   Allergies   Bactrim [Sulfamethoprim Ds] Nausea and Vomiting              Code Status:  [x] Full Code  [] DNR  [] Other           HBsAg ONLY: Date Drawn 3/12/2018         [x]Negative []Positive []Unknown   HBsAb: Date 3/12/2018    [] Susceptible   [x] [id number] []Not Drawn  [] Drawn     Current Labs:    Date of Labs: Today [x]     Results for Olga Joyce (MRN [id number]) as of 3/15/2018 13:14   Ref.  Range 3/15/2018 02:32   WBC Latest Ref Range: 4.6 - 13.2 K/uL 7.8   RBC Latest Ref Range: 4.70 - 5.50 M/uL 3.28 (L)   HGB Latest Ref Range: 13.0 - 16.0 g/dL 9.2 (L)   HCT Latest Ref Range: 36.0 - 48.0 % 29.1 (L)   MCV Latest Ref Range: 74.0 - 97.0 FL 88.7   MCH Latest Ref Range: 24.0 - 34.0 PG 28.0   MCHC Latest Ref Range: 31.0 - 37.0 g/dL 31.6   RDW Latest Ref Range: 11.6 - 14.5 % 13.5   PLATELET Latest Ref Range: 135 - 420 K/uL 289   MPV Latest Ref Range: 9.2 - 11.8 FL 10.8   NEUTROPHILS Latest Ref Range: 40 - 73 % 69   LYMPHOCYTES Latest Ref Range: 21 - 52 % 14 (L)   MONOCYTES Latest Ref Range: 3 - 10 % 15 (H)   EOSINOPHILS Latest Ref Range: 0 - 5 % 2   BASOPHILS Latest Ref Range: 0 - 2 % 0   DF Latest Units:   AUTOMATED   ABS. NEUTROPHILS Latest Ref Range: 1.8 - 8.0 K/UL 5.3   ABS. LYMPHOCYTES Latest Ref Range: 0.9 - 3.6 K/UL 1.1   ABS. MONOCYTES Latest Ref Range: 0.05 - 1.2 K/UL 1.2   ABS. EOSINOPHILS Latest Ref Range: 0.0 - 0.4 K/UL 0.2   ABS.  BASOPHILS Latest Ref Range: 0.0 - 0.1 K/UL 0.0   Sodium Latest Ref Range: 136 - 145 mmol/L 141   Potassium Latest Ref Range: 3.5 - 5.5 mmol/L 3.4 (L)   Chloride Latest Ref Range: 100 - 108 mmol/L 105   CO2 Latest Ref Range: 21 - 32 mmol/L 28   Anion gap Latest Ref Range: 3.0 - 18 mmol/L 8   Glucose Latest Ref Range: 74 - 99 mg/dL 104 (H)   BUN Latest Ref Range: 7.0 - 18 MG/DL 19 (H)   Creatinine Latest Ref Range: 0.6 - 1.3 MG/DL 5.68 (H)   BUN/Creatinine ratio Latest Ref Range: 12 - 20   3 (L)   Calcium Latest Ref Range: 8.5 - 10.1 MG/DL 8.3 (L)   Phosphorus Latest Ref Range: 2.5 - 4.9 MG/DL 3.8   GFR est non-AA Latest Ref Range: >60 ml/min/1.73m2 10 (L)   GFR est AA Latest Ref Range: >60 ml/min/1.73m2 13 (L)                                                                                DIET:  [x] Renal    [] Other     [] NPO     []  Diabetic      PRIMARY NURSE REPORT: First initial/Last name/Title      Pre Dialysis: Mir Starr Rn    Time: 0915      EDUCATION:    [x] Patient [] Other         Knowledge Basis: []None [x]Minimal [] Substantial   Barriers to learning  [x]N/A   [x] Access Care     [x] S&S of infection     [] Fluid Management     []K+     [x]Procedural    []Albumin     [x] Medications     [] Tx Options     [] Transplant     [] Diet     [] Other   Teaching Tools:  [x] Explain  [] Demo  [] Handouts [] Video  Patient response:   [x] Verbalized understanding  [] Teach back  [] Return demonstration [x] Requires follow up   Inappropriate due to            [x] Time Out/Safety Check  [x]Extracorporeal Circuit Tested for integrity       RO/HEMODIALYSIS MACHINE SAFETY CHECKS  Before each treatment:     Machine Number:                   [facility]                                  [x] Unit Machine # 9 with centralized RO      Alarm Test:  Pass time 5108         Other:         [x] RO/Machine Log Complete      Temp    37             Dialysate: pH  7.4 Conductivity: Meter   NA     HD Machine   14.2                  TCD: 14  Dialyzer Lot # [id number]            Blood Tubing Lot # [id number]          Saline Lot #  [id number]     CHLORINE TESTING-Before each treatment and every 4 hours    Total Chlorine: [x] less than 0.1 ppm  Time: 0830 4 Hr/2nd Check Time: 3037   (if greater than 0.1 ppm from Primary then every 30 minutes from Secondary)     TREATMENT INITIATION  with Dialysis Precautions:   [x] All Connections Secured                 [x] Saline Line Double Clamped   [x] Venous Parameters Set                  [x] Arterial Parameters Set    [x] Prime Given  250ml                          [x]Air Foam Detector Engaged      Treatment Initiation Note:  pt arrive via transport in bed, pt is A&O, no S/S of distress, VSS. tx started with out complications.   .CVC no S/S of complications     Medication Dose Volume Route Initials Dialyzer Cleared: [x] Good [] Fair  [] Poor    Blood processed:  59.4 L  UF Removed  2000 Ml    Post Wt: NA  kg  POst BP:   181/82       Pulse: 73      Respirations: 18  Temperature: 98.2     epo [id number]                   Post Tx Vascular Access: AVF/AVG:N/A     hect 1mcg                   Catheter: Locking solution: Heparin 1:1000 Art. 1.6  Tashi. 1.6                                   Post Assessment:                                    Skin:  [x] Warm  [x] Dry [] Diaphoretic    [] Flushed  [] Pale [] Cyanotic   [facility] Signatures Title Initials  Time Lungs: [x] Clear    [] Course  [] Crackles  [] Wheezing [] Diminished   Betina Nesbitt RN   Cardiac: [x] Regular   [] Irregular   [] Monitor  [] N/A  Rhythm:           Edema:  [x] None    [] General     [] Facial   [] Pedal    [] UE    [] LE       Pain: [x]0  []1  []2   []3  []4   []5   []6   []7   []8   []9   []10       Post Treatment Note:  pt leaving via transport. Pt tolerated tx well. No S/S of distress.         POST TREATMENT PRIMARY NURSE HANDOFF REPORT:     First initial/Last name/Title         Post Dialysis: Milagros Galan RNTime:  0214     Abbreviations: AVG-arterial venous graft, AVF-arterial venous fistula, IJ-Internal Jugular, Subcl-Subclavian, Fem-Femoral, Tx-treatment, AP/HR-apical heart rate, DFR-dialysate flow rate, BFR-blood flow rate, AP-arterial pressure, -venous pressure, UF-ultrafiltrate, TMP-transmembrane pressure, Tashi-Venous, Art-Arterial, RO-Reverse Osmosis

## 2018-03-15 NOTE — PROGRESS NOTES
OT orders received and chart reviewed. Pt not seen for skilled OT due to:  []  Nausea/vomiting  []  Eating  []  Pain  []  Pt lethargic  [x]  Off Unit/dialysis  Will f/u later as patients' schedule allows. Thank you.   Isaías Tucker OTR/L

## 2018-03-15 NOTE — PROGRESS NOTES
Problem: Falls - Risk of  Goal: *Absence of Falls  Document Katja Fall Risk and appropriate interventions in the flowsheet.    Outcome: Progressing Towards Goal  Fall Risk Interventions:            Medication Interventions: Bed/chair exit alarm         History of Falls Interventions: Bed/chair exit alarm

## 2018-03-16 ENCOUNTER — APPOINTMENT (OUTPATIENT)
Dept: ULTRASOUND IMAGING | Age: 58
DRG: 682 | End: 2018-03-16
Attending: HOSPITALIST
Payer: COMMERCIAL

## 2018-03-16 PROBLEM — E88.09 HYPOALBUMINEMIA: Status: ACTIVE | Noted: 2018-03-16

## 2018-03-16 PROBLEM — D64.9 ANEMIA: Status: ACTIVE | Noted: 2018-03-16

## 2018-03-16 PROBLEM — N18.6 ESRD (END STAGE RENAL DISEASE) (HCC): Status: ACTIVE | Noted: 2018-03-16

## 2018-03-16 LAB
ANION GAP SERPL CALC-SCNC: 5 MMOL/L (ref 3–18)
BASOPHILS # BLD: 0.1 K/UL (ref 0–0.1)
BASOPHILS NFR BLD: 1 % (ref 0–3)
BUN SERPL-MCNC: 13 MG/DL (ref 7–18)
BUN/CREAT SERPL: 3 (ref 12–20)
CALCIUM SERPL-MCNC: 8.2 MG/DL (ref 8.5–10.1)
CHLORIDE SERPL-SCNC: 104 MMOL/L (ref 100–108)
CO2 SERPL-SCNC: 30 MMOL/L (ref 21–32)
CREAT SERPL-MCNC: 4.4 MG/DL (ref 0.6–1.3)
DIFFERENTIAL METHOD BLD: ABNORMAL
EOSINOPHIL # BLD: 0.4 K/UL (ref 0–0.4)
EOSINOPHIL NFR BLD: 5 % (ref 0–5)
ERYTHROCYTE [DISTWIDTH] IN BLOOD BY AUTOMATED COUNT: 13.3 % (ref 11.6–14.5)
GLUCOSE BLD STRIP.AUTO-MCNC: 128 MG/DL (ref 70–110)
GLUCOSE BLD STRIP.AUTO-MCNC: 154 MG/DL (ref 70–110)
GLUCOSE BLD STRIP.AUTO-MCNC: 207 MG/DL (ref 70–110)
GLUCOSE BLD STRIP.AUTO-MCNC: 213 MG/DL (ref 70–110)
GLUCOSE SERPL-MCNC: 150 MG/DL (ref 74–99)
HCT VFR BLD AUTO: 30.1 % (ref 36–48)
HGB BLD-MCNC: 9.2 G/DL (ref 13–16)
LYMPHOCYTES # BLD: 1 K/UL (ref 0.8–3.5)
LYMPHOCYTES NFR BLD: 13 % (ref 20–51)
MCH RBC QN AUTO: 27.5 PG (ref 24–34)
MCHC RBC AUTO-ENTMCNC: 30.6 G/DL (ref 31–37)
MCV RBC AUTO: 90.1 FL (ref 74–97)
MONOCYTES # BLD: 1.4 K/UL (ref 0–1)
MONOCYTES NFR BLD: 18 % (ref 2–9)
NEUTS SEG # BLD: 4.6 K/UL (ref 1.8–8)
NEUTS SEG NFR BLD: 63 % (ref 42–75)
NRBC BLD-RTO: 4 PER 100 WBC
PHOSPHATE SERPL-MCNC: 2.5 MG/DL (ref 2.5–4.9)
PLATELET # BLD AUTO: 283 K/UL (ref 135–420)
PLATELET COMMENTS,PCOM: ABNORMAL
PMV BLD AUTO: 10.8 FL (ref 9.2–11.8)
POTASSIUM SERPL-SCNC: 3.3 MMOL/L (ref 3.5–5.5)
RBC # BLD AUTO: 3.34 M/UL (ref 4.7–5.5)
RBC MORPH BLD: ABNORMAL
SODIUM SERPL-SCNC: 139 MMOL/L (ref 136–145)
WBC # BLD AUTO: 7.5 K/UL (ref 4.6–13.2)

## 2018-03-16 PROCEDURE — 36415 COLL VENOUS BLD VENIPUNCTURE: CPT | Performed by: INTERNAL MEDICINE

## 2018-03-16 PROCEDURE — 80048 BASIC METABOLIC PNL TOTAL CA: CPT | Performed by: INTERNAL MEDICINE

## 2018-03-16 PROCEDURE — 65270000029 HC RM PRIVATE

## 2018-03-16 PROCEDURE — 74011250637 HC RX REV CODE- 250/637: Performed by: INTERNAL MEDICINE

## 2018-03-16 PROCEDURE — 74011636637 HC RX REV CODE- 636/637: Performed by: INTERNAL MEDICINE

## 2018-03-16 PROCEDURE — 97161 PT EVAL LOW COMPLEX 20 MIN: CPT

## 2018-03-16 PROCEDURE — 74011636637 HC RX REV CODE- 636/637: Performed by: HOSPITALIST

## 2018-03-16 PROCEDURE — 85025 COMPLETE CBC W/AUTO DIFF WBC: CPT | Performed by: INTERNAL MEDICINE

## 2018-03-16 PROCEDURE — 74011250636 HC RX REV CODE- 250/636: Performed by: HOSPITALIST

## 2018-03-16 PROCEDURE — 82962 GLUCOSE BLOOD TEST: CPT

## 2018-03-16 PROCEDURE — 76870 US EXAM SCROTUM: CPT

## 2018-03-16 PROCEDURE — 74011250637 HC RX REV CODE- 250/637: Performed by: HOSPITALIST

## 2018-03-16 PROCEDURE — 97530 THERAPEUTIC ACTIVITIES: CPT

## 2018-03-16 PROCEDURE — 84100 ASSAY OF PHOSPHORUS: CPT | Performed by: INTERNAL MEDICINE

## 2018-03-16 RX ADMIN — INSULIN LISPRO 2 UNITS: 100 INJECTION, SOLUTION INTRAVENOUS; SUBCUTANEOUS at 10:01

## 2018-03-16 RX ADMIN — PREDNISOLONE ACETATE 1 DROP: 10 SUSPENSION/ DROPS OPHTHALMIC at 17:43

## 2018-03-16 RX ADMIN — INSULIN LISPRO 6 UNITS: 100 INJECTION, SOLUTION INTRAVENOUS; SUBCUTANEOUS at 17:41

## 2018-03-16 RX ADMIN — INSULIN LISPRO 4 UNITS: 100 INJECTION, SOLUTION INTRAVENOUS; SUBCUTANEOUS at 11:05

## 2018-03-16 RX ADMIN — HYDROCODONE BITARTRATE AND ACETAMINOPHEN 1 TABLET: 5; 325 TABLET ORAL at 10:00

## 2018-03-16 RX ADMIN — Medication 10 ML: at 22:04

## 2018-03-16 RX ADMIN — LOSARTAN POTASSIUM 50 MG: 50 TABLET ORAL at 10:02

## 2018-03-16 RX ADMIN — NEPHROCAP 1 CAPSULE: 1 CAP ORAL at 09:59

## 2018-03-16 RX ADMIN — CALCIUM ACETATE 667 MG: 667 CAPSULE ORAL at 13:18

## 2018-03-16 RX ADMIN — INSULIN GLARGINE 8 UNITS: 100 INJECTION, SOLUTION SUBCUTANEOUS at 22:03

## 2018-03-16 RX ADMIN — CALCIUM ACETATE 667 MG: 667 CAPSULE ORAL at 10:00

## 2018-03-16 RX ADMIN — FINASTERIDE 5 MG: 5 TABLET, FILM COATED ORAL at 09:59

## 2018-03-16 RX ADMIN — DOCUSATE SODIUM 100 MG: 100 CAPSULE, LIQUID FILLED ORAL at 17:43

## 2018-03-16 RX ADMIN — FLUTICASONE PROPIONATE 2 SPRAY: 50 SPRAY, METERED NASAL at 09:59

## 2018-03-16 RX ADMIN — Medication 10 ML: at 07:03

## 2018-03-16 RX ADMIN — ROSUVASTATIN CALCIUM 20 MG: 20 TABLET, FILM COATED ORAL at 22:04

## 2018-03-16 RX ADMIN — Medication 10 ML: at 13:18

## 2018-03-16 RX ADMIN — PREDNISOLONE ACETATE 1 DROP: 10 SUSPENSION/ DROPS OPHTHALMIC at 10:00

## 2018-03-16 RX ADMIN — HYDRALAZINE HYDROCHLORIDE 10 MG: 20 INJECTION INTRAMUSCULAR; INTRAVENOUS at 10:09

## 2018-03-16 RX ADMIN — CALCIUM ACETATE 667 MG: 667 CAPSULE ORAL at 17:43

## 2018-03-16 RX ADMIN — HYDRALAZINE HYDROCHLORIDE 10 MG: 20 INJECTION INTRAMUSCULAR; INTRAVENOUS at 00:34

## 2018-03-16 RX ADMIN — AMLODIPINE BESYLATE 10 MG: 10 TABLET ORAL at 17:43

## 2018-03-16 RX ADMIN — CYANOCOBALAMIN TAB 1000 MCG 1000 MCG: 1000 TAB at 10:00

## 2018-03-16 RX ADMIN — DOCUSATE SODIUM 100 MG: 100 CAPSULE, LIQUID FILLED ORAL at 10:00

## 2018-03-16 NOTE — PROGRESS NOTES
[facility] Hospitalist Group  Progress Note    Patient: Urbano Guajardo Age: 62 y.o. : 1960 MR#: [id number] SSN: [id number]  Date: 3/16/2018     Subjective:     Pt and ot pending. US pending. He feels about the same. Wife to bring in peripheral neuropathy cream from home as we do not have on formulary. Assessment/Plan:     1. ESRD-pt admitted for initiation of HD. S/p TDC placement and HD #3 completed. Arrangements for outpatient HD leon. Nutritionist provided education regarding renal diet for patient and his wife. 2. Hypertensive urgency gradual improvement. Avoid coreg (tolerates other beta blockers). Check orthostatics. 3. DM2. A1C 8.5. diabetes educator worked with him. Plan discharge on lantus / ssi. Outpatient f/u with Dr. Fani Guzman, endocrinology. Hold VGO at discharge. 4. Anemia of ckd - epogen per nephrology  5. 2ndary hyperparathyroidism of ckd - hectoral per nephrology  6. obesity Body mass index is 31.54 kg/(m^2). 7. Scrotal swelling - US. 8. Peripheral neuropathy - await cream from home  9. dvt prophylaxis  10. Full code pt and ot. Wife at bedside, all questions answered to the best of my ability.      Additional Notes:      Case discussed with:  [x]Patient  [x]Family  [x]Nursing  [x]Case Management  DVT Prophylaxis:  []Lovenox  []Hep SQ  [x]SCDs  []Coumadin   []On Heparin gtt    Objective:   VS:   Visit Vitals    /87 (BP 1 Location: Left arm, BP Patient Position: Head of bed elevated (Comment degrees))    Pulse 78    Temp 97.8 °F (36.6 °C)    Resp 18    Ht 5' 6\" (1.676 m)    Wt 88.6 kg (195 lb 6.4 oz)    SpO2 96%    BMI 31.54 kg/m2      Tmax/24hrs: Temp (24hrs), Av.7 °F (37.1 °C), Min:97.8 °F (36.6 °C), Max:99.5 °F (37.5 °C)      Intake/Output Summary (Last 24 hours) at 18 1014  Last data filed at 18 0936   Gross per 24 hour   Intake              400 ml   Output             2650 ml   Net            -2250 ml     Chaperone: asad Lasha Sanches. General:  Alert, awake, oriented. Wife at bedside  Heent: ncat. Perrl. Cardiovascular:  RRR, no murmurs  Chest wall: TDC site clean  Pulmonary: cta b.l  GI: Soft, nt, nd. Nabs. : scrotal swelling, no masses, no ttp. Extremities: + pitting ankle edema. Neuro: no focal deficit  Skin: no rash    Labs:    Recent Results (from the past 24 hour(s))   GLUCOSE, POC    Collection Time: 03/15/18  2:10 PM   Result Value Ref Range    Glucose (POC) 140 (H) 70 - 110 mg/dL   GLUCOSE, POC    Collection Time: 03/15/18  4:36 PM   Result Value Ref Range    Glucose (POC) 143 (H) 70 - 110 mg/dL   GLUCOSE, POC    Collection Time: 03/15/18  9:41 PM   Result Value Ref Range    Glucose (POC) 198 (H) 70 - 110 mg/dL   CBC WITH AUTOMATED DIFF    Collection Time: 03/16/18  2:35 AM   Result Value Ref Range    WBC 7.5 4.6 - 13.2 K/uL    RBC 3.34 (L) 4.70 - 5.50 M/uL    HGB 9.2 (L) 13.0 - 16.0 g/dL    HCT 30.1 (L) 36.0 - 48.0 %    MCV 90.1 74.0 - 97.0 FL    MCH 27.5 24.0 - 34.0 PG    MCHC 30.6 (L) 31.0 - 37.0 g/dL    RDW 13.3 11.6 - 14.5 %    PLATELET 700 023 - 897 K/uL    MPV 10.8 9.2 - 11.8 FL    NEUTROPHILS 63 42 - 75 %    LYMPHOCYTES 13 (L) 20 - 51 %    MONOCYTES 18 (H) 2 - 9 %    EOSINOPHILS 5 0 - 5 %    BASOPHILS 1 0 - 3 %    NRBC 4.0 (H) 0  WBC    ABS. NEUTROPHILS 4.6 1.8 - 8.0 K/UL    ABS. LYMPHOCYTES 1.0 0.8 - 3.5 K/UL    ABS. MONOCYTES 1.4 (H) 0 - 1.0 K/UL    ABS. EOSINOPHILS 0.4 0.0 - 0.4 K/UL    ABS.  BASOPHILS 0.1 0.0 - 0.1 K/UL    PLATELET COMMENTS ADEQUATE PLATELETS      RBC COMMENTS ANISOCYTOSIS  1+        RBC COMMENTS POLYCHROMASIA  1+        RBC COMMENTS STOMATOCYTES  2+        DF MANUAL     METABOLIC PANEL, BASIC    Collection Time: 03/16/18  2:35 AM   Result Value Ref Range    Sodium 139 136 - 145 mmol/L    Potassium 3.3 (L) 3.5 - 5.5 mmol/L    Chloride 104 100 - 108 mmol/L    CO2 30 21 - 32 mmol/L    Anion gap 5 3.0 - 18 mmol/L    Glucose 150 (H) 74 - 99 mg/dL    BUN 13 7.0 - 18 MG/DL    Creatinine 4.40 (H) 0.6 - 1.3 MG/DL    BUN/Creatinine ratio 3 (L) 12 - 20      GFR est AA 17 (L) >60 ml/min/1.73m2    GFR est non-AA 14 (L) >60 ml/min/1.73m2    Calcium 8.2 (L) 8.5 - 10.1 MG/DL   PHOSPHORUS    Collection Time: 03/16/18  2:35 AM   Result Value Ref Range    Phosphorus 2.5 2.5 - 4.9 MG/DL   GLUCOSE, POC    Collection Time: 03/16/18  9:07 AM   Result Value Ref Range    Glucose (POC) 154 (H) 70 - 110 mg/dL       Signed By: Clarice Pemberton MD     March 16, 2018 4:05 PM

## 2018-03-16 NOTE — DIABETES MGMT
Glycemic Control Plan of Care    BG now above target range. Patient stated that is eating much better. POC BG range on 03/15/2018: 140-198 mg/dL. POC BG report on 03/16/2018 at time of review: 154, 207, 213 mg/dL. Home diabetes meds:  Daily disposable insulin delivery device called VG0 40 (humalog). He is under the care of dulce Verar. See assessment of home diabetes management and education entered on 03/14/2018. Patient reported episodes of hypoglycemia prior to hospital admission due to poor po intake and stated that VG0 40 is a high dose. 03/14/2018: Patient is currently having dialysis treatment. Wife is the primary caregiver and discussed that her  is currently on lantus insulin 8 units daily at bedtime while inpatient. She verbalized understanding that this is a long acting insulin. Patient should not start on VG0 until bedtime if decision is made to resume use at discharge. Patient is currently on VG0 40 which is a high dose. It is important to consider prevention of hypoglycemia. Encouraged wife to f/u with Dr. Shira Mckeon regarding use of VG 40 and she agreed. Wife also stated prior experiencing in giving patient SC lantus insulin and humalog sliding scale insulin. She will be able to help administer SC insulin if prescribed for her . Called Dr. Abdon Herrera and discussed above. Recommendation(s):  1.) Continue on basal and correctional insulin. 2.) Modified correctional lispro insulin to very resistant dose. Done. 3.) Consider modifying die to include no concentrated sweets. 4.) May need to consider SC insulin (basal and correctional) at discharge until patient is able to f/u with Dr. Fabiana Lara after discharge. Wife is the primary caregiver and stated that she previously administer SC insulin to patient. She will be able to administer insulin to patient if discharge on SC insulin.     Assessment:  Patient is 62year old with past medical history including type 2 diabetes mellitus, diabetic retinopathy and recent surgery for detached retina, hypertension, diabetic foot ulcer with history of toe amputation, hyperlipidemia, and CKD stage 5 - was admitted on 03/12/2018 with report of shortness of breath and swelling on bilateral legs. Noted:  ESRD. Status post temporary dialysis catheter placement. Type 2 diabetes mellitus with current A1c of 8.4% (03/13/2018). Most recent blood glucose values:    Results for Henrique Nava (MRN [id number]) as of 3/16/2018 16:18   Ref. Range 3/15/2018 07:45 3/15/2018 14:10 3/15/2018 16:36 3/15/2018 21:41   GLUCOSE,FAST - POC Latest Ref Range: 70 - 110 mg/dL 142 (H) 140 (H) 143 (H) 198 (H)     Results for Henrique Nava (MRN [id number]) as of 3/16/2018 16:18   Ref. Range 3/16/2018 09:07 3/16/2018 11:04 3/16/2018 15:18   GLUCOSE,FAST - POC Latest Ref Range: 70 - 110 mg/dL 154 (H) 207 (H) 213 (H)     Current A1C: 8.4% (03/13/2018) is equivalent to average blood glucose of 194 mg/dL during the past 2-3 months. Current hospital diabetes medications:  Basal lantus insulin 8 units daily at bedtime since 03/13/2018. Correctional lispro insulin TID AC. Very resistant dose. Total daily dose insulin requirement previous day: 03/14/2018  Lantus: 8 units  Lispro: None. Patient did not require correctional insulin coverage. Home diabetes medications: Patient reported on 03/13/2018 that he is on VG0 40 which is a disposable insulin delivery device. This is changed daily. He is using humalog insulin. Diet: Renal regular; nutr suppl: nepro with all meals.     Goals:  Blood glucose will be within target range of  mg/dL by 03/19/2018     Education:  _X__  Refer to Diabetes Education Record: 03/13/2018             ___  Education not indicated at this time    Jason Hernandez RN CCM

## 2018-03-16 NOTE — PROGRESS NOTES
Problem: Mobility Impaired (Adult and Pediatric)  Goal: *Acute Goals and Plan of Care (Insert Text)  Physical Therapy Goals  Initiated 3/16/2018 and to be accomplished within 7 day(s)  1. Patient will move from supine to sit and sit to supine, scoot up and down and roll side to side in bed with supervision/set-up. 2.  Patient will transfer from bed to chair and chair to bed with supervision/CGA using the least restrictive device. 3.  Patient will perform sit to stand with supervision/CGA. 4.  Patient will ambulate with supervision/set-up for >50 feet with the least restrictive device. 5.  Patient will ascend/descend 2 stairs without handrail(s) with minimal assistance/contact guard assist and least restrictive device. Outcome: Progressing Towards Goal  physical Therapy EVALUATION    Patient: Lucien Snowden (42 y.o. male)  Date: 3/16/2018  Primary Diagnosis: Kidney disease  Precautions: Fall    ASSESSMENT :  Based on the objective data described below, the patient presents with impaired functional mobility including bed mobility, transfers, ambulation, balance, and general activity tolerance following admission ESRD. Patient presents today drowsy but agreeable to PT evaluation. He required ModA using log roll technique per patient preference to transfer to sitting EOB. Following seated rest break with training on gaze fixation and controlled breathing, patient stood with RW and Tyrone. He performed several sidesteps at EOB with Tyrone for RW management, then returned to supine. Patient c/o vertigo with transfer back to bed, demonstrates B nystagmus with far right gaze. Patient left resting comfortably with call bell in reach, needs met, and wife in room. Patient will benefit from skilled intervention to address the above impairments.   Patients rehabilitation potential is considered to be Fair  Factors which may influence rehabilitation potential include:   []         None noted  []         Mental ability/status  [x]         Medical condition  []         Home/family situation and support systems  [x]         Safety awareness  []         Pain tolerance/management  []         Other:      PLAN :  Recommendations and Planned Interventions:  [x]           Bed Mobility Training             [x]    Neuromuscular Re-Education  [x]           Transfer Training                   []    Orthotic/Prosthetic Training  [x]           Gait Training                          []    Modalities  [x]           Therapeutic Exercises          []    Edema Management/Control  [x]           Therapeutic Activities            [x]    Patient and Family Training/Education  []           Other (comment):    Frequency/Duration: Patient will be followed by physical therapy 1-2 times per day to address goals. Discharge Recommendations: Manfred Curtis  Further Equipment Recommendations for Discharge: rolling walker     SUBJECTIVE:   Patient stated Tired isn't a strong enough word.     OBJECTIVE DATA SUMMARY:     Past Medical History:   Diagnosis Date    Cardiac echocardiogram 10/21/2016    EF 60-65%. No WMA. Mod-marked LVH. Normal diastolic fx. No significant valvular heart disease.  Cardiac treadmill stress test, low risk 11/02/2012    Negative maximal exercise treadmill test.  Ex time 7 min 45 sec.  Cardiovascular LE peripheral arterial testing 03/22/2016    No significant peripheral arterial disease at rest bilaterally. ABIs deferred due to calcified vessels.  Cardiovascular LLE venous duplex 06/06/2012    Left leg:  No DVT.  Cardiovascular renal duplex 10/21/2016    No significant renal artery stenosis.  Chronic kidney disease     CKD (chronic kidney disease), stage V ([address]     Diabetes mellitus ([address] 3/12/2010    Diabetic retinopathy ([address] 5/4/2010    Edema of both legs     Eye examination 5/4/10    OU: 20/20; OD: 20/25; OS: 20/25 without correction.     Glaucoma 08/17/2017    Debbie Zavala HLD (hyperlipidemia) 3/12/2010    HTN (hypertension) 3/12/2010    Orthostatic hypertension      Past Surgical History:   Procedure Laterality Date    HX AMPUTATION      TOES    HX HERNIA REPAIR  2005    HX OTHER SURGICAL  11/07/2017    glaucoma valve- Ahmed     Barriers to Learning/Limitations: yes;  sensory deficits-vision  Compensate with: Verbal Cues and Tactile Cues  Prior Level of Function/Home Situation: Patient lives with wife in single story home. He was ambulating with SPC prior to admission, required assistance from wife for ADLs and some transfers. Home Situation  Home Environment: Private residence  # Steps to Enter: 2  Rails to Enter: No  One/Two Story Residence: One story  Living Alone: No  Support Systems: Spouse/Significant Other/Partner  Patient Expects to be Discharged to[de-identified] Private residence  Current DME Used/Available at Home: [employer], straight  Critical Behavior:  Neurologic State: Alert  Psychosocial  Patient Behaviors: Calm; Cooperative  Family  Behaviors: Calm;Supportive  Strength:    Strength: Generally decreased, functional (BLE)  Tone & Sensation:   Tone: Normal (BLE)  Sensation: Impaired (B foot neuropathy)   Range Of Motion:  AROM: Within functional limits (BLE)  Functional Mobility:  Bed Mobility:  Rolling: Contact guard assistance  Supine to Sit: Moderate assistance  Sit to Supine: Minimum assistance  Transfers:  Sit to Stand: Minimum assistance  Stand to Sit: Minimum assistance  Balance:   Sitting: Impaired; With support  Sitting - Static: Good (unsupported)  Sitting - Dynamic: Fair (occasional)  Standing: Impaired; With support (RW)  Standing - Static: Fair  Standing - Dynamic : Fair  Ambulation/Gait Training:  Distance (ft): 3 Feet (ft)  Assistive Device: Walker, rolling  Ambulation - Level of Assistance: Minimal assistance  Base of Support: Widened  Speed/Denise: Slow  Pain:  Pre session: 8/10 L flank (received pain meds)  Post session: 8/10 L flank  Activity Tolerance:   Fair  Please refer to the flowsheet for vital signs taken during this treatment. After treatment:   [] Patient left in no apparent distress sitting up in chair  [] Patient left sitting on EOB  [x] Patient left in no apparent distress in bed  [] Patient declined to be OOB at this time due to  [x] Call bell left within reach  [x] Nursing notified  [x] Caregiver present  [] Bed alarm activated  [] SCDs in place    COMMUNICATION/EDUCATION:   [x]         Fall prevention education was provided and the patient/caregiver indicated understanding. [x]         Patient/family have participated as able in goal setting and plan of care. [x]         Patient/family agree to work toward stated goals and plan of care. []         Patient understands intent and goals of therapy, but is neutral about his/her participation. []         Patient is unable to participate in goal setting and plan of care. Thank you for this referral.  Anthony Mitchell   Time Calculation: 29 mins      Mobility  Current  CK= 40-59%   Goal  CI= 1-19%. The severity rating is based on the Level of Assistance required for Functional Mobility and ADLs.     Eval Complexity: History: MEDIUM  Complexity : 1-2 comorbidities / personal factors will impact the outcome/ POC Exam:LOW Complexity : 1-2 Standardized tests and measures addressing body structure, function, activity limitation and / or participation in recreation  Presentation: MEDIUM Complexity : Evolving with changing characteristics  Clinical Decision Making:Medium Complexity   Overall Complexity:MEDIUM

## 2018-03-16 NOTE — PROGRESS NOTES
Care Management Interventions  PCP Verified by CM: Yes  Current Support Network: Lives with Spouse  Confirm Follow Up Transport: Family  Plan discussed with Pt/Family/Caregiver: Yes     CM informed that plan is for pt to discharge home after dialysis treatment. Pt is scheduled for outpatient dialysis to begin on Monday (3/19/18) at 2:45pm. Pt is asked to arrive at 2:15pm to complete forms. CM discussed with pt and spouse in room. Information provided to pt's spouse yesterday.

## 2018-03-16 NOTE — ROUTINE PROCESS
Bedside and Verbal shift change report given to YESICA Davis (oncoming nurse) by Fabi Tong RN (offgoing nurse). Report included the following information SBAR, Kardex, MAR and Recent Results. SITUATION:    Code Status: Full Code   Reason for Admission: Kidney disease    [facility] day: 4   Problem List:       Hospital Problems  Date Reviewed: 3/16/2018          Codes Class Noted POA    ESRD (end stage renal disease) ([facility] ICD-10-CM: N18.6  ICD-9-CM: 585.6  3/16/2018 Unknown        Anemia ICD-10-CM: D64.9  ICD-9-CM: 285.9  3/16/2018 Unknown        Hypoalbuminemia ICD-10-CM: E88.09  ICD-9-CM: 273.8  3/16/2018 Unknown        Kidney disease ICD-10-CM: N28.9  ICD-9-CM: 593.9  3/12/2018 Unknown        Diabetes mellitus due to underlying condition, uncontrolled, with diabetic nephropathy, with long-term current use of insulin ([facility] ICD-10-CM: E08.21, E08.65, Z79.4  ICD-9-CM: 249.41, 583.81, V58.67  10/11/2017 Yes        Diabetic nephropathy ([facility] (Chronic) ICD-10-CM: E11.21  ICD-9-CM: 250.40, 583.81  3/22/2016 Yes        HLD (hyperlipidemia) ICD-10-CM: E78.5  ICD-9-CM: 272.4  3/12/2010 Yes        HTN (hypertension) ICD-10-CM: I10  ICD-9-CM: 401.9  3/12/2010 Yes              BACKGROUND:    Past Medical History:   Past Medical History:   Diagnosis Date    Cardiac echocardiogram 10/21/2016    EF 60-65%. No WMA. Mod-marked LVH. Normal diastolic fx. No significant valvular heart disease.  Cardiac treadmill stress test, low risk 11/02/2012    Negative maximal exercise treadmill test.  Ex time 7 min 45 sec.  Cardiovascular LE peripheral arterial testing 03/22/2016    No significant peripheral arterial disease at rest bilaterally. ABIs deferred due to calcified vessels.  Cardiovascular LLE venous duplex 06/06/2012    Left leg:  No DVT.  Cardiovascular renal duplex 10/21/2016    No significant renal artery stenosis.       Chronic kidney disease     CKD ([facility] disease[facility] V [facility]     Diabetes mellitus ([facility] 3/12/2010    Diabetic retinopathy ([facility] 5/4/2010    Edema of both legs     Eye examination 5/4/10    OU: 20/20; OD: 20/25; OS: 20/25 without correction.     Glaucoma 08/17/2017    Penny Gupta HLD (hyperlipidemia) 3/12/2010    HTN (hypertension) 3/12/2010    Orthostatic hypertension          Patient taking anticoagulants no     ASSESSMENT:    Changes in Assessment Throughout Shift: elevated bp     Patient has Central Line: yes Reasons if yes: dialysis   Patient has Pa Cath: no Reasons if yes: n/a      Last Vitals:     Vitals:    03/16/18 0415 03/16/18 0905 03/16/18 1141 03/16/18 1513   BP: 166/80 181/87 138/74 171/82   Pulse: 75 78 79 79   Resp: 18 18 18 18   Temp: 99.4 °F (37.4 °C) 97.8 °F (36.6 °C) 99.6 °F (37.6 °C) 99.4 °F (37.4 °C)   TempSrc:       SpO2: 94% 96% 95% 98%   Weight:       Height:            IV and DRAINS (will only show if present)   Hemodialysis Access 03/13/18-Site Assessment: Clean, dry, & intact  Peripheral IV 03/13/18 Right Hand-Site Assessment: Clean, dry, & intact  [REMOVED] Peripheral IV 03/12/18 Right Hand-Site Assessment: Clean, dry, & intact     WOUND (if present)   Wound Type:  none   Dressing present Dressing Present : No   Wound Concerns/Notes:  none     PAIN    Pain Assessment    Pain Intensity 1: 0 (03/16/18 1612)    Pain Location 1: Foot    Pain Intervention(s) 1: Declines    Patient Stated Pain Goal: 0  o Interventions for Pain:  Norco  o Intervention effective: yes  o Time of last intervention: 0927  o Reassessment Completed: yes      Last 3 Weights:  Last 3 Recorded Weights in this Encounter    03/12/18 1930 03/15/18 1917   Weight: 90.7 kg (200 lb) 88.6 kg (195 lb 6.4 oz)     Weight change:      INTAKE/OUPUT    Current Shift:      Last three shifts: 03/15 0701 - 03/16 1900  In: 757 [P.O.:757]  Out: 2650 [Urine:650]     LAB RESULTS     Recent Labs      03/16/18   0235  03/15/18   0232   WBC  7.5  7.8   HGB  9.2*  9.2*   HCT  30.1*  29.1* PLT  283  289        Recent Labs      03/16/18   0235  03/15/18   0232   NA  139  141   K  3.3*  3.4*   GLU  150*  104*   BUN  13  19*   CREA  4.40*  5.68*   CA  8.2*  8.3*  8.1*       RECOMMENDATIONS AND DISCHARGE PLANNING     1. Pending tests/procedures/ Plan of Care or Other Needs: dialysis     2. Discharge plan for patient and Needs/Barriers: home    3. Estimated Discharge Date: 3/17/18 Posted on Whiteboard in [facility] Covert Room: yes      4. The patient's care plan was reviewed with the oncoming nurse. \"HEALS\" SAFETY CHECK      Fall Risk    Total Score: 3    Safety Measures: Safety Measures: Bed/Chair-Wheels locked, Bed in low position, Call light within reach    A safety check occurred in the patient's room between off going nurse and oncoming nurse listed above. The safety check included the below items  Area Items   H  High Alert Medications - Verify all high alert medication drips (heparin, PCA, etc.)   E  Equipment - Suction is set up for ALL patients (with noel)  - Red plugs utilized for all equipment (IV pumps, etc.)  - WOWs wiped down at end of shift.  - Room stocked with oxygen, suction, and other unit-specific supplies   A  Alarms - Bed alarm is set for fall risk patients  - Ensure chair alarm is in place and activated if patient is up in a chair   L  Lines - Check IV for any infiltration  - Pa bag is empty if patient has a Pa   - Tubing and IV bags are labeled   S  Safety   - Room is clean, patient is clean, and equipment is clean. - Hallways are clear from equipment besides carts. - Fall bracelet on for fall risk patients  - Ensure room is clear and free of clutter  - Suction is set up for ALL patients (with noel)  - Hallways are clear from equipment besides carts.    - Isolation precautions followed, supplies available outside room, sign posted     Sheryl Abrams RN

## 2018-03-16 NOTE — PROGRESS NOTES
Progress Note    Christine Herrera  62 y.o. Admit Date: 3/12/2018  Active Problems:    HLD (hyperlipidemia) (3/12/2010) POA: Yes      HTN (hypertension) (3/12/2010) POA: Yes      Diabetic nephropathy ([facility] (3/22/2016) POA: Yes      Diabetes mellitus due to underlying condition, uncontrolled, with diabetic nephropathy, with long-term current use of insulin ([facility] (10/11/2017) POA: Yes      Kidney disease (3/12/2018) POA: Unknown      ESRD (end stage renal disease) ([facility] (3/16/2018) POA: Unknown      Anemia (3/16/2018) POA: Unknown      Hypoalbuminemia (3/16/2018) POA: Unknown            Subjective:     Patient feels good, no SOB. A comprehensive review of systems was negative except for that written in the History of Present Illness.     Objective:     Visit Vitals    /74 (BP 1 Location: Left arm, BP Patient Position: Head of bed elevated (Comment degrees))    Pulse 79    Temp 99.6 °F (37.6 °C)    Resp 18    Ht 5' 6\" (1.676 m)    Wt 88.6 kg (195 lb 6.4 oz)    SpO2 95%    BMI 31.54 kg/m2         Intake/Output Summary (Last 24 hours) at 03/16/18 1404  Last data filed at 03/16/18 0954   Gross per 24 hour   Intake              400 ml   Output              650 ml   Net             -250 ml       Current Facility-Administered Medications   Medication Dose Route Frequency Provider Last Rate Last Dose    [START ON 3/17/2018] heparin (porcine) 1,000 unit/mL injection 1,000 Units  1,000 Units Hemodialysis [facility] Jolie Cazares MD        morphine injection 1 mg  1 mg IntraVENous Q4H PRN Arnold Sethi MD        HYDROcodone-acetaminophen (NORCO) 5-325 mg per tablet 1 Tab  1 Tab Oral Q6H PRN Arnold Sethi MD   1 Tab at 03/16/18 1000    acetaminophen (TYLENOL) tablet 500 mg  500 mg Oral Q6H PRN Arnold Sethi MD   500 mg at 03/14/18 1647    hydrALAZINE (APRESOLINE) 20 mg/mL injection 10 mg  10 mg IntraVENous Q6H PRN Arnold Sethi MD   10 mg at 03/16/18 1009    amLODIPine (NORVASC) tablet 10 mg  10 mg Oral QPM Nilay Whitten MD   10 mg at 03/15/18 1833    cloNIDine (CATAPRES) 0.3 mg/24 hr patch 1 Patch  1 Patch TransDERmal Q7D Nilay Whitten MD   1 Patch at 03/13/18 2215    cyanocobalamin tablet 1,000 mcg  1,000 mcg Oral DAILY Nilay Whitten MD   1,000 mcg at 03/16/18 1000    cyclobenzaprine (FLEXERIL) tablet 10 mg  10 mg Oral TID PRN Nilay Whitten MD        docusate sodium (COLACE) capsule 100 mg  100 mg Oral BID Nilay Whitten MD   100 mg at 03/16/18 1000    ferrous sulfate tablet 325 mg  1 Tab Oral Q7D Nilay Whitten MD   325 mg at 03/13/18 0506    finasteride (PROSCAR) tablet 5 mg  5 mg Oral DAILY Nilay Whitten MD   5 mg at 03/16/18 1111    mupirocin (BACTROBAN) 2 % ointment   Topical DAILY Nilay Whitten MD        prednisoLONE acetate (PRED FORTE) 1 % ophthalmic suspension 1 Drop  1 Drop Left Eye TID Nilay Whitten MD   1 Drop at 03/16/18 1000    rosuvastatin (CRESTOR) tablet 20 mg  20 mg Oral QHS Nilay Whitten MD   20 mg at 03/15/18 2118    sodium chloride (NS) flush 5-10 mL  5-10 mL IntraVENous Q8H Nilay Whitten MD   10 mL at 03/16/18 1318    sodium chloride (NS) flush 5-10 mL  5-10 mL IntraVENous PRN Nilay Whitten MD        insulin lispro (HUMALOG) injection   SubCUTAneous Syed Ramirez MD   4 Units at 03/16/18 1105    glucose chewable tablet 16 g  4 Tab Oral PRN Nilay Whitten MD        glucagon (GLUCAGEN) injection 1 mg  1 mg IntraMUSCular PRN Nilay Whitten MD        dextrose (D50W) injection syrg 12.5-25 g  25-50 mL IntraVENous PRN Nilay Whitten MD        ondansetron [facility]) injection 4 mg  4 mg IntraVENous Q4H PRN Nilay Whitten MD        PGIIL/P Cream (Patient Supplied)  1-2 Pump(s) Topical QID PRN Nilay Whitten MD        fluticasone (FLONASE) 50 mcg/actuation nasal spray 2 Spray  2 Spray Both Nostrils DAILY Nilay Whitten MD   2 [place] at 03/16/18 0959    losartan (COZAAR) tablet 50 mg  50 mg Oral DAILY Cielo Melchor MD   50 mg at 03/16/18 1002    epoetin aleksandra (EPOGEN;PROCRIT) injection 10,000 Units  10,000 Units IntraVENous [facility] Melchor MD   10,000 Units at 03/15/18 1224    doxercalciferol (HECTOROL) 4 mcg/2 mL injection 1 mcg  1 mcg IntraVENous DIALYSIS ANTHONY LAM[facility] MD   1 mcg at 03/15/18 1224    B complex-vitaminC-folic acid (NEPHROCAP) cap  1 Cap Oral DAILY Cielo Melchor MD   1 Cap at 03/16/18 0959    calcium acetate (PHOSLO) capsule 667 mg  1 Cap Oral TID WITH MEALS Cielo Melchor MD   667 mg at 03/16/18 1318    insulin glargine (LANTUS) injection 8 Units  8 Units SubCUTAneous QHS Kenneth Odonnell MD   8 Units at 03/15/18 2118        Physical Exam:     Physical Exam:   General:  Alert, cooperative, no distress, appears stated age. Lungs:   Clear to auscultation bilaterally. Heart:  Regular rate and rhythm, S1, S2 normal, no murmur, click, rub or gallop. Abdomen:   Soft, non-tender. Bowel sounds normal. No masses,  No organomegaly. Extremities: Extremities normal, atraumatic, no cyanosis or edema, HD catheter is well dressed.    Skin: Skin color, texture, turgor normal. No rashes or lesions         Data Review:    CBC w/Diff    Recent Labs      03/16/18 0235  03/15/18   0232   WBC  7.5  7.8   RBC  3.34*  3.28*   HGB  9.2*  9.2*   HCT  30.1*  29.1*   MCV  90.1  88.7   MCH  27.5  28.0   MCHC  30.6*  31.6   RDW  13.3  13.5    Recent Labs      03/16/18   0235  03/15/18   0232   MONOS  18*  15*   EOS  5  2   BASOS  1  0   RDW  13.3  13.5        Comprehensive Metabolic Profile    Recent Labs      03/16/18 0235  03/15/18   0232   NA  139  141   K  3.3*  3.4*   CL  104  105   CO2  30  28   BUN  13  19*   CREA  4.40*  5.68*    Recent Labs      03/16/18   0235  03/15/18   0232   CA  8.2*  8.3*  8.1*   PHOS  2.5  3.8                        Impression:       Active Hospital Problems    Diagnosis Date Noted    ESRD (end stage renal disease) ([facility] 03/16/2018    Anemia 03/16/2018    Hypoalbuminemia 03/16/2018    Kidney disease 03/12/2018    Diabetes mellitus due to underlying condition, uncontrolled, with diabetic nephropathy, with long-term current use of insulin ([facility] 10/11/2017    Diabetic nephropathy ([facility] 03/22/2016    HTN (hypertension) 03/12/2010    HLD (hyperlipidemia) 03/12/2010            Plan:     No dialysis today, watch BP, OT/PT , dialysis tomorrow& then DC.       Esther Hernandez MD

## 2018-03-16 NOTE — PROGRESS NOTES
Problem: Falls - Risk of  Goal: *Absence of Falls  Document Katja Fall Risk and appropriate interventions in the flowsheet.    Outcome: Progressing Towards Goal  Fall Risk Interventions:  Mobility Interventions: Patient to call before getting OOB, Bed/chair exit alarm         Medication Interventions: Bed/chair exit alarm         History of Falls Interventions: Bed/chair exit alarm

## 2018-03-16 NOTE — ROUTINE PROCESS
Bedside and Verbal shift change report given to Noni Devlin RN (oncoming nurse) by Rawland Goldmann, RN (offgoing nurse). Report included the following information SBAR, Kardex, MAR and Recent Results. SITUATION:    Code Status: Full Code   Reason for Admission: Kidney disease    [facility] day: 4   Problem List:       Hospital Problems  Date Reviewed: 1/19/2018          Codes Class Noted POA    Kidney disease ICD-10-CM: N28.9  ICD-9-CM: 593.9  3/12/2018 Unknown              BACKGROUND:    Past Medical History:   Past Medical History:   Diagnosis Date    Cardiac echocardiogram 10/21/2016    EF 60-65%. No WMA. Mod-marked LVH. Normal diastolic fx. No significant valvular heart disease.  Cardiac treadmill stress test, low risk 11/02/2012    Negative maximal exercise treadmill test.  Ex time 7 min 45 sec.  Cardiovascular LE peripheral arterial testing 03/22/2016    No significant peripheral arterial disease at rest bilaterally. ABIs deferred due to calcified vessels.  Cardiovascular LLE venous duplex 06/06/2012    Left leg:  No DVT.  Cardiovascular renal duplex 10/21/2016    No significant renal artery stenosis.  Chronic kidney disease     CKD (chronic kidney disease), stage V ([address]     Diabetes mellitus ([facility] 3/12/2010    Diabetic retinopathy ([facility] 5/4/2010    Edema of both legs     Eye examination 5/4/10    OU: 20/20; OD: 20/25; OS: 20/25 without correction.     Glaucoma 08/17/2017    Beryle Sago    HLD (hyperlipidemia) 3/12/2010    HTN (hypertension) 3/12/2010    Orthostatic hypertension          Patient taking anticoagulants no     ASSESSMENT:    Changes in Assessment Throughout Shift: elevated bp     Patient has Central Line: yes Reasons if yes: dialysis   Patient has Pa Cath: no Reasons if yes: n/a      Last Vitals:     Vitals:    03/15/18 2001 03/16/18 0017 03/16/18 0056 03/16/18 0415   BP: 189/90 (!) 206/92 188/84 166/80   Pulse: 79 71  75   Resp: 18 18 18   Temp: 99.5 °F (37.5 °C) 98.6 °F (37 °C)  99.4 °F (37.4 °C)   TempSrc:       SpO2: 96% 96%  94%   Weight:       Height:            IV and DRAINS (will only show if present)   Hemodialysis Access 03/13/18-Site Assessment: Clean, dry, & intact  Peripheral IV 03/13/18 Right Hand-Site Assessment: Clean, dry, & intact  [REMOVED] Peripheral IV 03/12/18 Right Hand-Site Assessment: Clean, dry, & intact     WOUND (if present)   Wound Type:  none   Dressing present Dressing Present : No   Wound Concerns/Notes:  none     PAIN    Pain Assessment    Pain Intensity 1: 0 (03/16/18 0342)    Pain Location 1: Flank    Pain Intervention(s) 1: Medication (see MAR)    Patient Stated Pain Goal: 0  o Interventions for Pain:  None given  o Intervention effective: no c/o pain  o Time of last intervention: n/a   o Reassessment Completed: yes      Last 3 Weights:  Last 3 Recorded Weights in this Encounter    03/12/18 1930 03/15/18 1917   Weight: 90.7 kg (200 lb) 88.6 kg (195 lb 6.4 oz)     Weight change:      INTAKE/OUPUT    Current Shift: 03/15 1901 - 03/16 0700  In: -   Out: 300 [Urine:300]    Last three shifts: 03/14 0701 - 03/15 1900  In: 357 [P.O.:357]  Out: 4200 [Urine:200]     LAB RESULTS     Recent Labs      03/16/18   0235  03/15/18   0232   WBC  7.8  7.8   HGB  9.2*  9.2*   HCT  30.1*  29.1*   PLT  283  289        Recent Labs      03/16/18   0235  03/15/18   0232   NA  139  141   K  3.3*  3.4*   GLU  150*  104*   BUN  13  19*   CREA  4.40*  5.68*   CA  8.2*  8.3*  8.1*       RECOMMENDATIONS AND DISCHARGE PLANNING     1. Pending tests/procedures/ Plan of Care or Other Needs: dialysis Tues, Thurs, Sat     2. Discharge plan for patient and Needs/Barriers: home    3. Estimated Discharge Date: 3/17/18 Posted on Whiteboard in [facility] Room: yes      4. The patient's care plan was reviewed with the oncoming nurse.        \"HEALS\" SAFETY CHECK      Fall Risk    Total Score: 3    Safety Measures: Safety Measures: Bed/Chair-Wheels locked, Bed in low position, Call light within reach    A safety check occurred in the patient's room between off going nurse and oncoming nurse listed above. The safety check included the below items  Area Items   H  High Alert Medications - Verify all high alert medication drips (heparin, PCA, etc.)   E  Equipment - Suction is set up for ALL patients (with noel)  - Red plugs utilized for all equipment (IV pumps, etc.)  - WOWs wiped down at end of shift.  - Room stocked with oxygen, suction, and other unit-specific supplies   A  Alarms - Bed alarm is set for fall risk patients  - Ensure chair alarm is in place and activated if patient is up in a chair   L  Lines - Check IV for any infiltration  - Pa bag is empty if patient has a Pa   - Tubing and IV bags are labeled   S  Safety   - Room is clean, patient is clean, and equipment is clean. - Hallways are clear from equipment besides carts. - Fall bracelet on for fall risk patients  - Ensure room is clear and free of clutter  - Suction is set up for ALL patients (with noel)  - Hallways are clear from equipment besides carts.    - Isolation precautions followed, supplies available outside room, sign posted     Juilo Fischer RN

## 2018-03-17 VITALS
DIASTOLIC BLOOD PRESSURE: 84 MMHG | HEART RATE: 84 BPM | BODY MASS INDEX: 31.39 KG/M2 | SYSTOLIC BLOOD PRESSURE: 182 MMHG | HEIGHT: 66 IN | RESPIRATION RATE: 18 BRPM | WEIGHT: 195.33 LBS | TEMPERATURE: 99.5 F | OXYGEN SATURATION: 98 %

## 2018-03-17 LAB
ANION GAP SERPL CALC-SCNC: 7 MMOL/L (ref 3–18)
BASOPHILS # BLD: 0 K/UL (ref 0–0.06)
BASOPHILS NFR BLD: 0 % (ref 0–2)
BUN SERPL-MCNC: 16 MG/DL (ref 7–18)
BUN/CREAT SERPL: 3 (ref 12–20)
CALCIUM SERPL-MCNC: 8.1 MG/DL (ref 8.5–10.1)
CHLORIDE SERPL-SCNC: 103 MMOL/L (ref 100–108)
CO2 SERPL-SCNC: 29 MMOL/L (ref 21–32)
CREAT SERPL-MCNC: 6 MG/DL (ref 0.6–1.3)
DIFFERENTIAL METHOD BLD: ABNORMAL
EOSINOPHIL # BLD: 0.2 K/UL (ref 0–0.4)
EOSINOPHIL NFR BLD: 3 % (ref 0–5)
ERYTHROCYTE [DISTWIDTH] IN BLOOD BY AUTOMATED COUNT: 13.4 % (ref 11.6–14.5)
GLUCOSE BLD STRIP.AUTO-MCNC: 123 MG/DL (ref 70–110)
GLUCOSE BLD STRIP.AUTO-MCNC: 134 MG/DL (ref 70–110)
GLUCOSE SERPL-MCNC: 147 MG/DL (ref 74–99)
HCT VFR BLD AUTO: 27.4 % (ref 36–48)
HGB BLD-MCNC: 8.6 G/DL (ref 13–16)
LYMPHOCYTES # BLD: 1.5 K/UL (ref 0.9–3.6)
LYMPHOCYTES NFR BLD: 18 % (ref 21–52)
MCH RBC QN AUTO: 28 PG (ref 24–34)
MCHC RBC AUTO-ENTMCNC: 31.4 G/DL (ref 31–37)
MCV RBC AUTO: 89.3 FL (ref 74–97)
MONOCYTES # BLD: 1.2 K/UL (ref 0.05–1.2)
MONOCYTES NFR BLD: 15 % (ref 3–10)
NEUTS SEG # BLD: 5.1 K/UL (ref 1.8–8)
NEUTS SEG NFR BLD: 64 % (ref 40–73)
PHOSPHATE SERPL-MCNC: 2.8 MG/DL (ref 2.5–4.9)
PLATELET # BLD AUTO: 298 K/UL (ref 135–420)
PMV BLD AUTO: 10.9 FL (ref 9.2–11.8)
POTASSIUM SERPL-SCNC: 3.2 MMOL/L (ref 3.5–5.5)
RBC # BLD AUTO: 3.07 M/UL (ref 4.7–5.5)
SODIUM SERPL-SCNC: 139 MMOL/L (ref 136–145)
WBC # BLD AUTO: 8.1 K/UL (ref 4.6–13.2)

## 2018-03-17 PROCEDURE — 36415 COLL VENOUS BLD VENIPUNCTURE: CPT | Performed by: INTERNAL MEDICINE

## 2018-03-17 PROCEDURE — 85025 COMPLETE CBC W/AUTO DIFF WBC: CPT | Performed by: INTERNAL MEDICINE

## 2018-03-17 PROCEDURE — 74011250636 HC RX REV CODE- 250/636: Performed by: HOSPITALIST

## 2018-03-17 PROCEDURE — 5A1D70Z PERFORMANCE OF URINARY FILTRATION, INTERMITTENT, LESS THAN 6 HOURS PER DAY: ICD-10-PCS | Performed by: INTERNAL MEDICINE

## 2018-03-17 PROCEDURE — 90935 HEMODIALYSIS ONE EVALUATION: CPT

## 2018-03-17 PROCEDURE — 82962 GLUCOSE BLOOD TEST: CPT

## 2018-03-17 PROCEDURE — 84100 ASSAY OF PHOSPHORUS: CPT | Performed by: INTERNAL MEDICINE

## 2018-03-17 PROCEDURE — 74011250637 HC RX REV CODE- 250/637: Performed by: HOSPITALIST

## 2018-03-17 PROCEDURE — 80048 BASIC METABOLIC PNL TOTAL CA: CPT | Performed by: INTERNAL MEDICINE

## 2018-03-17 PROCEDURE — 74011250637 HC RX REV CODE- 250/637: Performed by: INTERNAL MEDICINE

## 2018-03-17 PROCEDURE — 74011250636 HC RX REV CODE- 250/636: Performed by: INTERNAL MEDICINE

## 2018-03-17 RX ORDER — LOSARTAN POTASSIUM 50 MG/1
50 TABLET ORAL DAILY
Qty: 30 TAB | Refills: 2 | Status: SHIPPED | OUTPATIENT
Start: 2018-03-18 | End: 2019-04-05 | Stop reason: ALTCHOICE

## 2018-03-17 RX ORDER — INSULIN LISPRO 100 [IU]/ML
INJECTION, SOLUTION INTRAVENOUS; SUBCUTANEOUS
Qty: 1 VIAL | Refills: 2 | Status: SHIPPED | OUTPATIENT
Start: 2018-03-17

## 2018-03-17 RX ORDER — NAPHAZOLINE HYDROCHLORIDE AND POLYETHYLENE GLYCOL 300 .1; 2 MG/ML; MG/ML
1 SOLUTION/ DROPS OPHTHALMIC
Qty: 150 SYRINGE | Refills: 2 | Status: ON HOLD | OUTPATIENT
Start: 2018-03-17 | End: 2019-04-11

## 2018-03-17 RX ORDER — CALCIUM ACETATE 667 MG/1
1 CAPSULE ORAL
Qty: 90 CAP | Refills: 2 | Status: SHIPPED | OUTPATIENT
Start: 2018-03-17

## 2018-03-17 RX ORDER — INSULIN GLARGINE 100 [IU]/ML
8 INJECTION, SOLUTION SUBCUTANEOUS
Qty: 1 VIAL | Refills: 2 | Status: SHIPPED | OUTPATIENT
Start: 2018-03-17 | End: 2018-05-09

## 2018-03-17 RX ADMIN — ERYTHROPOIETIN 10000 UNITS: 10000 INJECTION, SOLUTION INTRAVENOUS; SUBCUTANEOUS at 10:15

## 2018-03-17 RX ADMIN — LOSARTAN POTASSIUM 50 MG: 50 TABLET ORAL at 13:47

## 2018-03-17 RX ADMIN — Medication 10 ML: at 15:07

## 2018-03-17 RX ADMIN — Medication 10 ML: at 05:53

## 2018-03-17 RX ADMIN — DOXERCALCIFEROL 1 MCG: 4 INJECTION, SOLUTION INTRAVENOUS at 10:15

## 2018-03-17 RX ADMIN — CALCIUM ACETATE 667 MG: 667 CAPSULE ORAL at 13:47

## 2018-03-17 RX ADMIN — HYDRALAZINE HYDROCHLORIDE 10 MG: 20 INJECTION INTRAMUSCULAR; INTRAVENOUS at 13:47

## 2018-03-17 RX ADMIN — HYDRALAZINE HYDROCHLORIDE 10 MG: 20 INJECTION INTRAMUSCULAR; INTRAVENOUS at 02:23

## 2018-03-17 RX ADMIN — ACETAMINOPHEN 500 MG: 500 TABLET ORAL at 02:37

## 2018-03-17 NOTE — PROGRESS NOTES
Pt and family decline SNF recommendation preferring to return home with home health services. 3:00 p.m. This pt and his wife at bedside, have decline SNF referral and also decline [address]. Pt's wife will transport this pt home. Pt reports that he plans to use family support for any discharge needs.

## 2018-03-17 NOTE — PROGRESS NOTES
Problem: Falls - Risk of  Goal: *Absence of Falls  Document Katja Fall Risk and appropriate interventions in the flowsheet.    Outcome: Progressing Towards Goal  Fall Risk Interventions:  Mobility Interventions: Bed/chair exit alarm         Medication Interventions: Bed/chair exit alarm         History of Falls Interventions: Bed/chair exit alarm

## 2018-03-17 NOTE — ROUTINE PROCESS
Bedside and Verbal shift change report given to Niya Sheppard RN (oncoming nurse) by Khushboo John RN (offgoing nurse). Report included the following information SBAR, Kardex, MAR and Recent Results. SITUATION:    Code Status: Full Code   Reason for Admission: Kidney disease    [facility] day: 5   Problem List:       Hospital Problems  Date Reviewed: 3/16/2018          Codes Class Noted POA    ESRD (end stage renal disease) ([facility] ICD-10-CM: N18.6  ICD-9-CM: 585.6  3/16/2018 Unknown        Anemia ICD-10-CM: D64.9  ICD-9-CM: 285.9  3/16/2018 Unknown        Hypoalbuminemia ICD-10-CM: E88.09  ICD-9-CM: 273.8  3/16/2018 Unknown        Kidney disease ICD-10-CM: N28.9  ICD-9-CM: 593.9  3/12/2018 Unknown        Diabetes mellitus due to underlying condition, uncontrolled, with diabetic nephropathy, with long-term current use of insulin ([facility] ICD-10-CM: E08.21, E08.65, Z79.4  ICD-9-CM: 249.41, 583.81, V58.67  10/11/2017 Yes        Diabetic nephropathy ([facility] (Chronic) ICD-10-CM: E11.21  ICD-9-CM: 250.40, 583.81  3/22/2016 Yes        HLD (hyperlipidemia) ICD-10-CM: E78.5  ICD-9-CM: 272.4  3/12/2010 Yes        HTN (hypertension) ICD-10-CM: I10  ICD-9-CM: 401.9  3/12/2010 Yes              BACKGROUND:    Past Medical History:   Past Medical History:   Diagnosis Date    Cardiac echocardiogram 10/21/2016    EF 60-65%. No WMA. Mod-marked LVH. Normal diastolic fx. No significant valvular heart disease.  Cardiac treadmill stress test, low risk 11/02/2012    Negative maximal exercise treadmill test.  Ex time 7 min 45 sec.  Cardiovascular LE peripheral arterial testing 03/22/2016    No significant peripheral arterial disease at rest bilaterally. ABIs deferred due to calcified vessels.  Cardiovascular LLE venous duplex 06/06/2012    Left leg:  No DVT.  Cardiovascular renal duplex 10/21/2016    No significant renal artery stenosis.       Chronic kidney disease     CKD (chronic kidney disease), stage V ([address] [facility]     Diabetes mellitus ([facility] [facility] 3/12/2010    Diabetic retinopathy ([facility] 5/4/2010    Edema of both legs     Eye examination 5/4/10    OU: 20/20; OD: 20/25; OS: 20/25 without correction.     Glaucoma 08/17/2017    Cornelius Carnes HLD (hyperlipidemia) 3/12/2010    HTN (hypertension) 3/12/2010    Orthostatic hypertension          Patient taking anticoagulants no     ASSESSMENT:    Changes in Assessment Throughout Shift: elevated bp     Patient has Central Line: yes Reasons if yes: dialysis   Patient has Pa Cath: no Reasons if yes: n/a      Last Vitals:     Vitals:    03/16/18 1513 03/16/18 2053 03/17/18 0045 03/17/18 0427   BP: 171/82 188/86 198/89 163/79   Pulse: 79 75 75 76   Resp: 18 18 18 18   Temp: 99.4 °F (37.4 °C) 99.3 °F (37.4 °C) 98.5 °F (36.9 °C) 98.9 °F (37.2 °C)   TempSrc:       SpO2: 98% 96% 95% 96%   Weight:       Height:            IV and DRAINS (will only show if present)   Hemodialysis Access 03/13/18-Site Assessment: Clean  Peripheral IV 03/13/18 Right Hand-Site Assessment: Clean, dry, & intact  [REMOVED] Peripheral IV 03/12/18 Right Hand-Site Assessment: Clean, dry, & intact     WOUND (if present)   Wound Type:  none   Dressing present Dressing Present : No   Wound Concerns/Notes:  none     PAIN    Pain Assessment    Pain Intensity 1: 0 (03/16/18 1941)    Pain Location 1: Foot    Pain Intervention(s) 1: Declines    Patient Stated Pain Goal: 0  o Interventions for Pain:  Tylenol 500 mg po  o Intervention effective: yes  o Time of last intervention: 2291  o Reassessment Completed: yes      Last 3 Weights:  Last 3 Recorded Weights in this Encounter    03/12/18 1930 03/15/18 1917   Weight: 90.7 kg (200 lb) 88.6 kg (195 lb 6.4 oz)     Weight change:      INTAKE/OUPUT    Current Shift:      Last three shifts: 03/15 1901 - 03/17 0700  In: 400 [P.O.:400]  Out: 450 [Urine:450]     LAB RESULTS     Recent Labs      03/17/18   0128  03/16/18   0235  03/15/18   0232   WBC  8.1  7.5  7.8   HGB 8. 6*  9.2*  9.2*   HCT  27.4*  30.1*  29.1*   PLT  298  283  289        Recent Labs      03/17/18   0128  03/16/18   0235  03/15/18   0232   NA  139  139  141   K  3.2*  3.3*  3.4*   GLU  147*  150*  104*   BUN  16  13  19*   CREA  6.00*  4.40*  5.68*   CA  8.1*  8.2*  8.3*  8.1*       RECOMMENDATIONS AND DISCHARGE PLANNING     1. Pending tests/procedures/ Plan of Care or Other Needs: dialysis Tues, Thurs, Sat     2. Discharge plan for patient and Needs/Barriers: home    3. Estimated Discharge Date: 3/17/18 Posted on Whiteboard in [facility] Room: yes      4. The patient's care plan was reviewed with the oncoming nurse. \"HEALS\" SAFETY CHECK      Fall Risk    Total Score: 3    Safety Measures: Safety Measures: Bed/Chair-Wheels locked, Bed in low position, Call light within reach, Family at bedside, Bed/Chair alarm on    A safety check occurred in the patient's room between off going nurse and oncoming nurse listed above. The safety check included the below items  Area Items   H  High Alert Medications - Verify all high alert medication drips (heparin, PCA, etc.)   E  Equipment - Suction is set up for ALL patients (with noel)  - Red plugs utilized for all equipment (IV pumps, etc.)  - WOWs wiped down at end of shift.  - Room stocked with oxygen, suction, and other unit-specific supplies   A  Alarms - Bed alarm is set for fall risk patients  - Ensure chair alarm is in place and activated if patient is up in a chair   L  Lines - Check IV for any infiltration  - Pa bag is empty if patient has a Pa   - Tubing and IV bags are labeled   S  Safety   - Room is clean, patient is clean, and equipment is clean. - Hallways are clear from equipment besides carts. - Fall bracelet on for fall risk patients  - Ensure room is clear and free of clutter  - Suction is set up for ALL patients (with noel)  - Hallways are clear from equipment besides carts.    - Isolation precautions followed, supplies available outside room, sign posted     Js Barrow RN

## 2018-03-17 NOTE — DISCHARGE SUMMARY
Discharge Summary    Patient: Damien Navas MRN: [id number]  CSN: [id number]    YOB: 1960  Age: 62 y.o.   Sex: male    DOA: 3/12/2018 LOS:  LOS: 5 days   Discharge Date:      Admission Diagnoses: Kidney disease    Discharge Diagnoses:    Problem List as of 3/17/2018  Date Reviewed: 3/16/2018          Codes Class Noted - Resolved    ESRD (end stage renal disease) ([facility] ICD-10-CM: N18.6  ICD-9-CM: 585.6  3/16/2018 - Present        Anemia ICD-10-CM: D64.9  ICD-9-CM: 285.9  3/16/2018 - Present        Hypoalbuminemia ICD-10-CM: E88.09  ICD-9-CM: 273.8  3/16/2018 - Present        Kidney disease ICD-10-CM: N28.9  ICD-9-CM: 593.9  3/12/2018 - Present        Stage 5 chronic kidney disease not on chronic dialysis [facility]) ICD-10-CM: N18.5  ICD-9-CM: 585.5  10/11/2017 - Present        Diabetes mellitus due to underlying condition, uncontrolled, with diabetic nephropathy, with long-term current use of insulin ([facility] ICD-10-CM: E08.21, E08.65, Z79.4  ICD-9-CM: 249.41, 583.81, V58.67  10/11/2017 - Present        Hypertensive urgency ICD-10-CM: I16.0  ICD-9-CM: 401.9  4/4/2017 - Present        Elevated troponin ICD-10-CM: R74.8  ICD-9-CM: 790.6  4/4/2017 - Present        Orthostatic hypotension ICD-10-CM: I95.1  ICD-9-CM: 458.0  3/24/2017 - Present        Type 2 diabetes mellitus with complication, with long-term current use of insulin ([facility] ICD-10-CM: E11.8, Z79.4  ICD-9-CM: 250.90, V58.67  3/21/2017 - Present        Malignant hypertension ICD-10-CM: I10  ICD-9-CM: 401.0  10/12/2016 - Present        CKD (chronic kidney disease) ICD-10-CM: N18.9  ICD-9-CM: 585.9  10/12/2016 - Present        Hyperlipidemia ICD-10-CM: E78.5  ICD-9-CM: 272.4  10/12/2016 - Present        Hypertension ICD-10-CM: I10  ICD-9-CM: 401.9  3/24/2016 - Present        Chronic kidney disease, stage IV (severe) ([facility] ICD-10-CM: N18.4  ICD-9-CM: 585.4  3/22/2016 - Present        Renal failure (ARF), acute on chronic ([facility] ICD-10-CM: N17.9, N18.9  ICD-9-CM: 584.9, 585.9  3/22/2016 - Present        Diabetic nephropathy (HCC) (Chronic) ICD-10-CM: E11.21  ICD-9-CM: 250.40, 583.81  3/22/2016 - Present        Diabetic foot ulcer ([facility] ICD-10-CM: E11.621, L97.509  ICD-9-CM: 250.80, 707.15  3/21/2016 - Present        Gangrene of toe ([facility] ICD-10-CM: M86  ICD-9-CM: 785.4  3/21/2016 - Present        Dry gangrene ([facility] ICD-10-CM: X10  ICD-9-CM: 785.4  3/21/2016 - Present        Chest pain ICD-10-CM: R07.9  ICD-9-CM: 786.50  10/26/2012 - Present        Noncompliance with treatment ICD-10-CM: Z91.19  ICD-9-CM: V15.81  9/27/2010 - Present    Overview Signed 9/27/2010  8:06 AM by Nara Kelly MD     failure to follow-up as recommended. Told at march visit to get labs and follow-up in 3 weeks (it had been a few years since previous visit)--he did not get labs until sept 10. Type II or unspecified type diabetes mellitus without mention of complication, uncontrolled ICD-10-CM: E11.65  ICD-9-CM: 250.02  9/27/2010 - Present        Paresthesias/numbness ICD-10-CM: R20.9  ICD-9-CM: 782.0  9/27/2010 - Present        Diabetes mellitus with renal manifestations, uncontrolled ([facility] ICD-10-CM: E11.29, E11.65  ICD-9-CM: 250.42  9/27/2010 - Present        Diabetic retinopathy ([facility] ICD-10-CM: E11.319  ICD-9-CM: 250.50, 362.01  5/4/2010 - Present        HLD (hyperlipidemia) ICD-10-CM: E78.5  ICD-9-CM: 272.4  3/12/2010 - Present        HTN (hypertension) ICD-10-CM: I10  ICD-9-CM: 401.9  3/12/2010 - Present        RESOLVED: Traction retinal detachment, left ICD-10-CM: H33.42  ICD-9-CM: 361.81  1/19/2018 - 1/19/2018        RESOLVED: Diabetes mellitus ([facility] ICD-10-CM: E11.9  ICD-9-CM: 250.00  3/12/2010 - 3/21/2017    Overview Addendum 9/27/2010  9:24 PM by Nara Kelly MD     FBS: 120 per self report? Old glucometer? Last eye: summer 2009? Retinopathy per old chart?  No diabetic follow-up x 2 years; came in for well male 3/2010;labs ordered  Foot exam: 9/27/10  Lab Results   Component Value Date/Time    Hemoglobin A1C 14.0 9/24/2010 12:13 PM    LDL, calculated 117.2 9/24/2010 12:13 PM    Creatinine 1.0 9/24/2010 12:13 PM                          Reason for Admission  62 y.o.  male with medical history significant for CKD V with progression in last 6 months to m/l ESRD, DM2 with neuropathy, retinopathy, and nephropathy, HTN, dyslipidemia, glaucoma s/p retinal detachment (1/2018) who presented to the ED for HD catheter placement. He has been followed by Nephrology Dr. Angela Delgadillo for the last 2 years, but in the last 6 months had progressive worsening of his renal function and last labs done as outpatient demonstrated a SCr of 10, up from baseline of 4. Pt and wife stated that he was feeling is usual self. Patient reported dyspnea with even the slightest exertion. Able to walk ~ 4 ft, then would become SOB. + edema in lower extremities that improved with elevation of feet. He was having some early morning hypoglycemia with sugars in the 60s, during these times he feels shaky and sweaty with low sugars, occuring ~ 3-4x/week. He is followed by Endocrinology (Dr. Noel Boyle). Noted to have elevated bp upon presentation, but had not taken his evening meds. Discharge Condition: Good    PHYSICAL EXAM at discharge. Visit Vitals    /84    Pulse 84    Temp 99.5 °F (37.5 °C)    Resp 18    Ht 5' 6\" (1.676 m)    Wt 88.6 kg (195 lb 5.2 oz)    SpO2 98%    BMI 31.53 kg/m2     General:  Alert, awake, oriented. Wife at bedside  Heent: ncat. Perrl. Cardiovascular:  RRR, no murmurs  Chest wall: TDC site clean  Pulmonary: cta b.l  GI: Soft, nt, nd. Nabs. : scrotal swelling, no masses, no ttp. Extremities: + pitting ankle edema. Neuro: no focal deficit  Skin: no rash    Hospital Course:   1. ESRD-pt admitted for initiation of HD. S/p TDC placement and HD #3 completed. Arrangements for outpatient HD leon.  Nutritionist provided education regarding renal diet for patient and his wife. 2. Hypertensive urgency gradual improvement. Avoided coreg (tolerates other beta blockers). 3. DM2. A1C 8.5. diabetes educator worked with him. discharge on lantus / ssi. Outpatient f/u with Dr. Ana Prabhakar, endocrinology. Hold VGO at discharge, was causing am hypoglycemia. 4. Anemia of ckd - epogen per nephrology  5. 2ndary hyperparathyroidism of ckd - hectoral per nephrology  6. obesity Body mass index is 31.54 kg/(m^2). 7. Scrotal swelling - US reassuring. 8. Peripheral neuropathy - resume home meds  9. Anemia requiring transfusion - 1 unit given   10. dvt prophylaxis was given in the form of heparin subcut tid. 10. Full code. PATIENT DECLINED HOME HEALTH FOR PT AND OT. Discharge to home. Patient and wife agree; all questions answered to the best of my ability. Consults: Nephrology Dr. Eliazar Newberry  Vascular Dr. Darline Gomez    Procedures  1. Ultrasound-guided access of right internal jugular vein. Moderate conscious sedation of 11 minutes. Permanent dialysis catheter placement, tunneled 19 cm Palindrome through right internal jugular approach. 03/13/2018 Nora Nugent MD   2.  Initiation of HD per Dr Eliazar Newberry 3/13/18    Significant Diagnostic Studies: labs:   Recent Results (from the past 24 hour(s))   GLUCOSE, POC    Collection Time: 03/16/18  3:18 PM   Result Value Ref Range    Glucose (POC) 213 (H) 70 - 110 mg/dL   GLUCOSE, POC    Collection Time: 03/16/18 10:03 PM   Result Value Ref Range    Glucose (POC) 128 (H) 70 - 110 mg/dL   CBC WITH AUTOMATED DIFF    Collection Time: 03/17/18  1:28 AM   Result Value Ref Range    WBC 8.1 4.6 - 13.2 K/uL    RBC 3.07 (L) 4.70 - 5.50 M/uL    HGB 8.6 (L) 13.0 - 16.0 g/dL    HCT 27.4 (L) 36.0 - 48.0 %    MCV 89.3 74.0 - 97.0 FL    MCH 28.0 24.0 - 34.0 PG    MCHC 31.4 31.0 - 37.0 g/dL    RDW 13.4 11.6 - 14.5 %    PLATELET 059 600 - 006 K/uL    MPV 10.9 9.2 - 11.8 FL    NEUTROPHILS 64 40 - 73 %    LYMPHOCYTES 18 (L) 21 - 52 %    MONOCYTES 15 (H) 3 - 10 %    EOSINOPHILS 3 0 - 5 %    BASOPHILS 0 0 - 2 %    ABS. NEUTROPHILS 5.1 1.8 - 8.0 K/UL    ABS. LYMPHOCYTES 1.5 0.9 - 3.6 K/UL    ABS. MONOCYTES 1.2 0.05 - 1.2 K/UL    ABS. EOSINOPHILS 0.2 0.0 - 0.4 K/UL    ABS. BASOPHILS 0.0 0.0 - 0.06 K/UL    DF AUTOMATED     METABOLIC PANEL, BASIC    Collection Time: 03/17/18  1:28 AM   Result Value Ref Range    Sodium 139 136 - 145 mmol/L    Potassium 3.2 (L) 3.5 - 5.5 mmol/L    Chloride 103 100 - 108 mmol/L    CO2 29 21 - 32 mmol/L    Anion gap 7 3.0 - 18 mmol/L    Glucose 147 (H) 74 - 99 mg/dL    BUN 16 7.0 - 18 MG/DL    Creatinine 6.00 (H) 0.6 - 1.3 MG/DL    BUN/Creatinine ratio 3 (L) 12 - 20      GFR est AA 12 (L) >60 ml/min/1.73m2    GFR est non-AA 10 (L) >60 ml/min/1.73m2    Calcium 8.1 (L) 8.5 - 10.1 MG/DL   PHOSPHORUS    Collection Time: 03/17/18  1:28 AM   Result Value Ref Range    Phosphorus 2.8 2.5 - 4.9 MG/DL   GLUCOSE, POC    Collection Time: 03/17/18  7:21 AM   Result Value Ref Range    Glucose (POC) 134 (H) 70 - 110 mg/dL   GLUCOSE, POC    Collection Time: 03/17/18  1:48 PM   Result Value Ref Range    Glucose (POC) 123 (H) 70 - 110 mg/dL     IMAGING  US Results (most recent):    Results from Hospital Encounter encounter on 03/12/18   US SCROTUM/TESTICLES   Narrative SCROTAL ULTRASOUND:      CPT CODE: 07975, [id number]    INDICATION: Above, Scrotal mass or lump. Patient complains of recent scrotal  swelling now improving. TECHNIQUE:  Ultrasonography of the scrotum was performed and selected images  submitted for review. Findings: The testicles appear grossly homogeneous in echotexture and grossly symmetric  in size, with no evidence of intratesticular mass lesion. The right testis  measures approximately 3.2 x 2.2 x 2.3 cm. The left testis measures   approximately 3.6 x 1.9 x 2.7 cm. Color Doppler and spectral waveform Doppler  evaluation demonstrate symmetric perfusion to the testicles within normal  limits.     The epididymal heads measure approximately 0.7 cm bilaterally without  enlargement, mildly heterogeneous in echotexture bilaterally without definite  sonographically definable focal mass. Mild diffuse scrotal wall thickening/edema noted. Impression Impression:    No evidence of testicular mass or of acute testicular torsion at this time. Mild diffuse scrotal wall thickening/edema. XR Results (most recent):    Results from Hospital Encounter encounter on 03/12/18   XR CHEST PORT   Narrative Portable Chest    CPT CODE: 23537    HISTORY: Preop. FINDINGS:     Compared with 12/28/2017    Patient is lordotic in positioning. Multiple wires overlie the patient. Hazy  opacity at the left base with some obscuration of the diaphragm and costophrenic  angle blunting. Minimal right costophrenic angle blunting. No pulmonary edema or  pneumothorax. Heart size and mediastinal contours are stable and within normal  limits. Arthritic change of the acromioclavicular joints. .         Impression IMPRESSION:    Opacity at the left base which may represent a combination of airspace disease  and small effusion. Small right pleural effusion.       EKG Results     Procedure 720 Value Units Date/Time    EKG, 12 LEAD, INITIAL [[id number]] Collected:  03/12/18 2047    Order Status:  Completed Updated:  03/14/18 0829     Ventricular Rate 90 BPM      Atrial Rate 90 BPM      P-R Interval 134 ms      QRS Duration 92 ms      Q-T Interval 398 ms      QTC Calculation (Bezet) 486 ms      Calculated P Axis 43 degrees      Calculated R Axis 59 degrees      Calculated T Axis -179 degrees      Diagnosis --     Normal sinus rhythm  Nonspecific T wave abnormality  Prolonged QT  Abnormal ECG  When compared with ECG of 04-JUN-2012 11:26,  No significant change was found  Confirmed by Rayna Gorman ([id number]) on 3/14/2018 8:28:52 AM          Discharge Medications:     Current Discharge Medication List      START taking these medications    Details   b complex-vitamin c-folic acid (NEPHROCAPS) 1 mg capsule Take 1 Cap by mouth daily. Qty: 30 Cap, Refills: 6      calcium acetate (PHOSLO) 667 mg cap Take 1 Cap by mouth three (3) times daily (with meals). Qty: 90 Cap, Refills: 2      insulin glargine (LANTUS) 100 unit/mL injection 8 Units by SubCUTAneous route nightly. Qty: 1 Vial, Refills: 2      insulin lispro (HUMALOG) 100 unit/mL injection Blood Sugar (mg/dL): <150 =0 units; 150 -199 =2 units; 200 -249 =4 units; 250 -299 =6 units; 300 -349 =8 units; 350 and above =10 units. Qty: 1 Vial, Refills: 2      losartan (COZAAR) 50 mg tablet Take 1 Tab by mouth daily. Qty: 30 Tab, Refills: 2      Insulin Syringe-Needle U-100 (INSULIN SYRINGE ULTRAFINE) 0.5 mL 29 gauge x 1/2\" syrg 1 Each by Does Not Apply route Before breakfast, lunch, dinner and at bedtime. And for use with lantus qhs.  Qty: 150 Syringe, Refills: 2         CONTINUE these medications which have NOT CHANGED    Details   finasteride (PROSCAR) 5 mg tablet TAKE 1 TABLET BY MOUTH DAILY. Qty: 30 Tab, Refills: 6      prednisoLONE acetate (PRED FORTE) 1 % ophthalmic suspension Administer 1 Drop to left eye three (3) times daily. amLODIPine (NORVASC) 10 mg tablet Take 1 Tab by mouth every evening. Qty: 1 Tab, Refills: 0      acetaminophen (TYLENOL EXTRA STRENGTH) 500 mg tablet Take  by mouth every six (6) hours as needed for Pain. mupirocin (BACTROBAN) 2 % ointment       cyclobenzaprine (FLEXERIL) 10 mg tablet Take 1 Tab by mouth three (3) times daily as needed for Muscle Spasm(s). Qty: 30 Tab, Refills: 0    Associated Diagnoses: Muscle spasms of neck; Muscle spasm of back      cloNIDine (CATAPRES) 0.3 mg/24 hr APPLY 1 PATCH TO SKIN ONCE EVERY 7 DAYS  Qty: 12 Patch, Refills: 1      OTHER PGIIL/P CRM - Apply 1 -2 grams ( 1-2 pumps) to left foot 3 - 4 times daily. rosuvastatin (CRESTOR) 20 mg tablet Take 1 Tab by mouth nightly.   Qty: 30 Tab, Refills: 6      docusate sodium (COLACE) 100 mg capsule Take 1 Cap by mouth two (2) times a day. Qty: 60 Cap, Refills: 0      glucose blood VI test strips (ASCENSIA AUTODISC VI, ONE TOUCH ULTRA TEST VI) strip Test twice daily:  Fasting before breakfast and 2 hours after dinner (log readings), One touch ultra 2 test strips please; Dx: 250.02  Qty: 1 Package, Refills: 11    Associated Diagnoses: Diabetes mellitus with renal manifestations, uncontrolled ([address]      fluticasone (FLONASE) 50 mcg/actuation nasal spray 1 [place] by Both Nostrils route two (2) times a day. Qty: 1 Bottle, Refills: 2      ferrous sulfate (IRON) 325 mg (65 mg iron) tablet Take  by mouth Daily (before breakfast). Take 1 tablet by mouth once a week as per patient. Associated Diagnoses: Type II or unspecified type diabetes mellitus without mention of complication, uncontrolled      Lancets (ONE TOUCH DELICA) Misc Test twice daily: Once in the morning fasting, then two hours after dinner (log results)  Qty: 1 Package, Refills: 11    Associated Diagnoses: Diabetes mellitus with renal manifestations, uncontrolled (HCC)      Blood-Glucose Meter monitoring kit by Does Not Apply route. One touch ultra2  Qty: 1 Kit, Refills: 0    Associated Diagnoses: Diabetes mellitus with renal manifestations, uncontrolled (HCC)      cyanocobalamin (VITAMIN B-12) 1,000 mcg tablet Take 1,000 mcg by mouth daily. STOP taking these medications       bumetanide (BUMEX) 2 mg tablet Comments:   Reason for Stopping:         carvedilol (COREG) 12.5 mg tablet Comments:   Reason for Stopping:         hydrALAZINE (APRESOLINE) 50 mg tablet Comments:   Reason for Stopping:         sub-q insulin device, 40 unit (VGO 40) alicia Comments:   Reason for Stopping:         calcitRIOL (ROCALTROL) 0.25 mcg capsule Comments:   Reason for Stopping:               Activity: Activity as tolerated. PATIENT DECLINED HOME HEALTH FOR PT AND OT.      Diet: Cardiac Diet, Diabetic Diet and Renal Diet    Wound Care: As directed    Follow-up: 1. Dr. Wilson Solid 10 days  2. Outpatient HD MWF starting 3/19/18  3. Dr. Sneed Hearing endocrinology 4 weeks.     Minutes spent on discharge: greater than 30

## 2018-03-17 NOTE — DIALYSIS
ACUTE HEMODIALYSIS FLOW SHEET    HEMODIALYSIS ORDERS: Physician: Pat Dies: Elder        Duration: 3.3 hr  BFR: 400   DFR: 800   Dialysate:  Temp 37 K+   3    Ca+  3 Na 140 Bicarb 30   Weight:  88.6 kg    Bed Scale []     Unable to Obtain []      Dry weight/UF Goal: 2000 Access CVL  Needle Gauge     Heparin []  Bolus      Units    [x] Hourly  1000     Units    []None      Catheter locking solution Heparin   Pre BP:  205/96     Pulse:     75     Temperature:   97.7  Respirations: 18  Tx: NS   250    ml/Bolus  Other        [] N/A   Labs: Pre        Post:        [x] N/A   Additional Orders(medications, blood products, hypotension management):       [] N/A     [x] [facility] Consent Verified     CATHETER ACCESS:* []N/A   [x]Right   []Left   [x]IJ     []Fem   [] First use X-ray verified     [x]Tunnel                [] Non Tunneled   [x]No S/S infection  []Redness  []Drainage []Cultured []Swelling []Pain   [x]Medical Aseptic Prep Utilized   []Dressing Changed  [] Biopatch  Date: 3/13/18      []Clotted   [x]Patent   Flows: [x]Good  []Poor  []Reversed   If access problem,  notified: []Yes    [x]N/A  Date:           GRAFT/FISTULA ACCESS:  [x]N/A     []Right     []Left     []UE     []LE   []AVG   []AVF        []Buttonhole    []Medical Aseptic Prep Utilized   []No S/S infection  []Redness  []Drainage []Cultured []Swelling []Pain    Bruit:   [] Strong    [] Weak       Thrill :   [] Strong    [] Weak       Needle Gauge:    Length:    If access problem,  notified: []Yes     []N/A  Date:        Please describe access if present and not used:       GENERAL ASSESSMENT:    LUNGS:  Rate 18 SaO2%        [] N/A    [] Clear  [] Coarse  [] Crackles  [] Wheezing        [x] Diminished     Location : []RLL   []LLL    []RUL  []FELICIANO   Cough: []Productive  []Dry  [x]N/A   Respirations:  [x]Easy  []Labored   Therapy:  [x]RA  []NC  l/min    Mask: []NRB []Venti       O2%                  []Ventilator  []Intubated  [] Ruddy Carrier [] BiPaP   CARDIAC: [x]Regular      [] Irregular   [] Pericardial Rub  [] JVD        []  Monitored  [] Bedside  [] Remotely monitored [] N/A  Rhythm:Sinus   EDEMA: [] None  [x]Generalized  [] Pitting [] 1    [] 2    [] 3    [] 4                 [] Facial  [] Pedal  []  UE  [] LE   SKIN:   [x] Warm  [] Hot     [] Cold   [x] Dry     [] Pale   [] Diaphoretic                  [] Flushed  [] Jaundiced  [] Cyanotic  [] Rash  [] Weeping   LOC:    [x] Alert      [x]Oriented:    [x] Person     [x] Place  []Time               [] Confused  [] Lethargic  [] Medicated  [] Non-responsive     GI / ABDOMEN:  [x] Flat    [] Distended    [x] Soft    [] Firm   []  Obese                             [] Diarrhea  [x] Bowel Sounds  [] Nausea  [] Vomiting       / URINE ASSESSMENT:[] Voiding   [] Oliguria  [x] Anuria   []  Pa     [] Incontinent    []  Incontinent Brief      []  Bathroom Privileges     PAIN: [x] 0 []1  []2   []3   []4   []5   []6   []7   []8   []9   []10            Scale 0-10  Action/Follow Up:    MOBILITY:  [] Amb    [] Amb/Assist    [x] Bed    [] Wheelchair  [] Stretcher      All Vitals and Treatment Details on Attached [address]: [facility]          Room # 451     [] 1st Time Acute  [] Stat  [x] Routine  [] Urgent     [x] Acute Room  []  Bedside  [] ICU/CCU  [] ER   Isolation Precautions:  [x] Dialysis   [] Airborne   [] Contact    [] Reverse   Special Considerations:         [] Blood Consent Verified []N/A     ALLERGIES: Bactrim  [] NKA          Code Status:  [x] Full Code  [] DNR  [] Other           HBsAg ONLY: Date Drawn          [x]Negative []Positive []Unknown   HBsAb: Date     [] Susceptible   [x] [id number] []Not Drawn  [] Drawn     Current Labs:    Date of Labs:            Today [x]                                                                                                                                     DIET:  [x] Renal    [] Other     [] NPO     []  Diabetic      PRIMARY NURSE REPORT: First initial/Last name/Title      Pre Dialysis: FRANKY Mandel RN    Time: 3926      EDUCATION:    [x] Patient [] Other         Knowledge Basis: []None []Minimal [x] Substantial   Barriers to learning  [x]N/A   [] Access Care     [] S&S of infection     [] Fluid Management     []K+     [x]Procedural    []Albumin     [] Medications     [] Tx Options     [] Transplant     [] Diet     [] Other   Teaching Tools:  [x] Explain  [] Demo  [] Handouts [] Video  Patient response:   [x] Verbalized understanding  [] Teach back  [] Return demonstration [] Requires follow up   Inappropriate due to            [x] Time Out/Safety Check       [address] Before each treatment:     Machine Number:                   [facility]                                   [x] Unit Machine # 4   with centralized RO                                  [] Portable Machine #1/RO serial # [id number]                                  [] Portable Machine #2/RO serial # [id number]                                  [] Portable Machine #3/RO serial # [id number]                                                                                                       [facility]                                  [] Portable Machine #11/RO serial # [id number]                                   [] Portable Machine #12/RO serial # [id number]                                  [] Portable Machine #13/RO serial #  [id number]      Alarm Test:  Pass time 0900        Other:         [x] RO/Machine Log Complete      Temp    37            [x]Extracorporeal Circuit Tested for integrity   Dialysate: pH  7.4   Conductivity: Meter        HD Machine   14                  TCD: 14  Dialyzer Lot # [id number]           Blood Tubing Lot # [id number]          Saline Lot #  [id number]     CHLORINE TESTING-Before each treatment and every 4 hours    Total Chlorine: [x] less than 0.1 ppm  Time: 0900     4 Hr/2nd Check Time: 1300   (if greater than 0.1 ppm from Primary then every 30 minutes from Secondary)     TREATMENT INITIATION  with Dialysis Precautions:   [x] All Connections Secured                 [x] Saline Line Double Clamped   [x] Venous Parameters Set                  [x] Arterial Parameters Set    [x] Prime Given  250                              [x]Air Foam Detector Engaged      Treatment Initiation Note:      Medication Dose Volume Route Initials Dialyzer Cleared: [] Good [x] Fair  [] Poor    Blood processed:  50.9 L  UF Removed  2000 Ml    Post Wt: 83.1    kg  POst BP:   201/100       Pulse: 85      Respirations: 18  Temperature: 98.6     Epogen       10,000  unit      1 ml     IV       MS     Post Tx Vascular Access: AVF/AVG: Bleeding stopped Art  min. Tashi. Min   N/A     Hectorol      1mcg      0.5 ml      IV       MS     Catheter: Locking solution: Heparin 1:1000 Art. Tashi. N/A                                 Post Assessment:                                    Skin:  [x] Warm  [x] Dry [] Diaphoretic    [] Flushed  [] Pale [] Cyanotic   [facility] Signatures Title Initials  Time Lungs: [] Clear    [] Course  [] Crackles  [] Wheezing [x] Diminished   Yumiko Rodrigez RN MS 1230 Cardiac: [x] Regular   [] Irregular   [] Monitor  [] N/A  Rhythm:  Sinus         Edema:  [] None    [x] General     [] Facial   [] Pedal    [] UE    [] LE       Pain: [x]0  []1  []2   []3  []4   []5   []6   []7   []8   []9   []10     Post Treatment Note:   3844: Patient completed total treatment. Fluids removed = 1500 ml. Patient alert and oriented x3. Voiced no distress at this time. Nurse to nurse report given. Transferred patient back to room 451.  1230: Nephrologist at bedside to reassess patient. No distress noted at this time. POST TREATMENT PRIMARY NURSE HANDOFF REPORT:     First initial/Last name/Title         Post Dialysis: FRANKY Rader RN Time: 1859     Abbreviations: AVG-arterial venous graft, AVF-arterial venous fistula, IJ-Internal Jugular, Subcl-Subclavian, Fem-Femoral, Tx-treatment, AP/HR-apical heart rate, DFR-dialysate flow rate, BFR-blood flow rate, AP-arterial pressure, -venous pressure, UF-ultrafiltrate, TMP-transmembrane pressure, Tashi-Venous, Art-Arterial, RO-Reverse Osmosis

## 2018-03-17 NOTE — DISCHARGE INSTRUCTIONS
Learning About Diabetes Food Guidelines  Your Care Instructions    Meal planning is important to manage diabetes. It helps keep your blood sugar at a target level (which you set with your doctor). You don't have to eat special foods. You can eat what your family eats, including sweets once in a while. But you do have to pay attention to how often you eat and how much you eat of certain foods. You may want to work with a dietitian or a certified diabetes educator (CDE) to help you plan meals and snacks. A dietitian or CDE can also help you lose weight if that is one of your goals. What should you know about eating carbs? Managing the amount of carbohydrate (carbs) you eat is an important part of healthy meals when you have diabetes. Carbohydrate is found in many foods. · Learn which foods have carbs. And learn the amounts of carbs in different foods. ¨ Bread, cereal, pasta, and rice have about 15 grams of carbs in a serving. A serving is 1 slice of bread (1 ounce), ½ cup of cooked cereal, or 1/3 cup of cooked pasta or rice. ¨ Fruits have 15 grams of carbs in a serving. A serving is 1 small fresh fruit, such as an apple or orange; ½ of a banana; ½ cup of cooked or canned fruit; ½ cup of fruit juice; 1 cup of melon or raspberries; or 2 tablespoons of dried fruit. ¨ Milk and no-sugar-added yogurt have 15 grams of carbs in a serving. A serving is 1 cup of milk or 2/3 cup of no-sugar-added yogurt. ¨ Starchy vegetables have 15 grams of carbs in a serving. A serving is ½ cup of mashed potatoes or sweet potato; 1 cup winter squash; ½ of a small baked potato; ½ cup of cooked beans; or ½ cup cooked corn or green peas. · Learn how much carbs to eat each day and at each meal. A dietitian or CDE can teach you how to keep track of the amount of carbs you eat. This is called carbohydrate counting. · If you are not sure how to count carbohydrate grams, use the Plate Method to plan meals.  It is a good, quick way to make sure that you have a balanced meal. It also helps you spread carbs throughout the day. ¨ Divide your plate by types of foods. Put non-starchy vegetables on half the plate, meat or other protein food on one-quarter of the plate, and a grain or starchy vegetable in the final quarter of the plate. To this you can add a small piece of fruit and 1 cup of milk or yogurt, depending on how many carbs you are supposed to eat at a meal.  · Try to eat about the same amount of carbs at each meal. Do not \"save up\" your daily allowance of carbs to eat at one meal.  · Proteins have very little or no carbs per serving. Examples of proteins are beef, chicken, turkey, fish, eggs, tofu, cheese, cottage cheese, and peanut butter. A serving size of meat is 3 ounces, which is about the size of a deck of cards. Examples of meat substitute serving sizes (equal to 1 ounce of meat) are 1/4 cup of cottage cheese, 1 egg, 1 tablespoon of peanut butter, and ½ cup of tofu. How can you eat out and still eat healthy? · Learn to estimate the serving sizes of foods that have carbohydrate. If you measure food at home, it will be easier to estimate the amount in a serving of restaurant food. · If the meal you order has too much carbohydrate (such as potatoes, corn, or baked beans), ask to have a low-carbohydrate food instead. Ask for a salad or green vegetables. · If you use insulin, check your blood sugar before and after eating out to help you plan how much to eat in the future. · If you eat more carbohydrate at a meal than you had planned, take a walk or do other exercise. This will help lower your blood sugar. What else should you know? · Limit saturated fat, such as the fat from meat and dairy products. This is a healthy choice because people who have diabetes are at higher risk of heart disease. So choose lean cuts of meat and nonfat or low-fat dairy products.  Use olive or canola oil instead of butter or shortening when cooking. · Don't skip meals. Your blood sugar may drop too low if you skip meals and take insulin or certain medicines for diabetes. · Check with your doctor before you drink alcohol. Alcohol can cause your blood sugar to drop too low. Alcohol can also cause a bad reaction if you take certain diabetes medicines. Follow-up care is a key part of your treatment and safety. Be sure to make and go to all appointments, and call your doctor if you are having problems. It's also a good idea to know your test results and keep a list of the medicines you take. Where can you learn more? Go to http://hoang-jayden.info/. Enter U941 in the search box to learn more about \"Learning About Diabetes Food Guidelines. \"  Current as of: March 13, 2017  Content Version: 11.4  © 1141-1236 [employer]. Care instructions adapted under license by [employer] (which disclaims liability or warranty for this information). If you have questions about a medical condition or this instruction, always ask your healthcare professional. [place] [address] any warranty or liability for your use of this information. High Blood Pressure: Care Instructions  Your Care Instructions    If your blood pressure is usually above 140/90, you have high blood pressure, or hypertension. That means the top number is 140 or higher or the bottom number is 90 or higher, or both. Despite what a lot of people think, high blood pressure usually doesn't cause headaches or make you feel dizzy or lightheaded. It usually has no symptoms. But it does increase your risk for heart attack, stroke, and kidney or eye damage. The higher your blood pressure, the more your risk increases. Your doctor will give you a goal for your blood pressure. Your goal will be based on your health and your age. An example of a goal is to keep your blood pressure below 140/90.   Lifestyle changes, such as eating healthy and being active, are always important to help lower blood pressure. You might also take medicine to reach your blood pressure goal.  Follow-up care is a key part of your treatment and safety. Be sure to make and go to all appointments, and call your doctor if you are having problems. It's also a good idea to know your test results and keep a list of the medicines you take. How can you care for yourself at home? Medical treatment  · If you stop taking your medicine, your blood pressure will go back up. You may take one or more types of medicine to lower your blood pressure. Be safe with medicines. Take your medicine exactly as prescribed. Call your doctor if you think you are having a problem with your medicine. · Talk to your doctor before you start taking aspirin every day. Aspirin can help certain people lower their risk of a heart attack or stroke. But taking aspirin isn't right for everyone, because it can cause serious bleeding. · See your doctor regularly. You may need to see the doctor more often at first or until your blood pressure comes down. · If you are taking blood pressure medicine, talk to your doctor before you take decongestants or anti-inflammatory medicine, such as ibuprofen. Some of these medicines can raise blood pressure. · Learn how to check your blood pressure at home. Lifestyle changes  · Stay at a healthy weight. This is especially important if you put on weight around the waist. Losing even 10 pounds can help you lower your blood pressure. · If your doctor recommends it, get more exercise. Walking is a good choice. Bit by bit, increase the amount you walk every day. Try for at least 30 minutes on most days of the week. You also may want to swim, bike, or do other activities. · Avoid or limit alcohol. Talk to your doctor about whether you can drink any alcohol. · Try to limit how much sodium you eat to less than 2,300 milligrams (mg) a day.  Your doctor may ask you to try to eat less than 1,500 mg a day. · Eat plenty of fruits (such as bananas and oranges), vegetables, legumes, whole grains, and low-fat dairy products. · Lower the amount of saturated fat in your diet. Saturated fat is found in animal products such as milk, cheese, and meat. Limiting these foods may help you lose weight and also lower your risk for heart disease. · Do not smoke. Smoking increases your risk for heart attack and stroke. If you need help quitting, talk to your doctor about stop-smoking programs and medicines. These can increase your chances of quitting for good. When should you call for help? Call 911 anytime you think you may need emergency care. This may mean having symptoms that suggest that your blood pressure is causing a serious heart or blood vessel problem. Your blood pressure may be over 180/110. ? For example, call 911 if:  ? · You have symptoms of a heart attack. These may include:  ¨ Chest pain or pressure, or a strange feeling in the chest.  ¨ Sweating. ¨ Shortness of breath. ¨ Nausea or vomiting. ¨ Pain, pressure, or a strange feeling in the back, neck, jaw, or upper belly or in one or both shoulders or arms. ¨ Lightheadedness or sudden weakness. ¨ A fast or irregular heartbeat. ? · You have symptoms of a stroke. These may include:  ¨ Sudden numbness, tingling, weakness, or loss of movement in your face, arm, or leg, especially on only one side of your body. ¨ Sudden vision changes. ¨ Sudden trouble speaking. ¨ Sudden confusion or trouble understanding simple statements. ¨ Sudden problems with walking or balance. ¨ A sudden, severe headache that is different from past headaches. ? · You have severe back or belly pain. ?Do not wait until your blood pressure comes down on its own. Get help right away. ?Call your doctor now or seek immediate care if:  ? · Your blood pressure is much higher than normal (such as 180/110 or higher), but you don't have symptoms.    ? · You think high blood pressure is causing symptoms, such as:  ¨ Severe headache. ¨ Blurry vision. ? Watch closely for changes in your health, and be sure to contact your doctor if:  ? · Your blood pressure measures 140/90 or higher at least 2 times. That means the top number is 140 or higher or the bottom number is 90 or higher, or both. ? · You think you may be having side effects from your blood pressure medicine. ? · Your blood pressure is usually normal, but it goes above normal at least 2 times. Where can you learn more? Go to http://hoang-jayden.info/. Enter L860 in the search box to learn more about \"High Blood Pressure: Care Instructions. \"  Current as of: September 21, 2016  Content Version: 11.4  © 5057-5524 [employer]. Care instructions adapted under license by [employer] (which disclaims liability or warranty for this information). If you have questions about a medical condition or this instruction, always ask your healthcare professional. [place] [address] any warranty or liability for your use of this information. Kidney Dialysis: Care Instructions  Your Care Instructions    Dialysis is a process that filters wastes from the blood when your kidneys can no longer do the job. It is not a cure, but it can help you live longer and feel better. It is a lifesaving treatment when you have kidney failure. Normal kidneys work 24 hours a day to clean wastes from your blood. Your kidneys are not able to do this job, so a process called dialysis will do some of the work for your kidneys. You and your doctor will decide which type of dialysis you should have. Peritoneal dialysis uses the lining of your belly (peritoneum) to filter your blood. You can do it at home, on a daily basis. Hemodialysis uses a man-made filter called a dialyzer to clean your blood. Most people need to go to a hospital or clinic 3 days a week for several hours each time.  Sometimes hemodialysis can be done at home. It is normal to have questions about your treatment, and you have a right to know what is happening to you. Learning about dialysis can help you take an active role in your treatment. Dialysis does not cure kidney disease, but it can help you live longer and feel better. You will need to follow your diet and treatment schedule carefully. Follow-up care is a key part of your treatment and safety. Be sure to make and go to all appointments, and call your doctor if you are having problems. It's also a good idea to know your test results and keep a list of the medicines you take. What do you need to know about peritoneal dialysis? Peritoneal dialysis uses the lining of your belly (or peritoneal membrane) to filter your blood. Before you can begin peritoneal dialysis, your doctor will need to place a thin tube called a catheter in your belly. This is the dialysis access. · Peritoneal dialysis can be done at home or in any clean place. You may be able to do it while you sleep. · You can do it by yourself. You do not have to rely on help from others. · You can do it at the times you choose as long as you do the right number of treatments. · It has to be done every day of the week. · Some people find it hard to do all the required steps. · It increases your chance for a serious infection of the lining of the belly (peritoneum). What do you need to know about hemodialysis? Hemodialysis uses a man-made membrane called a dialyzer to clean your blood. You are connected to the dialyzer by tubes attached to your blood vessels. Before you start hemodialysis, your doctor will create a site where the blood can flow in and out of your body during your dialysis sessions. This site is called the vascular access. It may be a fistula, made by connecting an artery and a vein. Or it may be a graft, which is a tube implanted under your skin.   · Hemodialysis is done mainly by trained health workers who can watch for any problems. · It allows you to be in contact with other people having dialysis. This can help provide emotional support. · You can schedule your treatments in the evenings so you can keep working. · You may be able to do home hemodialysis, which gives you more control over your schedule. · It usually needs to be done on a set schedule 3 times a week. · It can cause side effects. The most common side effects are low blood pressure and muscle cramps. These can often be treated easily. · It requires needle sticks during every treatment, which bothers some people. Others get used to it and even do the needle sticks themselves. How can you care for yourself at home? · Be sure to have all of your dialysis sessions. Do not try to shorten or skip your sessions. You have a better chance of a longer and healthier life by getting your full treatment. · Your doctor or health care team will show you the steps you need to go through each day before, during, and after dialysis. Be sure to follow these steps. If you do not understand a step, talk to your team.  · Your doctor and dietitian will help you design menus that follow your diet. Be sure to follow your diet guidelines. ¨ You will need to limit fluids and certain foods that contain salt (sodium), potassium, and phosphorus. ¨ You may need to follow a heart-healthy diet to keep the fat (cholesterol) in your blood under control. ¨ You may need higher levels of protein in your diet. · Your doctor may recommend certain vitamins. But do not take any other medicine, including over-the-counter medicines, vitamins, and herbal products, without talking to your doctor first.  · Do not smoke. Smoking raises your risk of many health problems, including more kidney damage. If you need help quitting, talk to your doctor about stop-smoking programs and medicines. These can increase your chances of quitting for good.   · Do not take ibuprofen (Advil, Motrin), naproxen (Aleve), or similar medicines, unless your doctor tells you to. These medicines may make kidney problems worse. When should you call for help? Call your doctor now or seek immediate medical care if:  ? · You have a fever. ? · You are dizzy or lightheaded, or you feel like you may faint. ? · You are confused or cannot think clearly. ? · You have new or worse nausea or vomiting. ? · You have new or more blood in your urine. ? · You have new swelling. ? Watch closely for changes in your health, and be sure to contact your doctor if:  ? · You do not get better as expected. Where can you learn more? Go to http://hoang-jayden.info/. Enter L843 in the search box to learn more about \"Kidney Dialysis: Care Instructions. \"  Current as of: May 12, 2017  Content Version: 11.4  © 7275-6186 [employer]. Care instructions adapted under license by [employer] (which disclaims liability or warranty for this information). If you have questions about a medical condition or this instruction, always ask your healthcare professional. [address] any warranty or liability for your use of this information.

## 2018-03-17 NOTE — PROGRESS NOTES
This pt was provided a list of SNF providers to chose an appropriate placement. This writer explained to the reason for this referral.  Family and pt are in agreement with this recommendation.

## 2018-03-17 NOTE — PROGRESS NOTES
RENAL PROGRESS NOTE: Pt seen on dialysis. Follow up of ESRD     Present on Admission:   Diabetes mellitus due to underlying condition, uncontrolled, with diabetic nephropathy, with long-term current use of insulin (HCC)   Diabetic nephropathy ([address]   HTN (hypertension)   HLD (hyperlipidemia)         Subjective: Feels good, no nausea, no vomiting,Tolerating dialysis well. OT/PT 's recommendations noted. He will discuss with his wife regarding short time NH placement    Patient is on Dialysis.      Objective:    Patient Vitals for the past 12 hrs:   Temp Pulse Resp BP SpO2   03/17/18 1130 - (P) 79 (P) 18 (P) 165/82 -   03/17/18 1100 - (P) 76 (P) 18 (P) 171/84 -   03/17/18 1030 - (P) 73 (P) 18 (P) 173/86 -   03/17/18 1000 - (P) 75 (P) 18 (P) 171/88 -   03/17/18 0945 - 74 18 (!) 192/94 -   03/17/18 0942 97.7 °F (36.5 °C) 73 18 (!) 190/97 -   03/17/18 0718 98.9 °F (37.2 °C) 76 18 182/85 98 %   03/17/18 0427 98.9 °F (37.2 °C) 76 18 163/79 96 %   03/17/18 0045 98.5 °F (36.9 °C) 75 18 198/89 95 %      No intake or output data in the 24 hours ending 03/17/18 1230    Physical Assessment:     General Appearance: NAD  Lung: clear to auscultation  Heart: regular rate and rhythm and no murmurs, clicks or gallops  Lower Extremities:trace  edema   Access: Right IJ TDC, qb 400,   Labs    CBC w/Diff    Recent Labs      03/17/18   0128  03/16/18   0235  03/15/18   0232   WBC  8.1  7.5  7.8   RBC  3.07*  3.34*  3.28*   HGB  8.6*  9.2*  9.2*   HCT  27.4*  30.1*  29.1*   MCV  89.3  90.1  88.7   MCH  28.0  27.5  28.0   MCHC  31.4  30.6*  31.6   RDW  13.4  13.3  13.5    Recent Labs      03/17/18   0128  03/16/18   0235  03/15/18   0232   MONOS  15*  18*  15*   EOS  3  5  2   BASOS  0  1  0   RDW  13.4  13.3  13.5        Comprehensive Metabolic Profile    Recent Labs      03/17/18   0128  03/16/18   0235  03/15/18   0232   NA  139  139  141   K  3.2*  3.3*  3.4*   CL  103  104  105   CO2  29  30  28   BUN  16  13  19*   CREA  6.00* 4.40*  5.68*    Recent Labs      03/17/18   0128  03/16/18   0235  03/15/18   0232   CA  8.1*  8.2*  8.3*  8.1*   PHOS  2.8  2.5  3.8          Basic Metabolic Profile       results  reviwed. MEDS:Reviwed.   Current Facility-Administered Medications   Medication Dose Route Frequency Provider Last Rate Last Dose    heparin (porcine) 1,000 unit/mL injection 1,000 Units  1,000 Units Hemodialysis [facility] Joanell Dance, MD        morphine injection 1 mg  1 mg IntraVENous Q4H PRN Jenni Tinoco MD        HYDROcodone-acetaminophen (NORCO) 5-325 mg per tablet 1 Tab  1 Tab Oral Q6H PRN Jenni Tinoco MD   1 Tab at 03/16/18 1000    acetaminophen (TYLENOL) tablet 500 mg  500 mg Oral Q6H PRN Jenni Tinoco MD   500 mg at 03/17/18 0237    hydrALAZINE (APRESOLINE) 20 mg/mL injection 10 mg  10 mg IntraVENous Q6H PRN Jenni Tinoco MD   10 mg at 03/17/18 0223    amLODIPine (NORVASC) tablet 10 mg  10 mg Oral QPM Elsie Monge MD   10 mg at 03/16/18 1743    cloNIDine (CATAPRES) 0.3 mg/24 hr patch 1 Patch  1 Patch TransDERmal Q7D Elsie Monge MD   1 Patch at 03/13/18 2215    cyanocobalamin tablet 1,000 mcg  1,000 mcg Oral DAILY Elsie Monge MD   1,000 mcg at 03/16/18 1000    cyclobenzaprine (FLEXERIL) tablet 10 mg  10 mg Oral TID PRN Elsie Monge MD        docusate sodium (COLACE) capsule 100 mg  100 mg Oral BID Elsie Monge MD   100 mg at 03/16/18 1743    ferrous sulfate tablet 325 mg  1 Tab Oral Q7D Elsie Monge MD   325 mg at 03/13/18 0506    finasteride (PROSCAR) tablet 5 mg  5 mg Oral DAILY Elsie Monge MD   5 mg at 03/16/18 8950    mupirocin (BACTROBAN) 2 % ointment   Topical DAILY Elsie Monge MD        prednisoLONE acetate (PRED FORTE) 1 % ophthalmic suspension 1 Drop  1 Drop Left Eye TID Elsie Monge MD   1 Drop at 03/16/18 1743    rosuvastatin (CRESTOR) tablet 20 mg  20 mg Oral QHS Elsie Monge MD   20 mg at 03/16/18 2209    sodium chloride (NS) flush 5-10 mL  5-10 mL IntraVENous Q8H Dorota Kline MD   10 mL at 03/17/18 0553    sodium chloride (NS) flush 5-10 mL  5-10 mL IntraVENous PRN Dorota Kline MD        insulin lispro (HUMALOG) injection   SubCUTAneous Shantanu Harvey MD   Stopped at 03/17/18 0730    glucose chewable tablet 16 g  4 Tab Oral PRN Dorota Kline MD        glucagon (GLUCAGEN) injection 1 mg  1 mg IntraMUSCular PRN Dorota Kline MD        dextrose (D50W) injection syrg 12.5-25 g  25-50 mL IntraVENous PRN Dorota Kline MD        ondansetron [facility]) injection 4 mg  4 mg IntraVENous Q4H PRN Dorota Kline MD        PGIIL/P Cream (Patient Supplied)  1-2 Pump(s) Topical QID PRN Dorota Kline MD        fluticasone (FLONASE) 50 mcg/actuation nasal spray 2 Spray  2 Spray Both Nostrils DAILY Dorota Kline MD   2 Vera at 03/16/18 0959    losartan (COZAAR) tablet 50 mg  50 mg Oral DAILY Zena Conde MD   50 mg at 03/16/18 1002    epoetin aleksandra (EPOGEN;PROCRIT) injection 10,000 Units  10,000 Units IntraVENous [facility] Conde MD   10,000 Units at 03/17/18 1015    doxercalciferol (HECTOROL) 4 mcg/2 mL injection 1 mcg  1 mcg IntraVENous [facility] Conde MD   1 mcg at 03/17/18 1015    B complex-vitaminC-folic acid (NEPHROCAP) cap  1 Cap Oral DAILY Zena Conde MD   1 Cap at 03/16/18 0959    calcium acetate (PHOSLO) capsule 667 mg  1 Cap Oral TID WITH MEALS Zena Conde MD   667 mg at 03/16/18 1743    insulin glargine (LANTUS) injection 8 Units  8 Units SubCUTAneous QHS Vinita Addison MD   8 Units at 03/16/18 2203       Impression:    ESRD: Tolerating HD well. Anemia of CKD: on  Epogen. Hyperparathyroidism Yes. Hypertension    Plan  Dialysis for volume and solute management  Epogen  : 89399 units. Hectorol: 1 microgram.  Continue current medicine.   If he refuse to go to Short term Rehab in Nursing home then strongly recommend Out patient  Or home OT/PT. Next dialysis will be on 03/19/18, if he is discharged then he will start q Mon,Wed  & Friday at [facility] , [address] [place] [address]. ,[place].         Jignesh Long MD

## 2018-03-26 ENCOUNTER — TELEPHONE (OUTPATIENT)
Dept: FAMILY MEDICINE CLINIC | Age: 58
End: 2018-03-26

## 2018-03-26 DIAGNOSIS — N18.4 CHRONIC RENAL DISEASE, STAGE IV (HCC): Primary | ICD-10-CM

## 2018-03-26 NOTE — TELEPHONE ENCOUNTER
pts wife calling for ins referral. Stated was referred by dialysis doctor for vein mapping    appt tomorrow 03/27/2018    Dr Meri Ahmadi NPI [id number]  Proc code [id number]  Reason vein mapping   [phone]  Fax [phone] [facility] [place]  [phone]

## 2018-03-27 ENCOUNTER — OFFICE VISIT (OUTPATIENT)
Dept: VASCULAR SURGERY | Age: 58
End: 2018-03-27

## 2018-03-27 ENCOUNTER — OFFICE VISIT (OUTPATIENT)
Dept: FAMILY MEDICINE CLINIC | Age: 58
End: 2018-03-27

## 2018-03-27 VITALS
WEIGHT: 190 LBS | TEMPERATURE: 98.1 F | BODY MASS INDEX: 30.53 KG/M2 | RESPIRATION RATE: 16 BRPM | HEIGHT: 66 IN | OXYGEN SATURATION: 96 % | SYSTOLIC BLOOD PRESSURE: 210 MMHG | DIASTOLIC BLOOD PRESSURE: 80 MMHG | HEART RATE: 77 BPM

## 2018-03-27 DIAGNOSIS — H54.40 BLINDNESS OF ONE EYE: ICD-10-CM

## 2018-03-27 DIAGNOSIS — N18.6 ESRD (END STAGE RENAL DISEASE) (HCC): Primary | ICD-10-CM

## 2018-03-27 DIAGNOSIS — I15.0 RENOVASCULAR HYPERTENSION: ICD-10-CM

## 2018-03-27 NOTE — PATIENT INSTRUCTIONS
Taking Aspirin to Prevent Heart Attack and Stroke: Care Instructions  Your Care Instructions    Aspirin acts as a \"blood thinner. \" It prevents blood clots from forming. When taken during and after a heart attack, it can reduce your chance of dying. And it's used if you have a stent in your coronary artery. Also, aspirin helps certain people lower their risk of a heart attack or stroke. Be sure you know what dose of aspirin to take and how often to take it. Low-dose aspirin is typically 81 mg. But the dose for daily aspirin can range from 81 mg to 325 mg. Taking aspirin every day can cause bleeding. It may not be safe if you have stomach ulcers. And it may not be safe if you have high blood pressure that is not controlled. If you take aspirin pills every day, do not take ones that have other ingredients such as caffeine or sodium. Before you start to take aspirin, tell your doctor all the medicines, vitamins, herbal products, and supplements you take. Follow-up care is a key part of your treatment and safety. Be sure to make and go to all appointments, and call your doctor if you are having problems. It's also a good idea to know your test results and keep a list of the medicines you take. How can you care for yourself at home? · Take aspirin with a full glass of water unless your doctor tells you not to. Do not lie down right after you take it. · If you have a stent in your coronary artery, take your aspirin as your heart doctor says to. If another doctor says to stop taking the aspirin for any reason, talk to your heart doctor before you stop. · Do not chew or crush the coated or sustained-release forms of aspirin. · Ask your doctor if you can drink alcohol while you take aspirin. And ask how much you can drink. Too much alcohol with aspirin can cause stomach bleeding. · Do not take aspirin if you are pregnant, unless your doctor says it is okay. · Keep all aspirin out of children's reach.   · Throw aspirin away if it starts to smell like vinegar. · Do not take aspirin if you have gout or if you take prescription blood thinners, unless your doctor has told you to. · Do not take prescription or over-the-counter medicines, vitamins, herbal products, or supplements without talking to your doctor first. Aylin Idrisstad the label before you take another over-the-counter medicine. Many contain aspirin. So they could cause you to take too much aspirin. · Talk with your doctor before you take a pain medicine. Ask which type of medicine you can take and how to take it safely with aspirin. · Tell your doctor or dentist before a surgery or procedure that you take aspirin. He or she will tell you if you should stop taking aspirin before your surgery or procedure. Make sure that you understand exactly what your doctor wants you to do. Where can you learn more? Go to http://hoang-jayden.info/. Enter Y066 in the search box to learn more about \"Taking Aspirin to Prevent Heart Attack and Stroke: Care Instructions. \"  Current as of: September 21, 2016  Content Version: 11.4  © 0729-5421 Healthwise, Incorporated. Care instructions adapted under license by [employer] (which disclaims liability or warranty for this information). If you have questions about a medical condition or this instruction, always ask your healthcare professional. [place] [address] any warranty or liability for your use of this information.

## 2018-03-27 NOTE — PROGRESS NOTES
HISTORY OF PRESENT ILLNESS  Joceline Lopez is a 62 y.o. male. HPI  Patient is here today for follow up on: Kidney Disease    Kidney Disease: Pt was admitted directly from his nephrologists' office when his creatinine apparently shaan from 10 to 4. He had a HD catheter for dialysis and has started dialysis. He is scheduled to see vascular this afternoon for vein mapping. Sofie Clark referral was placed yesterday. Will see Dr. Maegan Gipson on Mondays; He is under the care of cardiology Earl Salazar); Ghada Pardo follows him from an endocrine standpoint. HHospital discharge has been reviewed. Pt has recalcitrant/resistant HTN; Per pt there was difficulty getting his BP down. BP was elevated initially here;  Very slowly trended down. Breathing is stable; he has SOB with exertion. He rests most of the day. Current Outpatient Prescriptions:     b complex-vitamin c-folic acid (NEPHROCAPS) 1 mg capsule, Take 1 Cap by mouth daily. , Disp: 30 Cap, Rfl: 6    calcium acetate (PHOSLO) 667 mg cap, Take 1 Cap by mouth three (3) times daily (with meals). , Disp: 90 Cap, Rfl: 2    insulin glargine (LANTUS) 100 unit/mL injection, 8 Units by SubCUTAneous route nightly., Disp: 1 Vial, Rfl: 2    insulin lispro (HUMALOG) 100 unit/mL injection, Blood Sugar (mg/dL): <150 =0 units; 150 -199 =2 units; 200 -249 =4 units; 250 -299 =6 units; 300 -349 =8 units; 350 and above =10 units. , Disp: 1 Vial, Rfl: 2    losartan (COZAAR) 50 mg tablet, Take 1 Tab by mouth daily. , Disp: 30 Tab, Rfl: 2    Insulin Syringe-Needle U-100 (INSULIN SYRINGE ULTRAFINE) 0.5 mL 29 gauge x 1/2\" syrg, 1 Each by Does Not Apply route Before breakfast, lunch, dinner and at bedtime. And for use with lantus qhs., Disp: 150 Syringe, Rfl: 2    finasteride (PROSCAR) 5 mg tablet, TAKE 1 TABLET BY MOUTH DAILY. , Disp: 30 Tab, Rfl: 6    prednisoLONE acetate (PRED FORTE) 1 % ophthalmic suspension, Administer 1 Drop to left eye three (3) times daily. , Disp: , Rfl:    amLODIPine (NORVASC) 10 mg tablet, Take 1 Tab by mouth every evening., Disp: 1 Tab, Rfl: 0    acetaminophen (TYLENOL EXTRA STRENGTH) 500 mg tablet, Take  by mouth every six (6) hours as needed for Pain., Disp: , Rfl:     cyclobenzaprine (FLEXERIL) 10 mg tablet, Take 1 Tab by mouth three (3) times daily as needed for Muscle Spasm(s). , Disp: 30 Tab, Rfl: 0    cloNIDine (CATAPRES) 0.3 mg/24 hr, APPLY 1 PATCH TO SKIN ONCE EVERY 7 DAYS, Disp: 12 Patch, Rfl: 1    OTHER, PGIIL/P CRM - Apply 1 -2 grams ( 1-2 pumps) to left foot 3 - 4 times daily. , Disp: , Rfl:     rosuvastatin (CRESTOR) 20 mg tablet, Take 1 Tab by mouth nightly., Disp: 30 Tab, Rfl: 6    docusate sodium (COLACE) 100 mg capsule, Take 1 Cap by mouth two (2) times a day., Disp: 60 Cap, Rfl: 0    glucose blood VI test strips (ASCENSIA AUTODISC VI, ONE TOUCH ULTRA TEST VI) strip, Test twice daily:  Fasting before breakfast and 2 hours after dinner (log readings), One touch ultra 2 test strips please; Dx: 250.02, Disp: 1 Package, Rfl: 11    fluticasone (FLONASE) 50 mcg/actuation nasal spray, 1 [place] by Both Nostrils route two (2) times a day. (Patient taking differently: 1 Spray by Both Nostrils route as needed.), Disp: 1 Bottle, Rfl: 2    ferrous sulfate (IRON) 325 mg (65 mg iron) tablet, Take  by mouth Daily (before breakfast). Take 1 tablet by mouth once a week as per patient., Disp: , Rfl:     Lancets (ONE TOUCH DELICA) Misc, Test twice daily: Once in the morning fasting, then two hours after dinner (log results), Disp: 1 Package, Rfl: 11    Blood-Glucose Meter monitoring kit, by Does Not Apply route. One touch ultra2, Disp: 1 Kit, Rfl: 0    cyanocobalamin (VITAMIN B-12) 1,000 mcg tablet, Take 1,000 mcg by mouth daily. , Disp: , Rfl:       PMH,  Meds, Allergies, Family History, Social history reviewed    Review of Systems   Constitutional: Negative for chills and fever. Cardiovascular: Negative for chest pain.    Neurological: Negative for focal weakness. Physical Exam   Constitutional: He appears well-developed and well-nourished. No distress. Cardiovascular: Normal rate and regular rhythm. Exam reveals no gallop and no friction rub. No murmur heard. Pulmonary/Chest: Breath sounds normal. No respiratory distress. He has no wheezes. He has no rales. Musculoskeletal: He exhibits no edema. Nursing note and vitals reviewed. + ankle edema; Visit Vitals    BP (!) 210/80    Pulse 77    Temp 98.1 °F (36.7 °C) (Oral)    Resp 16    Ht 5' 6\" (1.676 m)    Wt 190 lb (86.2 kg)    SpO2 96%    BMI 30.67 kg/m2         ASSESSMENT and PLAN    ICD-10-CM ICD-9-CM    1. ESR[facility] (end stage renal disease) ([facility] N18.6 585.6    2. Renovascular hypertension I15.0 405.91    3. Blindness of one eye H54.40 369.60        As above, BP not controlled; history of recalcitrant BP; follow up with renal,cardiology. Dialysis MWF  Monitor closely  Follow-up Disposition:  Return in about 4 months (around 7/27/2018) for htn/chol. An After Visit Summary was printed and given to the patient. This has been fully explained to the patient, who indicates understanding.   Follow up with consultants as scheduled

## 2018-03-27 NOTE — PROGRESS NOTES
1. Have you been to the ER, urgent care clinic since your last visit? Hospitalized since your last visit? Yes Where: [facility] and emergency room    2. Have you seen or consulted any other health care providers outside of the [facility] since your last visit? Include any pap smears or colon screening.  No

## 2018-03-27 NOTE — MR AVS SNAPSHOT
[address] [facility] 
 
 
 [address], [place] [address] [address] 
[phone] Patient: Janett Begum MRN:  [id number] Visit Information Date & Time Provider Department Dept. Phone Encounter #  
 3/27/2018 10:20 AM Jalil Villareal[address] [place] [address] [id number] Follow-up Instructions Return in about 4 months (around 2018) for htn/chol. Your Appointments 3/27/2018  2:45 PM  
PROCEDURE with [facility] ([facility]) Appt Note: fu  
  Jeanie Clark, [place] [address] [address] [facility]  
[phone] [facility] [address] 2018  9:00 AM  
Follow Up with Shantelle Real MD  
[facility] ([facility]) Appt Note: 6 month follow up [facility] [place] [address] [address] [address] [phone] [address] [place] [address]  
  
    
 2018  9:00 AM  
Office Visit with Jalil Villareal MD  
[facility]) Appt Note: pre op clearance, cataract surgery and glaucoma surgery on 18, Dr. Key Arana, [phone]; 4 m fu chol.htn  
 [address] [facility] [address] [address]  
[phone]  
  
   
 [address],  [address] Upcoming Health Maintenance Date Due Pneumococcal 19-64 Highest Risk (1 of 3 - PCV13) 10/14/1979 LIPID PANEL Q1 2018 FOOT EXAM Q1 3/21/2018 MICROALBUMIN Q1 3/21/2018 HEMOGLOBIN A1C Q6M 2018 EYE EXAM RETINAL OR DILATED Q1 2019 COLONOSCOPY 10/9/2023 DTaP/Tdap/Td series (2 - Td) 3/21/2027 Allergies as of 3/27/2018  Review Complete On: 3/27/2018 By: Suyapa Diones, LPN Severity Noted Reaction Type Reactions Bactrim [Sulfamethoprim Ds]  2014   Side Effect Nausea and Vomiting Coreg [Carvedilol]  03/27/2018    Nausea and Vomiting Difficulty walking Current Immunizations  Reviewed on 11/2/2017 Name Date Influenza Vaccine (Quad) PF 10/10/2017 Not reviewed this visit You Were Diagnosed With   
  
 Codes Comments ESRD (end stage renal disease) ([facility]    -  Primary ICD-10-CM: N18.6 ICD-9-CM: 116. 6 Renovascular hypertension     ICD-10-CM: I15.0 ICD-9-CM: 405.91 Vitals BP Pulse Temp Resp Height(growth percentile) Weight(growth percentile) (!) 210/80 77 98.1 °F (36.7 °C) (Oral) 16 5' 6\" (1.676 m) 190 lb (86.2 kg) SpO2 BMI Smoking Status 96% 30.67 kg/m2 Never Smoker Vitals History BMI and BSA Data Body Mass Index Body Surface Area  
 30.67 kg/m 2 2 m 2 Preferred Pharmacy Pharmacy Name Phone [address] [phone] Your Updated Medication List  
  
   
This list is accurate as of 3/27/18 10:57 AM.  Always use your most recent med list. amLODIPine 10 mg tablet Commonly known as:  Des Rings Take 1 Tab by mouth every evening. b complex-vitamin c-folic acid 1 mg capsule Commonly known as:  Deryl Cool Take 1 Cap by mouth daily. Blood-Glucose Meter monitoring kit  
by Does Not Apply route. One touch ultra2  
  
 calcium acetate 667 mg Cap Commonly known as:  PHOSLO Take 1 Cap by mouth three (3) times daily (with meals). cloNIDine 0.3 mg/24 hr  
Commonly known as:  CATAPRES  
APPLY 1 PATCH TO SKIN ONCE EVERY 7 DAYS  
  
 cyclobenzaprine 10 mg tablet Commonly known as:  FLEXERIL Take 1 Tab by mouth three (3) times daily as needed for Muscle Spasm(s). docusate sodium 100 mg capsule Commonly known as:  Jason Pian Take 1 Cap by mouth two (2) times a day. finasteride 5 mg tablet Commonly known as:  PROSCAR  
TAKE 1 TABLET BY MOUTH DAILY. fluticasone 50 mcg/actuation nasal spray Commonly known as:  FLONASE  
1 [place] by Both Nostrils route two (2) times a day. glucose blood VI test strips strip Commonly known as:  ASCENSIA AUTODISC VI, ONE TOUCH ULTRA TEST VI Test twice daily:  Fasting before breakfast and 2 hours after dinner (log readings), One touch ultra 2 test strips please; Dx: 250.02  
  
 insulin glargine 100 unit/mL injection Commonly known as:  LANTUS  
8 Units by SubCUTAneous route nightly. insulin lispro 100 unit/mL injection Commonly known as:  HUMALOG Blood Sugar (mg/dL): <150 =0 units; 150 -199 =2 units; 200 -249 =4 units; 250 -299 =6 units; 300 -349 =8 units; 350 and above =10 units. Insulin Syringe-Needle U-100 0.5 mL 29 gauge x 1/2\" Syrg Commonly known as:  INSULIN SYRINGE ULTRAFINE  
1 Each by Does Not Apply route Before breakfast, lunch, dinner and at bedtime. And for use with lantus qhs. Iron 325 mg (65 mg iron) tablet Generic drug:  ferrous sulfate Take  by mouth Daily (before breakfast). Take 1 tablet by mouth once a week as per patient. Lancets Misc Commonly known as: One Touch Skipper Peter Test twice daily: Once in the morning fasting, then two hours after dinner (log results)  
  
 losartan 50 mg tablet Commonly known as:  COZAAR Take 1 Tab by mouth daily. mupirocin 2 % ointment Commonly known as:  BACTROBAN  
  
 OTHER PGIIL/P CRM - Apply 1 -2 grams ( 1-2 pumps) to left foot 3 - 4 times daily. prednisoLONE acetate 1 % ophthalmic suspension Commonly known as:  PRED FORTE Administer 1 Drop to left eye three (3) times daily. rosuvastatin 20 mg tablet Commonly known as:  CRESTOR Take 1 Tab by mouth nightly. TYLENOL EXTRA STRENGTH 500 mg tablet Generic drug:  acetaminophen Take  by mouth every six (6) hours as needed for Pain. VITAMIN B-12 1,000 mcg tablet Generic drug:  cyanocobalamin Take 1,000 mcg by mouth daily. Follow-up Instructions Return in about 4 months (around 7/27/2018) for htn/chol. Patient Instructions Taking Aspirin to Prevent Heart Attack and Stroke: Care Instructions Your Care Instructions Aspirin acts as a \"blood thinner. \" It prevents blood clots from forming. When taken during and after a heart attack, it can reduce your chance of dying. And it's used if you have a stent in your coronary artery. Also, aspirin helps certain people lower their risk of a heart attack or stroke. Be sure you know what dose of aspirin to take and how often to take it. Low-dose aspirin is typically 81 mg. But the dose for daily aspirin can range from 81 mg to 325 mg. Taking aspirin every day can cause bleeding. It may not be safe if you have stomach ulcers. And it may not be safe if you have high blood pressure that is not controlled. If you take aspirin pills every day, do not take ones that have other ingredients such as caffeine or sodium. Before you start to take aspirin, tell your doctor all the medicines, vitamins, herbal products, and supplements you take. Follow-up care is a key part of your treatment and safety. Be sure to make and go to all appointments, and call your doctor if you are having problems. It's also a good idea to know your test results and keep a list of the medicines you take. How can you care for yourself at home? · Take aspirin with a full glass of water unless your doctor tells you not to. Do not lie down right after you take it. · If you have a stent in your coronary artery, take your aspirin as your heart doctor says to. If another doctor says to stop taking the aspirin for any reason, talk to your heart doctor before you stop. · Do not chew or crush the coated or sustained-release forms of aspirin. · Ask your doctor if you can drink alcohol while you take aspirin. And ask how much you can drink. Too much alcohol with aspirin can cause stomach bleeding. · Do not take aspirin if you are pregnant, unless your doctor says it is okay. · Keep all aspirin out of children's reach. · Throw aspirin away if it starts to smell like vinegar. · Do not take aspirin if you have gout or if you take prescription blood thinners, unless your doctor has told you to. · Do not take prescription or over-the-counter medicines, vitamins, herbal products, or supplements without talking to your doctor first. Donte Lease the label before you take another over-the-counter medicine. Many contain aspirin. So they could cause you to take too much aspirin. · Talk with your doctor before you take a pain medicine. Ask which type of medicine you can take and how to take it safely with aspirin. · Tell your doctor or dentist before a surgery or procedure that you take aspirin. He or she will tell you if you should stop taking aspirin before your surgery or procedure. Make sure that you understand exactly what your doctor wants you to do. Where can you learn more? Go to http://hoang-jayden.info/. Enter U978 in the search box to learn more about \"Taking Aspirin to Prevent Heart Attack and Stroke: Care Instructions. \" Current as of: September 21, 2016 Content Version: 11.4 © 9323-3580 Healthwise, Incorporated. Care instructions adapted under license by [employer] (which disclaims liability or warranty for this information). If you have questions about a medical condition or this instruction, always ask your healthcare professional. [place] [address] any warranty or liability for your use of this information. Introducing [facility]! [employer] introduces [facility] patient portal. Now you can access parts of your medical record, email your doctor's office, and request medication refills online. 1. In your internet browser, go to [email or web address] 2. Click on the First Time User? Click Here link in the Sign In box. You will see the New Member Sign Up page. 3. Enter your Identia Access Code exactly as it appears below. You will not need to use this code after youve completed the sign-up process. If you do not sign up before the expiration date, you must request a new code. · Identia Access Code: [email or web address] Expires: 4/10/2018 10:17 AM 
 
4. Enter the last four digits of your Social Security Number (xxxx) and Date of Birth (mm/dd/yyyy) as indicated and click Submit. You will be taken to the next sign-up page. 5. Create a Identia ID. This will be your Identia login ID and cannot be changed, so think of one that is secure and easy to remember. 6. Create a Identia password. You can change your password at any time. 7. Enter your Password Reset Question and Answer. This can be used at a later time if you forget your password. 8. Enter your e-mail address. You will receive e-mail notification when new information is available in [address] 9. Click Sign Up. You can now view and download portions of your medical record. 10. Click the Download Summary menu link to download a portable copy of your medical information. If you have questions, please visit the Frequently Asked Questions section of the Identia website. Remember, Identia is NOT to be used for urgent needs. For medical emergencies, dial 911. Now available from your iPhone and Android! Please provide this summary of care documentation to your next provider. Your primary care clinician is listed as [address]. If you have any questions after today's visit, please call [phone].

## 2018-03-27 NOTE — PROCEDURES
[facility]  *** FINAL REPORT ***    Name: John Flowers  MRN: [id number]        Outpatient  : 14 Oct 1960  HIS Order #: [id number]  [address] [facility] Visit #: [id number]  Date: 27 Mar 2018    TYPE OF TEST: Peripheral Venous Testing    REASON FOR TEST  Chronic renal failure, Venous mapping    Right Arm:-  Deep venous thrombosis:           Not examined  Superficial venous thrombosis:    Not examined    Left Arm:-  Deep venous thrombosis:           Not examined  Superficial venous thrombosis:    Not examined    Vein Mapping:    Diam.   Depth  (mm)    Right Cephalic Vein:    Axilla:          2.2    Upper Arm:       2.5    Lower Arm:       1.4    Antecubital:     2.4    Upper Forearm:   1.3    Forearm:         1.4    Wrist:           1.6    Right Basilic Vein:    Upper Arm:    Lower Arm:       1.8    Antecubital:     1.4    Upper Forearm:   1.0    Lower Forearm:    Wrist:    R.Median Cubital:    Right Brachial Vein:    Proximal:    Distal:    Right Subclavian Artery:  Right Axillary Artery  :    Left Cephalic Vein:    Axilla:          1.8    Upper Arm:       1.7    Lower Arm:       1.1    Antecubital:     1.5    Upper Forearm:   1.7    Forearm:         1.3    Wrist:           1.7    Left Basilic Vein:    Upper Arm:    Lower Arm:       2.7    Antecubital:     2.2    Upper Forearm:   1.9    Lower Forearm:    Wrist:    L. Median Cubital:    Left Brachial Vein:    Proximal:    Distal:    Left Subclavian Artery:  Left Axillary Artery  :      INTERPRETATION/FINDINGS  Duplex images were obtained using 2-D gray scale, color flow and  spectral doppler analysis. Right arm :  1. The cephalic vein may be an inadequate conduit for dialysis access  - the smallest diameter is 1.3 mm.  2. The basilic vein may be an inadequate conduit for dialysis access -   the smallest diameter is 1.0 mm.  3. Triphasic signals noted in the right radial artery at the wrist  measuring 3.5 mm.   The ACF level of the right radial artery measures  4.6 mm (high bifurcation). Diffuse calcific plaquing noted  throughout. 4. Right catheter noted. Right SCV patent centrally. Left arm :  1. The cephalic vein may be an inadequate conduit for dialysis access  - the smallest diameter is 1.1 mm.  2. The basilic vein may be an inadequate conduit for dialysis access -   the smallest diameter is 1.9 mm.  3. Triphasic signals noted in the left radial artery at the wrist  measuring 3.2 mm. The left brachial artery measures 6.2 mm at the ACF   level. Diffuse calcific plaquing noted throughout. 4. Left SCV patent. ADDITIONAL COMMENTS    I have personally reviewed the data relevant to the interpretation of  this  study. TECHNOLOGIST: Louann Dickinson RVT, HANSEL  Signed: 03/27/2018 02:22 PM    PHYSICIAN: Sonia Bar MD  Signed: 03/28/2018 08:47 AM

## 2018-03-29 RX ORDER — FINASTERIDE 5 MG/1
TABLET, FILM COATED ORAL
Qty: 30 TAB | Refills: 6 | Status: SHIPPED | OUTPATIENT
Start: 2018-03-29 | End: 2018-03-31 | Stop reason: SDUPTHER

## 2018-04-03 RX ORDER — FINASTERIDE 5 MG/1
TABLET, FILM COATED ORAL
Qty: 30 TAB | Refills: 6 | Status: SHIPPED | OUTPATIENT
Start: 2018-04-03 | End: 2019-02-07 | Stop reason: SDUPTHER

## 2018-04-04 ENCOUNTER — TELEPHONE (OUTPATIENT)
Dept: CARDIOLOGY CLINIC | Age: 58
End: 2018-04-04

## 2018-04-04 NOTE — TELEPHONE ENCOUNTER
Patient wife called and states that patient was discharged from the hospital this month and a lot of his bp meds were stopped. Patient wife states that every time he goes to dialysis, his bp is super high. Consulted with Brittany Bustillos today. Per Brittany Bustillos, restart hydralazine 25mg twice daily. Follow up with nephrology for any additional changes to medications. Patient wife verbalized understanding.       Verbal order and read back per Devon Mukherjee NP

## 2018-04-05 RX ORDER — HYDRALAZINE HYDROCHLORIDE 25 MG/1
25 TABLET, FILM COATED ORAL 2 TIMES DAILY
Qty: 60 TAB | Refills: 6 | Status: SHIPPED | OUTPATIENT
Start: 2018-04-05 | End: 2019-04-05 | Stop reason: ALTCHOICE

## 2018-04-13 ENCOUNTER — OFFICE VISIT (OUTPATIENT)
Dept: CARDIOLOGY CLINIC | Age: 58
End: 2018-04-13

## 2018-04-13 VITALS
HEART RATE: 79 BPM | HEIGHT: 66 IN | WEIGHT: 192 LBS | OXYGEN SATURATION: 99 % | DIASTOLIC BLOOD PRESSURE: 112 MMHG | SYSTOLIC BLOOD PRESSURE: 210 MMHG | BODY MASS INDEX: 30.86 KG/M2

## 2018-04-13 DIAGNOSIS — Z99.2 ESRD ON DIALYSIS (HCC): ICD-10-CM

## 2018-04-13 DIAGNOSIS — N18.6 ESRD ON DIALYSIS (HCC): ICD-10-CM

## 2018-04-13 DIAGNOSIS — E78.5 HYPERLIPIDEMIA, UNSPECIFIED HYPERLIPIDEMIA TYPE: ICD-10-CM

## 2018-04-13 DIAGNOSIS — I10 MALIGNANT HYPERTENSION: Primary | ICD-10-CM

## 2018-04-13 PROBLEM — I16.0 HYPERTENSIVE URGENCY: Status: RESOLVED | Noted: 2017-04-04 | Resolved: 2018-04-13

## 2018-04-13 NOTE — PROGRESS NOTES
1. Have you been to the ER, urgent care clinic since your last visit? Hospitalized since your last visit? yes, 3/13/18, Dialysis catheter placement    2. Have you seen or consulted any other health care providers outside of the [address] [place] [address] since your last visit? Include any pap smears or colon screening.  No

## 2018-04-13 NOTE — PROGRESS NOTES
HISTORY OF PRESENT ILLNESS  Kathryn Carrero is a 62 y.o. male. Hypertension   Associated symptoms include shortness of breath. Pertinent negatives include no chest pain, no abdominal pain and no headaches. Shortness of Breath   Pertinent negatives include no fever, no headaches, no cough, no wheezing, no PND, no orthopnea, no chest pain, no vomiting, no abdominal pain, no rash, no leg swelling and no claudication. Leg Swelling   Associated symptoms include shortness of breath. Pertinent negatives include no chest pain, no abdominal pain and no headaches. Patient presents for a follow-up office visit. He was initially referred here for long-standing poorly controlled hypertension. The patient also has a history of chronic kidney disease, now end-stage recently started on hemodialysis in March 2018, long-standing poorly controlled diabetes, dyslipidemia, and intermittent compliance with his medications. He underwent an echocardiogram in October 2016 which showed moderate to marked concentric LVH with preserved LV systolic function, EF 87-12% without any significant valvular heart disease. He was hospitalized at [facility] in March 2017 following a syncopal episode. He was diagnosed with significant autonomic dysfunction on a tilt table test where his systolic blood pressure dropped from 200-97 while in the upright 70° position and administered 1 spray of sublingual nitroglycerin. His heart rate did increase appropriately from 75 to 110 bpm.  Because of the significant drop in blood pressure, his blood pressure medications were significantly decreased as were some of his diuretics. The patient was last seen by me in the office approximately 6 months ago. Since last visit, he was started on hemodialysis just last month. His volume status and leg swelling have been better since starting on hemodialysis. He states his blood pressure is still quite labile.   He does not take his blood pressure medications on dialysis days, but will take them on the nondialysis days. He denies any chest pain, orthopnea, or PND. He does have dyspnea on exertion which has not critically changed since last visit. Past Medical History:   Diagnosis Date    Blindness of one eye     left    Cardiac echocardiogram 10/21/2016    EF 60-65%. No WMA. Mod-marked LVH. Normal diastolic fx. No significant valvular heart disease.  Cardiac treadmill stress test, low risk 11/02/2012    Negative maximal exercise treadmill test.  Ex time 7 min 45 sec.  Cardiovascular LE peripheral arterial testing 03/22/2016    No significant peripheral arterial disease at rest bilaterally. ABIs deferred due to calcified vessels.  Cardiovascular LLE venous duplex 06/06/2012    Left leg:  No DVT.  Cardiovascular renal duplex 10/21/2016    No significant renal artery stenosis.  Chronic kidney disease     CKD [facility]ronic [facility] disease[facility] V ([facility]     Diabetes mellitus ([facility] 3/12/2010    Diabetic retinopathy ([facility] 5/4/2010    Edema of both legs     Eye examination 5/4/10    OU: 20/20; OD: 20/25; OS: 20/25 without correction.  Glaucoma 08/17/2017    Elsah Dark    HLD (hyperlipidemia) 3/12/2010    HTN (hypertension) 3/12/2010    Orthostatic hypertension      Current Outpatient Prescriptions   Medication Sig Dispense Refill    finasteride (PROSCAR) 5 mg tablet TAKE 1 TABLET BY MOUTH DAILY. 30 Tab 6    b complex-vitamin c-folic acid (NEPHROCAPS) 1 mg capsule Take 1 Cap by mouth daily. 30 Cap 6    calcium acetate (PHOSLO) 667 mg cap Take 1 Cap by mouth three (3) times daily (with meals). 90 Cap 2    insulin glargine (LANTUS) 100 unit/mL injection 8 Units by SubCUTAneous route nightly. 1 Vial 2    insulin lispro (HUMALOG) 100 unit/mL injection Blood Sugar (mg/dL): <150 =0 units; 150 -199 =2 units; 200 -249 =4 units; 250 -299 =6 units; 300 -349 =8 units; 350 and above =10 units.  1 Vial 2    losartan (COZAAR) 50 mg tablet Take 1 Tab by mouth daily. 30 Tab 2    Insulin Syringe-Needle U-100 (INSULIN SYRINGE ULTRAFINE) 0.5 mL 29 gauge x 1/2\" syrg 1 Each by Does Not Apply route Before breakfast, lunch, dinner and at bedtime. And for use with lantus qhs. 150 Syringe 2    prednisoLONE acetate (PRED FORTE) 1 % ophthalmic suspension Administer 1 Drop to left eye three (3) times daily.  amLODIPine (NORVASC) 10 mg tablet Take 1 Tab by mouth every evening. 1 Tab 0    acetaminophen (TYLENOL EXTRA STRENGTH) 500 mg tablet Take  by mouth every six (6) hours as needed for Pain.  mupirocin (BACTROBAN) 2 % ointment       cyclobenzaprine (FLEXERIL) 10 mg tablet Take 1 Tab by mouth three (3) times daily as needed for Muscle Spasm(s). 30 Tab 0    cloNIDine (CATAPRES) 0.3 mg/24 hr APPLY 1 PATCH TO SKIN ONCE EVERY 7 DAYS 12 Patch 1    OTHER PGIIL/P CRM - Apply 1 -2 grams ( 1-2 pumps) to left foot 3 - 4 times daily.  rosuvastatin (CRESTOR) 20 mg tablet Take 1 Tab by mouth nightly. 30 Tab 6    docusate sodium (COLACE) 100 mg capsule Take 1 Cap by mouth two (2) times a day. 60 Cap 0    glucose blood VI test strips (ASCENSIA AUTODISC VI, ONE TOUCH ULTRA TEST VI) strip Test twice daily:  Fasting before breakfast and 2 hours after dinner (log readings), One touch ultra 2 test strips please; Dx: 250.02 1 Package 11    fluticasone (FLONASE) 50 mcg/actuation nasal spray 1 [place] by Both Nostrils route two (2) times a day. (Patient taking differently: 1 Spray by Both Nostrils route as needed.) 1 Bottle 2    ferrous sulfate (IRON) 325 mg (65 mg iron) tablet Take  by mouth Daily (before breakfast). Take 1 tablet by mouth once a week as per patient.  Lancets (ONE TOUCH DELICA) Misc Test twice daily: Once in the morning fasting, then two hours after dinner (log results) 1 Package 11    Blood-Glucose Meter monitoring kit by Does Not Apply route.  One touch ultra2 1 Kit 0    cyanocobalamin (VITAMIN B-12) 1,000 mcg tablet Take 1,000 mcg by mouth daily.  hydrALAZINE (APRESOLINE) 25 mg tablet Take 1 Tab by mouth two (2) times a day. (Patient taking differently: Take 25 mg by mouth. Take TID on non-dialysis days, QPM on dialysis days) 60 Tab 6     Allergies   Allergen Reactions    Bactrim [Sulfamethoprim Ds] Nausea and Vomiting    Coreg [Carvedilol] Nausea and Vomiting     Difficulty walking        Social History   Substance Use Topics    Smoking status: Never Smoker    Smokeless tobacco: Never Used    Alcohol use No         Review of Systems   Constitutional: Negative for chills, fever and weight loss. HENT: Negative for nosebleeds. Eyes: Negative for blurred vision and double vision. Respiratory: Positive for shortness of breath. Negative for cough and wheezing. Cardiovascular: Negative for chest pain, palpitations, orthopnea, claudication, leg swelling and PND. Gastrointestinal: Negative for abdominal pain, heartburn, nausea and vomiting. Genitourinary: Negative for dysuria and hematuria. Musculoskeletal: Negative for falls and myalgias. Skin: Negative for rash. Neurological: Negative for dizziness, focal weakness and headaches. Endo/Heme/Allergies: Does not bruise/bleed easily. Psychiatric/Behavioral: Negative for substance abuse. Visit Vitals    BP (!) 210/112    Pulse 79    Ht 5' 6\" (1.676 m)    Wt 87.1 kg (192 lb)    SpO2 99%    BMI 30.99 kg/m2       Physical Exam   Constitutional: He is oriented to person, place, and time. He appears well-developed and well-nourished. HENT:   Head: Normocephalic and atraumatic. Eyes: Conjunctivae are normal.   Neck: Neck supple. No JVD present. Carotid bruit is not present. Cardiovascular: Normal rate, regular rhythm, S1 normal, S2 normal and normal pulses. Exam reveals distant heart sounds. Exam reveals no gallop and no S3. No murmur heard. Pulmonary/Chest: Breath sounds normal. He has no wheezes. He has no rales. Abdominal: Soft.  Bowel sounds are normal. There is no tenderness. Musculoskeletal: He exhibits edema (Trace to 1+). He exhibits no tenderness or deformity. Neurological: He is alert and oriented to person, place, and time. Skin: Skin is warm and dry. Psychiatric: He has a normal mood and affect. His behavior is normal. Thought content normal.     EKG: Normal sinus rhythm, normal axis, normal QTc interval, borderline voltage criteria for LVH, mild diffuse inferolateral T-wave changes, likely from strain, ischemia cannot be excluded. No change compared to the previous EKG    ASSESSMENT and PLAN    Hypertensive cardiovascular disease. Patient's blood pressure  is again significantly elevated today. He has preserved LV systolic function, EF 20% with significant concentric LVH. He reports that his nephrologist told not to take any blood pressure medications on his dialysis days, so is not taking any of his blood pressure pills today. I have recommended that he take all of his medications as prescribed on nondialysis days, would prefer him to take his hydralazine 25 mg the evening after dialysis and increase the scheduled dose of this to 25 mg 3 times a day on nondialysis days. Orthostatic hypotension. This was attributed to autonomic dysfunction. Patient is scheduled to follow-up with the dysautonomia clinic. No recurrent syncopal or near syncopal episodes. I suspect this is related to a long history of poorly controlled diabetes. Dyslipidemia. Patient is managed with Crestor. This is followed by his PCP. Diabetes mellitus, type II, insulin dependent. Historically this has been poorly controlled. End-stage renal disease. Recently started on hemodialysis last month. He follows up closely with nephrology. I would like to see him back in 6 months, sooner if needed.

## 2018-04-13 NOTE — PATIENT INSTRUCTIONS
Take hydralazine 25 mg three times daily on non-dialysis days  Once daily in the evening on dialysis days  Follow up in 6 months

## 2018-04-13 NOTE — MR AVS SNAPSHOT
[address] [id number] [address] [address] [phone] Patient: Wade Rodriguez MRN:  [id number] Visit Information Date & Time Provider Department Dept. Phone Encounter #  
 2018  9:00 AM Carla Story MD [facility] [address] [id number] Your Appointments 2018  9:00 AM  
Office Visit with Jolie Lezama MD  
[facility] [address]) Appt Note: pre op clearance, cataract surgery and glaucoma surgery on 18, Dr. Dahlia Rodriguez, [phone]; 4 m fu chol.htn  
 [address], [address] [address]  
[phone]  
  
   
 [address], [address] Upcoming Health Maintenance Date Due Pneumococcal 19-64 Highest Risk (1 of 3 - PCV13) 10/14/1979 LIPID PANEL Q1 2018 FOOT EXAM Q1 3/21/2018 MICROALBUMIN Q1 3/21/2018 HEMOGLOBIN A1C Q6M 2018 EYE EXAM RETINAL OR DILATED Q1 2019 COLONOSCOPY 10/9/2023 DTaP/Tdap/Td series (2 - Td) 3/21/2027 Allergies as of 2018  Review Complete On: 3/27/2018 By: Inessa Hoffman LPN Severity Noted Reaction Type Reactions Bactrim [Sulfamethoprim Ds]  2014   Side Effect Nausea and Vomiting Coreg [Carvedilol]  2018    Nausea and Vomiting Difficulty walking Current Immunizations  Reviewed on 2017 Name Date Influenza Vaccine (Quad) PF 10/10/2017 Not reviewed this visit You Were Diagnosed With   
  
 Codes Comments Hypertensive urgency    -  Primary ICD-10-CM: I16.0 ICD-9-CM: 401.9 Malignant hypertension     ICD-10-CM: I10 
ICD-9-CM: 401.0 Chest pain, unspecified type     ICD-10-CM: R07.9 ICD-9-CM: 786.50 Vitals BP Pulse Height(growth percentile) Weight(growth percentile) SpO2 BMI  
 (!) 210/112 79 5' 6\" (1.676 m) 192 lb (87.1 kg) 99% 30.99 kg/m2 Smoking Status Never Smoker Vitals History BMI and BSA Data Body Mass Index Body Surface Area 30.99 kg/m 2 2.01 m 2 Preferred Pharmacy Pharmacy Name Phone [facility] [phone] Your Updated Medication List  
  
   
This list is accurate as of 4/13/18 10:12 AM.  Always use your most recent med list. amLODIPine 10 mg tablet Commonly known as:  Sundmichel Allen Take 1 Tab by mouth every evening. b complex-vitamin c-folic acid 1 mg capsule Commonly known as:  Conner De Souza Take 1 Cap by mouth daily. Blood-Glucose Meter monitoring kit  
by Does Not Apply route. One touch ultra2  
  
 calcium acetate 667 mg Cap Commonly known as:  PHOSLO Take 1 Cap by mouth three (3) times daily (with meals). cloNIDine 0.3 mg/24 hr  
Commonly known as:  CATAPRES  
APPLY 1 PATCH TO SKIN ONCE EVERY 7 DAYS  
  
 cyclobenzaprine 10 mg tablet Commonly known as:  FLEXERIL Take 1 Tab by mouth three (3) times daily as needed for Muscle Spasm(s). docusate sodium 100 mg capsule Commonly known as:  Corrina Silk Take 1 Cap by mouth two (2) times a day. finasteride 5 mg tablet Commonly known as:  PROSCAR  
TAKE 1 TABLET BY MOUTH DAILY. fluticasone 50 mcg/actuation nasal spray Commonly known as:  FLONASE  
1 [place] by Both Nostrils route two (2) times a day. glucose blood VI test strips strip Commonly known as:  ASCENSIA AUTODISC VI, ONE TOUCH ULTRA TEST VI Test twice daily:  Fasting before breakfast and 2 hours after dinner (log readings), One touch ultra 2 test strips please; Dx: 250.02  
  
 hydrALAZINE 25 mg tablet Commonly known as:  APRESOLINE Take 1 Tab by mouth two (2) times a day. insulin glargine 100 unit/mL injection Commonly known as:  LANTUS  
8 Units by SubCUTAneous route nightly. insulin lispro 100 unit/mL injection Commonly known as:  HUMALOG Blood Sugar (mg/dL): <150 =0 units; 150 -199 =2 units; 200 -249 =4 units; 250 -299 =6 units; 300 -349 =8 units; 350 and above =10 units. Insulin Syringe-Needle U-100 0.5 mL 29 gauge x 1/2\" Syrg Commonly known as:  INSULIN SYRINGE ULTRAFINE  
1 Each by Does Not Apply route Before breakfast, lunch, dinner and at bedtime. And for use with lantus qhs. Iron 325 mg (65 mg iron) tablet Generic drug:  ferrous sulfate Take  by mouth Daily (before breakfast). Take 1 tablet by mouth once a week as per patient. Lancets Misc Commonly known as: One Touch [place] Held Test twice daily: Once in the morning fasting, then two hours after dinner (log results)  
  
 losartan 50 mg tablet Commonly known as:  COZAAR Take 1 Tab by mouth daily. mupirocin 2 % ointment Commonly known as:  BACTROBAN  
  
 OTHER PGIIL/P CRM - Apply 1 -2 grams ( 1-2 pumps) to left foot 3 - 4 times daily. prednisoLONE acetate 1 % ophthalmic suspension Commonly known as:  PRED FORTE Administer 1 Drop to left eye three (3) times daily. rosuvastatin 20 mg tablet Commonly known as:  CRESTOR Take 1 Tab by mouth nightly. TYLENOL EXTRA STRENGTH 500 mg tablet Generic drug:  acetaminophen Take  by mouth every six (6) hours as needed for Pain. VITAMIN B-12 1,000 mcg tablet Generic drug:  cyanocobalamin Take 1,000 mcg by mouth daily. We Performed the Following AMB POC EKG ROUTINE W/ 12 LEADS, INTER & REP [73974 CPT(R)] Patient Instructions Take hydralazine 25 mg three times daily on non-dialysis days Once daily in the evening on dialysis days Follow up in 6 months Introducing hospitals & HEALTH SERVICES! [facility] [place] [employer] [place] portal. Now you can access parts of your medical record, email your doctor's office, and request medication refills online. 1. In your internet browser, go to [email or web address] "[email or web address] 2. Click on the First Time User? Click Here link in the Sign In box. You will see the New Member Sign Up page. 3. Enter your [employer] Access Code exactly as it appears below. You will not need to use this code after youve completed the sign-up process. If you do not sign up before the expiration date, you must request a new code. · [employer] Access Code: [email or web address] Expires: 7/12/2018  9:21 AM 
 
4. Enter the last four digits of your Social Security Number (xxxx) and Date of Birth (mm/dd/yyyy) as indicated and click Submit. You will be taken to the next sign-up page. 5. Create a [employer] ID. This will be your [employer] login ID and cannot be changed, so think of one that is secure and easy to remember. 6. Create a [employer] password. You can change your password at any time. 7. Enter your Password Reset Question and Answer. This can be used at a later time if you forget your password. 8. Enter your e-mail address. You will receive e-mail notification when new information is available in [address] 9. Click Sign Up. You can now view and download portions of your medical record. 10. Click the Download Summary menu link to download a portable copy of your medical information. If you have questions, please visit the Frequently Asked Questions section of the [employer] website. Remember, [employer] is NOT to be used for urgent needs. For medical emergencies, dial 911. Now available from your iPhone and Android! Please provide this summary of care documentation to your next provider. Your primary care clinician is listed as [address]. If you have any questions after today's visit, please call [phone].

## 2018-04-20 RX ORDER — CLONIDINE 0.3 MG/24H
PATCH, EXTENDED RELEASE TRANSDERMAL
Qty: 12 PATCH | Refills: 1 | Status: SHIPPED | OUTPATIENT
Start: 2018-04-20 | End: 2018-10-04 | Stop reason: SDUPTHER

## 2018-04-24 ENCOUNTER — OFFICE VISIT (OUTPATIENT)
Dept: VASCULAR SURGERY | Age: 58
End: 2018-04-24

## 2018-04-24 VITALS
DIASTOLIC BLOOD PRESSURE: 90 MMHG | HEART RATE: 70 BPM | BODY MASS INDEX: 30.86 KG/M2 | WEIGHT: 192 LBS | SYSTOLIC BLOOD PRESSURE: 164 MMHG | HEIGHT: 66 IN | RESPIRATION RATE: 16 BRPM

## 2018-04-24 DIAGNOSIS — N18.6 ESRD ON DIALYSIS (HCC): Primary | ICD-10-CM

## 2018-04-24 DIAGNOSIS — Z99.2 ESRD ON DIALYSIS (HCC): Primary | ICD-10-CM

## 2018-04-24 NOTE — MR AVS SNAPSHOT
[address] [address] 706 [facility] 
[phone] Patient: Janett Begum MRN:  [id number] Visit Information Date & Time Provider Department Dept. Phone Encounter #  
 2018 10:00 AM NAZANIN Velez [employer] [facility] [id number] Your Appointments 2018  9:00 AM  
Office Visit with Jalil Villareal MD  
[place] [address] Primary Care [facility]) Appt Note: pre op clearance, cataract surgery and glaucoma surgery on 18, Dr. Key Arana, [phone]; 4 m fu chol.htn  
 [address], [place] [address] [address]  
[phone]  
  
   
 [address] 5/10/2018  3:15 PM  
HOSPITAL DISCHARGE with NAZANIN Velez [facility] ([facility]) Appt Note: [address] [place] [address] 706 [facility]  
[phone] 1212 [facility] [address] [place] [address], [address] [place] [address]  
  
    
 10/15/2018  3:20 PM  
Follow Up with Shantelle Real MD  
[facility] ([facility]) Appt Note: 6 month follow up Brennon Chiu [id number]  
[phone] 1212 [facility] [address] Upcoming Health Maintenance Date Due Pneumococcal 19-64 Highest Risk (1 of 3 - PCV13) 10/14/1979 LIPID PANEL Q1 2018 FOOT EXAM Q1 3/21/2018 MICROALBUMIN Q1 3/21/2018 HEMOGLOBIN A1C Q6M 2018 EYE EXAM RETINAL OR DILATED Q1 2019 COLONOSCOPY 10/9/2023 DTaP/Tdap/Td series (2 - Td) 3/21/2027 Allergies as of 2018  Review Complete On: 2018 By: Janiya Aldana LPN Severity Noted Reaction Type Reactions Bactrim [Sulfamethoprim Ds]  2014   Side Effect Nausea and Vomiting Coreg [Carvedilol]  2018    Nausea and Vomiting Difficulty walking Current Immunizations  Reviewed on 11/2/2017 Name Date Influenza Vaccine (Quad) PF 10/10/2017 Not reviewed this visit Vitals BP Pulse Resp Height(growth percentile) Weight(growth percentile) BMI  
 164/90 (BP 1 Location: Left arm, BP Patient Position: Sitting) 70 16 5' 6\" (1.676 m) 192 lb (87.1 kg) 30.99 kg/m2 Smoking Status Never Smoker Vitals History BMI and BSA Data Body Mass Index Body Surface Area 30.99 kg/m 2 2.01 m 2 Preferred Pharmacy Pharmacy Name Phone [facility], [address] [place] [address] [phone] Your Updated Medication List  
  
   
This list is accurate as of 4/24/18 10:09 AM.  Always use your most recent med list. amLODIPine 10 mg tablet Commonly known as:  Erenest Katie Take 1 Tab by mouth every evening. b complex-vitamin c-folic acid 1 mg capsule Commonly known as:  Wannetta Majors Take 1 Cap by mouth daily. Blood-Glucose Meter monitoring kit  
by Does Not Apply route. One touch ultra2  
  
 calcium acetate 667 mg Cap Commonly known as:  PHOSLO Take 1 Cap by mouth three (3) times daily (with meals). cloNIDine 0.3 mg/24 hr  
Commonly known as:  CATAPRES  
APPLY 1 PATCH TO SKIN ONCE EVERY 7 DAYS  
  
 cyclobenzaprine 10 mg tablet Commonly known as:  FLEXERIL Take 1 Tab by mouth three (3) times daily as needed for Muscle Spasm(s). docusate sodium 100 mg capsule Commonly known as:  Levern Breach Take 1 Cap by mouth two (2) times a day. finasteride 5 mg tablet Commonly known as:  PROSCAR  
TAKE 1 TABLET BY MOUTH DAILY. fluticasone 50 mcg/actuation nasal spray Commonly known as:  FLONASE  
1 [place] by Both Nostrils route two (2) times a day. glucose blood VI test strips strip Commonly known as:  ASCENSIA AUTODISC VI, ONE TOUCH ULTRA TEST VI  
 Test twice daily:  Fasting before breakfast and 2 hours after dinner (log readings), One touch ultra 2 test strips please; Dx: 250.02  
  
 hydrALAZINE 25 mg tablet Commonly known as:  APRESOLINE Take 1 Tab by mouth two (2) times a day. insulin glargine 100 unit/mL injection Commonly known as:  LANTUS  
8 Units by SubCUTAneous route nightly. insulin lispro 100 unit/mL injection Commonly known as:  HUMALOG Blood Sugar (mg/dL): <150 =0 units; 150 -199 =2 units; 200 -249 =4 units; 250 -299 =6 units; 300 -349 =8 units; 350 and above =10 units. Insulin Syringe-Needle U-100 0.5 mL 29 gauge x 1/2\" Syrg Commonly known as:  INSULIN SYRINGE ULTRAFINE  
1 Each by Does Not Apply route Before breakfast, lunch, dinner and at bedtime. And for use with lantus qhs. Iron 325 mg (65 mg iron) tablet Generic drug:  ferrous sulfate Take  by mouth Daily (before breakfast). Take 1 tablet by mouth once a week as per patient. Lancets Misc Commonly known as: One Touch [place] Class Test twice daily: Once in the morning fasting, then two hours after dinner (log results)  
  
 losartan 50 mg tablet Commonly known as:  COZAAR Take 1 Tab by mouth daily. mupirocin 2 % ointment Commonly known as:  BACTROBAN  
  
 OTHER PGIIL/P CRM - Apply 1 -2 grams ( 1-2 pumps) to left foot 3 - 4 times daily. prednisoLONE acetate 1 % ophthalmic suspension Commonly known as:  PRED FORTE Administer 1 Drop to left eye three (3) times daily. rosuvastatin 20 mg tablet Commonly known as:  CRESTOR Take 1 Tab by mouth nightly. TYLENOL EXTRA STRENGTH 500 mg tablet Generic drug:  acetaminophen Take  by mouth every six (6) hours as needed for Pain. VITAMIN B-12 1,000 mcg tablet Generic drug:  cyanocobalamin Take 1,000 mcg by mouth daily. Introducing [facility]!    
 [facility] introduces [employer] patient portal. Now you can access parts of your medical record, email your doctor's office, and request medication refills online. 1. In your internet browser, go to [email or web address] 2. Click on the First Time User? Click Here link in the Sign In box. You will see the New Member Sign Up page. 3. Enter your [email or web address] Access Code exactly as it appears below. You will not need to use this code after youve completed the sign-up process. If you do not sign up before the expiration date, you must request a new code. · [email or web address] Access Code: [email or web address] Expires: 7/12/2018  9:21 AM 
 
4. Enter the last four digits of your Social Security Number (xxxx) and Date of Birth (mm/dd/yyyy) as indicated and click Submit. You will be taken to the next sign-up page. 5. Create a [email or web address] ID. This will be your [email or web address] login ID and cannot be changed, so think of one that is secure and easy to remember. 6. Create a [email or web address] password. You can change your password at any time. 7. Enter your Password Reset Question and Answer. This can be used at a later time if you forget your password. 8. Enter your e-mail address. You will receive e-mail notification when new information is available in [address] 9. Click Sign Up. You can now view and download portions of your medical record. 10. Click the Download Summary menu link to download a portable copy of your medical information. If you have questions, please visit the Frequently Asked Questions section of the [email or web address] website. Remember, [email or web address] is NOT to be used for urgent needs. For medical emergencies, dial 911. Now available from your iPhone and Android! Please provide this summary of care documentation to your next provider. Your primary care clinician is listed as [address]. If you have any questions after today's visit, please call [phone].

## 2018-04-24 NOTE — PROGRESS NOTES
1. Have you been to an emergency room or urgent care clinic since your last visit? No  Hospitalized since your last visit? If yes, where, when, and reason for visit? No  2. Have you seen or consulted any other health care providers outside of the [facility] since your last visit including any procedures, health maintenance items. If yes, where, when and reason for visit?

## 2018-04-24 NOTE — PROGRESS NOTES
Kathryn Carrero    Chief Complaint   Patient presents with    End Stage Renal Disease       History and Physical    Mr Minesh Montano is following up after recent hospital admission  He had to start dialysis, with placement of tunneled dialysis catheter  He is now here for discussion of permanent access  He is right handed. No cardiac implanted devices  He enjoys playing bass guitar and doesn't want to interfere with that    Past Medical History:   Diagnosis Date    Blindness of one eye     left    Cardiac echocardiogram 10/21/2016    EF 60-65%. No WMA. Mod-marked LVH. Normal diastolic fx. No significant valvular heart disease.  Cardiac treadmill stress test, low risk 11/02/2012    Negative maximal exercise treadmill test.  Ex time 7 min 45 sec.  Cardiovascular LE peripheral arterial testing 03/22/2016    No significant peripheral arterial disease at rest bilaterally. ABIs deferred due to calcified vessels.  Cardiovascular LLE venous duplex 06/06/2012    Left leg:  No DVT.  Cardiovascular renal duplex 10/21/2016    No significant renal artery stenosis.  Chronic kidney disease     CKD (chronic kidney disease), stage V ([address]     Diabetes mellitus ([address] 3/12/2010    Diabetic retinopathy ([address] 5/4/2010    Edema of both legs     Eye examination 5/4/10    OU: 20/20; OD: 20/25; OS: 20/25 without correction.     Glaucoma 08/17/2017    Dario Walter    HLD (hyperlipidemia) 3/12/2010    HTN (hypertension) 3/12/2010    Orthostatic hypertension      Patient Active Problem List   Diagnosis Code    HLD (hyperlipidemia) E78.5    HTN (hypertension) I10    Diabetic retinopathy ([address] E11.319    Noncompliance with treatment Z91.19    Type II or unspecified type diabetes mellitus without mention of complication, uncontrolled E11.65    Paresthesias/numbness R20.9    Diabetes mellitus with renal manifestations, uncontrolled ([address] E11.29, E11.65    Diabetic foot ulcer ([address] E11.621, L97.509    Gangrene of toe ([facility]) I96    Dry gangrene ([facility] I96    Chronic kidney disease, stage IV (severe) ([facility]) N18.4    Renal failure (ARF), acute on chronic ([facility]) N17.9, N18.9    Diabetic nephropathy ([facility]) E11.21    Hypertension I10    Malignant hypertension I10    CKD (chronic kidney disease) N18.9    Hyperlipidemia E78.5    Type 2 diabetes mellitus with complication, with long-term current use of insulin ([facility]) E11.8, Z79.4    Orthostatic hypotension I95.1    Chest pain R07.9    Stage 5 chronic kidney disease not on chronic dialysis ([facility] N18.5    Diabetes mellitus due to underlying condition, uncontrolled, with diabetic nephropathy, with long-term current use of insulin ([facility]) E08.21, E08.65, Z79.4    Elevated troponin R74.8    Kidney disease N28.9    ESRD (end stage renal disease) ([facility]) N18.6    Anemia D64.9    Hypoalbuminemia E88.09    Blindness of one eye H54.40    ESRD on dialysis ([facility]) N18.6, Z99.2     Past Surgical History:   Procedure Laterality Date    HX AMPUTATION      TOES    HX HERNIA REPAIR  2005    HX OTHER SURGICAL  11/07/2017    glaucoma valve- Ahmed     Current Outpatient Prescriptions   Medication Sig Dispense Refill    cloNIDine (CATAPRES) 0.3 mg/24 hr APPLY 1 PATCH TO SKIN ONCE EVERY 7 DAYS 12 Patch 1    hydrALAZINE (APRESOLINE) 25 mg tablet Take 1 Tab by mouth two (2) times a day. (Patient taking differently: Take 25 mg by mouth. Take TID on non-dialysis days, QPM on dialysis days) 60 Tab 6    finasteride (PROSCAR) 5 mg tablet TAKE 1 TABLET BY MOUTH DAILY. 30 Tab 6    b complex-vitamin c-folic acid (NEPHROCAPS) 1 mg capsule Take 1 Cap by mouth daily. 30 Cap 6    calcium acetate (PHOSLO) 667 mg cap Take 1 Cap by mouth three (3) times daily (with meals). 90 Cap 2    insulin glargine (LANTUS) 100 unit/mL injection 8 Units by SubCUTAneous route nightly.  1 Vial 2    insulin lispro (HUMALOG) 100 unit/mL injection Blood Sugar (mg/dL): <150 =0 units; 150 -199 =2 units; 200 -249 =4 units; 250 -299 =6 units; 300 -349 =8 units; 350 and above =10 units. 1 Vial 2    losartan (COZAAR) 50 mg tablet Take 1 Tab by mouth daily. 30 Tab 2    Insulin Syringe-Needle U-100 (INSULIN SYRINGE ULTRAFINE) 0.5 mL 29 gauge x 1/2\" syrg 1 Each by Does Not Apply route Before breakfast, lunch, dinner and at bedtime. And for use with lantus qhs. 150 Syringe 2    prednisoLONE acetate (PRED FORTE) 1 % ophthalmic suspension Administer 1 Drop to left eye three (3) times daily.  amLODIPine (NORVASC) 10 mg tablet Take 1 Tab by mouth every evening. 1 Tab 0    acetaminophen (TYLENOL EXTRA STRENGTH) 500 mg tablet Take  by mouth every six (6) hours as needed for Pain.  mupirocin (BACTROBAN) 2 % ointment       cyclobenzaprine (FLEXERIL) 10 mg tablet Take 1 Tab by mouth three (3) times daily as needed for Muscle Spasm(s). 30 Tab 0    OTHER PGIIL/P CRM - Apply 1 -2 grams ( 1-2 pumps) to left foot 3 - 4 times daily.  rosuvastatin (CRESTOR) 20 mg tablet Take 1 Tab by mouth nightly. 30 Tab 6    docusate sodium (COLACE) 100 mg capsule Take 1 Cap by mouth two (2) times a day. 60 Cap 0    glucose blood VI test strips (ASCENSIA AUTODISC VI, ONE TOUCH ULTRA TEST VI) strip Test twice daily:  Fasting before breakfast and 2 hours after dinner (log readings), One touch ultra 2 test strips please; Dx: 250.02 1 Package 11    fluticasone (FLONASE) 50 mcg/actuation nasal spray 1 [place] by Both Nostrils route two (2) times a day. (Patient taking differently: 1 Spray by Both Nostrils route as needed.) 1 Bottle 2    ferrous sulfate (IRON) 325 mg (65 mg iron) tablet Take  by mouth Daily (before breakfast). Take 1 tablet by mouth once a week as per patient.  Lancets (ONE TOUCH DELICA) Misc Test twice daily: Once in the morning fasting, then two hours after dinner (log results) 1 Package 11    Blood-Glucose Meter monitoring kit by Does Not Apply route.  One touch ultra2 1 Kit 0    cyanocobalamin (VITAMIN B-12) 1,000 mcg tablet Take 1,000 mcg by mouth daily. Allergies   Allergen Reactions    Bactrim [Sulfamethoprim Ds] Nausea and Vomiting    Coreg [Carvedilol] Nausea and Vomiting     Difficulty walking       Review of Systems    Review of Systems - History obtained from chart review and the patient  10 point ROS negative except for some shortness of breath    Physical   Visit Vitals    /90 (BP 1 Location: Left arm, BP Patient Position: Sitting)    Pulse 70    Resp 16    Ht 5' 6\" (1.676 m)    Wt 192 lb (87.1 kg)    BMI 30.99 kg/m2     RIJ catheter present  No cardiac implants    Vascular studies:  Right arm :  1. The cephalic vein may be an inadequate conduit for dialysis access  - the smallest diameter is 1.3 mm.  2. The basilic vein may be an inadequate conduit for dialysis access -   the smallest diameter is 1.0 mm.  3. Triphasic signals noted in the right radial artery at the wrist  measuring 3.5 mm. The ACF level of the right radial artery measures  4.6 mm (high bifurcation). Diffuse calcific plaquing noted  throughout. 4. Right catheter noted. Right SCV patent centrally. Left arm :  1. The cephalic vein may be an inadequate conduit for dialysis access  - the smallest diameter is 1.1 mm.  2. The basilic vein may be an inadequate conduit for dialysis access -   the smallest diameter is 1.9 mm.  3. Triphasic signals noted in the left radial artery at the wrist  measuring 3.2 mm. The left brachial artery measures 6.2 mm at the ACF   level. Diffuse calcific plaquing noted throughout. 4. Left SCV patent. Impression/Plan:     ICD-10-CM ICD-9-CM    1. ESRD on dialysis ([facility] N18.6 585.6     Z99.2 V45.11      Is a mapping shows that he is really only going to be a candidate for graft placement. He is right-handed so we will aim for the left arm. He expressed concern about being able to play base guitar.   I said other than a few days after treatment being sore from the actual surgery, that most patients do not have any implications from placement of the graft, and are able to do labor positions and hobbies without issue. I did mention signs and symptoms of steal that will have to be monitored for possible signs of infection. But details of graft placement, timing of that, then timing of use, and timing of catheter removal were all reviewed. He acknowledges understanding and is agreeable to proceed, so we will schedule this for him at the next available time at his [address] [place] [address],[address]    Portions of this note have been entered using voice recognition software.

## 2018-04-25 ENCOUNTER — ANESTHESIA EVENT (OUTPATIENT)
Dept: CARDIOTHORACIC SURGERY | Age: 58
End: 2018-04-25
Payer: COMMERCIAL

## 2018-04-25 NOTE — H&P
History and Physical    Mr Arabella Bejarano is following up after recent hospital admission  He had to start dialysis, with placement of tunneled dialysis catheter  He is now here for discussion of permanent access  He is right handed. No cardiac implanted devices  He enjoys playing bass guitar and doesn't want to interfere with that          Past Medical History:   Diagnosis Date    Blindness of one eye       left    Cardiac echocardiogram 10/21/2016     EF 60-65%. No WMA. Mod-marked LVH. Normal diastolic fx. No significant valvular heart disease.  Cardiac treadmill stress test, low risk 11/02/2012     Negative maximal exercise treadmill test.  Ex time 7 min 45 sec.  Cardiovascular LE peripheral arterial testing 03/22/2016     No significant peripheral arterial disease at rest bilaterally. ABIs deferred due to calcified vessels.  Cardiovascular LLE venous duplex 06/06/2012     Left leg:  No DVT.  Cardiovascular renal duplex 10/21/2016     No significant renal artery stenosis.  Chronic kidney disease      CKD (chronic kidney disease), stage V ([facility])      Diabetes mellitus ([address] 3/12/2010    Diabetic retinopathy ([address] 5/4/2010    Edema of both legs      Eye examination 5/4/10     OU: 20/20; OD: 20/25; OS: 20/25 without correction.     Glaucoma 08/17/2017     NORA muro    HLD (hyperlipidemia) 3/12/2010    HTN (hypertension) 3/12/2010    Orthostatic hypertension        Patient Active Problem List   Diagnosis Code    HLD (hyperlipidemia) E78.5    HTN (hypertension) I10    Diabetic retinopathy ([address] E11.319    Noncompliance with treatment Z91.19    Type II or unspecified type diabetes mellitus without mention of complication, uncontrolled E11.65    Paresthesias/numbness R20.9    Diabetes mellitus with renal manifestations, uncontrolled ([address] E11.29, E11.65    Diabetic foot ulcer ([address] E11.621, L97.509    Gangrene of toe ([address] I96    Dry gangrene ([address] I96    Chronic kidney disease, stage IV (severe) ([facility]) N18.4    Renal failure (ARF), acute on chronic (HCC) N17.9, N18.9    Diabetic nephropathy ([facility]) E11.21    Hypertension I10    Malignant hypertension I10    CKD (chronic kidney disease) N18.9    Hyperlipidemia E78.5    Type 2 diabetes mellitus with complication, with long-term current use of insulin ([facility]) E11.8, Z79.4    Orthostatic hypotension I95.1    Chest pain R07.9    Stage 5 chronic kidney disease not on chronic dialysis ([facility] N18.5    Diabetes mellitus due to underlying condition, uncontrolled, with diabetic nephropathy, with long-term current use of insulin ([facility]) E08.21, E08.65, Z79.4    Elevated troponin R74.8    Kidney disease N28.9    ESRD (end stage renal disease) ([facility]) N18.6    Anemia D64.9    Hypoalbuminemia E88.09    Blindness of one eye H54.40    ESRD on dialysis ([facility]) N18.6, Z99.2            Past Surgical History:   Procedure Laterality Date    HX AMPUTATION         TOES    HX HERNIA REPAIR   2005    HX OTHER SURGICAL   11/07/2017     glaucoma valve- Ahmed             Current Outpatient Prescriptions   Medication Sig Dispense Refill    cloNIDine (CATAPRES) 0.3 mg/24 hr APPLY 1 PATCH TO SKIN ONCE EVERY 7 DAYS 12 Patch 1    hydrALAZINE (APRESOLINE) 25 mg tablet Take 1 Tab by mouth two (2) times a day. (Patient taking differently: Take 25 mg by mouth. Take TID on non-dialysis days, QPM on dialysis days) 60 Tab 6    finasteride (PROSCAR) 5 mg tablet TAKE 1 TABLET BY MOUTH DAILY. 30 Tab 6    b complex-vitamin c-folic acid (NEPHROCAPS) 1 mg capsule Take 1 Cap by mouth daily. 30 Cap 6    calcium acetate (PHOSLO) 667 mg cap Take 1 Cap by mouth three (3) times daily (with meals). 90 Cap 2    insulin glargine (LANTUS) 100 unit/mL injection 8 Units by SubCUTAneous route nightly.  1 Vial 2    insulin lispro (HUMALOG) 100 unit/mL injection Blood Sugar (mg/dL): <150 =0 units; 150 -199 =2 units; 200 -249 =4 units; 250 -299 =6 units; 300 -349 =8 units; 350 and above =10 units. 1 Vial 2    losartan (COZAAR) 50 mg tablet Take 1 Tab by mouth daily. 30 Tab 2    Insulin Syringe-Needle U-100 (INSULIN SYRINGE ULTRAFINE) 0.5 mL 29 gauge x 1/2\" syrg 1 Each by Does Not Apply route Before breakfast, lunch, dinner and at bedtime. And for use with lantus qhs. 150 Syringe 2    prednisoLONE acetate (PRED FORTE) 1 % ophthalmic suspension Administer 1 Drop to left eye three (3) times daily.        amLODIPine (NORVASC) 10 mg tablet Take 1 Tab by mouth every evening. 1 Tab 0    acetaminophen (TYLENOL EXTRA STRENGTH) 500 mg tablet Take  by mouth every six (6) hours as needed for Pain.        mupirocin (BACTROBAN) 2 % ointment          cyclobenzaprine (FLEXERIL) 10 mg tablet Take 1 Tab by mouth three (3) times daily as needed for Muscle Spasm(s). 30 Tab 0    OTHER PGIIL/P CRM - Apply 1 -2 grams ( 1-2 pumps) to left foot 3 - 4 times daily.        rosuvastatin (CRESTOR) 20 mg tablet Take 1 Tab by mouth nightly. 30 Tab 6    docusate sodium (COLACE) 100 mg capsule Take 1 Cap by mouth two (2) times a day. 60 Cap 0    glucose blood VI test strips (ASCENSIA AUTODISC VI, ONE TOUCH ULTRA TEST VI) strip Test twice daily:  Fasting before breakfast and 2 hours after dinner (log readings), One touch ultra 2 test strips please; Dx: 250.02 1 Package 11    fluticasone (FLONASE) 50 mcg/actuation nasal spray 1 [place] by Both Nostrils route two (2) times a day. (Patient taking differently: 1 Spray by Both Nostrils route as needed.) 1 Bottle 2    ferrous sulfate (IRON) 325 mg (65 mg iron) tablet Take  by mouth Daily (before breakfast). Take 1 tablet by mouth once a week as per patient.        Lancets (ONE TOUCH DELICA) Misc Test twice daily: Once in the morning fasting, then two hours after dinner (log results) 1 Package 11    Blood-Glucose Meter monitoring kit by Does Not Apply route.  One touch ultra2 1 Kit 0    cyanocobalamin (VITAMIN B-12) 1,000 mcg tablet Take 1,000 mcg by mouth daily.         Allergies   Allergen Reactions    Bactrim [Sulfamethoprim Ds] Nausea and Vomiting    Coreg [Carvedilol] Nausea and Vomiting       Difficulty walking         Review of Systems    Review of Systems - History obtained from chart review and the patient  10 point ROS negative except for some shortness of breath     Physical        Visit Vitals    /90 (BP 1 Location: Left arm, BP Patient Position: Sitting)    Pulse 70    Resp 16    Ht 5' 6\" (1.676 m)    Wt 192 lb (87.1 kg)    BMI 30.99 kg/m2      RIJ catheter present  No cardiac implants     Vascular studies:  Right arm :  1. The cephalic vein may be an inadequate conduit for dialysis access  - the smallest diameter is 1.3 mm.  2. The basilic vein may be an inadequate conduit for dialysis access -   the smallest diameter is 1.0 mm.  3. Triphasic signals noted in the right radial artery at the wrist  measuring 3.5 mm.  The ACF level of the right radial artery measures  4.6 mm (high bifurcation).  Diffuse calcific plaquing noted  throughout. 4. Right catheter noted.  Right SCV patent centrally. Left arm :  1. The cephalic vein may be an inadequate conduit for dialysis access  - the smallest diameter is 1.1 mm.  2. The basilic vein may be an inadequate conduit for dialysis access -   the smallest diameter is 1.9 mm.  3. Triphasic signals noted in the left radial artery at the wrist  measuring 3.2 mm.  The left brachial artery measures 6.2 mm at the ACF   level.  Diffuse calcific plaquing noted throughout. 4. Left SCV patent.     Impression/Plan:       ICD-10-CM ICD-9-CM     1. ESRD on dialysis ([facility] N18.6 585. 6       Z99.2 V45.11        Is a mapping shows that he is really only going to be a candidate for graft placement. He is right-handed so we will aim for the left arm. He expressed concern about being able to play base guitar.   I said other than a few days after treatment being sore from the actual surgery, that most patients do not have any implications from placement of the graft, and are able to do labor positions and hobbies without issue. I did mention signs and symptoms of steal that will have to be monitored for possible signs of infection. But details of graft placement, timing of that, then timing of use, and timing of catheter removal were all reviewed.   He acknowledges understanding and is agreeable to proceed, so we will schedule this for him at the next available time at his convenience

## 2018-04-26 ENCOUNTER — ANESTHESIA (OUTPATIENT)
Dept: CARDIOTHORACIC SURGERY | Age: 58
End: 2018-04-26
Payer: COMMERCIAL

## 2018-04-26 ENCOUNTER — HOSPITAL ENCOUNTER (OUTPATIENT)
Age: 58
Setting detail: OUTPATIENT SURGERY
Discharge: HOME | End: 2018-04-26
Attending: SURGERY | Admitting: SURGERY
Payer: COMMERCIAL

## 2018-04-26 VITALS
OXYGEN SATURATION: 97 % | HEART RATE: 62 BPM | TEMPERATURE: 96.8 F | SYSTOLIC BLOOD PRESSURE: 177 MMHG | RESPIRATION RATE: 20 BRPM | BODY MASS INDEX: 29.73 KG/M2 | HEIGHT: 66 IN | DIASTOLIC BLOOD PRESSURE: 82 MMHG | WEIGHT: 185 LBS

## 2018-04-26 DIAGNOSIS — N18.6 ESRD (END STAGE RENAL DISEASE) (HCC): Primary | ICD-10-CM

## 2018-04-26 LAB
BUN BLD-MCNC: 7 MG/DL (ref 7–18)
CHLORIDE BLD-SCNC: 100 MMOL/L (ref 100–108)
GLUCOSE BLD STRIP.AUTO-MCNC: 318 MG/DL (ref 70–110)
GLUCOSE BLD STRIP.AUTO-MCNC: 352 MG/DL (ref 74–106)
GLUCOSE BLD STRIP.AUTO-MCNC: 372 MG/DL (ref 70–110)
HCT VFR BLD CALC: 35 % (ref 36–49)
HGB BLD-MCNC: 11.9 G/DL (ref 12–16)
POTASSIUM BLD-SCNC: 3.3 MMOL/L (ref 3.5–5.5)
SODIUM BLD-SCNC: 139 MMOL/L (ref 136–145)

## 2018-04-26 PROCEDURE — 74011250637 HC RX REV CODE- 250/637: Performed by: NURSE ANESTHETIST, CERTIFIED REGISTERED

## 2018-04-26 PROCEDURE — 82962 GLUCOSE BLOOD TEST: CPT

## 2018-04-26 PROCEDURE — 77030002933 HC SUT MCRYL J&J -A: Performed by: SURGERY

## 2018-04-26 PROCEDURE — 77030002996 HC SUT SLK J&J -A: Performed by: SURGERY

## 2018-04-26 PROCEDURE — 77030002986 HC SUT PROL J&J -A: Performed by: SURGERY

## 2018-04-26 PROCEDURE — 82947 ASSAY GLUCOSE BLOOD QUANT: CPT

## 2018-04-26 PROCEDURE — 74011250636 HC RX REV CODE- 250/636

## 2018-04-26 PROCEDURE — 77030002924 HC SUT GORTX WLGO -B: Performed by: SURGERY

## 2018-04-26 PROCEDURE — 77030010507 HC ADH SKN DERMBND J&J -B: Performed by: SURGERY

## 2018-04-26 PROCEDURE — 76210000026 HC REC RM PH II 1 TO 1.5 HR: Performed by: SURGERY

## 2018-04-26 PROCEDURE — 77030010512 HC APPL CLP LIG J&J -C: Performed by: SURGERY

## 2018-04-26 PROCEDURE — 74011250637 HC RX REV CODE- 250/637: Performed by: SURGERY

## 2018-04-26 PROCEDURE — 74011000258 HC RX REV CODE- 258

## 2018-04-26 PROCEDURE — 74011250636 HC RX REV CODE- 250/636: Performed by: SURGERY

## 2018-04-26 PROCEDURE — 76060000034 HC ANESTHESIA 1.5 TO 2 HR: Performed by: SURGERY

## 2018-04-26 PROCEDURE — 74011636637 HC RX REV CODE- 636/637: Performed by: NURSE ANESTHETIST, CERTIFIED REGISTERED

## 2018-04-26 PROCEDURE — 74011000258 HC RX REV CODE- 258: Performed by: NURSE ANESTHETIST, CERTIFIED REGISTERED

## 2018-04-26 PROCEDURE — 77030011640 HC PAD GRND REM COVD -A: Performed by: SURGERY

## 2018-04-26 PROCEDURE — 76010000129 HC CV SURG 1.5 TO 2 HR: Performed by: SURGERY

## 2018-04-26 PROCEDURE — C1768 GRAFT, VASCULAR: HCPCS | Performed by: SURGERY

## 2018-04-26 PROCEDURE — 76210000006 HC OR PH I REC 0.5 TO 1 HR: Performed by: SURGERY

## 2018-04-26 DEVICE — GRAFT VASC L45CM DIA4-7MM PTFE CBAS HEP SURF STD WALLED: Type: IMPLANTABLE DEVICE | Site: ARM | Status: FUNCTIONAL

## 2018-04-26 RX ORDER — LIDOCAINE HYDROCHLORIDE 10 MG/ML
INJECTION, SOLUTION EPIDURAL; INFILTRATION; INTRACAUDAL; PERINEURAL
Status: DISCONTINUED
Start: 2018-04-26 | End: 2018-04-26 | Stop reason: HOSPADM

## 2018-04-26 RX ORDER — INSULIN LISPRO 100 [IU]/ML
INJECTION, SOLUTION INTRAVENOUS; SUBCUTANEOUS ONCE
Status: COMPLETED | OUTPATIENT
Start: 2018-04-26 | End: 2018-04-26

## 2018-04-26 RX ORDER — LIDOCAINE HYDROCHLORIDE 10 MG/ML
INJECTION, SOLUTION EPIDURAL; INFILTRATION; INTRACAUDAL; PERINEURAL AS NEEDED
Status: DISCONTINUED | OUTPATIENT
Start: 2018-04-26 | End: 2018-04-26 | Stop reason: HOSPADM

## 2018-04-26 RX ORDER — HEPARIN SODIUM 200 [USP'U]/100ML
INJECTION, SOLUTION INTRAVENOUS
Status: DISCONTINUED
Start: 2018-04-26 | End: 2018-04-26 | Stop reason: HOSPADM

## 2018-04-26 RX ORDER — MIDAZOLAM HYDROCHLORIDE 1 MG/ML
INJECTION, SOLUTION INTRAMUSCULAR; INTRAVENOUS AS NEEDED
Status: DISCONTINUED | OUTPATIENT
Start: 2018-04-26 | End: 2018-04-26 | Stop reason: HOSPADM

## 2018-04-26 RX ORDER — SODIUM CHLORIDE 0.9 % (FLUSH) 0.9 %
5-10 SYRINGE (ML) INJECTION EVERY 8 HOURS
Status: DISCONTINUED | OUTPATIENT
Start: 2018-04-26 | End: 2018-04-26 | Stop reason: HOSPADM

## 2018-04-26 RX ORDER — CEFAZOLIN SODIUM 2 G/50ML
2 SOLUTION INTRAVENOUS ONCE
Status: DISCONTINUED | OUTPATIENT
Start: 2018-04-26 | End: 2018-04-26 | Stop reason: HOSPADM

## 2018-04-26 RX ORDER — HEPARIN SODIUM 200 [USP'U]/100ML
INJECTION, SOLUTION INTRAVENOUS
Status: DISCONTINUED | OUTPATIENT
Start: 2018-04-26 | End: 2018-04-26 | Stop reason: HOSPADM

## 2018-04-26 RX ORDER — CLONIDINE 0.3 MG/24H
1 PATCH, EXTENDED RELEASE TRANSDERMAL
Status: DISCONTINUED | OUTPATIENT
Start: 2018-04-26 | End: 2018-04-26 | Stop reason: HOSPADM

## 2018-04-26 RX ORDER — DEXTROSE MONOHYDRATE AND SODIUM CHLORIDE 5; .225 G/100ML; G/100ML
INJECTION, SOLUTION INTRAVENOUS
Status: DISCONTINUED | OUTPATIENT
Start: 2018-04-26 | End: 2018-04-26 | Stop reason: HOSPADM

## 2018-04-26 RX ORDER — HYDROCODONE BITARTRATE AND ACETAMINOPHEN 5; 325 MG/1; MG/1
1-2 TABLET ORAL
Qty: 40 TAB | Refills: 0 | Status: SHIPPED | OUTPATIENT
Start: 2018-04-26 | End: 2018-05-09 | Stop reason: ALTCHOICE

## 2018-04-26 RX ORDER — DEXTROSE 50 % IN WATER (D50W) INTRAVENOUS SYRINGE
25-50 AS NEEDED
Status: DISCONTINUED | OUTPATIENT
Start: 2018-04-26 | End: 2018-04-26 | Stop reason: HOSPADM

## 2018-04-26 RX ORDER — HYDROCODONE BITARTRATE AND ACETAMINOPHEN 5; 325 MG/1; MG/1
1 TABLET ORAL ONCE
Status: COMPLETED | OUTPATIENT
Start: 2018-04-26 | End: 2018-04-26

## 2018-04-26 RX ORDER — FENTANYL CITRATE 50 UG/ML
INJECTION, SOLUTION INTRAMUSCULAR; INTRAVENOUS AS NEEDED
Status: DISCONTINUED | OUTPATIENT
Start: 2018-04-26 | End: 2018-04-26 | Stop reason: HOSPADM

## 2018-04-26 RX ORDER — SODIUM CHLORIDE 0.9 % (FLUSH) 0.9 %
5-10 SYRINGE (ML) INJECTION AS NEEDED
Status: DISCONTINUED | OUTPATIENT
Start: 2018-04-26 | End: 2018-04-26 | Stop reason: HOSPADM

## 2018-04-26 RX ORDER — FAMOTIDINE 20 MG/1
20 TABLET, FILM COATED ORAL ONCE
Status: COMPLETED | OUTPATIENT
Start: 2018-04-26 | End: 2018-04-26

## 2018-04-26 RX ORDER — HYDRALAZINE HYDROCHLORIDE 20 MG/ML
INJECTION INTRAMUSCULAR; INTRAVENOUS AS NEEDED
Status: DISCONTINUED | OUTPATIENT
Start: 2018-04-26 | End: 2018-04-26 | Stop reason: HOSPADM

## 2018-04-26 RX ORDER — NALBUPHINE HYDROCHLORIDE 10 MG/ML
5 INJECTION, SOLUTION INTRAMUSCULAR; INTRAVENOUS; SUBCUTANEOUS
Status: DISCONTINUED | OUTPATIENT
Start: 2018-04-26 | End: 2018-04-26 | Stop reason: HOSPADM

## 2018-04-26 RX ORDER — PROPOFOL 10 MG/ML
INJECTION, EMULSION INTRAVENOUS
Status: DISCONTINUED | OUTPATIENT
Start: 2018-04-26 | End: 2018-04-26 | Stop reason: HOSPADM

## 2018-04-26 RX ORDER — HEPARIN SODIUM 1000 [USP'U]/ML
INJECTION, SOLUTION INTRAVENOUS; SUBCUTANEOUS AS NEEDED
Status: DISCONTINUED | OUTPATIENT
Start: 2018-04-26 | End: 2018-04-26 | Stop reason: HOSPADM

## 2018-04-26 RX ORDER — MAGNESIUM SULFATE 100 %
4 CRYSTALS MISCELLANEOUS AS NEEDED
Status: DISCONTINUED | OUTPATIENT
Start: 2018-04-26 | End: 2018-04-26 | Stop reason: HOSPADM

## 2018-04-26 RX ORDER — DEXTROSE MONOHYDRATE AND SODIUM CHLORIDE 5; .225 G/100ML; G/100ML
25 INJECTION, SOLUTION INTRAVENOUS CONTINUOUS
Status: DISCONTINUED | OUTPATIENT
Start: 2018-04-26 | End: 2018-04-26 | Stop reason: HOSPADM

## 2018-04-26 RX ORDER — CEFAZOLIN SODIUM IN 0.9 % NACL 2 G/100 ML
PLASTIC BAG, INJECTION (ML) INTRAVENOUS AS NEEDED
Status: DISCONTINUED | OUTPATIENT
Start: 2018-04-26 | End: 2018-04-26 | Stop reason: HOSPADM

## 2018-04-26 RX ORDER — INSULIN LISPRO 100 [IU]/ML
INJECTION, SOLUTION INTRAVENOUS; SUBCUTANEOUS ONCE
Status: DISCONTINUED | OUTPATIENT
Start: 2018-04-26 | End: 2018-04-26 | Stop reason: HOSPADM

## 2018-04-26 RX ORDER — FENTANYL CITRATE 50 UG/ML
50 INJECTION, SOLUTION INTRAMUSCULAR; INTRAVENOUS AS NEEDED
Status: DISCONTINUED | OUTPATIENT
Start: 2018-04-26 | End: 2018-04-26 | Stop reason: HOSPADM

## 2018-04-26 RX ORDER — CEFAZOLIN SODIUM 2 G/50ML
2 SOLUTION INTRAVENOUS EVERY 8 HOURS
Status: DISCONTINUED | OUTPATIENT
Start: 2018-04-26 | End: 2018-04-26

## 2018-04-26 RX ADMIN — HYDRALAZINE HYDROCHLORIDE 10 MG: 20 INJECTION INTRAMUSCULAR; INTRAVENOUS at 08:21

## 2018-04-26 RX ADMIN — Medication 2 G: at 08:13

## 2018-04-26 RX ADMIN — HYDROCODONE BITARTRATE AND ACETAMINOPHEN 1 TABLET: 5; 325 TABLET ORAL at 11:30

## 2018-04-26 RX ADMIN — DEXTROSE MONOHYDRATE AND SODIUM CHLORIDE: 5; .225 INJECTION, SOLUTION INTRAVENOUS at 07:02

## 2018-04-26 RX ADMIN — HEPARIN SODIUM 2500 UNITS: 1000 INJECTION, SOLUTION INTRAVENOUS; SUBCUTANEOUS at 09:04

## 2018-04-26 RX ADMIN — DEXTROSE MONOHYDRATE AND SODIUM CHLORIDE 25 ML/HR: 5; .225 INJECTION, SOLUTION INTRAVENOUS at 07:41

## 2018-04-26 RX ADMIN — PROPOFOL 120 MCG/KG/MIN: 10 INJECTION, EMULSION INTRAVENOUS at 08:14

## 2018-04-26 RX ADMIN — MIDAZOLAM HYDROCHLORIDE 2 MG: 1 INJECTION, SOLUTION INTRAMUSCULAR; INTRAVENOUS at 08:00

## 2018-04-26 RX ADMIN — FAMOTIDINE 20 MG: 20 TABLET, FILM COATED ORAL at 07:41

## 2018-04-26 RX ADMIN — FENTANYL CITRATE 100 MCG: 50 INJECTION, SOLUTION INTRAMUSCULAR; INTRAVENOUS at 08:08

## 2018-04-26 RX ADMIN — INSULIN LISPRO 15 UNITS: 100 INJECTION, SOLUTION INTRAVENOUS; SUBCUTANEOUS at 10:40

## 2018-04-26 NOTE — ANESTHESIA PREPROCEDURE EVALUATION
Anesthetic History   No history of anesthetic complications            Review of Systems / Medical History  Patient summary reviewed, nursing notes reviewed and pertinent labs reviewed    Pulmonary  Within defined limits                 Neuro/Psych   Within defined limits          Comments: glaucoma Cardiovascular    Hypertension: poorly controlled                   GI/Hepatic/Renal                Endo/Other    Diabetes: poorly controlled, type 2, using insulin         Other Findings            Physical Exam    Airway  Mallampati: III  TM Distance: 4 - 6 cm  Neck ROM: normal range of motion   Mouth opening: Normal     Cardiovascular  Regular rate and rhythm,  S1 and S2 normal,  no murmur, click, rub, or gallop  Rhythm: regular  Rate: normal         Dental  No notable dental hx       Pulmonary  Breath sounds clear to auscultation               Abdominal  GI exam deferred       Other Findings            Anesthetic Plan    ASA: 4  Anesthesia type: MAC          Induction: Intravenous  Anesthetic plan and risks discussed with: Patient

## 2018-04-26 NOTE — IP AVS SNAPSHOT
[address] [address] 
[phone] Patient: Anais Banda MRN: [id number] :1960 About your hospitalization You were admitted on:  2018 You last received care in the:  [facility] PACU You were discharged on:  2018 Why you were hospitalized Your primary diagnosis was:  Not on File Your diagnoses also included:  Esrd (End Stage Renal Disease) (Hcc) Follow-up Information Follow up With Details Comments Contact Info Jose Armando Austin MD [facility] [address] [address] 
[phone] Talha East MD  Follow up as scheduled. [address] [place], [place] [address] [facility] 
[phone] Your Scheduled Appointments Wednesday May 09, 2018  9:00 AM EDT Office Visit with Jose Armando Austin MD  
[facility] [address] [address]  
[phone] Thursday May 10, 2018  3:15 PM EDT HOSPITAL DISCHARGE with [facility] ([facility]) [address] [place] [address]  
[phone] Discharge Orders None A check feliberto indicates which time of day the medication should be taken. My Medications START taking these medications Instructions Each Dose to Equal  
 Morning Noon Evening Bedtime HYDROcodone-acetaminophen 5-325 mg per tablet Commonly known as:  Concepcion Steve Take 1-2 Tabs by mouth every four (4) hours as needed for Pain. Max Daily Amount: 12 Tabs. 1-2 Tab CHANGE how you take these medications Instructions Each Dose to Equal  
 Morning Noon Evening Bedtime  
 fluticasone 50 mcg/actuation nasal spray Commonly known as:  Francis Marie What changed:   
- when to take this 
- reasons to take this 1 [place] by Both Nostrils route two (2) times a day. 1 Spray  
    
   
   
   
  
 hydrALAZINE 25 mg tablet Commonly known as:  APRESOLINE What changed:   
- when to take this 
- additional instructions Take 1 Tab by mouth two (2) times a day. 25 mg CONTINUE taking these medications Instructions Each Dose to Equal  
 Morning Noon Evening Bedtime  
 amLODIPine 10 mg tablet Commonly known as:  Mik Schmidt Take 1 Tab by mouth every evening. 10 mg  
    
   
   
   
  
 b complex-vitamin c-folic acid 1 mg capsule Commonly known as:  Chrissyellen Jessica Take 1 Cap by mouth daily. 1 Cap Blood-Glucose Meter monitoring kit  
   
 by Does Not Apply route. One touch ultra2  
     
   
   
   
  
 calcium acetate 667 mg Cap Commonly known as:  PHOSLO Take 1 Cap by mouth three (3) times daily (with meals). 1 Cap  
    
   
   
   
  
 cloNIDine 0.3 mg/24 hr  
Commonly known as:  CATAPRES  
   
 APPLY 1 PATCH TO SKIN ONCE EVERY 7 DAYS  
     
   
   
   
  
 cyclobenzaprine 10 mg tablet Commonly known as:  FLEXERIL Take 1 Tab by mouth three (3) times daily as needed for Muscle Spasm(s). 10 mg  
    
   
   
   
  
 docusate sodium 100 mg capsule Commonly known as:  Zaki Stamford Take 1 Cap by mouth two (2) times a day. 100 mg  
    
   
   
   
  
 finasteride 5 mg tablet Commonly known as:  PROSCAR  
   
 TAKE 1 TABLET BY MOUTH DAILY. glucose blood VI test strips strip Commonly known as:  ASCENSIA AUTODISC VI, ONE TOUCH ULTRA TEST VI Test twice daily:  Fasting before breakfast and 2 hours after dinner (log readings), One touch ultra 2 test strips please; Dx: 250.02  
     
   
   
   
  
 insulin glargine 100 unit/mL injection Commonly known as:  LANTUS  
   
 8 Units by SubCUTAneous route nightly. 8 Units  
    
   
   
   
  
 insulin lispro 100 unit/mL injection Commonly known as:  HUMALOG Your last dose was:  15 units at 10:40 am  
Notes to Patient:  Please resume your home regime. Your fasting pre-operative blood sugar 352. Please review this information with your PCP. Blood Sugar (mg/dL): <150 =0 units; 150 -199 =2 units; 200 -249 =4 units; 250 -299 =6 units; 300 -349 =8 units; 350 and above =10 units. Insulin Syringe-Needle U-100 0.5 mL 29 gauge x 1/2\" Syrg Commonly known as:  INSULIN SYRINGE ULTRAFINE  
   
 1 Each by Does Not Apply route Before breakfast, lunch, dinner and at bedtime. And for use with lantus qhs.  
 1 Each Iron 325 mg (65 mg iron) tablet Generic drug:  ferrous sulfate Take  by mouth Daily (before breakfast). Take 1 tablet by mouth once a week as per patient. Lancets Misc Commonly known as: One Touch Gwynneth Sidles Test twice daily: Once in the morning fasting, then two hours after dinner (log results)  
     
   
   
   
  
 losartan 50 mg tablet Commonly known as:  COZAAR Take 1 Tab by mouth daily. 50 mg  
    
   
   
   
  
 mupirocin 2 % ointment Commonly known as:  BACTROBAN  
   
      
   
   
   
  
 OTHER PGIIL/P CRM - Apply 1 -2 grams ( 1-2 pumps) to left foot 3 - 4 times daily. prednisoLONE acetate 1 % ophthalmic suspension Commonly known as:  PRED FORTE Administer 1 Drop to left eye three (3) times daily. 1 Drop  
    
   
   
   
  
 rosuvastatin 20 mg tablet Commonly known as:  CRESTOR Take 1 Tab by mouth nightly. 20 mg  
    
   
   
   
  
 TYLENOL EXTRA STRENGTH 500 mg tablet Generic drug:  acetaminophen Take  by mouth every six (6) hours as needed for Pain. VITAMIN B-12 1,000 mcg tablet Generic drug:  cyanocobalamin Take 1,000 mcg by mouth daily. 1000 mcg Where to Get Your Medications Information on where to get these meds will be given to you by the nurse or doctor. ! Ask your nurse or doctor about these medications HYDROcodone-acetaminophen 5-325 mg per tablet Opioid Education Prescription Opioids: What You Need to Know: 
 
Prescription opioids can be used to help relieve moderate-to-severe pain and are often prescribed following a surgery or injury, or for certain health conditions. These medications can be an important part of treatment but also come with serious risks. Opioids are strong pain medicines. Examples include hydrocodone, oxycodone, fentanyl, and morphine. Heroin is an example of an illegal opioid. It is important to work with your health care provider to make sure you are getting the safest, most effective care. WHAT ARE THE RISKS AND SIDE EFFECTS OF OPIOID USE? Prescription opioids carry serious risks of addiction and overdose, especially with prolonged use. An opioid overdose, often marked by slow breathing, can cause sudden death. The use of prescription opioids can have a number of side effects as well, even when taken as directed. · Tolerance-meaning you might need to take more of a medication for the same pain relief · Physical dependence-meaning you have symptoms of withdrawal when the medication is stopped. Withdrawal symptoms can include nausea, sweating, chills, diarrhea, stomach cramps, and muscle aches. Withdrawal can last up to several weeks, depending on which drug you took and how long you took it. · Increased sensitivity to pain · Constipation · Nausea, vomiting, and dry mouth · Sleepiness and dizziness · Confusion · Depression · Low levels of testosterone that can result in lower sex drive, energy, and strength · Itching and sweating RISKS ARE GREATER WITH:      
· History of drug misuse, substance use disorder, or overdose · Mental health conditions (such as depression or anxiety) · Sleep apnea · Older age (72 years or older) · Pregnancy Avoid alcohol while taking prescription opioids. Also, unless specifically advised by your health care provider, medications to avoid include: · Benzodiazepines (such as Xanax or Valium) · Muscle relaxants (such as Soma or Flexeril) · Hypnotics (such as Ambien or Lunesta) · Other prescription opioids KNOW YOUR OPTIONS Talk to your health care provider about ways to manage your pain that don't involve prescription opioids. Some of these options may actually work better and have fewer risks and side effects. Options may include: 
· Pain relievers such as acetaminophen, ibuprofen, and naproxen · Some medications that are also used for depression or seizures · Physical therapy and exercise · Counseling to help patients learn how to cope better with triggers of pain and stress. · Application of heat or cold compress · Massage therapy · Relaxation techniques Be Informed Make sure you know the name of your medication, how much and how often to take it, and its potential risks & side effects. IF YOU ARE PRESCRIBED OPIOIDS FOR PAIN: 
· Never take opioids in greater amounts or more often than prescribed. Remember the goal is not to be pain-free but to manage your pain at a tolerable level. · Follow up with your primary care provider to: · Work together to create a plan on how to manage your pain. · Talk about ways to help manage your pain that don't involve prescription opioids. · Talk about any and all concerns and side effects. · Help prevent misuse and abuse. · Never sell or share prescription opioids · Help prevent misuse and abuse. · Store prescription opioids in a secure place and out of reach of others (this may include visitors, children, friends, and family).  
· Safely dispose of unused/unwanted prescription opioids: Find your community drug take-back program or your pharmacy mail-back program, or flush them down the toilet, following guidance from the Food and Drug Administration (www.fda.gov/Drugs/ResourcesForYou). · Visit www.cdc.gov/drugoverdose to learn about the risks of opioid abuse and overdose. · If you believe you may be struggling with addiction, tell your health care provider and ask for guidance or call Moni Adam at 2-736-142-NREP. Discharge Instructions Hemodialysis Access: What to Expect at [facility] Your Recovery Hemodialysis is a way to remove wastes from the blood when your kidneys can no longer do the job. It is not a cure, but it can help you live longer and feel better. It is a lifesaving treatment when you have kidney failure. Hemodialysis is often called dialysis. Your doctor created a place (called an access) in your arm for your blood to flow in and out of your body during your dialysis sessions. Your arm will probably be bruised and swollen. It may hurt. The cut (incision) may bleed. The pain and bleeding will get better over several days. You will probably need only over-the-counter pain medicine. You can reduce swelling by propping your arm on 1 or 2 pillows and keeping your elbow straight. You will have stitches. These may dissolve on their own, or your doctor will tell you when to come in to have them removed. You should also be able to return to work in a few days. You may feel some coolness or numbness in your hand. These feelings usually go away in a few weeks. Your doctor may suggest squeezing a soft object. This will strengthen your access and may make hemodialysis faster and easier. You should always be able to feel blood rushing through the fistula or graft. It feels like a slight vibration when you put your fingers on the skin over the fistula or graft. This feeling is called a thrill or pulse.  
This care sheet gives you a general idea about how long it will take for you to recover. But each person recovers at a different pace. Follow the steps below to get better as quickly as possible. How can you care for yourself at home? Activity ? · Rest when you feel tired. Getting enough sleep will help you recover. Do not lie on or sleep on the arm with the access. ? · Avoid activities such as washing windows or gardening that put stress on the arm with the access. ? · You may use your arm, but do not lift anything that weighs more than about 15 pounds. This may include a child, heavy grocery bags, a heavy briefcase or backpack, cat litter or dog food bags, or a vacuum . ? · You can shower, but keep the access dry for the first 2 days. Cover the area with a plastic bag to keep it dry. ? · Do not soak or scrub the incision until it has healed. ? · Wear an arm guard to protect the area if you play sports or work with your arms. ? · You may drive when your doctor says it is okay. This is usually in 1 to 2 days. ? · Most people are able to return to work about 1 or 2 days after surgery. Diet ? · Follow an eating plan that is good for your kidneys. A registered dietitian can help you make a meal plan that is right for you. You may need to limit protein, salt, fluids, and certain foods. Medicines ? · Your doctor will tell you if and when you can restart your medicines. He or she will also give you instructions about taking any new medicines. ? · If you take blood thinners, such as warfarin (Coumadin), clopidogrel (Plavix), or aspirin, be sure to talk to your doctor. He or she will tell you if and when to start taking those medicines again. Make sure that you understand exactly what your doctor wants you to do. ? · Take pain medicines exactly as directed. ¨ If the doctor gave you a prescription medicine for pain, take it as prescribed.  
¨ If you are not taking a prescription pain medicine, ask your doctor if you can take acetaminophen (Tylenol). Do not take ibuprofen (Advil, Motrin) or naproxen (Aleve), or similar medicines, unless your doctor tells you to. They may make chronic kidney disease worse. ¨ Do not take two or more pain medicines at the same time unless the doctor told you to. Many pain medicines have acetaminophen, which is Tylenol. Too much acetaminophen (Tylenol) can be harmful. ? · If you think your pain medicine is making you sick to your stomach: 
¨ Take your medicine after meals (unless your doctor has told you not to). ¨ Ask your doctor for a different pain medicine. ? · If your doctor prescribed antibiotics, take them as directed. Do not stop taking them just because you feel better. You need to take the full course of antibiotics. Incision care ? · Keep the area dry for 2 days. After 2 days, wash the area with soap and water every day, and always before dialysis. ? · Do not soak or scrub the incision until it has healed. ? · If you have a bandage, change it every day or as your doctor recommends. Your doctor will tell you when you can remove it. Exercise ? · Squeeze a soft ball or other object as your doctor tells you. This will help blood flow through the access and help prevent blood clots. ? Elevation ? · Prop up the sore arm on a pillow anytime you sit or lie down during the next 3 days. Try to keep it above the level of your heart. This will help reduce swelling. Other instructions ? · Every day, check your access for a pulse or thrill in the fistula or graft area. A thrill is a vibration. To feel a pulse or thrill, place the first two fingers of your hand over the access. ? · Do not bump your arm. ? · Do not wear tight clothing, jewelry, or anything else that may squeeze the access. ? · Use your other arm to have blood drawn or blood pressure taken. ? · Do not put cream or lotion on or near the access. ? · Make sure all doctors you deal with know you have a vascular access. Follow-up care is a key part of your treatment and safety. Be sure to make and go to all appointments, and call your doctor if you are having problems. It's also a good idea to know your test results and keep a list of the medicines you take. When should you call for help? Call 911 anytime you think you may need emergency care. For example, call if: 
? · You passed out (lost consciousness). ? · You have chest pain, are short of breath, or cough up blood. ?Call your doctor now or seek immediate medical care if: 
? · Your hand or arm is cold or dark-colored. ? · You have no pulse in your access. ? · You have nausea or you vomit. ? · You have pain that does not get better after you take pain medicine. ? · You have loose stitches, or your incision comes open. ? · You are bleeding from the incision. ? · You have signs of infection, such as: 
¨ Increased pain, swelling, warmth, or redness. ¨ Red streaks leading from the area. ¨ Pus draining from the area. ¨ A fever. ? · You have signs of a blood clot in your leg (called a deep vein thrombosis), such as: 
¨ Pain in your calf, back of the knee, thigh, or groin. ¨ Redness or swelling in your leg. ? Watch closely for changes in your health, and be sure to contact your doctor if you have any problems. Where can you learn more? Go to http://hoang-jayden.info/. Enter P616 in the search box to learn more about \"Hemodialysis Access: What to Expect at Home. \" Current as of: May 12, 2017 Content Version: 11.4 © 2919-3587 Healthwise, Incorporated. Care instructions adapted under license by [employer] (which disclaims liability or warranty for this information).  If you have questions about a medical condition or this instruction, always ask your healthcare professional. [address] any warranty or liability for your use of this information. DISCHARGE SUMMARY from Nurse PATIENT INSTRUCTIONS: 
 
 
F-face looks uneven A-arms unable to move or move unevenly S-speech slurred or non-existent T-time-call 911 as soon as signs and symptoms begin-DO NOT go Back to bed or wait to see if you get better-TIME IS BRAIN. Warning Signs of HEART ATTACK Call 911 if you have these symptoms: 
? Chest discomfort. Most heart attacks involve discomfort in the center of the chest that lasts more than a few minutes, or that goes away and comes back. It can feel like uncomfortable pressure, squeezing, fullness, or pain. ? Discomfort in other areas of the upper body. Symptoms can include pain or discomfort in one or both arms, the back, neck, jaw, or stomach. ? Shortness of breath with or without chest discomfort. ? Other signs may include breaking out in a cold sweat, nausea, or lightheadedness. Don't wait more than five minutes to call [address]! Fast action can save your life. Calling 911 is almost always the fastest way to get lifesaving treatment. Emergency Medical Services staff can begin treatment when they arrive  up to an hour sooner than if someone gets to the hospital by car. The discharge information has been reviewed with the patient and spouse. The patient and spouse verbalized understanding. Discharge medications reviewed with the patient and spouse and appropriate educational materials and side effects teaching were provided. Narcotic-Analgesic/Acetaminophen (Percocet, Norco, Lorcet HD, Lortab 10/325) - (By mouth) Why this medicine is used:  
Relieves pain. Contact a nurse or doctor right away if you have: 
· Extreme weakness, shallow breathing, slow heartbeat · Severe confusion, lightheadedness, dizziness, fainting · Yellow skin or eyes, dark urine or pale stools · Severe constipation, severe stomach pain, nausea, vomiting, loss of appetite · Sweating or cold, clammy skin Common side effects: · Mild constipation, nausea, vomiting · Sleepiness, tiredness · Itching, rash © 2017 [facility] Information is for End User's use only and may not be sold, redistributed or otherwise used for commercial purposes. ___________________________________________________________________________________________________________________________________ Introducing \[facility]\"" & [facility] SERVICES! Abigail Landa introduces Meditech patient portal. Now you can access parts of your medical record, email your doctor's office, and request medication refills online. 1. In your internet browser, go to [email or web address] 2. Click on the First Time User? Click Here link in the Sign In box. You will see the New Member Sign Up page. 3. Enter your Meditech Access Code exactly as it appears below. You will not need to use this code after youve completed the sign-up process. If you do not sign up before the expiration date, you must request a new code. · Meditech Access Code: [email or web address] Expires: 7/12/2018  9:21 AM 
 
4. Enter the last four digits of your Social Security Number (xxxx) and Date of Birth (mm/dd/yyyy) as indicated and click Submit. You will be taken to the next sign-up page. 5. Create a Fluencyt ID. This will be your Fluencyt login ID and cannot be changed, so think of one that is secure and easy to remember. 6. Create a Fluencyt password. You can change your password at any time. 7. Enter your Password Reset Question and Answer. This can be used at a later time if you forget your password. 8. Enter your e-mail address. You will receive e-mail notification when new information is available in [address] 9. Click Sign Up. You can now view and download portions of your medical record. 10. Click the Download Summary menu link to download a portable copy of your medical information. If you have questions, please visit the Frequently Asked Questions section of the [employer] website. Remember, [employer] is NOT to be used for urgent needs. For medical emergencies, dial 911. Now available from your iPhone and Android! Introducing Jesse Aragon As a [facility] patient, I wanted to make you aware of our electronic visit tool called Jesse Aragon. Aveillant/SumoSkinny allows you to connect within minutes with a medical provider 24 hours a day, seven days a week via a mobile device or tablet or logging into a secure website from your computer. You can access Jesse Aragon from anywhere in the [place]. A virtual visit might be right for you when you have a simple condition and feel like you just dont want to get out of bed, or cant get away from work for an appointment, when your regular [facility] provider is not available (evenings, weekends or holidays), or when youre out of town and need minor care. Electronic visits cost only $49 and if the [employer]/SumoSkinny provider determines a prescription is needed to treat your condition, one can be electronically transmitted to a nearby pharmacy*. Please take a moment to enroll today if you have not already done so. The enrollment process is free and takes just a few minutes. To enroll, please download the Aveillant/SumoSkinny tom to your tablet or phone, or visit [email or web address] to enroll on your computer. And, as an [address] [place] [address] patient with a Klick2Contact account, the results of your visits will be scanned into your electronic medical record and your primary care provider will be able to view the scanned results. We urge you to continue to see your regular [facility] provider for your ongoing medical care.   And while your primary care provider may not be the one available when you seek a Jesse Aragon virtual visit, the peace of mind you get from getting a real diagnosis real time can be priceless. For more information on Jesse Aragon, view our Frequently Asked Questions (FAQs) at [email or web address]. Sincerely, 
 
Ginny Sales MD 
Chief Medical Officer [employer] *:  certain medications cannot be prescribed via Jesse Aragon Providers Seen During Your Hospitalization Provider Specialty Primary office phone Martell Barlow MD Vascular Surgery [phone] Your Primary Care Physician (PCP) Primary Care Physician Office Phone Office Fax Lynne Conklin [phone] You are allergic to the following Allergen Reactions Bactrim (Sulfamethoprim Ds) Nausea and Vomiting Coreg (Carvedilol) Nausea and Vomiting Difficulty walking Recent Documentation Height Weight BMI Smoking Status 1.676 m 83.9 kg 29.86 kg/m2 Never Smoker Emergency Contacts Name Discharge Info Relation Home Work Mobile [address] [facility] CAREGIVER [3] Spouse [3] [phone] Patient Belongings The following personal items are in your possession at time of discharge: 
  Dental Appliances: None  Visual Aid: None      Home Medications: None   Jewelry: None  Clothing: Undergarments, Pants, Shirt, Footwear, Socks    Other Valuables: None Please provide this summary of care documentation to your next provider. Signatures-by signing, you are acknowledging that this After Visit Summary has been reviewed with you and you have received a copy. Patient Signature:  ____________________________________________________________ Date:  ____________________________________________________________  
  
Ramya Castillo Provider Signature:  ____________________________________________________________ Date:  ____________________________________________________________

## 2018-04-26 NOTE — PERIOP NOTES
Patient armband removed and shredded  Patient confirmed by two identifiers with discharge instructions prior too being provided to patient and spouse. Information provided on Catapres patch, pain medication and insulin.

## 2018-04-26 NOTE — OP NOTES
[address] [place] [address]   OPERATIVE REPORT    [place]  MR#: [id number]  : 1960  ACCOUNT #: [de-identified]   DATE OF SERVICE: 2018    PREOPERATIVE DIAGNOSIS:  End-stage renal disease. POSTOPERATIVE DIAGNOSIS:  End-stage renal disease. PROCEDURES PERFORMED:  Creation of primary dialysis access site left brachial artery to axillary vein arteriovenous graft. COMPLICATIONS:  0.    ESTIMATED BLOOD LOSS:  0.    CONDITION OF PATIENT:  Stable. ANESTHESIA:  Monitored sedation with local.    SURGEON:  Anika Vera DO.    ASSISTANT:  Antoinette Anderson. IMPLANTS:  A 4-7 mm taper Propaten graft. EXPLANTS:  None. SPECIMENS REMOVED: 0.     DETAILS OF PROCEDURE:  The patient is a 49-year-old currently using a tunneled dialysis catheter and needs permanent dialysis access. He is agreeable to the procedure, understands risks and benefits including bleeding, neuropathy and steal syndrome and also associated risks with anesthesia. He was brought back to the room, had preoperative antibiotic. He had moderate sedation, supine position, shaved, prepped and draped in usual standard fashion for left arm following [facility] compliant guidelines for aseptic technique. I localized the axilla and the antecubital fossa with 1% plain lidocaine. I did a cutdown in the axilla first.  The basilic vein was very small, unusable for dialysis. I did isolate the axillary vein and place vessel loops for controls. I made a second incision at the antecubital fossa. The superficial veins were super small as seen on ultrasound. The brachial artery was very calcific, but I placed vessel loops on this as well and made a curvilinear tunnel between the 2 sites. I placed a 4-7 mm Propaten graft, anticoagulated the patient. After the appropriate waiting period, I gained controls on the artery, made an arteriotomy, calcific, but appropriate for graft.   I cut the graft in a beveled fashion and sewed it end-to-side to the artery using CV6 [place] suture in a circular standard fashion. This was the 4 mm end of the graft. I clamped the graft in place and released the controls. There was good hemostasis. Again, the vein controls, a venotomy was made, with an 11 blade scalpel and Moncada scissors, I cut the graft on a bevelled edge and sewed it end-to-side to the vein using CV6 [place]-Mike suture in a circular and standard fashion. Upon completion of this, I released all of the controls. There was a wonderful thrill in the graft. I irrigated and closed 3-0 Monocryl for fascia, 4-0 Monocryl and Dermabond glue for skin. He was transferred back to recovery in stable condition.       DO NATALIIA Zambrano / Nick Jackson  D: 04/26/2018 10:15     T: 04/26/2018 12:33  JOB #: [id number]

## 2018-04-26 NOTE — DISCHARGE INSTRUCTIONS
Hemodialysis Access: What to Expect at [facility]  Hemodialysis is a way to remove wastes from the blood when your kidneys can no longer do the job. It is not a cure, but it can help you live longer and feel better. It is a lifesaving treatment when you have kidney failure. Hemodialysis is often called dialysis. Your doctor created a place (called an access) in your arm for your blood to flow in and out of your body during your dialysis sessions. Your arm will probably be bruised and swollen. It may hurt. The cut (incision) may bleed. The pain and bleeding will get better over several days. You will probably need only over-the-counter pain medicine. You can reduce swelling by propping your arm on 1 or 2 pillows and keeping your elbow straight. You will have stitches. These may dissolve on their own, or your doctor will tell you when to come in to have them removed. You should also be able to return to work in a few days. You may feel some coolness or numbness in your hand. These feelings usually go away in a few weeks. Your doctor may suggest squeezing a soft object. This will strengthen your access and may make hemodialysis faster and easier. You should always be able to feel blood rushing through the fistula or graft. It feels like a slight vibration when you put your fingers on the skin over the fistula or graft. This feeling is called a thrill or pulse. This care sheet gives you a general idea about how long it will take for you to recover. But each person recovers at a different pace. Follow the steps below to get better as quickly as possible. How can you care for yourself at home? Activity  ? · Rest when you feel tired. Getting enough sleep will help you recover. Do not lie on or sleep on the arm with the access. ? · Avoid activities such as washing windows or gardening that put stress on the arm with the access.    ? · You may use your arm, but do not lift anything that weighs more than about 15 pounds. This may include a child, heavy grocery bags, a heavy briefcase or backpack, cat litter or dog food bags, or a vacuum . ? · You can shower, but keep the access dry for the first 2 days. Cover the area with a plastic bag to keep it dry. ? · Do not soak or scrub the incision until it has healed. ? · Wear an arm guard to protect the area if you play sports or work with your arms. ? · You may drive when your doctor says it is okay. This is usually in 1 to 2 days. ? · Most people are able to return to work about 1 or 2 days after surgery. Diet  ? · Follow an eating plan that is good for your kidneys. A registered dietitian can help you make a meal plan that is right for you. You may need to limit protein, salt, fluids, and certain foods. Medicines  ? · Your doctor will tell you if and when you can restart your medicines. He or she will also give you instructions about taking any new medicines. ? · If you take blood thinners, such as warfarin (Coumadin), clopidogrel (Plavix), or aspirin, be sure to talk to your doctor. He or she will tell you if and when to start taking those medicines again. Make sure that you understand exactly what your doctor wants you to do. ? · Take pain medicines exactly as directed. ¨ If the doctor gave you a prescription medicine for pain, take it as prescribed. ¨ If you are not taking a prescription pain medicine, ask your doctor if you can take acetaminophen (Tylenol). Do not take ibuprofen (Advil, Motrin) or naproxen (Aleve), or similar medicines, unless your doctor tells you to. They may make chronic kidney disease worse. ¨ Do not take two or more pain medicines at the same time unless the doctor told you to. Many pain medicines have acetaminophen, which is Tylenol. Too much acetaminophen (Tylenol) can be harmful.    ? · If you think your pain medicine is making you sick to your stomach:  ¨ Take your medicine after meals (unless your doctor has told you not to). ¨ Ask your doctor for a different pain medicine. ? · If your doctor prescribed antibiotics, take them as directed. Do not stop taking them just because you feel better. You need to take the full course of antibiotics. Incision care  ? · Keep the area dry for 2 days. After 2 days, wash the area with soap and water every day, and always before dialysis. ? · Do not soak or scrub the incision until it has healed. ? · If you have a bandage, change it every day or as your doctor recommends. Your doctor will tell you when you can remove it. Exercise  ? · Squeeze a soft ball or other object as your doctor tells you. This will help blood flow through the access and help prevent blood clots. ? Elevation  ? · Prop up the sore arm on a pillow anytime you sit or lie down during the next 3 days. Try to keep it above the level of your heart. This will help reduce swelling. Other instructions  ? · Every day, check your access for a pulse or thrill in the fistula or graft area. A thrill is a vibration. To feel a pulse or thrill, place the first two fingers of your hand over the access. ? · Do not bump your arm. ? · Do not wear tight clothing, jewelry, or anything else that may squeeze the access. ? · Use your other arm to have blood drawn or blood pressure taken. ? · Do not put cream or lotion on or near the access. ? · Make sure all doctors you deal with know you have a vascular access. Follow-up care is a key part of your treatment and safety. Be sure to make and go to all appointments, and call your doctor if you are having problems. It's also a good idea to know your test results and keep a list of the medicines you take. When should you call for help? Call 911 anytime you think you may need emergency care. For example, call if:  ? · You passed out (lost consciousness). ? · You have chest pain, are short of breath, or cough up blood.    ?Call your doctor now or seek immediate medical care if:  ? · Your hand or arm is cold or dark-colored. ? · You have no pulse in your access. ? · You have nausea or you vomit. ? · You have pain that does not get better after you take pain medicine. ? · You have loose stitches, or your incision comes open. ? · You are bleeding from the incision. ? · You have signs of infection, such as:  ¨ Increased pain, swelling, warmth, or redness. ¨ Red streaks leading from the area. ¨ Pus draining from the area. ¨ A fever. ? · You have signs of a blood clot in your leg (called a deep vein thrombosis), such as:  ¨ Pain in your calf, back of the knee, thigh, or groin. ¨ Redness or swelling in your leg. ? Watch closely for changes in your health, and be sure to contact your doctor if you have any problems. Where can you learn more? Go to http://hoang-jayden.info/. Enter P616 in the search box to learn more about \"Hemodialysis Access: What to Expect at Home. \"  Current as of: May 12, 2017  Content Version: 11.4  © 9326-4420 [employer]. Care instructions adapted under license by [employer] (which disclaims liability or warranty for this information). If you have questions about a medical condition or this instruction, always ask your healthcare professional. [address] any warranty or liability for your use of this information. DISCHARGE SUMMARY from Nurse    PATIENT INSTRUCTIONS:    After general anesthesia or intravenous sedation, for 24 hours or while taking prescription Narcotics:  · Limit your activities  · Do not drive and operate hazardous machinery  · Do not make important personal or business decisions  · Do  not drink alcoholic beverages  · If you have not urinated within 8 hours after discharge, please contact your surgeon on call.     Report the following to your surgeon:  · Excessive pain, swelling, redness or odor of or around the surgical area  · Temperature over 100.5  · Nausea and vomiting lasting longer than 4 hours or if unable to take medications  · Any signs of decreased circulation or nerve impairment to extremity: change in color, persistent  numbness, tingling, coldness or increase pain  · Any questions  *  Please give a list of your current medications to your Primary Care Provider. *  Please update this list whenever your medications are discontinued, doses are      changed, or new medications (including over-the-counter products) are added. *  Please carry medication information at all times in case of emergency situations. These are general instructions for a healthy lifestyle:    No smoking/ No tobacco products/ Avoid exposure to second hand smoke  Surgeon General's Warning:  Quitting smoking now greatly reduces serious risk to your health. Obesity, smoking, and sedentary lifestyle greatly increases your risk for illness    A healthy diet, regular physical exercise & weight monitoring are important for maintaining a healthy lifestyle    You may be retaining fluid if you have a history of heart failure or if you experience any of the following symptoms:  Weight gain of 3 pounds or more overnight or 5 pounds in a week, increased swelling in our hands or feet or shortness of breath while lying flat in bed. Please call your doctor as soon as you notice any of these symptoms; do not wait until your next office visit. Recognize signs and symptoms of STROKE:    F-face looks uneven    A-arms unable to move or move unevenly    S-speech slurred or non-existent    T-time-call 911 as soon as signs and symptoms begin-DO NOT go       Back to bed or wait to see if you get better-TIME IS BRAIN. Warning Signs of HEART ATTACK     Call 911 if you have these symptoms:   Chest discomfort. Most heart attacks involve discomfort in the center of the chest that lasts more than a few minutes, or that goes away and comes back.  It can feel like uncomfortable pressure, squeezing, fullness, or pain.  Discomfort in other areas of the upper body. Symptoms can include pain or discomfort in one or both arms, the back, neck, jaw, or stomach.  Shortness of breath with or without chest discomfort.  Other signs may include breaking out in a cold sweat, nausea, or lightheadedness. Don't wait more than five minutes to call 911 - MINUTES MATTER! Fast action can save your life. Calling 911 is almost always the fastest way to get lifesaving treatment. Emergency Medical Services staff can begin treatment when they arrive -- up to an hour sooner than if someone gets to the hospital by car. The discharge information has been reviewed with the patient and spouse. The patient and spouse verbalized understanding. Discharge medications reviewed with the patient and spouse and appropriate educational materials and side effects teaching were provided. Narcotic-Analgesic/Acetaminophen (Percocet, Norco, Lorcet HD, Lortab 10/325) - (By mouth)   Why this medicine is used:   Relieves pain. Contact a nurse or doctor right away if you have:  · Extreme weakness, shallow breathing, slow heartbeat  · Severe confusion, lightheadedness, dizziness, fainting  · Yellow skin or eyes, dark urine or pale stools  · Severe constipation, severe stomach pain, nausea, vomiting, loss of appetite  · Sweating or cold, clammy skin     Common side effects:  · Mild constipation, nausea, vomiting  · Sleepiness, tiredness  · Itching, rash  © 2017 [address] [employer] [employer] Information is for End User's use only and may not be sold, redistributed or otherwise used for commercial purposes.     ___________________________________________________________________________________________________________________________________

## 2018-04-26 NOTE — PERIOP NOTES
1020  Re checked BG with reading of 372. Notified Dr. Lydia Carbone who said to treat with sliding scale ordered and to tell the wife as she manages the insulin pump for the patient.

## 2018-04-26 NOTE — ANESTHESIA POSTPROCEDURE EVALUATION
Post-Anesthesia Evaluation and Assessment    Patient: Joaquin Olguin MRN: [id number]  SSN: [id number]    YOB: 1960  Age: 62 y.o. Sex: male       Cardiovascular Function/Vital Signs  Visit Vitals    /70    Pulse 62    Temp (!) 34.6 °C (94.3 °F)    Resp 16    Ht 5' 6\" (1.676 m)    Wt 83.9 kg (185 lb)    SpO2 97%    BMI 29.86 kg/m2       Patient is status post MAC anesthesia for Procedure(s):  CREATION OF LEFT ARM ARTERIO VENOUS GRAFT/C-ARM. Nausea/Vomiting: None    Postoperative hydration reviewed and adequate. Pain:  Pain Scale 1: Numeric (0 - 10) (04/26/18 0730)  Pain Intensity 1: 8 (04/26/18 0730)   Managed    Neurological Status:   Neuro (WDL): Within Defined Limits (04/26/18 0733)   At baseline    Mental Status and Level of Consciousness: Arousable    Pulmonary Status:   O2 Device: Nasal cannula (04/26/18 1004)   Adequate oxygenation and airway patent    Complications related to anesthesia: None    Post-anesthesia assessment completed.  No concerns      Signed By: Claudine Bowen MD     April 26, 2018

## 2018-05-09 ENCOUNTER — OFFICE VISIT (OUTPATIENT)
Dept: FAMILY MEDICINE CLINIC | Age: 58
End: 2018-05-09

## 2018-05-09 ENCOUNTER — HOSPITAL ENCOUNTER (OUTPATIENT)
Dept: LAB | Age: 58
Discharge: HOME | End: 2018-05-09
Payer: COMMERCIAL

## 2018-05-09 VITALS
TEMPERATURE: 98.5 F | HEIGHT: 66 IN | OXYGEN SATURATION: 95 % | BODY MASS INDEX: 29.73 KG/M2 | WEIGHT: 185 LBS | HEART RATE: 79 BPM | SYSTOLIC BLOOD PRESSURE: 150 MMHG | DIASTOLIC BLOOD PRESSURE: 70 MMHG | RESPIRATION RATE: 16 BRPM

## 2018-05-09 DIAGNOSIS — E78.00 HYPERCHOLESTEREMIA: ICD-10-CM

## 2018-05-09 DIAGNOSIS — I10 ESSENTIAL HYPERTENSION: Primary | ICD-10-CM

## 2018-05-09 DIAGNOSIS — I10 ESSENTIAL HYPERTENSION: ICD-10-CM

## 2018-05-09 PROBLEM — Z89.422 STATUS POST AMPUTATION OF TOE OF LEFT FOOT (HCC): Status: ACTIVE | Noted: 2018-05-09

## 2018-05-09 PROBLEM — Z89.439 STATUS POST AMPUTATION OF FOOT (HCC): Status: ACTIVE | Noted: 2018-05-09

## 2018-05-09 LAB
ALT SERPL-CCNC: 21 U/L (ref 16–61)
ANION GAP SERPL CALC-SCNC: 8 MMOL/L (ref 3–18)
AST SERPL-CCNC: 27 U/L (ref 15–37)
BUN SERPL-MCNC: 17 MG/DL (ref 7–18)
BUN/CREAT SERPL: 3 (ref 12–20)
CALCIUM SERPL-MCNC: 8.3 MG/DL (ref 8.5–10.1)
CHLORIDE SERPL-SCNC: 100 MMOL/L (ref 100–108)
CHOLEST SERPL-MCNC: 170 MG/DL
CO2 SERPL-SCNC: 29 MMOL/L (ref 21–32)
CREAT SERPL-MCNC: 6.08 MG/DL (ref 0.6–1.3)
GLUCOSE SERPL-MCNC: 379 MG/DL (ref 74–99)
HDLC SERPL-MCNC: 66 MG/DL (ref 40–60)
HDLC SERPL: 2.6 {RATIO} (ref 0–5)
LDLC SERPL CALC-MCNC: 79.6 MG/DL (ref 0–100)
LIPID PROFILE,FLP: ABNORMAL
POTASSIUM SERPL-SCNC: 3.7 MMOL/L (ref 3.5–5.5)
SODIUM SERPL-SCNC: 137 MMOL/L (ref 136–145)
TRIGL SERPL-MCNC: 122 MG/DL (ref ?–150)
VLDLC SERPL CALC-MCNC: 24.4 MG/DL

## 2018-05-09 PROCEDURE — 84450 TRANSFERASE (AST) (SGOT): CPT | Performed by: FAMILY MEDICINE

## 2018-05-09 PROCEDURE — 80061 LIPID PANEL: CPT | Performed by: FAMILY MEDICINE

## 2018-05-09 PROCEDURE — 84460 ALANINE AMINO (ALT) (SGPT): CPT | Performed by: FAMILY MEDICINE

## 2018-05-09 PROCEDURE — 36415 COLL VENOUS BLD VENIPUNCTURE: CPT | Performed by: FAMILY MEDICINE

## 2018-05-09 PROCEDURE — 80048 BASIC METABOLIC PNL TOTAL CA: CPT | Performed by: FAMILY MEDICINE

## 2018-05-09 NOTE — PROGRESS NOTES
HISTORY OF PRESENT ILLNESS  Rich Pemberton is a 62 y.o. male. HPI  Patient is here today for evaluation and treatment of: Hypertension/Cholesterol problem    Hypertension: BP is better today than times past. Started dialysis in Feb. BP even better at second check. Cholesterol: he is on meds as listed below. Tolerates crestor without any difficulty    Pt has had eye surgery and has had an AV fistula placed in the left upper extremity. Has no new complaints; He is followed by Podiatry, Endocrinology      Current Outpatient Prescriptions:     cloNIDine (CATAPRES) 0.3 mg/24 hr, APPLY 1 PATCH TO SKIN ONCE EVERY 7 DAYS, Disp: 12 Patch, Rfl: 1    hydrALAZINE (APRESOLINE) 25 mg tablet, Take 1 Tab by mouth two (2) times a day. (Patient taking differently: Take 25 mg by mouth. Take TID on non-dialysis days, QPM on dialysis days), Disp: 60 Tab, Rfl: 6    finasteride (PROSCAR) 5 mg tablet, TAKE 1 TABLET BY MOUTH DAILY. , Disp: 30 Tab, Rfl: 6    b complex-vitamin c-folic acid (NEPHROCAPS) 1 mg capsule, Take 1 Cap by mouth daily. , Disp: 30 Cap, Rfl: 6    calcium acetate (PHOSLO) 667 mg cap, Take 1 Cap by mouth three (3) times daily (with meals). , Disp: 90 Cap, Rfl: 2    insulin lispro (HUMALOG) 100 unit/mL injection, Blood Sugar (mg/dL): <150 =0 units; 150 -199 =2 units; 200 -249 =4 units; 250 -299 =6 units; 300 -349 =8 units; 350 and above =10 units. , Disp: 1 Vial, Rfl: 2    losartan (COZAAR) 50 mg tablet, Take 1 Tab by mouth daily. , Disp: 30 Tab, Rfl: 2    Insulin Syringe-Needle U-100 (INSULIN SYRINGE ULTRAFINE) 0.5 mL 29 gauge x 1/2\" syrg, 1 Each by Does Not Apply route Before breakfast, lunch, dinner and at bedtime.  And for use with lantus qhs., Disp: 150 Syringe, Rfl: 2    amLODIPine (NORVASC) 10 mg tablet, Take 1 Tab by mouth every evening., Disp: 1 Tab, Rfl: 0    acetaminophen (TYLENOL EXTRA STRENGTH) 500 mg tablet, Take  by mouth every six (6) hours as needed for Pain., Disp: , Rfl:    cyclobenzaprine (FLEXERIL) 10 mg tablet, Take 1 Tab by mouth three (3) times daily as needed for Muscle Spasm(s). , Disp: 30 Tab, Rfl: 0    OTHER, PGIIL/P CRM - Apply 1 -2 grams ( 1-2 pumps) to left foot 3 - 4 times daily. , Disp: , Rfl:     rosuvastatin (CRESTOR) 20 mg tablet, Take 1 Tab by mouth nightly., Disp: 30 Tab, Rfl: 6    docusate sodium (COLACE) 100 mg capsule, Take 1 Cap by mouth two (2) times a day., Disp: 60 Cap, Rfl: 0    glucose blood VI test strips (ASCENSIA AUTODISC VI, ONE TOUCH ULTRA TEST VI) strip, Test twice daily:  Fasting before breakfast and 2 hours after dinner (log readings), One touch ultra 2 test strips please; Dx: 250.02, Disp: 1 Package, Rfl: 11    fluticasone (FLONASE) 50 mcg/actuation nasal spray, 1 [place] by Both Nostrils route two (2) times a day. (Patient taking differently: 1 Spray by Both Nostrils route as needed.), Disp: 1 Bottle, Rfl: 2    Lancets (ONE TOUCH DELICA) Misc, Test twice daily: Once in the morning fasting, then two hours after dinner (log results), Disp: 1 Package, Rfl: 11    Blood-Glucose Meter monitoring kit, by Does Not Apply route. One touch ultra2, Disp: 1 Kit, Rfl: 0    cyanocobalamin (VITAMIN B-12) 1,000 mcg tablet, Take 1,000 mcg by mouth daily. , Disp: , Rfl:       PMH,  Meds, Allergies, Family History, Social history reviewed    Review of Systems   Constitutional: Negative for chills and fever. Cardiovascular: Negative for chest pain and palpitations.        Physical Exam   Visit Vitals    /70    Pulse 79    Temp 98.5 °F (36.9 °C) (Oral)    Resp 16    Ht 5' 6\" (1.676 m)    Wt 185 lb (83.9 kg)    SpO2 95%    BMI 29.86 kg/m2     General appearance: alert, cooperative, no distress, appears stated age  Neck: supple, symmetrical, trachea midline, no adenopathy, thyroid: not enlarged, symmetric, no tenderness/mass/nodules, no carotid bruit and no JVD  Lungs: clear to auscultation bilaterally  Heart: regular rate and rhythm, S1, S2 normal, no murmur, click, rub or gallop  Extremities: extremities normal, atraumatic, no cyanosis or edema  Left Upper extremity with palpable thrill at the AV fistula      Lab Results   Component Value Date/Time    Cholesterol, total 214 (H) 02/28/2017 09:22 AM    HDL Cholesterol 71 (H) 02/28/2017 09:22 AM    LDL, calculated 113.4 (H) 02/28/2017 09:22 AM    VLDL, calculated 29.6 02/28/2017 09:22 AM    Triglyceride 148 02/28/2017 09:22 AM    CHOL/HDL Ratio 3.0 02/28/2017 09:22 AM     Lab Results   Component Value Date/Time    Sodium 139 03/17/2018 01:28 AM    Potassium 3.2 (L) 03/17/2018 01:28 AM    Chloride 103 03/17/2018 01:28 AM    CO2 29 03/17/2018 01:28 AM    Anion gap 7 03/17/2018 01:28 AM    Glucose 147 (H) 03/17/2018 01:28 AM    BUN 16 03/17/2018 01:28 AM    Creatinine 6.00 (H) 03/17/2018 01:28 AM    BUN/Creatinine ratio 3 (L) 03/17/2018 01:28 AM    GFR est AA 12 (L) 03/17/2018 01:28 AM    GFR est non-AA 10 (L) 03/17/2018 01:28 AM    Calcium 8.1 (L) 03/17/2018 01:28 AM    Bilirubin, total 0.2 03/13/2018 03:25 AM    AST (SGOT) 18 03/13/2018 03:25 AM    Alk. phosphatase 77 03/13/2018 03:25 AM    Protein, total 5.5 (L) 03/13/2018 03:25 AM    Albumin 1.9 (L) 03/13/2018 03:25 AM    Globulin 3.6 03/13/2018 03:25 AM    A-G Ratio 0.5 (L) 03/13/2018 03:25 AM    ALT (SGPT) 16 03/13/2018 03:25 AM       ASSESSMENT and PLAN    ICD-10-CM ICD-9-CM    1. Essential hypertension G93 058.6 METABOLIC PANEL, BASIC   2. Hypercholesteremia E78.00 272.0 LIPID PANEL      AST      ALT       As above,   above all stable unless otherwise noted  Labs as ordered  Continue current meds as ordered  Follow-up Disposition:  Return in about 4 months (around 9/9/2018) for htn/chol. An After Visit Summary was printed and given to the patient. This has been fully explained to the patient, who indicates understanding.   Follow up with consultants as scheduled  Advised healthy diet and exercise as tolerated;  BMI reviewed  ACP note given/documented

## 2018-05-09 NOTE — MR AVS SNAPSHOT
[address] [facility] 
 
 
 [address] [place], [place] [address] [address] [facility] [address] 
[phone] Patient: Frida Matos MRN:  [id number] Visit Information Date & Time Provider Department Dept. Phone Encounter #  
 2018  9:00 AM Marily Arevalo, [address] [place] [address] [phone] [id number] Follow-up Instructions Return in about 4 months (around 2018) for htn/chol. Your Appointments 5/10/2018  3:15 PM  
HOSPITAL DISCHARGE with [facility] ([facility]) Appt Note: [address] [place] [address]  
[phone] [address] [place] [address]  
  
    
 10/15/2018  3:20 PM  
Follow Up with Peola Kayser, MD  
[facility] ([facility]) Appt Note: 6 month follow up Brennon Thomas [id number]  
[phone] [address] [place] [address] [address] [facility] [address] Upcoming Health Maintenance Date Due Pneumococcal 19-64 Highest Risk (1 of 3 - PCV13) 10/14/1979 LIPID PANEL Q1 2018 MICROALBUMIN Q1 3/21/2018 Influenza Age 5 to Adult 2018 HEMOGLOBIN A1C Q6M 2018 EYE EXAM RETINAL OR DILATED Q1 3/21/2019 FOOT EXAM Q1 2019 COLONOSCOPY 10/9/2023 DTaP/Tdap/Td series (2 - Td) 3/21/2027 Allergies as of 2018  Review Complete On: 2018 By: Marily Arevalo MD  
  
 Severity Noted Reaction Type Reactions Bactrim [Sulfamethoprim Ds]  2014   Side Effect Nausea and Vomiting Coreg [Carvedilol]  2018    Nausea and Vomiting Difficulty walking Current Immunizations  Reviewed on 2017 Name Date Influenza Vaccine (Quad) PF 10/10/2017 Not reviewed this visit You Were Diagnosed With   
  
 Codes Comments Essential hypertension    -  Primary ICD-10-CM: I10 
ICD-9-CM: 401.9 Hypercholesteremia     ICD-10-CM: E78.00 ICD-9-CM: 272.0 Vitals BP Pulse Temp Resp Height(growth percentile) Weight(growth percentile) 150/70 79 98.5 °F (36.9 °C) (Oral) 16 5' 6\" (1.676 m) 185 lb (83.9 kg) SpO2 BMI Smoking Status 95% 29.86 kg/m2 Never Smoker Vitals History BMI and BSA Data Body Mass Index Body Surface Area  
 29.86 kg/m 2 1.98 m 2 Preferred Pharmacy Pharmacy Name Phone [facility], [facility] [phone] Your Updated Medication List  
  
   
This list is accurate as of 5/9/18  9:55 AM.  Always use your most recent med list. amLODIPine 10 mg tablet Commonly known as:  Petroleum Bars Take 1 Tab by mouth every evening. b complex-vitamin c-folic acid 1 mg capsule Commonly known as:  Deana Meckel Take 1 Cap by mouth daily. Blood-Glucose Meter monitoring kit  
by Does Not Apply route. One touch ultra2  
  
 calcium acetate 667 mg Cap Commonly known as:  PHOSLO Take 1 Cap by mouth three (3) times daily (with meals). cloNIDine 0.3 mg/24 hr  
Commonly known as:  CATAPRES  
APPLY 1 PATCH TO SKIN ONCE EVERY 7 DAYS  
  
 cyclobenzaprine 10 mg tablet Commonly known as:  FLEXERIL Take 1 Tab by mouth three (3) times daily as needed for Muscle Spasm(s). docusate sodium 100 mg capsule Commonly known as:  Vernard Carlos Alberto Take 1 Cap by mouth two (2) times a day. finasteride 5 mg tablet Commonly known as:  PROSCAR  
TAKE 1 TABLET BY MOUTH DAILY. fluticasone 50 mcg/actuation nasal spray Commonly known as:  FLONASE  
1 [place] by Both Nostrils route two (2) times a day. glucose blood VI test strips strip Commonly known as:  ASCENSIA AUTODISC VI, ONE TOUCH ULTRA TEST VI Test twice daily:  Fasting before breakfast and 2 hours after dinner (log readings), One touch ultra 2 test strips please; Dx: 250.02  
  
 hydrALAZINE 25 mg tablet Commonly known as:  APRESOLINE Take 1 Tab by mouth two (2) times a day. insulin lispro 100 unit/mL injection Commonly known as:  HUMALOG Blood Sugar (mg/dL): <150 =0 units; 150 -199 =2 units; 200 -249 =4 units; 250 -299 =6 units; 300 -349 =8 units; 350 and above =10 units. Insulin Syringe-Needle U-100 0.5 mL 29 gauge x 1/2\" Syrg Commonly known as:  INSULIN SYRINGE ULTRAFINE  
1 Each by Does Not Apply route Before breakfast, lunch, dinner and at bedtime. And for use with lantus qhs. Lancets Misc Commonly known as: One Touch Garcia Tamika Test twice daily: Once in the morning fasting, then two hours after dinner (log results)  
  
 losartan 50 mg tablet Commonly known as:  COZAAR Take 1 Tab by mouth daily. OTHER PGIIL/P CRM - Apply 1 -2 grams ( 1-2 pumps) to left foot 3 - 4 times daily. rosuvastatin 20 mg tablet Commonly known as:  CRESTOR Take 1 Tab by mouth nightly. TYLENOL EXTRA STRENGTH 500 mg tablet Generic drug:  acetaminophen Take  by mouth every six (6) hours as needed for Pain. VITAMIN B-12 1,000 mcg tablet Generic drug:  cyanocobalamin Take 1,000 mcg by mouth daily. Follow-up Instructions Return in about 4 months (around 9/9/2018) for htn/chol. To-Do List   
 05/09/2018 Lab:  ALT   
  
 05/09/2018 Lab:  AST   
  
 05/09/2018 Lab:  LIPID PANEL   
  
 05/09/2018 Lab:  METABOLIC PANEL, BASIC Patient Instructions Your Hemodialysis Access: Care Instructions Your Care Instructions Hemodialysis, or dialysis, is the use of a machine to remove wastes from your blood. You need it if your kidneys are not able to remove wastes on their own.  A dialysis access is the place in your arm, or sometimes in your leg, where a doctor creates a blood vessel that carries a large flow of blood. When you have dialysis, two needles are placed in this blood vessel and are connected to the dialysis machine. Your blood flows out of one needle and into the machine to be cleaned. Then your cleaned blood flows back into your body through the other needle. Sometimes, a doctor makes a short-term access through a tube, called a catheter, placed in your neck, upper chest, or groin. Your doctor creates an access during a minor surgery. You need to take care of your access to keep it working and to prevent infection. Follow-up care is a key part of your treatment and safety. Be sure to make and go to all appointments, and call your doctor if you are having problems. It's also a good idea to know your test results and keep a list of the medicines you take. How can you care for yourself at home? · After your doctor creates an access, keep it dry for at least 2 days. · Squeeze a soft ball or other object as instructed after the access is placed. This will help blood flow through the access and help prevent blood clots. · After you have dialysis, check to see whether the access bleeds or swells. Let your doctor know if your arm bleeds or swells. · Do not lift anything heavy with the arm that has the access. · Do not bump your arm. · Do not wear tight clothing or jewelry over the access. · Do not sleep with your access arm under your body. · Have blood drawn or blood pressure taken from your other arm. · Keep the access clean and dry. · Do not put cream or lotion on or near the access. When should you call for help? Call your doctor now or seek immediate medical care if: 
? · You have signs of infection, such as: 
¨ Increased pain, swelling, warmth, or redness around the access. ¨ Red streaks leading from the access. ¨ Pus draining from the access. ¨ A fever. ? · You do not feel a pulse in your access. ? Watch closely for changes in your health, and be sure to contact your doctor if: ? · You do not get better as expected. Where can you learn more? Go to http://hoang-jayden.info/. Enter L169 in the search box to learn more about \"Your Hemodialysis Access: Care Instructions. \" Current as of: May 12, 2017 Content Version: 11.4 © 5866-6164 Healthwise, [employer]. Care instructions adapted under license by [employer] (which disclaims liability or warranty for this information). If you have questions about a medical condition or this instruction, always ask your healthcare professional. [address] any warranty or liability for your use of this information. Introducing [facility]! [employer] introduces [employer] patient portal. Now you can access parts of your medical record, email your doctor's office, and request medication refills online. 1. In your internet browser, go to [email or web address] 2. Click on the First Time User? Click Here link in the Sign In box. You will see the New Member Sign Up page. 3. Enter your [employer] Access Code exactly as it appears below. You will not need to use this code after youve completed the sign-up process. If you do not sign up before the expiration date, you must request a new code. · [employer] Access Code: [email or web address] Expires: 7/12/2018  9:21 AM 
 
4. Enter the last four digits of your Social Security Number (xxxx) and Date of Birth (mm/dd/yyyy) as indicated and click Submit. You will be taken to the next sign-up page. 5. Create a RoleStart ID. This will be your [employer] login ID and cannot be changed, so think of one that is secure and easy to remember. 6. Create a [employer] password. You can change your password at any time. 7. Enter your Password Reset Question and Answer. This can be used at a later time if you forget your password. 8. Enter your e-mail address. You will receive e-mail notification when new information is available in [address] 9. Click Sign Up. You can now view and download portions of your medical record. 10. Click the Download Summary menu link to download a portable copy of your medical information. If you have questions, please visit the Frequently Asked Questions section of the [employer] website. Remember, [employer] is NOT to be used for urgent needs. For medical emergencies, dial 911. Now available from your iPhone and Android! Please provide this summary of care documentation to your next provider. Your primary care clinician is listed as [address]. If you have any questions after today's visit, please call [phone].

## 2018-05-09 NOTE — PATIENT INSTRUCTIONS
Your Hemodialysis Access: Care Instructions  Your Care Instructions  Hemodialysis, or dialysis, is the use of a machine to remove wastes from your blood. You need it if your kidneys are not able to remove wastes on their own. A dialysis access is the place in your arm, or sometimes in your leg, where a doctor creates a blood vessel that carries a large flow of blood. When you have dialysis, two needles are placed in this blood vessel and are connected to the dialysis machine. Your blood flows out of one needle and into the machine to be cleaned. Then your cleaned blood flows back into your body through the other needle. Sometimes, a doctor makes a short-term access through a tube, called a catheter, placed in your neck, upper chest, or groin. Your doctor creates an access during a minor surgery. You need to take care of your access to keep it working and to prevent infection. Follow-up care is a key part of your treatment and safety. Be sure to make and go to all appointments, and call your doctor if you are having problems. It's also a good idea to know your test results and keep a list of the medicines you take. How can you care for yourself at home? · After your doctor creates an access, keep it dry for at least 2 days. · Squeeze a soft ball or other object as instructed after the access is placed. This will help blood flow through the access and help prevent blood clots. · After you have dialysis, check to see whether the access bleeds or swells. Let your doctor know if your arm bleeds or swells. · Do not lift anything heavy with the arm that has the access. · Do not bump your arm. · Do not wear tight clothing or jewelry over the access. · Do not sleep with your access arm under your body. · Have blood drawn or blood pressure taken from your other arm. · Keep the access clean and dry. · Do not put cream or lotion on or near the access. When should you call for help?   Call your doctor now or seek immediate medical care if:  ? · You have signs of infection, such as:  ¨ Increased pain, swelling, warmth, or redness around the access. ¨ Red streaks leading from the access. ¨ Pus draining from the access. ¨ A fever. ? · You do not feel a pulse in your access. ? Watch closely for changes in your health, and be sure to contact your doctor if:  ? · You do not get better as expected. Where can you learn more? Go to http://hoang-jayden.info/. Enter L169 in the search box to learn more about \"Your Hemodialysis Access: Care Instructions. \"  Current as of: May 12, 2017  Content Version: 11.4  © 3069-8360 [employer]. Care instructions adapted under license by [employer]cer (which disclaims liability or warranty for this information). If you have questions about a medical condition or this instruction, always ask your healthcare professional. [place] [address] any warranty or liability for your use of this information. Body Mass Index: Care Instructions  Your Care Instructions    Body mass index (BMI) can help you see if your weight is raising your risk for health problems. It uses a formula to compare how much you weigh with how tall you are. · A BMI lower than 18.5 is considered underweight. · A BMI between 18.5 and 24.9 is considered healthy. · A BMI between 25 and 29.9 is considered overweight. A BMI of 30 or higher is considered obese. If your BMI is in the normal range, it means that you have a lower risk for weight-related health problems. If your BMI is in the overweight or obese range, you may be at increased risk for weight-related health problems, such as high blood pressure, heart disease, stroke, arthritis or joint pain, and diabetes.  If your BMI is in the underweight range, you may be at increased risk for health problems such as fatigue, lower protection (immunity) against illness, muscle loss, bone loss, hair loss, and hormone problems. BMI is just one measure of your risk for weight-related health problems. You may be at higher risk for health problems if you are not active, you eat an unhealthy diet, or you drink too much alcohol or use tobacco products. Follow-up care is a key part of your treatment and safety. Be sure to make and go to all appointments, and call your doctor if you are having problems. It's also a good idea to know your test results and keep a list of the medicines you take. How can you care for yourself at home? · Practice healthy eating habits. This includes eating plenty of fruits, vegetables, whole grains, lean protein, and low-fat dairy. · If your doctor recommends it, get more exercise. Walking is a good choice. Bit by bit, increase the amount you walk every day. Try for at least 30 minutes on most days of the week. · Do not smoke. Smoking can increase your risk for health problems. If you need help quitting, talk to your doctor about stop-smoking programs and medicines. These can increase your chances of quitting for good. · Limit alcohol to 2 drinks a day for men and 1 drink a day for women. Too much alcohol can cause health problems. If you have a BMI higher than 25  · Your doctor may do other tests to check your risk for weight-related health problems. This may include measuring the distance around your waist. A waist measurement of more than 40 inches in men or 35 inches in women can increase the risk of weight-related health problems. · Talk with your doctor about steps you can take to stay healthy or improve your health. You may need to make lifestyle changes to lose weight and stay healthy, such as changing your diet and getting regular exercise. If you have a BMI lower than 18.5  · Your doctor may do other tests to check your risk for health problems. · Talk with your doctor about steps you can take to stay healthy or improve your health.  You may need to make lifestyle changes to gain or maintain weight and stay healthy, such as getting more healthy foods in your diet and doing exercises to build muscle. Where can you learn more? Go to http://hoang-jayden.info/. Enter S176 in the search box to learn more about \"Body Mass Index: Care Instructions. \"  Current as of: October 13, 2016  Content Version: 11.4  © 1749-2167 zlien. Care instructions adapted under license by [employer] (which disclaims liability or warranty for this information). If you have questions about a medical condition or this instruction, always ask your healthcare professional. [place] [address] any warranty or liability for your use of this information.

## 2018-05-09 NOTE — ACP (ADVANCE CARE PLANNING)
Advance Care Planning (ACP) Provider Conversation Snapshot    Date of ACP Conversation: 05/09/18  Persons included in Conversation:  patient  Length of ACP Conversation in minutes:  <16 minutes (Non-Billable)    Authorized Decision Maker (if patient is incapable of making informed decisions):    This person is:   NA          For Patients with Decision Making Capacity:   TBD    Conversation Outcomes / Follow-Up Plan:   Recommended completion of Advance Directive form after review of ACP materials and conversation with prospective healthcare agent

## 2018-05-09 NOTE — PROGRESS NOTES
1. Have you been to the ER, urgent care clinic since your last visit? Hospitalized since your last visit? Yes [facility]    2. Have you seen or consulted any other health care providers outside of the [facility] since your last visit? Include any pap smears or colon screening.  No

## 2018-05-10 ENCOUNTER — OFFICE VISIT (OUTPATIENT)
Dept: VASCULAR SURGERY | Age: 58
End: 2018-05-10

## 2018-05-10 VITALS
DIASTOLIC BLOOD PRESSURE: 78 MMHG | BODY MASS INDEX: 29.73 KG/M2 | RESPIRATION RATE: 17 BRPM | SYSTOLIC BLOOD PRESSURE: 128 MMHG | HEART RATE: 96 BPM | WEIGHT: 185 LBS | HEIGHT: 66 IN

## 2018-05-10 DIAGNOSIS — N18.6 ESRD (END STAGE RENAL DISEASE) ON DIALYSIS (HCC): Primary | ICD-10-CM

## 2018-05-10 DIAGNOSIS — Z99.2 ESRD (END STAGE RENAL DISEASE) ON DIALYSIS (HCC): Primary | ICD-10-CM

## 2018-05-10 RX ORDER — LIDOCAINE AND PRILOCAINE 25; 25 MG/G; MG/G
CREAM TOPICAL AS NEEDED
Qty: 30 G | Refills: 12 | Status: SHIPPED | OUTPATIENT
Start: 2018-05-10 | End: 2020-01-01 | Stop reason: ALTCHOICE

## 2018-05-10 NOTE — PROGRESS NOTES
1. Have you been to an emergency room or urgent care clinic since your last visit? No    Hospitalized since your last visit? If yes, where, when, and reason for visit? No  2. Have you seen or consulted any other health care providers outside of the [facility] since your last visit including any procedures, health maintenance items. If yes, where, when and reason for visit?   NO

## 2018-05-10 NOTE — PROGRESS NOTES
Subjective:      Christine Horne is a 62 y.o. male who presents today for post op visit, status post left upper arm AVG placement. He has a surgical incision wound which is located on the left upper arm x 2. Current symptoms: mild swelling in arm and mild cold sensation to left hand  wound healing as expected. Objective:     Visit Vitals    /78 (BP 1 Location: Right arm, BP Patient Position: Sitting)    Pulse 96    Resp 17    Ht 5' 6\" (1.676 m)    Wt 185 lb (83.9 kg)    BMI 29.86 kg/m2       Wound:   wound margins intact and healing well. No signs of infection. Good bruit of graft    Has good radial pulse, hand is currently warm and good /strength. Good cap refill     Assessment:     Wound check/discharge visit. Plan:       Orders Placed This Encounter    lidocaine-prilocaine (EMLA) topical cream     May start cannulating left arm AVG, one needle, advance to 2 as tolerated, beginning May 14   Then call and schedule catheter removal when ready    Discussed signs/symptoms of steal. Can have mild symptoms initially after surgery until arm compensates. Encouraged continued squeeze ball exercises. If just the cool sensation progresses to pain or if limited motion/ occur, then call and we will have to evaluate    Follow-up Disposition: Not on [place]    Portions of this note have been entered using voice recognition software.

## 2018-05-10 NOTE — MR AVS SNAPSHOT
[address] 
[phone] Patient: José Miguel Room MRN:  :[id number] Visit Information Date & Time Provider Department Dept. Phone Encounter #  
 5/10/2018  3:15 PM Aba Zac [facility] [phone] [id number] Your Appointments 2018 10:40 AM  
Office Visit with MD Grace Lowery  [facility] Appt Note: 4 m fu htn/chol [address] [address]  
[phone]  
  
   
 [address] [facility]  
  
    
 10/15/2018  3:20 PM  
Follow Up with Dario Mckeon MD  
[facility] ([facility] Appt Note: 6 month follow up [address] [address] [phone] [address] [place] [address] [address] Upcoming Health Maintenance Date Due Pneumococcal 19-64 Highest Risk (1 of 3 - PCV13) 10/14/1979 MICROALBUMIN Q1 3/21/2018 Influenza Age 5 to Adult 2018 HEMOGLOBIN A1C Q6M 2018 EYE EXAM RETINAL OR DILATED Q1 3/21/2019 FOOT EXAM Q1 2019 LIPID PANEL Q1 2019 COLONOSCOPY 10/9/2023 DTaP/Tdap/Td series (2 - Td) 3/21/2027 Allergies as of 5/10/2018  Review Complete On: 5/10/2018 By: Irvin Domingo Severity Noted Reaction Type Reactions Bactrim [Sulfamethoprim Ds]  2014   Side Effect Nausea and Vomiting Coreg [Carvedilol]  2018    Nausea and Vomiting Difficulty walking Current Immunizations  Reviewed on 2017 Name Date Influenza Vaccine (Quad) PF 10/10/2017 Not reviewed this visit Vitals BP Pulse Resp Height(growth percentile) Weight(growth percentile) BMI  
 128/78 (BP 1 Location: Right arm, BP Patient Position: Sitting) 96 17 5' 6\" (1.676 m) 185 lb (83.9 kg) 29.86 kg/m2 Smoking Status Never Smoker Vitals History BMI and BSA Data Body Mass Index Body Surface Area  
 29.86 kg/m 2 1.98 m 2 Preferred Pharmacy Pharmacy Name Phone [facility] [phone] Your Updated Medication List  
  
   
This list is accurate as of 5/10/18  3:36 PM.  Always use your most recent med list. amLODIPine 10 mg tablet Commonly known as:  Danilo Wong Take 1 Tab by mouth every evening. b complex-vitamin c-folic acid 1 mg capsule Commonly known as:  Cassie Parkers Take 1 Cap by mouth daily. Blood-Glucose Meter monitoring kit  
by Does Not Apply route. One touch ultra2  
  
 calcium acetate 667 mg Cap Commonly known as:  PHOSLO Take 1 Cap by mouth three (3) times daily (with meals). cloNIDine 0.3 mg/24 hr  
Commonly known as:  CATAPRES  
APPLY 1 PATCH TO SKIN ONCE EVERY 7 DAYS  
  
 cyclobenzaprine 10 mg tablet Commonly known as:  FLEXERIL Take 1 Tab by mouth three (3) times daily as needed for Muscle Spasm(s). docusate sodium 100 mg capsule Commonly known as:  Lesly Busing Take 1 Cap by mouth two (2) times a day. finasteride 5 mg tablet Commonly known as:  PROSCAR  
TAKE 1 TABLET BY MOUTH DAILY. fluticasone 50 mcg/actuation nasal spray Commonly known as:  FLONASE  
1 [place] by Both Nostrils route two (2) times a day. glucose blood VI test strips strip Commonly known as:  ASCENSIA AUTODISC VI, ONE TOUCH ULTRA TEST VI Test twice daily:  Fasting before breakfast and 2 hours after dinner (log readings), One touch ultra 2 test strips please; Dx: 250.02  
  
 hydrALAZINE 25 mg tablet Commonly known as:  APRESOLINE Take 1 Tab by mouth two (2) times a day. insulin lispro 100 unit/mL injection Commonly known as:  HUMALOG Blood Sugar (mg/dL): <150 =0 units; 150 -199 =2 units; 200 -249 =4 units; 250 -299 =6 units; 300 -349 =8 units; 350 and above =10 units. Insulin Syringe-Needle U-100 0.5 mL 29 gauge x 1/2\" Syrg Commonly known as:  INSULIN SYRINGE ULTRAFINE  
1 Each by Does Not Apply route Before breakfast, lunch, dinner and at bedtime. And for use with lantus qhs. Lancets Misc Commonly known as: One Touch Jennet Scout Test twice daily: Once in the morning fasting, then two hours after dinner (log results)  
  
 losartan 50 mg tablet Commonly known as:  COZAAR Take 1 Tab by mouth daily. OTHER PGIIL/P CRM - Apply 1 -2 grams ( 1-2 pumps) to left foot 3 - 4 times daily. rosuvastatin 20 mg tablet Commonly known as:  CRESTOR Take 1 Tab by mouth nightly. TYLENOL EXTRA STRENGTH 500 mg tablet Generic drug:  acetaminophen Take  by mouth every six (6) hours as needed for Pain. VITAMIN B-12 1,000 mcg tablet Generic drug:  cyanocobalamin Take 1,000 mcg by mouth daily. Patient Instructions May start cannulating left arm AVG, one needle, advance to 2 as tolerated, beginning May 14 Then call and schedule catheter removal when ready Introducing [facility]! [facility] introduces [employer] patient portal. Now you can access parts of your medical record, email your doctor's office, and request medication refills online. 1. In your internet browser, go to [email or web address] 2. Click on the First Time User? Click Here link in the Sign In box. You will see the New Member Sign Up page. 3. Enter your [employer] Access Code exactly as it appears below. You will not need to use this code after youve completed the sign-up process. If you do not sign up before the expiration date, you must request a new code. · [employer] Access Code: [email or web address] Expires: 7/12/2018  9:21 AM 
 
4. Enter the last four digits of your Social Security Number (xxxx) and Date of Birth (mm/dd/yyyy) as indicated and click Submit.  You will be taken to the next sign-up page. 5. Create a RampedMedia ID. This will be your RampedMedia login ID and cannot be changed, so think of one that is secure and easy to remember. 6. Create a [employer]ampedMedia password. You can change your password at any time. 7. Enter your Password Reset Question and Answer. This can be used at a later time if you forget your password. 8. Enter your e-mail address. You will receive e-mail notification when new information is available in [address] 9. Click Sign Up. You can now view and download portions of your medical record. 10. Click the Download Summary menu link to download a portable copy of your medical information. If you have questions, please visit the Frequently Asked Questions section of the [employer] website. Remember, [employer] is NOT to be used for urgent needs. For medical emergencies, dial 911. Now available from your iPhone and Android! Please provide this summary of care documentation to your next provider. Your primary care clinician is listed as [address]. If you have any questions after today's visit, please call [phone].

## 2018-05-10 NOTE — PATIENT INSTRUCTIONS
May start cannulating left arm AVG, one needle, advance to 2 as tolerated, beginning May 14   Then call and schedule catheter removal when ready

## 2018-05-31 ENCOUNTER — TELEPHONE (OUTPATIENT)
Dept: VASCULAR SURGERY | Age: 58
End: 2018-05-31

## 2018-05-31 NOTE — TELEPHONE ENCOUNTER
Patient & his wife called asking about an appointment to get the catheter remover from his chest. Says the unit has been using his left are access for 2 weeks or so. She lost her phone and all upcomming appointment information with it. They were not sure what to do and would like to speak with you.   Thank you

## 2018-06-01 ENCOUNTER — TELEPHONE (OUTPATIENT)
Dept: FAMILY MEDICINE CLINIC | Age: 58
End: 2018-06-01

## 2018-06-01 NOTE — TELEPHONE ENCOUNTER
pt's wife calling on pt's behalf re:possible kidney transplant surgery with Dr Stevo Carroll ph [phone]    Stated part of preopt testing is an up to date coloscopy.  Stated this is one of the items the insurance co advised would need to be done before surgery is approved    Stated the avs summary states colonoscopy next due 10/09/2023    Request return call for advice

## 2018-06-01 NOTE — TELEPHONE ENCOUNTER
Returned call to patient's wife. Question was regarding pre-op clearance for kidney transplant surgery, but date has not been set yet. Advised patient's wife to contact Dr Marga Perez office and find out perspective surgery date, and once she has that info, to call and schedule an appointment wife Dr John Johnson for pre op clearance. Patient's wife verbalized understanding.

## 2018-06-19 ENCOUNTER — TELEPHONE (OUTPATIENT)
Dept: FAMILY MEDICINE CLINIC | Age: 58
End: 2018-06-19

## 2018-06-19 ENCOUNTER — CLINICAL SUPPORT (OUTPATIENT)
Dept: VASCULAR SURGERY | Age: 58
End: 2018-06-19

## 2018-06-19 VITALS
HEART RATE: 70 BPM | SYSTOLIC BLOOD PRESSURE: 150 MMHG | DIASTOLIC BLOOD PRESSURE: 86 MMHG | BODY MASS INDEX: 29.73 KG/M2 | RESPIRATION RATE: 16 BRPM | HEIGHT: 66 IN | WEIGHT: 185 LBS

## 2018-06-19 DIAGNOSIS — Z49.01 FITTING AND ADJUSTMENT OF EXTRACORPOREAL DIALYSIS CATHETER (HCC): Primary | ICD-10-CM

## 2018-06-19 NOTE — PROGRESS NOTES
History of Present Illness: Mr. Fabby Emmanuel is here today for a follow-up on his end stage renal disease. Procedure Note: the skin was cleansed with Betadine solution. The anchoring sutures were removed. Then, approximately 7 cc of 1% Lidocaine were injected around the catheter site. Under blunt dissection, the cuff of the catheter was freed from the skin edges. The catheter was removed in it's entirety with the cuff intact. Direct pressure was applied for approximately 10 minutes followed by pressure dressing. He tolerated the procedure well. Assessment/Plan:  He was given follow-up care instructions and told to call with any problems, questions or concerns.     NAZANIN Fernandez

## 2018-06-19 NOTE — TELEPHONE ENCOUNTER
Patients wife came in and dropped off form to be signed by  from the patients dentist. Form for form completed.  Paperwork placed on [employer]

## 2018-06-19 NOTE — MR AVS SNAPSHOT
[address] [facility] 
[phone] Patient: Anais Banda MRN: [id number] :[id number] Visit Information Date & Time Provider Department Dept. Phone Encounter #  
 2018  3:00 PM Batool Anguiano, Janis N [facility] [phone] [id number] Follow-up Instructions Routing History Follow-up and Disposition History Your Appointments 2018  3:00 PM  
PROCEDURE with NAZANIN [facility] ([address] [place] [address]) Appt Note: cath removal per lucas; Authorization needed for Cath Removal. Tom Marin working on it; lef generic message concerning appt time and date [address] [address] [address] [facility]  
[phone] [address] [facility] [address] [facility] 2018 10:40 AM  
Office Visit with Jose Armando Austin MD  
[address] [address] Primary Care [address] [place] [address] Appt Note: 4 m fu htn/chol [address] [facility] [address]  
[phone]  
  
   
 [address] [facility]  
  
    
 10/15/2018  3:20 PM  
Follow Up with Tameka Ferrell MD  
[facility] ([address] [place] [address]) Appt Note: 6 month follow up [facility] [address] [phone] [address] [place] [address] [address] Upcoming Health Maintenance Date Due Pneumococcal 19-64 Highest Risk (1 of 3 - PCV13) 10/14/1979 MICROALBUMIN Q1 3/21/2018 Influenza Age 5 to Adult 2018 HEMOGLOBIN A1C Q6M 2018 FOOT EXAM Q1 2019 LIPID PANEL Q1 2019 EYE EXAM RETINAL OR DILATED Q1 2019 COLONOSCOPY 10/9/2023 DTaP/Tdap/Td series (2 - Td) 3/21/2027 Allergies as of 2018  Review Complete On: 2018 By: Chelsea Clarke Severity Noted Reaction Type Reactions Bactrim [Sulfamethoprim Ds]  06/06/2014   Side Effect Nausea and Vomiting Coreg [Carvedilol]  03/27/2018    Nausea and Vomiting Difficulty walking Current Immunizations  Reviewed on 11/2/2017 Name Date Influenza Vaccine (Quad) PF 10/10/2017 Not reviewed this visit You Were Diagnosed With   
  
 Codes Comments Fitting and adjustment of extracorporeal dialysis catheter ([facility] [address]    -  Primary ICD-10-CM: Z49.01 
ICD-9-CM: V56.1 Vitals BP Pulse Resp Height(growth percentile) Weight(growth percentile) BMI  
 150/86 (BP 1 Location: Right arm, BP Patient Position: Sitting) 70 16 5' 6\" (1.676 m) 185 lb (83.9 kg) 29.86 kg/m2 Smoking Status Never Smoker BMI and BSA Data Body Mass Index Body Surface Area  
 29.86 kg/m 2 1.98 m 2 Preferred Pharmacy Pharmacy Name Phone [facility], [address] [place], [place] [address]  [phone] Your Updated Medication List  
  
   
This list is accurate as of 6/19/18  2:46 PM.  Always use your most recent med list. amLODIPine 10 mg tablet Commonly known as:  [place] Emerald Take 1 Tab by mouth every evening. b complex-vitamin c-folic acid 1 mg capsule Commonly known as:  Federico Desai Take 1 Cap by mouth daily. Blood-Glucose Meter monitoring kit  
by Does Not Apply route. One touch ultra2  
  
 calcium acetate 667 mg Cap Commonly known as:  PHOSLO Take 1 Cap by mouth three (3) times daily (with meals). cloNIDine 0.3 mg/24 hr  
Commonly known as:  CATAPRES  
APPLY 1 PATCH TO SKIN ONCE EVERY 7 DAYS  
  
 cyclobenzaprine 10 mg tablet Commonly known as:  FLEXERIL Take 1 Tab by mouth three (3) times daily as needed for Muscle Spasm(s). docusate sodium 100 mg capsule Commonly known as:  Hermina  Take 1 Cap by mouth two (2) times a day. finasteride 5 mg tablet Commonly known as:  PROSCAR  
TAKE 1 TABLET BY MOUTH DAILY. fluticasone 50 mcg/actuation nasal spray Commonly known as:  FLONASE  
1 [place] by Both Nostrils route two (2) times a day. glucose blood VI test strips strip Commonly known as:  ASCENSIA AUTODISC VI, ONE TOUCH ULTRA TEST VI Test twice daily:  Fasting before breakfast and 2 hours after dinner (log readings), One touch ultra 2 test strips please; Dx: 250.02  
  
 hydrALAZINE 25 mg tablet Commonly known as:  APRESOLINE Take 1 Tab by mouth two (2) times a day. insulin lispro 100 unit/mL injection Commonly known as:  HUMALOG Blood Sugar (mg/dL): <150 =0 units; 150 -199 =2 units; 200 -249 =4 units; 250 -299 =6 units; 300 -349 =8 units; 350 and above =10 units. Insulin Syringe-Needle U-100 0.5 mL 29 gauge x 1/2\" Syrg Commonly known as:  INSULIN SYRINGE ULTRAFINE  
1 Each by Does Not Apply route Before breakfast, lunch, dinner and at bedtime. And for use with lantus qhs. Lancets Misc Commonly known as: One Touch Nyoka Leaven Test twice daily: Once in the morning fasting, then two hours after dinner (log results) lidocaine-prilocaine topical cream  
Commonly known as:  EMLA Apply  to affected area as needed for Pain (apply to left arm 30 minutes prior to dialysis). losartan 50 mg tablet Commonly known as:  COZAAR Take 1 Tab by mouth daily. OTHER PGIIL/P CRM - Apply 1 -2 grams ( 1-2 pumps) to left foot 3 - 4 times daily. rosuvastatin 20 mg tablet Commonly known as:  CRESTOR Take 1 Tab by mouth nightly. TYLENOL EXTRA STRENGTH 500 mg tablet Generic drug:  acetaminophen Take  by mouth every six (6) hours as needed for Pain. VITAMIN B-12 1,000 mcg tablet Generic drug:  cyanocobalamin Take 1,000 mcg by mouth daily. We Performed the Following WA REMOVAL TUNNELED CV CATH W/O SC PUMP [78114 CPT(R)] Patient Instructions Post Catheter Removal Instructions 1. Remove bandage the following day. 2.  You are allowed to shower the following day after your procedure 3. Keep the area clean and covered until scabbed. 4.  If bleeding occurs, please call the office. 5.  Use an ice pack for discomfort. 6.  No heavy lifting or straining for 24 hours after removal. 
7.  If on blood thinner - you can resume taking your medication in 24 hours Introducing [facility]! Wenceslao Triana introduces [employer] patient portal. Now you can access parts of your medical record, email your doctor's office, and request medication refills online. 1. In your internet browser, go to [email or web address] 2. Click on the First Time User? Click Here link in the Sign In box. You will see the New Member Sign Up page. 3. Enter your Kens[employer]o Access Code exactly as it appears below. You will not need to use this code after youve completed the sign-up process. If you do not sign up before the expiration date, you must request a new code. · [employer] Access Code: [email or web address] Expires: 7/12/2018  9:21 AM 
 
4. Enter the last four digits of your Social Security Number (xxxx) and Date of Birth (mm/dd/yyyy) as indicated and click Submit. You will be taken to the next sign-up page. 5. Create a Kenshoo ID. This will be your Kenshoo login ID and cannot be changed, so think of one that is secure and easy to remember. 6. Create a Kenshoo password. You can change your password at any time. 7. Enter your Password Reset Question and Answer. This can be used at a later time if you forget your password. 8. Enter your e-mail address. You will receive e-mail notification when new information is available in [address] 9. Click Sign Up. You can now view and download portions of your medical record. 10. Click the Download Summary menu link to download a portable copy of your medical information.  
 
If you have questions, please visit the Frequently Asked Questions section of the [employer]. Remember, [employer] is NOT to be used for urgent needs. For medical emergencies, dial 911. Now available from your iPhone and Android! Please provide this summary of care documentation to your next provider. Your primary care clinician is listed as [address]. If you have any questions after today's visit, please call [phone].

## 2018-06-19 NOTE — PROGRESS NOTES
1. Have you been to an emergency room or urgent care clinic since your last visit?  no  Hospitalized since your last visit? If yes, where, when, and reason for visit? no  2. Have you seen or consulted any other health care providers outside of the [facility] since your last visit including any procedures, health maintenance items.  If yes, where, when and reason for visit? no

## 2018-07-11 ENCOUNTER — TELEPHONE (OUTPATIENT)
Dept: FAMILY MEDICINE CLINIC | Age: 58
End: 2018-07-11

## 2018-07-11 NOTE — TELEPHONE ENCOUNTER
Left message for patient to call back office regarding paperwork. Per Dr. Lawanda Garcia patient is to contact his cardiologist to complete paperwork for dental procedure.

## 2018-08-10 RX ORDER — AMLODIPINE BESYLATE 10 MG/1
TABLET ORAL
Qty: 30 TAB | Refills: 6 | Status: SHIPPED | OUTPATIENT
Start: 2018-08-10 | End: 2019-04-27 | Stop reason: SDUPTHER

## 2018-08-23 ENCOUNTER — TELEPHONE (OUTPATIENT)
Dept: CARDIOLOGY CLINIC | Age: 58
End: 2018-08-23

## 2018-08-23 NOTE — LETTER
8/23/2018 1:50 PM 
 
Mr. Jose Miguel Thornton [address] [facility] [address] Jose Miguel Thornton was seen in our office on 04/13/18 for cardiac evaluation. From a cardiac standpoint he is a acceptable risk for glaucoma procedure with Dr. River Camp on 9/13/18. Please feel free to contact our office if you have any questions regarding this patient. Sincerely, Destiny Huerta MD

## 2018-08-23 NOTE — TELEPHONE ENCOUNTER
Dr. Aubrey Martinez office called patient is having glaucoma procedure on 9/13/2018. Patient last seen 4/18/18. Please advise.

## 2018-08-23 NOTE — TELEPHONE ENCOUNTER
Verbal order and read back per Ronna Acosta MD  Patient is acceptable risk for glaucoma procedure on 9/13/18.

## 2018-09-13 ENCOUNTER — OFFICE VISIT (OUTPATIENT)
Dept: FAMILY MEDICINE CLINIC | Age: 58
End: 2018-09-13

## 2018-09-13 ENCOUNTER — HOSPITAL ENCOUNTER (OUTPATIENT)
Dept: LAB | Age: 58
Discharge: HOME | End: 2018-09-13
Payer: COMMERCIAL

## 2018-09-13 VITALS
WEIGHT: 169 LBS | OXYGEN SATURATION: 98 % | HEART RATE: 65 BPM | RESPIRATION RATE: 18 BRPM | TEMPERATURE: 98.4 F | HEIGHT: 66 IN | BODY MASS INDEX: 27.16 KG/M2 | SYSTOLIC BLOOD PRESSURE: 177 MMHG | DIASTOLIC BLOOD PRESSURE: 82 MMHG

## 2018-09-13 DIAGNOSIS — E78.5 HYPERLIPIDEMIA, UNSPECIFIED HYPERLIPIDEMIA TYPE: ICD-10-CM

## 2018-09-13 DIAGNOSIS — I10 ESSENTIAL HYPERTENSION: ICD-10-CM

## 2018-09-13 DIAGNOSIS — E78.5 HYPERLIPIDEMIA, UNSPECIFIED HYPERLIPIDEMIA TYPE: Primary | ICD-10-CM

## 2018-09-13 LAB
ALT SERPL-CCNC: 21 U/L (ref 16–61)
ANION GAP SERPL CALC-SCNC: 8 MMOL/L (ref 3–18)
AST SERPL-CCNC: 16 U/L (ref 15–37)
BUN SERPL-MCNC: 20 MG/DL (ref 7–18)
BUN/CREAT SERPL: 3 (ref 12–20)
CALCIUM SERPL-MCNC: 9.2 MG/DL (ref 8.5–10.1)
CHLORIDE SERPL-SCNC: 103 MMOL/L (ref 100–108)
CHOLEST SERPL-MCNC: 173 MG/DL
CO2 SERPL-SCNC: 29 MMOL/L (ref 21–32)
CREAT SERPL-MCNC: 6.33 MG/DL (ref 0.6–1.3)
EST. AVERAGE GLUCOSE BLD GHB EST-MCNC: 171 MG/DL
GLUCOSE SERPL-MCNC: 203 MG/DL (ref 74–99)
HBA1C MFR BLD: 7.6 % (ref 4.2–5.6)
HDLC SERPL-MCNC: 63 MG/DL (ref 40–60)
HDLC SERPL: 2.7 {RATIO} (ref 0–5)
LDLC SERPL CALC-MCNC: 92.2 MG/DL (ref 0–100)
LIPID PROFILE,FLP: ABNORMAL
POTASSIUM SERPL-SCNC: 4.3 MMOL/L (ref 3.5–5.5)
SODIUM SERPL-SCNC: 140 MMOL/L (ref 136–145)
TRIGL SERPL-MCNC: 89 MG/DL (ref ?–150)
VLDLC SERPL CALC-MCNC: 17.8 MG/DL

## 2018-09-13 PROCEDURE — 84460 ALANINE AMINO (ALT) (SGPT): CPT | Performed by: FAMILY MEDICINE

## 2018-09-13 PROCEDURE — 80061 LIPID PANEL: CPT | Performed by: FAMILY MEDICINE

## 2018-09-13 PROCEDURE — 84450 TRANSFERASE (AST) (SGOT): CPT | Performed by: FAMILY MEDICINE

## 2018-09-13 PROCEDURE — 83036 HEMOGLOBIN GLYCOSYLATED A1C: CPT | Performed by: FAMILY MEDICINE

## 2018-09-13 PROCEDURE — 36415 COLL VENOUS BLD VENIPUNCTURE: CPT | Performed by: FAMILY MEDICINE

## 2018-09-13 PROCEDURE — 80048 BASIC METABOLIC PNL TOTAL CA: CPT | Performed by: FAMILY MEDICINE

## 2018-09-13 NOTE — MR AVS SNAPSHOT
[facility] 
 
 
 [address] [id number] [address] [address] 
[phone] Patient: Isidor Hatchet MRN:  [id number] Visit Information Date & Time Provider Department Dept. Phone Encounter #  
 2018 10:40 AM Shawnee Dai, [address] [place] [address] [phone] [id number] Follow-up Instructions Return in about 3 months (around 2018). Your Appointments 10/15/2018  3:20 PM  
Follow Up with Cornelius Mcgowan MD  
[facility] ([facility]) Appt Note: 6 month follow up Brennon Alicea [id number]  
[phone] [address] [place] [address] [address] Upcoming Health Maintenance Date Due Pneumococcal 19-64 Highest Risk (1 of 3 - PCV13) 10/14/1979 MICROALBUMIN Q1 3/21/2018 Influenza Age 5 to Adult 2018 HEMOGLOBIN A1C Q6M 2018 FOOT EXAM Q1 2019 EYE EXAM RETINAL OR DILATED Q1 2019 LIPID PANEL Q1 2019 COLONOSCOPY 10/9/2023 DTaP/Tdap/Td series (2 - Td) 3/21/2027 Allergies as of 2018  Review Complete On: 2018 By: Shawnee Dai MD  
  
 Severity Noted Reaction Type Reactions Bactrim [Sulfamethoprim Ds]  2014   Side Effect Nausea and Vomiting Coreg [Carvedilol]  2018    Nausea and Vomiting Difficulty walking Current Immunizations  Reviewed on 2017 Name Date Influenza Vaccine (Quad) PF 10/10/2017 Not reviewed this visit You Were Diagnosed With   
  
 Codes Comments Hyperlipidemia, unspecified hyperlipidemia type    -  Primary ICD-10-CM: E78.5 ICD-9-CM: 272.4 Diabetes mellitus due to underlying condition, uncontrolled, with diabetic nephropathy, with long-term current use of insulin (HCC)     ICD-10-CM: E08.21, E08.65, Z79.4 ICD-9-CM: 249.41, 583.81, V58.67  Essential hypertension     ICD-10-CM: I10 
 ICD-9-CM: 401.9 Vitals BP Pulse Temp Resp Height(growth percentile) Weight(growth percentile) 177/82 (BP 1 Location: Left arm, BP Patient Position: Sitting) 65 98.4 °F (36.9 °C) (Oral) 18 5' 6\" (1.676 m) 169 lb (76.7 kg) SpO2 BMI Smoking Status 98% 27.28 kg/m2 Never Smoker BMI and BSA Data Body Mass Index Body Surface Area  
 27.28 kg/m 2 1.89 m 2 Preferred Pharmacy Pharmacy Name Phone [facility], Barbara Casarez  [phone] Your Updated Medication List  
  
   
This list is accurate as of 9/13/18 11:42 AM.  Always use your most recent med list. amLODIPine 10 mg tablet Commonly known as:  Rocio Gables TAKE 1 TABLET BY MOUTH DAILY. b complex-vitamin c-folic acid 1 mg capsule Commonly known as:  Naomia Boast Take 1 Cap by mouth daily. Blood-Glucose Meter monitoring kit  
by Does Not Apply route. One touch ultra2  
  
 calcium acetate 667 mg Cap Commonly known as:  PHOSLO Take 1 Cap by mouth three (3) times daily (with meals). cloNIDine 0.3 mg/24 hr  
Commonly known as:  CATAPRES  
APPLY 1 PATCH TO SKIN ONCE EVERY 7 DAYS  
  
 cyclobenzaprine 10 mg tablet Commonly known as:  FLEXERIL Take 1 Tab by mouth three (3) times daily as needed for Muscle Spasm(s). docusate sodium 100 mg capsule Commonly known as:  [place] Call Take 1 Cap by mouth two (2) times a day. finasteride 5 mg tablet Commonly known as:  PROSCAR  
TAKE 1 TABLET BY MOUTH DAILY. fluticasone 50 mcg/actuation nasal spray Commonly known as:  FLONASE  
1 [place] by Both Nostrils route two (2) times a day. glucose blood VI test strips strip Commonly known as:  ASCENSIA AUTODISC VI, ONE TOUCH ULTRA TEST VI Test twice daily:  Fasting before breakfast and 2 hours after dinner (log readings), One touch ultra 2 test strips please; Dx: 250.02  
  
 hydrALAZINE 25 mg tablet Commonly known as:  APRESOLINE Take 1 Tab by mouth two (2) times a day. insulin lispro 100 unit/mL injection Commonly known as:  HUMALOG Blood Sugar (mg/dL): <150 =0 units; 150 -199 =2 units; 200 -249 =4 units; 250 -299 =6 units; 300 -349 =8 units; 350 and above =10 units. Insulin Syringe-Needle U-100 0.5 mL 29 gauge x 1/2\" Syrg Commonly known as:  INSULIN SYRINGE ULTRAFINE  
1 Each by Does Not Apply route Before breakfast, lunch, dinner and at bedtime. And for use with lantus qhs. Lancets Misc Commonly known as: One Touch [place] Lady Test twice daily: Once in the morning fasting, then two hours after dinner (log results) lidocaine-prilocaine topical cream  
Commonly known as:  EMLA Apply  to affected area as needed for Pain (apply to left arm 30 minutes prior to dialysis). losartan 50 mg tablet Commonly known as:  COZAAR Take 1 Tab by mouth daily. rosuvastatin 20 mg tablet Commonly known as:  CRESTOR Take 1 Tab by mouth nightly. TYLENOL EXTRA STRENGTH 500 mg tablet Generic drug:  acetaminophen Take  by mouth every six (6) hours as needed for Pain. VITAMIN B-12 1,000 mcg tablet Generic drug:  cyanocobalamin Take 1,000 mcg by mouth daily. Follow-up Instructions Return in about 3 months (around 12/13/2018). To-Do List   
 09/13/2018 Lab:  ALT   
  
 09/13/2018 Lab:  AST   
  
 09/13/2018 Lab:  HEMOGLOBIN A1C WITH EAG   
  
 09/13/2018 Lab:  LIPID PANEL   
  
 09/13/2018 Lab:  METABOLIC PANEL, BASIC Patient Instructions Low Sodium Diet (2,000 Milligram): Care Instructions Your Care Instructions Too much sodium causes your body to hold on to extra water. This can raise your blood pressure and force your heart and kidneys to work harder. In very serious cases, this could cause you to be put in the hospital. It might even be life-threatening.  By limiting sodium, you will feel better and lower your risk of serious problems. The most common source of sodium is salt. People get most of the salt in their diet from canned, prepared, and packaged foods. Fast food and restaurant meals also are very high in sodium. Your doctor will probably limit your sodium to less than 2,000 milligrams (mg) a day. This limit counts all the sodium in prepared and packaged foods and any salt you add to your food. Follow-up care is a key part of your treatment and safety. Be sure to make and go to all appointments, and call your doctor if you are having problems. It's also a good idea to know your test results and keep a list of the medicines you take. How can you care for yourself at home? Read food labels · Read labels on cans and food packages. The labels tell you how much sodium is in each serving. Make sure that you look at the serving size. If you eat more than the serving size, you have eaten more sodium. · Food labels also tell you the Percent Daily Value for sodium. Choose products with low Percent Daily Values for sodium. · Be aware that sodium can come in forms other than salt, including monosodium glutamate (MSG), sodium citrate, and sodium bicarbonate (baking soda). MSG is often added to Asian food. When you eat out, you can sometimes ask for food without MSG or added salt. Buy low-sodium foods · Buy foods that are labeled \"unsalted\" (no salt added), \"sodium-free\" (less than 5 mg of sodium per serving), or \"low-sodium\" (less than 140 mg of sodium per serving). Foods labeled \"reduced-sodium\" and \"light sodium\" may still have too much sodium. Be sure to read the label to see how much sodium you are getting. · Buy fresh vegetables, or frozen vegetables without added sauces. Buy low-sodium versions of canned vegetables, soups, and other canned goods. Prepare low-sodium meals · Cut back on the amount of salt you use in cooking.  This will help you adjust to the taste. Do not add salt after cooking. One teaspoon of salt has about 2,300 mg of sodium. · Take the salt shaker off the table. · Flavor your food with garlic, lemon juice, onion, vinegar, herbs, and spices. Do not use soy sauce, lite soy sauce, steak sauce, onion salt, garlic salt, celery salt, mustard, or ketchup on your food. · Use low-sodium salad dressings, sauces, and ketchup. Or make your own salad dressings and sauces without adding salt. · Use less salt (or none) when recipes call for it. You can often use half the salt a recipe calls for without losing flavor. Other foods such as rice, pasta, and grains do not need added salt. · Rinse canned vegetables, and cook them in fresh water. This removes some-but not all-of the salt. · Avoid water that is naturally high in sodium or that has been treated with water softeners, which add sodium. Call your local water company to find out the sodium content of your water supply. If you buy bottled water, read the label and choose a sodium-free brand. Avoid high-sodium foods · Avoid eating: ¨ Smoked, cured, salted, and canned meat, fish, and poultry. ¨ Ham, arevalo, hot dogs, and luncheon meats. ¨ Regular, hard, and processed cheese and regular peanut butter. ¨ Crackers with salted tops, and other salted snack foods such as pretzels, chips, and salted popcorn. ¨ Frozen prepared meals, unless labeled low-sodium. ¨ Canned and dried soups, broths, and bouillon, unless labeled sodium-free or low-sodium. ¨ Canned vegetables, unless labeled sodium-free or low-sodium. ¨ [place] fries, pizza, tacos, and other fast foods. ¨ Pickles, olives, ketchup, and other condiments, especially soy sauce, unless labeled sodium-free or low-sodium. Where can you learn more? Go to http://hoang-jayden.info/. Enter C627 in the search box to learn more about \"Low Sodium Diet (2,000 Milligram): Care Instructions. \" Current as of: May 12, 2017 Content Version: 11.7 © 8223-3194 [employer], [employer]. Care instructions adapted under license by [employer] (which disclaims liability or warranty for this information). If you have questions about a medical condition or this instruction, always ask your healthcare professional. [address] any warranty or liability for your use of this information. Introducing [facility]! Jenn  introduces [employer] patient portal. Now you can access parts of your medical record, email your doctor's office, and request medication refills online. 1. In your internet browser, go to [email or web address]. [employer] 2. Click on the First Time User? Click Here link in the Sign In box. You will see the New Member Sign Up page. 3. Enter your [employer] Access Code exactly as it appears below. You will not need to use this code after youve completed the sign-up process. If you do not sign up before the expiration date, you must request a new code. · [employer] Access Code: [email or web address] Expires: 12/12/2018 11:42 AM 
 
4. Enter the last four digits of your Social Security Number (xxxx) and Date of Birth (mm/dd/yyyy) as indicated and click Submit. You will be taken to the next sign-up page. 5. Create a [employer] ID. This will be your [employer] login ID and cannot be changed, so think of one that is secure and easy to remember. 6. Create a [employer] password. You can change your password at any time. 7. Enter your Password Reset Question and Answer. This can be used at a later time if you forget your password. 8. Enter your e-mail address. You will receive e-mail notification when new information is available in [address] 9. Click Sign Up. You can now view and download portions of your medical record. 10. Click the Download Summary menu link to download a portable copy of your medical information. If you have questions, please visit the Frequently Asked Questions section of the [employer] website. Remember, [employer] is NOT to be used for urgent needs. For medical emergencies, dial 911. Now available from your iPhone and Android! Please provide this summary of care documentation to your next provider. Your primary care clinician is listed as [address]. If you have any questions after today's visit, please call [phone].

## 2018-09-13 NOTE — PROGRESS NOTES
1. Have you been to the ER, urgent care clinic since your last visit? Hospitalized since your last visit? No 
 
2. Have you seen or consulted any other health care providers outside of the [facility] since your last visit? Include any pap smears or colon screening. No 
 
Chief Complaint Patient presents with  Hypertension Doesnt think clonidine patch is working well.  Cholesterol Problem
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
## 2018-09-13 NOTE — PATIENT INSTRUCTIONS

## 2018-10-01 ENCOUNTER — TELEPHONE (OUTPATIENT)
Dept: FAMILY MEDICINE CLINIC | Age: 58
End: 2018-10-01

## 2018-10-01 DIAGNOSIS — Z12.11 SCREENING FOR COLON CANCER: Primary | ICD-10-CM

## 2018-10-01 NOTE — TELEPHONE ENCOUNTER
Patient wife calling would like to know where patient needs to go to get his colonoscopy done. Would like a call back with contact information.

## 2018-10-01 NOTE — TELEPHONE ENCOUNTER
Spoke with Padmini Gonsalez that stated [facility] has contact the patient and is requesting that patient have a colonoscopy to remain on kidney transplant list. Will inform provider. Ms. Siobhan Fuller verbalized understanding.

## 2018-10-04 RX ORDER — CLONIDINE 0.3 MG/24H
PATCH, EXTENDED RELEASE TRANSDERMAL
Qty: 16 PATCH | Refills: 3 | Status: SHIPPED | OUTPATIENT
Start: 2018-10-04 | End: 2019-10-17 | Stop reason: SDUPTHER

## 2018-10-29 NOTE — PROGRESS NOTES
Angel Irby presents today for a 6 month check-up. He was last seen by Dr. Michelle Pandey in April 2018. He has now been on hemodialysis for several months. He is a 60-year-old -American male with history of chronic kidney disease stage III-IV, long-standing poorly controlled diabetes and hypertension, CHF, dyslipidemia, and intermittent compliance with his medications. He also has significant autonomic dysfunction as noted on the tilt table study done in March 2017. He was supposed to be [facility] but it is unclear whether or not he followed up with them. In the past, compliance with his medications has been dependent on their finances. He was last seen by Dr. Michelle Pandey in October 2017. His last echocardiogram was done in October 2016 which showed a ejection fraction of 65% with significant concentric LVH. His last treadmill stress test was done in November 2012 which was considered and negative maximal stress test.  He had a renal artery duplex study done in Oct. 2016, which showed no significant renal artery stenosis. Per Dr. Olena Polanco last office note, he stated that a supine blood pressure around 935-158 mmHg systolic would be acceptable due to his history of orthostatic hypotension attributed to autonomic dysfunction. He was accompanied by his wife. She states tht he has had eye surgery. His volume status appears to have stabilized since he began hemodialysis. He denies chest pain, tightness, heaviness, and palpitations. He denies shortness of breath at rest, admits to dyspnea on exertion, denies orthopnea and PND. He denies abdominal bloating. He denies lightheadedness, admits to occasional dizziness, and denies syncope. He denies lower extremity edema and denies claudication. Denies nausea, vomiting, diarrhea, fever, chills. He has a left upper arm dialysis access. PMH: 
Past Medical History:  
Diagnosis Date  Blindness of one eye   
 left  Cardiac echocardiogram 10/21/2016 EF 60-65%. No WMA. Mod-marked LVH. Normal diastolic fx. No significant valvular heart disease.  Cardiac treadmill stress test, low risk 11/02/2012 Negative maximal exercise treadmill test.  Ex time 7 min 45 sec.  Cardiovascular LE peripheral arterial testing 03/22/2016 No significant peripheral arterial disease at rest bilaterally. ABIs deferred due to calcified vessels.  Cardiovascular LLE venous duplex 06/06/2012 Left leg:  No DVT.  Cardiovascular renal duplex 10/21/2016 No significant renal artery stenosis.  Chronic kidney disease   
 [id number]  
 CKD (chronic kidney disease), stage V ([address]  Diabetes mellitus ([facility] 3/12/2010  Diabetic retinopathy ([facility] 5/4/2010  Edema of both legs  Eye examination 5/4/10 OU: 20/20; OD: 20/25; OS: 20/25 without correction.  Glaucoma 08/17/2017  
 Joeline Romberg  
 HLD (hyperlipidemia) 3/12/2010  
 HTN (hypertension) 3/12/2010  Orthostatic hypertension PSH: 
Past Surgical History:  
Procedure Laterality Date  HX AMPUTATION Left TOES-small  HX HERNIA REPAIR  2005  HX OTHER SURGICAL  11/07/2017  
 glaucoma valve- Ahmed  HX VASCULAR ACCESS    
 HD catheter right neck MEDS: 
Current Outpatient Medications Medication Sig  cloNIDine (CATAPRES) 0.3 mg/24 hr APPLY 1 PATCH TO SKIN ONCE EVERY 7 DAYS  amLODIPine (NORVASC) 10 mg tablet TAKE 1 TABLET BY MOUTH DAILY.  lidocaine-prilocaine (EMLA) topical cream Apply  to affected area as needed for Pain (apply to left arm 30 minutes prior to dialysis).  hydrALAZINE (APRESOLINE) 25 mg tablet Take 1 Tab by mouth two (2) times a day. (Patient taking differently: Take 25 mg by mouth. Take TID on non-dialysis days, QPM on dialysis days)  finasteride (PROSCAR) 5 mg tablet TAKE 1 TABLET BY MOUTH DAILY.   
 b complex-vitamin c-folic acid (NEPHROCAPS) 1 mg capsule Take 1 Cap by mouth daily.  calcium acetate (PHOSLO) 667 mg cap Take 1 Cap by mouth three (3) times daily (with meals).  insulin lispro (HUMALOG) 100 unit/mL injection Blood Sugar (mg/dL): <150 =0 units; 150 -199 =2 units; 200 -249 =4 units; 250 -299 =6 units; 300 -349 =8 units; 350 and above =10 units.  losartan (COZAAR) 50 mg tablet Take 1 Tab by mouth daily.  Insulin Syringe-Needle U-100 (INSULIN SYRINGE ULTRAFINE) 0.5 mL 29 gauge x 1/2\" syrg 1 Each by Does Not Apply route Before breakfast, lunch, dinner and at bedtime. And for use with lantus qhs.  acetaminophen (TYLENOL EXTRA STRENGTH) 500 mg tablet Take  by mouth every six (6) hours as needed for Pain.  cyclobenzaprine (FLEXERIL) 10 mg tablet Take 1 Tab by mouth three (3) times daily as needed for Muscle Spasm(s).  rosuvastatin (CRESTOR) 20 mg tablet Take 1 Tab by mouth nightly.  docusate sodium (COLACE) 100 mg capsule Take 1 Cap by mouth two (2) times a day.  glucose blood VI test strips (ASCENSIA AUTODISC VI, ONE TOUCH ULTRA TEST VI) strip Test twice daily:  Fasting before breakfast and 2 hours after dinner (log readings), One touch ultra 2 test strips please; Dx: 250.02  
 fluticasone (FLONASE) 50 mcg/actuation nasal spray 1 [place] by Both Nostrils route two (2) times a day. (Patient taking differently: 1 Spray by Both Nostrils route as needed.)  Lancets (ONE TOUCH DELICA) Misc Test twice daily: Once in the morning fasting, then two hours after dinner (log results)  Blood-Glucose Meter monitoring kit by Does Not Apply route. One touch [place]  cyanocobalamin (VITAMIN B-12) 1,000 mcg tablet Take 1,000 mcg by mouth daily. No current facility-administered medications for this visit. Allergies and Sensitivities: 
Allergies Allergen Reactions  Bactrim [Sulfamethoprim Ds] Nausea and Vomiting  Coreg [Carvedilol] Nausea and Vomiting Difficulty walking Family History: 
Family History Problem Relation Age of Onset  Diabetes Sister  Sickle Cell Anemia Sister  Diabetes Sister Social History: He  reports that  has never smoked. he has never used smokeless tobacco.  He  reports that he does not drink alcohol. Physical: 
Visit Vitals /60 Pulse 71 Ht 5' 6\" (1.676 m) Wt 77.6 kg (171 lb) SpO2 99% BMI 27.60 kg/m² His weight is down 21 pounds since his last visit Exam: 
Neck:  Supple, no JVD, no carotid bruits CV:  Normal S1 and  S2, no murmurs, rubs, or gallops noted Lungs:  Clear to ausculation throughout, no wheezes or rales Abd:  Soft, non-tender, non-distended with good bowel sounds. No hepatosplenomegaly Extremities:  No lower extremity edema. Hemodialysis access noted in left upper arm with palpable thrill and audible bruit. Data: 
EKG:     Read by Phil Henson MD - Sinus rhythm.  Negative T-waves.  Lateral ischemia.  PRWP. LABS: 
Lab Results Component Value Date/Time Sodium 140 09/13/2018 11:48 AM  
 Potassium 4.3 09/13/2018 11:48 AM  
 Chloride 103 09/13/2018 11:48 AM  
 CO2 29 09/13/2018 11:48 AM  
 Glucose 203 (H) 09/13/2018 11:48 AM  
 BUN 20 (H) 09/13/2018 11:48 AM  
 Creatinine 6.33 (H) 09/13/2018 11:48 AM  
 
Lab Results Component Value Date/Time Cholesterol, total 173 09/13/2018 11:48 AM  
 HDL Cholesterol 63 (H) 09/13/2018 11:48 AM  
 LDL, calculated 92.2 09/13/2018 11:48 AM  
 Triglyceride 89 09/13/2018 11:48 AM  
 CHOL/HDL Ratio 2.7 09/13/2018 11:48 AM  
 
Lab Results Component Value Date/Time ALT (SGPT) 21 09/13/2018 11:48 AM  
 
 
Impression / Plan: 1. Essential hypertension, blood pressure stable 2. History of orthostatic hypotension due to autonomic dysfunction as noted during tilt table study in March 2017 3. Hypercholesterolemia, on rosuvastatin 20 mg 
4. Diabetes mellitus, recommend Hgb A1c less than 7% from cardiac standpoint  (9.1 recently) 5.   Chronic kidney disease, stage 5, followed by Dr. Matt Josue, now on hemodialysis Mr. Danielle Rogers was seen today for a 6 month follow-up of CHF/HTN. Since his last visit, he began hemodialysis and his volume status appears better controlled. According to our scale, his weight is down 21 pounds and he no longer has lower extremity edema. His wife states that he has been doing well with hemodialysis treatments. His blood pressure is well-controlled, breath sounds are clear, and he has no lower extremity edema. Congestive heart failure teaching reinforced today. Advised to limit sodium intake to no more than 1500-2000mg per day and to also limit his fluid intake to no more than 48 ounces per day. Advised to weigh daily every morning and record weights. Instructed to call our office if progressive weight gain is noted over a 2 to 3 day period of time, shortness of breath increases, or if abdominal bloating, nausea, fatigue, or increased lower extremity edema is noted. He will follow-up with Dr. Michelle Pandey as scheduled and as needed. Jack Lindsay MSN, FNP-BC Please note:  Portions of this chart were created with Dragon medical speech to text program.  Unrecognized errors may be present.

## 2018-10-30 ENCOUNTER — OFFICE VISIT (OUTPATIENT)
Dept: CARDIOLOGY CLINIC | Age: 58
End: 2018-10-30

## 2018-10-30 VITALS
OXYGEN SATURATION: 99 % | HEIGHT: 66 IN | SYSTOLIC BLOOD PRESSURE: 120 MMHG | BODY MASS INDEX: 27.48 KG/M2 | DIASTOLIC BLOOD PRESSURE: 60 MMHG | WEIGHT: 171 LBS | HEART RATE: 71 BPM

## 2018-10-30 DIAGNOSIS — I10 ESSENTIAL HYPERTENSION: Primary | ICD-10-CM

## 2018-10-30 DIAGNOSIS — Z99.2 ESRD ON DIALYSIS (HCC): ICD-10-CM

## 2018-10-30 DIAGNOSIS — E78.5 HYPERLIPIDEMIA, UNSPECIFIED HYPERLIPIDEMIA TYPE: ICD-10-CM

## 2018-10-30 DIAGNOSIS — N18.6 ESRD ON DIALYSIS (HCC): ICD-10-CM

## 2018-10-30 NOTE — PROGRESS NOTES
Gris Soler presents today for Chief Complaint Patient presents with  Irregular Heart Beat  
  6 month follow up  Hypertension 6 month follow up Gris Ryder preferred language for health care discussion is english/other. Is someone accompanying this pt? Yes Is the patient using any DME equipment during OV? Yes 
 
Depression Screening: PHQ over the last two weeks 6/19/2018 Little interest or pleasure in doing things Not at all Feeling down, depressed, irritable, or hopeless Not at all Total Score PHQ 2 0 Trouble falling or staying asleep, or sleeping too much - Feeling tired or having little energy - Poor appetite, weight loss, or overeating - Feeling bad about yourself - or that you are a failure or have let yourself or your family down - Trouble concentrating on things such as school, work, reading, or watching TV - Moving or speaking so slowly that other people could have noticed; or the opposite being so fidgety that others notice - Thoughts of being better off dead, or hurting yourself in some way -  
PHQ 9 Score - How difficult have these problems made it for you to do your work, take care of your home and get along with others - Learning Assessment: 
Learning Assessment 6/19/2018 PRIMARY LEARNER Patient PRIMARY LANGUAGE ENGLISH  
LEARNER PREFERENCE PRIMARY LISTENING  
  -  
  -  
ANSWERED BY pateint RELATIONSHIP SELF Pt currently taking Anticoagulant therapy? No 
 
 
Coordination of Care: 1. Have you been to the ER, urgent care clinic since your last visit? Hospitalized since your last visit? Yes 
 
2. Have you seen or consulted any other health care providers outside of the [address] [place] [address] since your last visit? Include any pap smears or colon screening. Yes Medications verified verbally with patients wife.

## 2018-12-13 ENCOUNTER — OFFICE VISIT (OUTPATIENT)
Dept: FAMILY MEDICINE CLINIC | Age: 58
End: 2018-12-13

## 2018-12-13 VITALS
DIASTOLIC BLOOD PRESSURE: 70 MMHG | HEART RATE: 71 BPM | TEMPERATURE: 98.2 F | HEIGHT: 66 IN | OXYGEN SATURATION: 100 % | SYSTOLIC BLOOD PRESSURE: 140 MMHG | BODY MASS INDEX: 28.12 KG/M2 | RESPIRATION RATE: 16 BRPM | WEIGHT: 175 LBS

## 2018-12-13 DIAGNOSIS — E78.5 HYPERLIPIDEMIA, UNSPECIFIED HYPERLIPIDEMIA TYPE: Primary | ICD-10-CM

## 2018-12-13 DIAGNOSIS — I10 HTN (HYPERTENSION), MALIGNANT: ICD-10-CM

## 2018-12-13 NOTE — PATIENT INSTRUCTIONS
Counting Carbohydrates: Care Instructions  Your Care Instructions    You don't have to eat special foods when you have diabetes. You just have to be careful to eat healthy foods. Carbohydrates (carbs) raise blood sugar higher and quicker than any other nutrient. Carbs are found in desserts, breads and cereals, and fruit. They're also in starchy vegetables. These include potatoes, corn, and grains such as rice and pasta. Carbs are also in milk and yogurt. The more carbs you eat at one time, the higher your blood sugar will rise. Spreading carbs all through the day helps keep your blood sugar levels within your target range. Counting carbs is one of the best ways to keep your blood sugar under control. If you use insulin, counting carbs helps you match the right amount of insulin to the number of grams of carbs in a meal. Then you can change your diet and insulin dose as needed. Testing your blood sugar several times a day can help you learn how carbs affect your blood sugar. A registered dietitian or certified diabetes educator can help you plan meals and snacks. Follow-up care is a key part of your treatment and safety. Be sure to make and go to all appointments, and call your doctor if you are having problems. It's also a good idea to know your test results and keep a list of the medicines you take. How can you care for yourself at home? Know your daily amount of carbohydrates  Your daily amount depends on several things, such as your weight, how active you are, which diabetes medicines you take, and what your goals are for your blood sugar levels. A registered dietitian or certified diabetes educator can help you plan how many carbs to include in each meal and snack. For most adults, a guideline for the daily amount of carbs is:  · 45 to 60 grams at each meal. That's about the same as 3 to 4 carbohydrate servings. · 15 to 20 grams at each snack. That's about the same as 1 carbohydrate serving.   Count carbs  Counting carbs lets you know how much rapid-acting insulin to take before you eat. If you use an insulin pump, you get a constant rate of insulin during the day. So the pump must be programmed at meals. This gives you extra insulin to cover the rise in blood sugar after meals. If you take insulin:  · Learn your own insulin-to-carb ratio. You and your diabetes health professional will figure out the ratio. You can do this by testing your blood sugar after meals. For example, you may need a certain amount of insulin for every 15 grams of carbs. · Add up the carb grams in a meal. Then you can figure out how many units of insulin to take based on your insulin-to-carb ratio. · Exercise lowers blood sugar. You can use less insulin than you would if you were not doing exercise. Keep in mind that timing matters. If you exercise within 1 hour after a meal, your body may need less insulin for that meal than it would if you exercised 3 hours after the meal. Test your blood sugar to find out how exercise affects your need for insulin. If you do or don't take insulin:  · Look at labels on packaged foods. This can tell you how many carbs are in a serving. You can also use guides from the American Diabetes Association. · Be aware of portions, or serving sizes. If a package has two servings and you eat the whole package, you need to double the number of grams of carbohydrate listed for one serving. · Protein, fat, and fiber do not raise blood sugar as much as carbs do. If you eat a lot of these nutrients in a meal, your blood sugar will rise more slowly than it would otherwise. Eat from all food groups  · Eat at least three meals a day. · Plan meals to include food from all the food groups. The food groups include grains, fruits, dairy, proteins, and vegetables. · Talk to your dietitian or diabetes educator about ways to add limited amounts of sweets into your meal plan. · If you drink alcohol, talk to your doctor. It may not be recommended when you are taking certain diabetes medicines. Where can you learn more? Go to http://hoang-jayden.info/. Enter D141 in the search box to learn more about \"Counting Carbohydrates: Care Instructions. \"  Current as of: December 7, 2017  Content Version: 11.8  © 8020-2050 [employer]. Care instructions adapted under license by [employer] (which disclaims liability or warranty for this information). If you have questions about a medical condition or this instruction, always ask your healthcare professional. [address] any warranty or liability for your use of this information.

## 2018-12-13 NOTE — PROGRESS NOTES
HISTORY OF PRESENT ILLNESS  Marilia Tinajero is a 62 y.o. male. HPI  Patient is here today for evaluation and treatment of: Hypertension/Cholesterol problem    Hypertension: initial BP is very elevated initially. Pt is on dialysis; His BP drops at times at dialysis. He had a BP of 93/53 at dialysis recently. His wife states that he had a syncopal episode at dialysis at that time. His wife has been directed to NOT give pt his BP meds if  His BP is < 054 systolic. This am BP was was 110/ 83  att home so his wife elected not to give him his BP med today. Dialysis days are M W F. Pt is on Norvasc , clonidine,  hydralazine and cozaar;  He has not taken BP meds ( except for the clonidine patch) for the last three days. Cholesterol: he is on crestor. Takes med daily. Pts diabetes is followed by Dr. Alexis Gordon. He does need a urine microalbumin        Current Outpatient Medications:     cloNIDine (CATAPRES) 0.3 mg/24 hr, APPLY 1 PATCH TO SKIN ONCE EVERY 7 DAYS, Disp: 16 Patch, Rfl: 3    amLODIPine (NORVASC) 10 mg tablet, TAKE 1 TABLET BY MOUTH DAILY. , Disp: 30 Tab, Rfl: 6    lidocaine-prilocaine (EMLA) topical cream, Apply  to affected area as needed for Pain (apply to left arm 30 minutes prior to dialysis). , Disp: 30 g, Rfl: 12    hydrALAZINE (APRESOLINE) 25 mg tablet, Take 1 Tab by mouth two (2) times a day. (Patient taking differently: Take 25 mg by mouth. Take TID on non-dialysis days, QPM on dialysis days), Disp: 60 Tab, Rfl: 6    finasteride (PROSCAR) 5 mg tablet, TAKE 1 TABLET BY MOUTH DAILY. , Disp: 30 Tab, Rfl: 6    b complex-vitamin c-folic acid (NEPHROCAPS) 1 mg capsule, Take 1 Cap by mouth daily. , Disp: 30 Cap, Rfl: 6    calcium acetate (PHOSLO) 667 mg cap, Take 1 Cap by mouth three (3) times daily (with meals). , Disp: 90 Cap, Rfl: 2    insulin lispro (HUMALOG) 100 unit/mL injection, Blood Sugar (mg/dL): <150 =0 units; 150 -199 =2 units; 200 -249 =4 units; 250 -299 =6 units; 300 -349 =8 units; 350 and above =10 units. , Disp: 1 Vial, Rfl: 2    losartan (COZAAR) 50 mg tablet, Take 1 Tab by mouth daily. , Disp: 30 Tab, Rfl: 2    acetaminophen (TYLENOL EXTRA STRENGTH) 500 mg tablet, Take  by mouth every six (6) hours as needed for Pain., Disp: , Rfl:     cyclobenzaprine (FLEXERIL) 10 mg tablet, Take 1 Tab by mouth three (3) times daily as needed for Muscle Spasm(s). , Disp: 30 Tab, Rfl: 0    rosuvastatin (CRESTOR) 20 mg tablet, Take 1 Tab by mouth nightly., Disp: 30 Tab, Rfl: 6    docusate sodium (COLACE) 100 mg capsule, Take 1 Cap by mouth two (2) times a day., Disp: 60 Cap, Rfl: 0    glucose blood VI test strips (ASCENSIA AUTODISC VI, ONE TOUCH ULTRA TEST VI) strip, Test twice daily:  Fasting before breakfast and 2 hours after dinner (log readings), One touch ultra 2 test strips please; Dx: 250.02, Disp: 1 Package, Rfl: 11    fluticasone (FLONASE) 50 mcg/actuation nasal spray, 1 [place] by Both Nostrils route two (2) times a day. (Patient taking differently: 1 Spray by Both Nostrils route as needed.), Disp: 1 Bottle, Rfl: 2    Lancets (ONE TOUCH DELICA) Misc, Test twice daily: Once in the morning fasting, then two hours after dinner (log results), Disp: 1 Package, Rfl: 11    Blood-Glucose Meter monitoring kit, by Does Not Apply route. One touch ultra2, Disp: 1 Kit, Rfl: 0    cyanocobalamin (VITAMIN B-12) 1,000 mcg tablet, Take 1,000 mcg by mouth daily. , Disp: , Rfl:     Insulin Syringe-Needle U-100 (INSULIN SYRINGE ULTRAFINE) 0.5 mL 29 gauge x 1/2\" syrg, 1 Each by Does Not Apply route Before breakfast, lunch, dinner and at bedtime. And for use with lantus qhs., Disp: 150 Syringe, Rfl: 2      PMH,  Meds, Allergies, Family History, Social history reviewed    Review of Systems   Constitutional: Negative for fever. Respiratory: Negative for shortness of breath. Cardiovascular: Negative for chest pain.    Musculoskeletal: Positive for back pain (spasm in back yesterday after travelling to [place]). Physical Exam   Constitutional: He appears well-developed and well-nourished. No distress. Cardiovascular: Normal rate and regular rhythm. Exam reveals no gallop and no friction rub. No murmur heard. Pulmonary/Chest: Breath sounds normal. No respiratory distress. He has no wheezes. He has no rales. Musculoskeletal: He exhibits no edema. Nursing note and vitals reviewed. Visit Vitals  /70   Pulse 71   Temp 98.2 °F (36.8 °C) (Oral)   Resp 16   Ht 5' 6\" (1.676 m)   Wt 175 lb (79.4 kg)   SpO2 100%   BMI 28.25 kg/m²         ASSESSMENT and PLAN    ICD-10-CM ICD-9-CM    1. Hyperlipidemia, unspecified hyperlipidemia type E78.5 272.4 LIPID PANEL      AST      ALT   2. HTN (hypertension), malignant M96 293.5 METABOLIC PANEL, BASIC       As above,   above all stable unless otherwise noted   treatment plan as listed below  Orders Placed This Encounter    LIPID PANEL    METABOLIC PANEL, BASIC    AST    ALT     Follow-up Disposition:  Return in about 4 months (around 4/13/2019) for medicare well. An After Visit Summary was printed and given to the patient. This has been fully explained to the patient, who indicates understanding.   Follow up with consultants as scheduled  Monitor BP; pt gave pt his BP meds while in the office today, monitor

## 2018-12-13 NOTE — PROGRESS NOTES
1. Have you been to the ER, urgent care clinic since your last visit? Hospitalized since your last visit? No    2. Have you seen or consulted any other health care providers outside of the [address] [place] [address] since your last visit? Include any pap smears or colon screening.  No

## 2019-01-09 ENCOUNTER — ANESTHESIA EVENT (OUTPATIENT)
Dept: ENDOSCOPY | Age: 59
End: 2019-01-09
Payer: COMMERCIAL

## 2019-01-10 ENCOUNTER — ANESTHESIA (OUTPATIENT)
Dept: ENDOSCOPY | Age: 59
End: 2019-01-10
Payer: COMMERCIAL

## 2019-01-10 ENCOUNTER — HOSPITAL ENCOUNTER (OUTPATIENT)
Age: 59
Setting detail: OUTPATIENT SURGERY
Discharge: HOME | End: 2019-01-10
Attending: INTERNAL MEDICINE | Admitting: INTERNAL MEDICINE
Payer: COMMERCIAL

## 2019-01-10 VITALS
HEIGHT: 66 IN | RESPIRATION RATE: 14 BRPM | TEMPERATURE: 96.9 F | BODY MASS INDEX: 29.09 KG/M2 | OXYGEN SATURATION: 99 % | WEIGHT: 181 LBS | SYSTOLIC BLOOD PRESSURE: 185 MMHG | DIASTOLIC BLOOD PRESSURE: 73 MMHG | HEART RATE: 75 BPM

## 2019-01-10 LAB
BUN BLD-MCNC: 33 MG/DL (ref 7–18)
CHLORIDE BLD-SCNC: 93 MMOL/L (ref 100–108)
GLUCOSE BLD STRIP.AUTO-MCNC: 121 MG/DL (ref 70–110)
GLUCOSE BLD STRIP.AUTO-MCNC: 130 MG/DL (ref 70–110)
GLUCOSE BLD STRIP.AUTO-MCNC: 149 MG/DL (ref 74–106)
GLUCOSE BLD STRIP.AUTO-MCNC: 161 MG/DL (ref 70–110)
HCT VFR BLD CALC: 38 % (ref 36–49)
HGB BLD-MCNC: 12.9 G/DL (ref 12–16)
POTASSIUM BLD-SCNC: 5 MMOL/L (ref 3.5–5.5)
SODIUM BLD-SCNC: 127 MMOL/L (ref 136–145)

## 2019-01-10 PROCEDURE — 76040000019: Performed by: INTERNAL MEDICINE

## 2019-01-10 PROCEDURE — 76060000031 HC ANESTHESIA FIRST 0.5 HR: Performed by: INTERNAL MEDICINE

## 2019-01-10 PROCEDURE — 77030008565 HC TBNG SUC IRR ERBE -B: Performed by: INTERNAL MEDICINE

## 2019-01-10 PROCEDURE — 77030009426 HC FCPS BIOP ENDOSC BSC -B: Performed by: INTERNAL MEDICINE

## 2019-01-10 PROCEDURE — 74011000258 HC RX REV CODE- 258

## 2019-01-10 PROCEDURE — 74011250636 HC RX REV CODE- 250/636: Performed by: NURSE ANESTHETIST, CERTIFIED REGISTERED

## 2019-01-10 PROCEDURE — 74011000250 HC RX REV CODE- 250: Performed by: NURSE ANESTHETIST, CERTIFIED REGISTERED

## 2019-01-10 PROCEDURE — 82962 GLUCOSE BLOOD TEST: CPT

## 2019-01-10 PROCEDURE — 77030013992 HC SNR POLYP ENDOSC BSC -B: Performed by: INTERNAL MEDICINE

## 2019-01-10 PROCEDURE — 74011250636 HC RX REV CODE- 250/636

## 2019-01-10 PROCEDURE — 77030018846 HC SOL IRR STRL H20 ICUM -A: Performed by: INTERNAL MEDICINE

## 2019-01-10 PROCEDURE — 82947 ASSAY GLUCOSE BLOOD QUANT: CPT

## 2019-01-10 PROCEDURE — 88305 TISSUE EXAM BY PATHOLOGIST: CPT

## 2019-01-10 RX ORDER — LIDOCAINE HYDROCHLORIDE 20 MG/ML
INJECTION, SOLUTION EPIDURAL; INFILTRATION; INTRACAUDAL; PERINEURAL AS NEEDED
Status: DISCONTINUED | OUTPATIENT
Start: 2019-01-10 | End: 2019-01-10 | Stop reason: HOSPADM

## 2019-01-10 RX ORDER — DEXTROSE 50 % IN WATER (D50W) INTRAVENOUS SYRINGE
25-50 AS NEEDED
Status: DISCONTINUED | OUTPATIENT
Start: 2019-01-10 | End: 2019-01-10 | Stop reason: HOSPADM

## 2019-01-10 RX ORDER — LIDOCAINE HYDROCHLORIDE 10 MG/ML
0.1 INJECTION, SOLUTION EPIDURAL; INFILTRATION; INTRACAUDAL; PERINEURAL AS NEEDED
Status: DISCONTINUED | OUTPATIENT
Start: 2019-01-10 | End: 2019-01-10 | Stop reason: HOSPADM

## 2019-01-10 RX ORDER — ONDANSETRON 2 MG/ML
4 INJECTION INTRAMUSCULAR; INTRAVENOUS ONCE
Status: DISCONTINUED | OUTPATIENT
Start: 2019-01-10 | End: 2019-01-10 | Stop reason: HOSPADM

## 2019-01-10 RX ORDER — SODIUM CHLORIDE 0.9 % (FLUSH) 0.9 %
5-40 SYRINGE (ML) INJECTION AS NEEDED
Status: DISCONTINUED | OUTPATIENT
Start: 2019-01-10 | End: 2019-01-10 | Stop reason: HOSPADM

## 2019-01-10 RX ORDER — INSULIN LISPRO 100 [IU]/ML
INJECTION, SOLUTION INTRAVENOUS; SUBCUTANEOUS ONCE
Status: DISCONTINUED | OUTPATIENT
Start: 2019-01-10 | End: 2019-01-10 | Stop reason: HOSPADM

## 2019-01-10 RX ORDER — FENTANYL CITRATE 50 UG/ML
50 INJECTION, SOLUTION INTRAMUSCULAR; INTRAVENOUS AS NEEDED
Status: DISCONTINUED | OUTPATIENT
Start: 2019-01-10 | End: 2019-01-10 | Stop reason: HOSPADM

## 2019-01-10 RX ORDER — SODIUM CHLORIDE 0.9 % (FLUSH) 0.9 %
5-40 SYRINGE (ML) INJECTION EVERY 8 HOURS
Status: DISCONTINUED | OUTPATIENT
Start: 2019-01-10 | End: 2019-01-10 | Stop reason: HOSPADM

## 2019-01-10 RX ORDER — PROPOFOL 10 MG/ML
INJECTION, EMULSION INTRAVENOUS AS NEEDED
Status: DISCONTINUED | OUTPATIENT
Start: 2019-01-10 | End: 2019-01-10 | Stop reason: HOSPADM

## 2019-01-10 RX ORDER — HYDRALAZINE HYDROCHLORIDE 20 MG/ML
5 INJECTION INTRAMUSCULAR; INTRAVENOUS AS NEEDED
Status: DISCONTINUED | OUTPATIENT
Start: 2019-01-10 | End: 2019-01-10 | Stop reason: HOSPADM

## 2019-01-10 RX ORDER — SODIUM CHLORIDE 9 MG/ML
25 INJECTION, SOLUTION INTRAVENOUS CONTINUOUS
Status: DISCONTINUED | OUTPATIENT
Start: 2019-01-10 | End: 2019-01-10 | Stop reason: HOSPADM

## 2019-01-10 RX ORDER — MAGNESIUM SULFATE 100 %
4 CRYSTALS MISCELLANEOUS AS NEEDED
Status: DISCONTINUED | OUTPATIENT
Start: 2019-01-10 | End: 2019-01-10 | Stop reason: HOSPADM

## 2019-01-10 RX ADMIN — PROPOFOL 50 MG: 10 INJECTION, EMULSION INTRAVENOUS at 09:34

## 2019-01-10 RX ADMIN — SODIUM CHLORIDE 25 ML/HR: 900 INJECTION, SOLUTION INTRAVENOUS at 07:46

## 2019-01-10 RX ADMIN — FAMOTIDINE 20 MG: 10 INJECTION, SOLUTION INTRAVENOUS at 07:48

## 2019-01-10 RX ADMIN — PROPOFOL 50 MG: 10 INJECTION, EMULSION INTRAVENOUS at 09:36

## 2019-01-10 RX ADMIN — HYDRALAZINE HYDROCHLORIDE 5 MG: 20 INJECTION INTRAMUSCULAR; INTRAVENOUS at 10:12

## 2019-01-10 RX ADMIN — LIDOCAINE HYDROCHLORIDE 40 MG: 20 INJECTION, SOLUTION EPIDURAL; INFILTRATION; INTRACAUDAL; PERINEURAL at 09:34

## 2019-01-10 NOTE — ANESTHESIA POSTPROCEDURE EVALUATION
Procedure(s): 
COLONOSCOPY w polyp[ectomy. Anesthesia Post Evaluation Multimodal analgesia: multimodal analgesia used between 6 hours prior to anesthesia start to PACU discharge Patient location during evaluation: bedside Patient participation: complete - patient participated Level of consciousness: awake Pain management: adequate Airway patency: patent Anesthetic complications: no 
Cardiovascular status: stable Respiratory status: acceptable Hydration status: acceptable Post anesthesia nausea and vomiting:  controlled Visit Vitals /73 (BP Patient Position: At rest) Pulse 75 Temp 36.1 °C (96.9 °F) Resp 14 Ht 5' 6\" (1.676 m) Wt 82.1 kg (181 lb) SpO2 99% BMI 29.21 kg/m²

## 2019-01-10 NOTE — ANESTHESIA PREPROCEDURE EVALUATION
Anesthetic History No history of anesthetic complications Review of Systems / Medical History Patient summary reviewed and pertinent labs reviewed Pulmonary Within defined limits Neuro/Psych Within defined limits Cardiovascular Hypertension: poorly controlled Exercise tolerance: <4 METS 
  
GI/Hepatic/Renal 
  
GERD Renal disease: ESRD and dialysis Endo/Other Diabetes: type 2 Other Findings Physical Exam 
 
Airway Mallampati: III 
TM Distance: 4 - 6 cm Neck ROM: decreased range of motion Mouth opening: Diminished (comment) Cardiovascular Rhythm: regular Rate: normal 
 
 
 
 Dental 
 
Dentition: Poor dentition Pulmonary Breath sounds clear to auscultation Abdominal 
GI exam deferred Other Findings Anesthetic Plan ASA: 3 Anesthesia type: MAC Anesthetic plan and risks discussed with: Patient

## 2019-01-10 NOTE — DISCHARGE INSTRUCTIONS
Colonoscopy: What to Expect at [address] [place] [address]  After you have a colonoscopy, you will stay at the clinic for 1 to 2 hours until the medicines wear off. Then you can go home. But you will need to arrange for a ride. Your doctor will tell you when you can eat and do your other usual activities. Your doctor will talk to you about when you will need your next colonoscopy. Your doctor can help you decide how often you need to be checked. This will depend on the results of your test and your risk for colorectal cancer. After the test, you may be bloated or have gas pains. You may need to pass gas. If a biopsy was done or a polyp was removed, you may have streaks of blood in your stool (feces) for a few days. Problems such as heavy rectal bleeding may not occur until several weeks after the test. This isn't common. But it can happen after polyps are removed. This care sheet gives you a general idea about how long it will take for you to recover. But each person recovers at a different pace. Follow the steps below to get better as quickly as possible. How can you care for yourself at home? Activity    · Rest when you feel tired.     · You can do your normal activities when it feels okay to do so. Diet    · Follow your doctor's directions for eating.     · Unless your doctor has told you not to, drink plenty of fluids. This helps to replace the fluids that were lost during the colon prep.     · Do not drink alcohol. Medicines    · Your doctor will tell you if and when you can restart your medicines. He or she will also give you instructions about taking any new medicines.     · If you take blood thinners, such as warfarin (Coumadin), clopidogrel (Plavix), or aspirin, be sure to talk to your doctor. He or she will tell you if and when to start taking those medicines again.  Make sure that you understand exactly what your doctor wants you to do.     · If polyps were removed or a biopsy was done during the test, your doctor may tell you not to take aspirin or other anti-inflammatory medicines for a few days. These include ibuprofen (Advil, Motrin) and naproxen (Aleve). Other instructions    · For your safety, do not drive or operate machinery until the medicine wears off and you can think clearly. Your doctor may tell you not to drive or operate machinery until the day after your test.     · Do not sign legal documents or make major decisions until the medicine wears off and you can think clearly. The anesthesia can make it hard for you to fully understand what you are agreeing to. Follow-up care is a key part of your treatment and safety. Be sure to make and go to all appointments, and call your doctor if you are having problems. It's also a good idea to know your test results and keep a list of the medicines you take. When should you call for help? Call 911 anytime you think you may need emergency care. For example, call if:    · You passed out (lost consciousness).     · You pass maroon or bloody stools.     · You have trouble breathing.    Call your doctor now or seek immediate medical care if:    · You have pain that does not get better after you take pain medicine.     · You are sick to your stomach or cannot drink fluids.     · You have new or worse belly pain.     · You have blood in your stools.     · You have a fever.     · You cannot pass stools or gas.    Watch closely for changes in your health, and be sure to contact your doctor if you have any problems. Where can you learn more? Go to http://hoang-jayden.info/. Enter E264 in the search box to learn more about \"Colonoscopy: What to Expect at Home. \"  Current as of: March 28, 2018  Content Version: 11.8  © 8136-3248 [employer]. Care instructions adapted under license by [employer] (which disclaims liability or warranty for this information).  If you have questions about a medical condition or this instruction, always ask your healthcare professional. [place] [address] any warranty or liability for your use of this information. Colon Polyps: Care Instructions  Your Care Instructions    Colon polyps are growths in the colon or the rectum. The cause of most colon polyps is not known, and most people who get them do not have any problems. But a certain kind can turn into cancer. For this reason, regular testing for colon polyps is important for people age 48 and older and anyone who has an increased risk for colon cancer. Polyps are usually found through routine colon cancer screening tests. Although most colon polyps are not cancerous, they are usually removed and then tested for cancer. Screening for colon cancer saves lives because the cancer can usually be cured if it is caught early. If you have a polyp that is the type that can turn into cancer, you may need more tests to examine your entire colon. The doctor will remove any other polyps that he or she finds, and you will be tested more often. Follow-up care is a key part of your treatment and safety. Be sure to make and go to all appointments, and call your doctor if you are having problems. It's also a good idea to know your test results and keep a list of the medicines you take. How can you care for yourself at home? Regular exams to look for colon polyps are the best way to prevent polyps from turning into colon cancer. These can include stool tests, sigmoidoscopy, colonoscopy, and CT colonography. Talk with your doctor about a testing schedule that is right for you. To prevent polyps  There is no home treatment that can prevent colon polyps. But these steps may help lower your risk for cancer. · Stay active. Being active can help you get to and stay at a healthy weight. Try to exercise on most days of the week. Walking is a good choice. · Eat well.  Choose a variety of vegetables, fruits, legumes (such as peas and beans), fish, poultry, and whole grains. · Do not smoke. If you need help quitting, talk to your doctor about stop-smoking programs and medicines. These can increase your chances of quitting for good. · If you drink alcohol, limit how much you drink. Limit alcohol to 2 drinks a day for men and 1 drink a day for women. When should you call for help? Call your doctor now or seek immediate medical care if:    · You have severe belly pain.     · Your stools are maroon or very bloody.    Watch closely for changes in your health, and be sure to contact your doctor if:    · You have a fever.     · You have nausea or vomiting.     · You have a change in bowel habits (new constipation or diarrhea).     · Your symptoms get worse or are not improving as expected. Where can you learn more? Go to http://hoang-jayden.info/. Enter 95 [id number] in the search box to learn more about \"Colon Polyps: Care Instructions. \"  Current as of: March 28, 2018  Content Version: 11.8  © 0474-8567 [employer]. Care instructions adapted under license by [employer] (which disclaims liability or warranty for this information). If you have questions about a medical condition or this instruction, always ask your healthcare professional. [place] [address] any warranty or liability for your use of this information. DISCHARGE SUMMARY from Nurse    PATIENT INSTRUCTIONS:    After general anesthesia or intravenous sedation, for 24 hours or while taking prescription Narcotics:  · Limit your activities  · Do not drive and operate hazardous machinery  · Do not make important personal or business decisions  · Do  not drink alcoholic beverages  · If you have not urinated within 8 hours after discharge, please contact your surgeon on call.     Report the following to your surgeon:  · Excessive pain, swelling, redness or odor of or around the surgical area  · Temperature over 100.5  · Nausea and vomiting lasting longer than 4 hours or if unable to take medications  · Any signs of decreased circulation or nerve impairment to extremity: change in color, persistent  numbness, tingling, coldness or increase pain  · Any questions    *  Please give a list of your current medications to your Primary Care Provider. *  Please update this list whenever your medications are discontinued, doses are      changed, or new medications (including over-the-counter products) are added. *  Please carry medication information at all times in case of emergency situations. These are general instructions for a healthy lifestyle:    No smoking/ No tobacco products/ Avoid exposure to second hand smoke  Surgeon General's Warning:  Quitting smoking now greatly reduces serious risk to your health. Obesity, smoking, and sedentary lifestyle greatly increases your risk for illness    A healthy diet, regular physical exercise & weight monitoring are important for maintaining a healthy lifestyle    You may be retaining fluid if you have a history of heart failure or if you experience any of the following symptoms:  Weight gain of 3 pounds or more overnight or 5 pounds in a week, increased swelling in our hands or feet or shortness of breath while lying flat in bed. Please call your doctor as soon as you notice any of these symptoms; do not wait until your next office visit. Recognize signs and symptoms of STROKE:    F-face looks uneven    A-arms unable to move or move unevenly    S-speech slurred or non-existent    T-time-call 911 as soon as signs and symptoms begin-DO NOT go       Back to bed or wait to see if you get better-TIME IS BRAIN. Warning Signs of HEART ATTACK     Call 911 if you have these symptoms:   Chest discomfort. Most heart attacks involve discomfort in the center of the chest that lasts more than a few minutes, or that goes away and comes back. It can feel like uncomfortable pressure, squeezing, fullness, or pain.    Discomfort in other areas of the upper body. Symptoms can include pain or discomfort in one or both arms, the back, neck, jaw, or stomach.  Shortness of breath with or without chest discomfort.  Other signs may include breaking out in a cold sweat, nausea, or lightheadedness. Don't wait more than five minutes to call 911 - MINUTES MATTER! Fast action can save your life. Calling 911 is almost always the fastest way to get lifesaving treatment. Emergency Medical Services staff can begin treatment when they arrive -- up to an hour sooner than if someone gets to the hospital by car. The discharge information has been reviewed with the patient and spouse. The patient and spouse verbalized understanding. Discharge medications reviewed with the patient and spouse and appropriate educational materials and side effects teaching were provided.   ___________________________________________________________________________________________________________________________________

## 2019-01-10 NOTE — H&P
[email or web address] 
[phone] Impression: 1. Average risk colon cancer screening exam 
 
 
Plan: 1. Colonoscopy Chief Complaint: Average risk colon cancer screening exam. 
 
 
HPI: 
Sohail Waite is a 62 y.o. male who is being seen on consult for average risk colon cancer screening with colonoscopy PMH:  
Past Medical History:  
Diagnosis Date  Blindness of one eye   
 left  Cardiac echocardiogram 10/21/2016 EF 60-65%. No WMA. Mod-marked LVH. Normal diastolic fx. No significant valvular heart disease.  Cardiac treadmill stress test, low risk 11/02/2012 Negative maximal exercise treadmill test.  Ex time 7 min 45 sec.  Cardiovascular LE peripheral arterial testing 03/22/2016 No significant peripheral arterial disease at rest bilaterally. ABIs deferred due to calcified vessels.  Cardiovascular LLE venous duplex 06/06/2012 Left leg:  No DVT.  Cardiovascular renal duplex 10/21/2016 No significant renal artery stenosis.  Chronic kidney disease   
 [id number]  
 CKD (chronic kidney disease), stage V ([address]  Diabetes mellitus ([address] 3/12/2010  Diabetic retinopathy ([address] 5/4/2010  Edema of both legs  Eye examination 5/4/10 OU: 20/20; OD: 20/25; OS: 20/25 without correction.  GERD (gastroesophageal reflux disease)  Glaucoma 08/17/2017  
 [place]   
 HLD (hyperlipidemia) 3/12/2010  
 HTN (hypertension) 3/12/2010  Orthostatic hypertension PSH:  
Past Surgical History:  
Procedure Laterality Date  HX AMPUTATION Left TOES-small  HX HERNIA REPAIR  2005  HX OTHER SURGICAL  11/07/2017  
 glaucoma valve- Ahmed  HX VASCULAR ACCESS    
 HD catheter right neck Social HX:  
Social History Socioeconomic History  Marital status:  Spouse name: Not on file  Number of children: Not on file  Years of education: Not on file  Highest education level: Not on file Social Needs  Financial resource strain: Not on file  Food insecurity - worry: Not on file  Food insecurity - inability: Not on file  Transportation needs - medical: Not on file  Transportation needs - non-medical: Not on file Occupational History  Not on file Tobacco Use  Smoking status: Never Smoker  Smokeless tobacco: Never Used Substance and Sexual Activity  Alcohol use: No  
 Drug use: No  
 Sexual activity: Yes  
  Partners: Female Other Topics Concern [address] Service Not Asked  Blood Transfusions Not Asked  Caffeine Concern Yes Comment: 6 cups a month  Occupational Exposure Not Asked [place] Hazards Not Asked  Sleep Concern Not Asked  Stress Concern Not Asked  Weight Concern Not Asked  Special Diet Not Asked  Back Care Not Asked  Exercise Yes Comment: walking 4 hour a day  Bike Helmet Not Asked [address] Yes  Self-Exams Not Asked Social History Narrative Safety issues/concerns: ( none)Domestic Violence, (none)Guns in home,(doesn't use)Sunscreen, (working)Smoke Detectors, (safe)Housing. FHX:  
Family History Problem Relation Age of Onset  Diabetes Sister  Sickle Cell Anemia Sister  Diabetes Sister Allergy: Allergies Allergen Reactions  Bactrim [Sulfamethoprim Ds] Nausea and Vomiting  Coreg [Carvedilol] Nausea and Vomiting Difficulty walking Home Medications:  
 
Medications Prior to Admission Medication Sig  cloNIDine (CATAPRES) 0.3 mg/24 hr APPLY 1 PATCH TO SKIN ONCE EVERY 7 DAYS  amLODIPine (NORVASC) 10 mg tablet TAKE 1 TABLET BY MOUTH DAILY.  hydrALAZINE (APRESOLINE) 25 mg tablet Take 1 Tab by mouth two (2) times a day. (Patient taking differently: Take 25 mg by mouth. Take TID on non-dialysis days, QPM on dialysis days)  finasteride (PROSCAR) 5 mg tablet TAKE 1 TABLET BY MOUTH DAILY.   
 b complex-vitamin c-folic acid (NEPHROCAPS) 1 mg capsule Take 1 Cap by mouth daily.  calcium acetate (PHOSLO) 667 mg cap Take 1 Cap by mouth three (3) times daily (with meals).  insulin lispro (HUMALOG) 100 unit/mL injection Blood Sugar (mg/dL): <150 =0 units; 150 -199 =2 units; 200 -249 =4 units; 250 -299 =6 units; 300 -349 =8 units; 350 and above =10 units.  losartan (COZAAR) 50 mg tablet Take 1 Tab by mouth daily.  Insulin Syringe-Needle U-100 (INSULIN SYRINGE ULTRAFINE) 0.5 mL 29 gauge x 1/2\" syrg 1 Each by Does Not Apply route Before breakfast, lunch, dinner and at bedtime. And for use with lantus qhs.  acetaminophen (TYLENOL EXTRA STRENGTH) 500 mg tablet Take  by mouth every six (6) hours as needed for Pain.  cyclobenzaprine (FLEXERIL) 10 mg tablet Take 1 Tab by mouth three (3) times daily as needed for Muscle Spasm(s).  rosuvastatin (CRESTOR) 20 mg tablet Take 1 Tab by mouth nightly.  docusate sodium (COLACE) 100 mg capsule Take 1 Cap by mouth two (2) times a day.  glucose blood VI test strips (ASCENSIA AUTODISC VI, ONE TOUCH ULTRA TEST VI) strip Test twice daily:  Fasting before breakfast and 2 hours after dinner (log readings), One touch ultra 2 test strips please; Dx: 250.02  
 fluticasone (FLONASE) 50 mcg/actuation nasal spray 1 [place] by Both Nostrils route two (2) times a day. (Patient taking differently: 1 Spray by Both Nostrils route as needed.)  Lancets (ONE TOUCH DELICA) Misc Test twice daily: Once in the morning fasting, then two hours after dinner (log results)  Blood-Glucose Meter monitoring kit by Does Not Apply route. One touch [place]  cyanocobalamin (VITAMIN B-12) 1,000 mcg tablet Take 1,000 mcg by mouth daily.  lidocaine-prilocaine (EMLA) topical cream Apply  to affected area as needed for Pain (apply to left arm 30 minutes prior to dialysis). Review of Systems:  
 
Constitutional: No fevers, chills, weight loss, fatigue. Skin: No rashes, pruritis, jaundice, ulcerations, erythema. HENT: No headaches, nosebleeds, sinus pressure, rhinorrhea, sore throat. Eyes: No visual changes, blurred vision, eye pain, photophobia, jaundice. Cardiovascular: No chest pain, heart palpitations. Respiratory: No cough, SOB, wheezing, chest discomfort, orthopnea. Gastrointestinal:   
Genitourinary: No dysuria, bleeding, discharge, pyuria. Musculoskeletal: No weakness, arthralgias, wasting. Endo: No sweats. Heme: No bruising, easy bleeding. Allergies: As noted. Neurological: Cranial nerves intact. Alert and oriented. Gait not assessed. Psychiatric:  No anxiety, depression, hallucinations. Visit Vitals BP (!) 226/87 Pulse 77 Temp 98.6 °F (37 °C) Resp 18 Ht 5' 6\" (1.676 m) Wt 82.1 kg (181 lb) SpO2 100% BMI 29.21 kg/m² Physical Assessment:  
 
constitutional: appearance: well developed, well nourished, normal habitus, no deformities, in no acute distress. skin: inspection: no rashes, ulcers, icterus or other lesions; no clubbing or telangiectasias. palpation: no induration or subcutaneos nodules. eyes: inspection: normal conjunctivae and lids; no jaundice pupils: symmetrical, normoreactive to light, normal accommodation and size. ENMT: mouth: normal oral mucosa,lips and gums; good dentition. oropharynx: normal tongue, hard and soft palate; posterior pharynx without erithema, exudate or lesions. neck: thyroid: normal size, consistency and position; no masses or tenderness. respiratory: effort: normal chest excursion; no intercostal retraction or accessory muscle use. cardiovascular: abdominal aorta: normal size and position; no bruits. palpation: PMI of normal size and position; normal rhythm; no thrill or murmurs. abdominal: abdomen: normal consistency; no tenderness or masses. hernias: no hernias appreciated. liver: normal size and consistency. spleen: not palpable. rectal: hemoccult/guaiac: not performed. musculoskeletal: digits and nails: no clubbing, cyanosis, petechiae or other inflammatory conditions. gait: normal gait and station head and neck: normal range of motion; no pain, crepitation or contracture. spine/ribs/pelvis: normal range of motion; no pain, deformity or contracture. lymphatic: axilae: not palpable. groin: not palpable. neck: within normal limits. other: not palpable. neurologic: cranial nerves: II-XII normal.  
psychiatric: judgement/insight: within normal limits. memory: within normal limits for recent and remote events. mood and affect: no evidence of depression, anxiety or agitation. orientation: oriented to time, space and person. Basic Metabolic Profile No results for input(s): NA, K, CL, CO2, BUN, GLU, CA, MG, PHOS in the last 72 hours. No lab exists for component: CREAT  
  
CBC w/Diff No results for input(s): WBC, RBC, HGB, HCT, MCV, MCH, MCHC, RDW, PLT, HGBEXT, HCTEXT, PLTEXT in the last 72 hours. No lab exists for component: MPV No results for input(s): GRANS, LYMPH, EOS, PRO, MYELO, METAS, BLAST in the last 72 hours. No lab exists for component: MONO, BASO Hepatic Function No results for input(s): ALB, TP, TBILI, GPT, SGOT, AP, AML, LPSE in the last 72 hours. No lab exists for component: DAHIANA Porter MD, M.D. [facility], [address] [place] [address] 
[email or web address]

## 2019-01-10 NOTE — PERIOP NOTES
Late entry Patient confirmed by two identifiers with discharge instructions prior too being provided to patient and spouse. Patient armband removed and shredded.

## 2019-02-06 RX ORDER — ROSUVASTATIN CALCIUM 20 MG/1
TABLET, COATED ORAL
Qty: 30 TAB | Refills: 6 | Status: SHIPPED | OUTPATIENT
Start: 2019-02-06 | End: 2019-04-05 | Stop reason: ALTCHOICE

## 2019-02-07 RX ORDER — FINASTERIDE 5 MG/1
5 TABLET, FILM COATED ORAL DAILY
Qty: 30 TAB | Refills: 2 | Status: SHIPPED | OUTPATIENT
Start: 2019-02-07 | End: 2019-05-13 | Stop reason: SDUPTHER

## 2019-04-05 ENCOUNTER — OFFICE VISIT (OUTPATIENT)
Dept: CARDIOLOGY CLINIC | Age: 59
End: 2019-04-05

## 2019-04-05 VITALS
WEIGHT: 178 LBS | BODY MASS INDEX: 28.61 KG/M2 | DIASTOLIC BLOOD PRESSURE: 60 MMHG | OXYGEN SATURATION: 97 % | HEIGHT: 66 IN | HEART RATE: 73 BPM | SYSTOLIC BLOOD PRESSURE: 142 MMHG

## 2019-04-05 DIAGNOSIS — Z01.812 PRE-PROCEDURE LAB EXAM: ICD-10-CM

## 2019-04-05 DIAGNOSIS — R09.89 BRUIT OF LEFT CAROTID ARTERY: ICD-10-CM

## 2019-04-05 DIAGNOSIS — I10 ESSENTIAL HYPERTENSION: Primary | ICD-10-CM

## 2019-04-05 DIAGNOSIS — E11.8 TYPE 2 DIABETES MELLITUS WITH COMPLICATION, WITH LONG-TERM CURRENT USE OF INSULIN (HCC): ICD-10-CM

## 2019-04-05 DIAGNOSIS — Z79.4 TYPE 2 DIABETES MELLITUS WITH COMPLICATION, WITH LONG-TERM CURRENT USE OF INSULIN (HCC): ICD-10-CM

## 2019-04-05 DIAGNOSIS — E78.5 HYPERLIPIDEMIA, UNSPECIFIED HYPERLIPIDEMIA TYPE: ICD-10-CM

## 2019-04-05 DIAGNOSIS — Z99.2 END-STAGE RENAL DISEASE ON HEMODIALYSIS (HCC): ICD-10-CM

## 2019-04-05 DIAGNOSIS — R06.09 DOE (DYSPNEA ON EXERTION): ICD-10-CM

## 2019-04-05 DIAGNOSIS — N18.6 END-STAGE RENAL DISEASE ON HEMODIALYSIS (HCC): ICD-10-CM

## 2019-04-05 RX ORDER — HYDRALAZINE HYDROCHLORIDE 100 MG/1
50 TABLET, FILM COATED ORAL
Status: ON HOLD | COMMUNITY
End: 2019-04-11 | Stop reason: SDUPTHER

## 2019-04-05 RX ORDER — LOSARTAN POTASSIUM 100 MG/1
50 TABLET ORAL 2 TIMES DAILY
COMMUNITY

## 2019-04-05 NOTE — LETTER
2019 10:46 AM 
 
 
 
Julieth López 
[id number] 
1960 Insurance:  Medicare/Cign[employer]                 Auth # No Auth Needed Proc Date:                 Proc Time:  8:00am 
Performing MD : Dr. Duncan Shaw                      Procedure:Left [facility] Hospital:  [facility]                                            PCP Dr. Samuel Dixon Scheduled with:  Deja/EMail                                                        Date:2019 HP:       EK/5  Labs:______  CXR: _______  Orders:   Special Instructions:  _____________________________________________________ 
______________________________________________________________________ 
______________________________________________________________________ Date Faxed:   ______________   Pages Faxed: ___________________ The materials enclosed with this facsimile transmission are private and confidential and are the property of the sender. If you are not the intended recipient, be advised that any unauthorized use, disclosure, copying, distribution, or the taking of any action in reliance on the contents of this telecopied information is strictly prohibited. If you have received this in error, please immediately notify the sender via telephone to arrange for return of the forwarded documentation.

## 2019-04-05 NOTE — PATIENT INSTRUCTIONS
Instructions Patients Name:  Kori Felipe 1. You are scheduled to have a left heart cath on 4/11/2019  at 8am Please check in at 7am  . 2. Please go to [facility] and park in the outpatient parking lot that is located around to the back of the hospital and enter through the [facility] building. Once you enter through the [facility] check in with the  there. The  will either give you directions or assist you in getting to the cath holding area. 3. You are not to eat or drink anything after midnight the night before your procedure. Small sips of water to take your medications is ok. 4. If you are diabetic, do not take your insulin/sugar pill the morning of the procedure. 5. MEDICATION INSTRUCTIONS:   Please take your morning medications with the following special instructions: 
 
[x]          Please make sure to take your Blood pressure medication :  
. 6. We encourage families to wait in the waiting room on the first floor while the procedure is being done. The Doctor will come out and talk with you as soon as the procedure is over. 7. There is the possibility that you may spend the night in the hospital, depending on the results of the procedure. This will be determined after the procedure is done. If angioplasty or stent is planned, you will stay at least one day. 8. If you or your family have any questions, please call our office Monday Friday, 9:00 a. m.4:30 p.m.,  At [phone], and ask to speak to one of the nurses.

## 2019-04-05 NOTE — PROGRESS NOTES
Debby Walter presents today for Chief Complaint Patient presents with  Hypertension 1 year follow up  Leg Swelling  
  mild Debby Walter preferred language for health care discussion is english/other. Is someone accompanying this pt? family Is the patient using any DME equipment during OV? Daly Reich Depression Screening: 
3 most recent PHQ Screens 6/19/2018 Little interest or pleasure in doing things Not at all Feeling down, depressed, irritable, or hopeless Not at all Total Score PHQ 2 0 Trouble falling or staying asleep, or sleeping too much - Feeling tired or having little energy - Poor appetite, weight loss, or overeating - Feeling bad about yourself - or that you are a failure or have let yourself or your family down - Trouble concentrating on things such as school, work, reading, or watching TV - Moving or speaking so slowly that other people could have noticed; or the opposite being so fidgety that others notice - Thoughts of being better off dead, or hurting yourself in some way -  
PHQ 9 Score - How difficult have these problems made it for you to do your work, take care of your home and get along with others - Learning Assessment: 
Learning Assessment 4/5/2019 PRIMARY LEARNER Patient PRIMARY LANGUAGE ENGLISH  
LEARNER PREFERENCE PRIMARY LISTENING  
  -  
  -  
ANSWERED BY Patient RELATIONSHIP SELF Abuse Screening: No flowsheet data found. Fall Risk No flowsheet data found. Pt currently taking Anticoagulant therapy? no 
 
Coordination of Care: 1. Have you been to the ER, urgent care clinic since your last visit? Hospitalized since your last visit? no 
 
2. Have you seen or consulted any other health care providers outside of the [address] [place] [address] since your last visit? Include any pap smears or colon screening.  no

## 2019-04-05 NOTE — PROGRESS NOTES
HISTORY OF PRESENT ILLNESS Jacky Jennings is a 62 y.o. male. Hypertension Associated symptoms include shortness of breath. Pertinent negatives include no chest pain, no abdominal pain and no headaches. Shortness of Breath Pertinent negatives include no fever, no headaches, no cough, no wheezing, no PND, no orthopnea, no chest pain, no vomiting, no abdominal pain, no rash, no leg swelling and no claudication. Patient presents for a follow-up office visit. He was initially referred here for long-standing poorly controlled hypertension. The patient also has a history of chronic kidney disease, now end-stage recently started on hemodialysis in March 2018, long-standing poorly controlled diabetes, dyslipidemia, and intermittent compliance with his medications. He underwent an echocardiogram in October 2016 which showed moderate to marked concentric LVH with preserved LV systolic function, EF 14-98% without any significant valvular heart disease. He was hospitalized at [facility] in March 2017 following a syncopal episode. He was diagnosed with significant autonomic dysfunction on a tilt table test where his systolic blood pressure dropped from 200-97 while in the upright 70° position and administered 1 spray of sublingual nitroglycerin. His heart rate did increase appropriately from 75 to 110 bpm.  Because of the significant drop in blood pressure, his blood pressure medications were significantly decreased as were some of his diuretics. The patient was last seen by me in the office by me approximately a year ago and by our nurse practitioner 5 months ago. He returns today feeling well. He states that his swelling has improved and his weight is down since being on hemodialysis. His blood pressure has been on the low side often times at dialysis so as result he is on less medication.   He is currently being worked up for a possible kidney transplant at [facility], which is requesting a cardiac catheterization as part of a workup in the near future. He does complain of chronic shortness of breath which has not changed. No new chest pain, no orthopnea, PND. Past Medical History:  
Diagnosis Date  Blindness of one eye   
 left  Cardiac echocardiogram 10/21/2016 EF 60-65%. No WMA. Mod-marked LVH. Normal diastolic fx. No significant valvular heart disease.  Cardiac treadmill stress test, low risk 11/02/2012 Negative maximal exercise treadmill test.  Ex time 7 min 45 sec.  Cardiovascular LE peripheral arterial testing 03/22/2016 No significant peripheral arterial disease at rest bilaterally. ABIs deferred due to calcified vessels.  Cardiovascular LLE venous duplex 06/06/2012 Left leg:  No DVT.  Cardiovascular renal duplex 10/21/2016 No significant renal artery stenosis.  Chronic kidney disease   
 hd-m-w-f  
 CKD (chronic kidney disease), stage V ([address]  Diabetes mellitus ([facility] 3/12/2010  Diabetic retinopathy ([facility] 5/4/2010  Edema of both legs  Eye examination 5/4/10 OU: 20/20; OD: 20/25; OS: 20/25 without correction.  GERD (gastroesophageal reflux disease)  Glaucoma 08/17/2017  
 Preeti Fields  
 HLD (hyperlipidemia) 3/12/2010  
 HTN (hypertension) 3/12/2010  Orthostatic hypertension Current Outpatient Medications Medication Sig Dispense Refill  hydrALAZINE (APRESOLINE) 100 mg tablet Take 50 mg by mouth. 50mg BID on non-dialysis days.  losartan (COZAAR) 100 mg tablet Take 100 mg by mouth daily.  finasteride (PROSCAR) 5 mg tablet Take 1 Tab by mouth daily. APPOINTMENT IS NEEDED BEFORE NEXT REFILL. 30 Tab 2  cloNIDine (CATAPRES) 0.3 mg/24 hr APPLY 1 PATCH TO SKIN ONCE EVERY 7 DAYS 16 Patch 3  
 amLODIPine (NORVASC) 10 mg tablet TAKE 1 TABLET BY MOUTH DAILY.  30 Tab 6  
 lidocaine-prilocaine (EMLA) topical cream Apply  to affected area as needed for Pain (apply to left arm 30 minutes prior to dialysis). 30 g 12  
 b complex-vitamin c-folic acid (NEPHROCAPS) 1 mg capsule Take 1 Cap by mouth daily. 30 Cap 6  calcium acetate (PHOSLO) 667 mg cap Take 1 Cap by mouth three (3) times daily (with meals). 90 Cap 2  
 insulin lispro (HUMALOG) 100 unit/mL injection Blood Sugar (mg/dL): <150 =0 units; 150 -199 =2 units; 200 -249 =4 units; 250 -299 =6 units; 300 -349 =8 units; 350 and above =10 units. 1 Vial 2  
 Insulin Syringe-Needle U-100 (INSULIN SYRINGE ULTRAFINE) 0.5 mL 29 gauge x 1/2\" syrg 1 Each by Does Not Apply route Before breakfast, lunch, dinner and at bedtime. And for use with lantus qhs. 150 Syringe 2  
 acetaminophen (TYLENOL EXTRA STRENGTH) 500 mg tablet Take  by mouth every six (6) hours as needed for Pain.  cyclobenzaprine (FLEXERIL) 10 mg tablet Take 1 Tab by mouth three (3) times daily as needed for Muscle Spasm(s). 30 Tab 0  
 rosuvastatin (CRESTOR) 20 mg tablet Take 1 Tab by mouth nightly. 30 Tab 6  
 docusate sodium (COLACE) 100 mg capsule Take 1 Cap by mouth two (2) times a day. 60 Cap 0  
 glucose blood VI test strips (ASCENSIA AUTODISC VI, ONE TOUCH ULTRA TEST VI) strip Test twice daily:  Fasting before breakfast and 2 hours after dinner (log readings), One touch ultra 2 test strips please; Dx: 250.02 1 Package 11  
 fluticasone (FLONASE) 50 mcg/actuation nasal spray 1 [place] by Both Nostrils route two (2) times a day. (Patient taking differently: 1 Spray by Both Nostrils route as needed.) 1 Bottle 2  
 Lancets (ONE TOUCH DELICA) Misc Test twice daily: Once in the morning fasting, then two hours after dinner (log results) 1 Package 11  Blood-Glucose Meter monitoring kit by Does Not Apply route. One touch ultra2 1 Kit 0  
 cyanocobalamin (VITAMIN B-12) 1,000 mcg tablet Take 1,000 mcg by mouth daily. Allergies Allergen Reactions  Bactrim [Sulfamethoprim Ds] Nausea and Vomiting  Coreg [Carvedilol] Nausea and Vomiting Difficulty walking Social History Tobacco Use  Smoking status: Never Smoker  Smokeless tobacco: Never Used Substance Use Topics  Alcohol use: No  
 Drug use: No  
 
 
 
Review of Systems Constitutional: Negative for chills, fever and weight loss. HENT: Negative for nosebleeds. Eyes: Negative for blurred vision and double vision. Respiratory: Positive for shortness of breath. Negative for cough and wheezing. Cardiovascular: Negative for chest pain, palpitations, orthopnea, claudication, leg swelling and PND. Gastrointestinal: Negative for abdominal pain, heartburn, nausea and vomiting. Genitourinary: Negative for dysuria and hematuria. Musculoskeletal: Negative for falls and myalgias. Skin: Negative for rash. Neurological: Negative for dizziness, focal weakness and headaches. Endo/Heme/Allergies: Does not bruise/bleed easily. Psychiatric/Behavioral: Negative for substance abuse. Visit Vitals /60 Pulse 73 Ht 5' 6\" (1.676 m) Wt 80.7 kg (178 lb) SpO2 97% BMI 28.73 kg/m² Physical Exam  
Constitutional: He is oriented to person, place, and time. He appears well-developed and well-nourished. HENT:  
Head: Normocephalic and atraumatic. Eyes: Conjunctivae are normal.  
Neck: Neck supple. No JVD present. Carotid bruit is present ( Left). Cardiovascular: Normal rate, regular rhythm, S1 normal, S2 normal and normal pulses. Exam reveals distant heart sounds. Exam reveals no gallop and no S3. No murmur heard. Pulmonary/Chest: Breath sounds normal. He has no wheezes. He has no rales. Abdominal: Soft. Bowel sounds are normal. There is no tenderness. Musculoskeletal: He exhibits no edema, tenderness or deformity. Neurological: He is alert and oriented to person, place, and time. Skin: Skin is warm and dry. Psychiatric: He has a normal mood and affect.  His behavior is normal. Thought content normal.  
 
EKG: Normal sinus rhythm, normal axis, normal QTc interval, borderline voltage criteria for LVH, nonspecific T wave abnormality. No change compared to the previous tracing. ASSESSMENT and PLAN Hypertensive cardiovascular disease. Patient last underwent an echocardiogram in July 2018 which showed preserved LV systolic function, EF 32% with moderate concentric LVH, mild mitral regurgitation normal PA pressures. Patient blood pressure now appears recently well controlled on his current medical regimen. Is being adjusted by his nephrologist.  He does not take his hydralazine on the morning of dialysis days. I will continue his current regimen. Dyslipidemia. Patient is managed with Crestor. This is followed by his PCP. Diabetes mellitus, type II, insulin dependent. Historically this has been poorly controlled. End-stage renal disease. Patient was started on hemodialysis in March 2018. He is slowly adjusting to this. He is now being evaluated for possible kidney transplant and will require a cardiac catheterization as part of the workup. This can be scheduled at the patient's convenience within the next month or so. Follow-up in 6 months, sooner if needed.

## 2019-04-09 ENCOUNTER — OFFICE VISIT (OUTPATIENT)
Dept: FAMILY MEDICINE CLINIC | Age: 59
End: 2019-04-09

## 2019-04-09 VITALS
HEIGHT: 66 IN | TEMPERATURE: 98.4 F | HEART RATE: 74 BPM | OXYGEN SATURATION: 97 % | WEIGHT: 172.6 LBS | BODY MASS INDEX: 27.74 KG/M2 | SYSTOLIC BLOOD PRESSURE: 130 MMHG | DIASTOLIC BLOOD PRESSURE: 70 MMHG | RESPIRATION RATE: 20 BRPM

## 2019-04-09 DIAGNOSIS — Z00.00 WELCOME TO MEDICARE PREVENTIVE VISIT: Primary | ICD-10-CM

## 2019-04-09 LAB — HBA1C MFR BLD HPLC: 9.6 %

## 2019-04-09 NOTE — PATIENT INSTRUCTIONS

## 2019-04-09 NOTE — PROGRESS NOTES
This is a \"Welcome to [employer]"  Initial Preventive Physical Examination (IPPE) providing Personalized Prevention Plan Services (Performed in the first 12 months of enrollment) I have reviewed the patient's medical history in detail and updated the computerized patient record. Under podiatry care for leg cramps. Will be having a noninvasive doppler study soon. This has been ordered by podiatry Will have a cardiac cath on 4/11 History Past Medical History:  
Diagnosis Date  Blindness of one eye   
 left  Cardiac echocardiogram 10/21/2016 EF 60-65%. No WMA. Mod-marked LVH. Normal diastolic fx. No significant valvular heart disease.  Cardiac treadmill stress test, low risk 11/02/2012 Negative maximal exercise treadmill test.  Ex time 7 min 45 sec.  Cardiovascular LE peripheral arterial testing 03/22/2016 No significant peripheral arterial disease at rest bilaterally. ABIs deferred due to calcified vessels.  Cardiovascular LLE venous duplex 06/06/2012 Left leg:  No DVT.  Cardiovascular renal duplex 10/21/2016 No significant renal artery stenosis.  Chronic kidney disease   
 hd-m-w-f  
 CKD (chronic kidney disease), stage V ([address]  Diabetes mellitus ([address] 3/12/2010  Diabetic retinopathy ([address] 5/4/2010  Edema of both legs  Eye examination 5/4/10 OU: 20/20; OD: 20/25; OS: 20/25 without correction.  GERD (gastroesophageal reflux disease)  Glaucoma 08/17/2017  
 [facility]  
 HLD (hyperlipidemia) 3/12/2010  
 HTN (hypertension) 3/12/2010  Orthostatic hypertension Past Surgical History:  
Procedure Laterality Date  COLONOSCOPY N/A 1/10/2019 COLONOSCOPY w polyp[ectomy performed by Tobi Cruz MD at [address] Left TOES-small  HX HERNIA REPAIR  2005  HX OTHER SURGICAL  11/07/2017  
 glaucoma valve- Ahmed  HX VASCULAR ACCESS    
 HD catheter right neck Current Outpatient Medications Medication Sig Dispense Refill  hydrALAZINE (APRESOLINE) 100 mg tablet Take 50 mg by mouth. 50mg BID on non-dialysis days.  losartan (COZAAR) 100 mg tablet Take 100 mg by mouth daily.  finasteride (PROSCAR) 5 mg tablet Take 1 Tab by mouth daily. APPOINTMENT IS NEEDED BEFORE NEXT REFILL. 30 Tab 2  cloNIDine (CATAPRES) 0.3 mg/24 hr APPLY 1 PATCH TO SKIN ONCE EVERY 7 DAYS 16 Patch 3  
 amLODIPine (NORVASC) 10 mg tablet TAKE 1 TABLET BY MOUTH DAILY. 30 Tab 6  
 lidocaine-prilocaine (EMLA) topical cream Apply  to affected area as needed for Pain (apply to left arm 30 minutes prior to dialysis). 30 g 12  
 b complex-vitamin c-folic acid (NEPHROCAPS) 1 mg capsule Take 1 Cap by mouth daily. 30 Cap 6  calcium acetate (PHOSLO) 667 mg cap Take 1 Cap by mouth three (3) times daily (with meals). 90 Cap 2  
 insulin lispro (HUMALOG) 100 unit/mL injection Blood Sugar (mg/dL): <150 =0 units; 150 -199 =2 units; 200 -249 =4 units; 250 -299 =6 units; 300 -349 =8 units; 350 and above =10 units. 1 Vial 2  
 Insulin Syringe-Needle U-100 (INSULIN SYRINGE ULTRAFINE) 0.5 mL 29 gauge x 1/2\" syrg 1 Each by Does Not Apply route Before breakfast, lunch, dinner and at bedtime. And for use with lantus qhs. 150 Syringe 2  
 acetaminophen (TYLENOL EXTRA STRENGTH) 500 mg tablet Take  by mouth every six (6) hours as needed for Pain.  cyclobenzaprine (FLEXERIL) 10 mg tablet Take 1 Tab by mouth three (3) times daily as needed for Muscle Spasm(s). 30 Tab 0  
 rosuvastatin (CRESTOR) 20 mg tablet Take 1 Tab by mouth nightly. 30 Tab 6  
 docusate sodium (COLACE) 100 mg capsule Take 1 Cap by mouth two (2) times a day. 60 Cap 0  
 glucose blood VI test strips (ASCENSIA AUTODISC VI, ONE TOUCH ULTRA TEST VI) strip Test twice daily:  Fasting before breakfast and 2 hours after dinner (log readings), One touch ultra 2 test strips please;  Dx: 250.02 1 Package 11  
 fluticasone (FLONASE) 50 mcg/actuation nasal spray 1 Spray by Both Nostrils route two (2) times a day. (Patient taking differently: 1 Spray by Both Nostrils route as needed.) 1 Bottle 2  
 Lancets (ONE TOUCH DELICA) Misc Test twice daily: Once in the morning fasting, then two hours after dinner (log results) 1 Package 11  Blood-Glucose Meter monitoring kit by Does Not Apply route. One touch ultra2 1 Kit 0  
 cyanocobalamin (VITAMIN B-12) 1,000 mcg tablet Take 1,000 mcg by mouth daily. Allergies Allergen Reactions  Bactrim [Sulfamethoprim Ds] Nausea and Vomiting  Coreg [Carvedilol] Nausea and Vomiting Difficulty walking Family History Problem Relation Age of Onset  Diabetes Sister  Sickle Cell Anemia Sister  Diabetes Sister Social History Tobacco Use  Smoking status: Never Smoker  Smokeless tobacco: Never Used Substance Use Topics  Alcohol use: No  
 
Diet, Lifestyle: renal diet Exercise level: sedentary Depression Risk Screen 3 most recent PHQ Screens 6/19/2018 Little interest or pleasure in doing things Not at all Feeling down, depressed, irritable, or hopeless Not at all Total Score PHQ 2 0 Trouble falling or staying asleep, or sleeping too much - Feeling tired or having little energy - Poor appetite, weight loss, or overeating - Feeling bad about yourself - or that you are a failure or have let yourself or your family down - Trouble concentrating on things such as school, work, reading, or watching TV - Moving or speaking so slowly that other people could have noticed; or the opposite being so fidgety that others notice - Thoughts of being better off dead, or hurting yourself in some way -  
PHQ 9 Score - How difficult have these problems made it for you to do your work, take care of your home and get along with others - Alcohol Risk Screen You do not drink alcohol or very rarely. Functional Ability and Level of Safety Hearing Loss Hearing is good. Activities of Daily Living The home contains: handrails and grab bars Patient needs help with:  housework, eating, dressing and hygiene Fall Risk Screen No flowsheet data found. Abuse Screen Patient is not abused Screening EKG EKG order placed: No - EKG 4/8/2019 Patient Care Team  
Patient Care Team: 
Tiff Alarcon MD as PCP - General Javier Moore MD (Cardiology) Edouard Rocha NP (Nurse Practitioner) NEYMAR Coe PA (Physician Assistant) PE: 
Visit Vitals /70 Pulse 74 Temp 98.4 °F (36.9 °C) (Oral) Resp 20 Ht 5' 6\" (1.676 m) Wt 172 lb 9.6 oz (78.3 kg) SpO2 97% BMI 27.86 kg/m² General appearance: alert, cooperative, no distress, appears stated age Lungs: clear to auscultation bilaterally Heart: regular rate and rhythm, S1, S2 normal, no murmur, click, rub or gallop Extremities: extremities normal, atraumatic, no cyanosis or edema Right posterior ankle with TTP just above the achiilles tendom End of Life Planning Advanced care planning directives were discussed with the patient and /or family/caregiver. Assessment/Plan Education and counseling provided: 
Are appropriate based on today's review and evaluation End-of-Life planning (with patient's consent) Diagnoses and all orders for this visit: 
 
1. Welcome to Medicare preventive visit 2. Diabetes mellitus due to underlying condition, uncontrolled, with diabetic nephropathy, with long-term current use of insulin ([address] -     AMB POC HEMOGLOBIN A1C- for lab order only Lab Results Component Value Date/Time Hemoglobin A1c 7.6 (H) 09/13/2018 11:48 AM  
 Hemoglobin A1c (POC) 9.6 04/09/2019 02:10 PM  
 Hemoglobin A1c, External 8.3 08/25/2016 Health Maintenance Due Topic Date Due  
 FOOT EXAM Q1 - followed closely by podiatry 04/25/2019 A1c noted; Pt followed by endocrine.  Has follow up there ; reviewed with pt to keep follow up.

## 2019-04-11 ENCOUNTER — HOSPITAL ENCOUNTER (OUTPATIENT)
Age: 59
Setting detail: OUTPATIENT SURGERY
Discharge: HOME | End: 2019-04-11
Attending: INTERNAL MEDICINE | Admitting: INTERNAL MEDICINE
Payer: MEDICARE

## 2019-04-11 ENCOUNTER — APPOINTMENT (OUTPATIENT)
Dept: VASCULAR SURGERY | Age: 59
End: 2019-04-11
Attending: PODIATRIST
Payer: MEDICARE

## 2019-04-11 VITALS
TEMPERATURE: 98.7 F | RESPIRATION RATE: 10 BRPM | BODY MASS INDEX: 28.61 KG/M2 | WEIGHT: 178 LBS | DIASTOLIC BLOOD PRESSURE: 72 MMHG | OXYGEN SATURATION: 100 % | HEART RATE: 92 BPM | HEIGHT: 66 IN | SYSTOLIC BLOOD PRESSURE: 162 MMHG

## 2019-04-11 DIAGNOSIS — I87.2 PERIPHERAL VENOUS INSUFFICIENCY: ICD-10-CM

## 2019-04-11 DIAGNOSIS — N18.6 END STAGE RENAL DISEASE (HCC): ICD-10-CM

## 2019-04-11 LAB
ANION GAP SERPL CALC-SCNC: 6 MMOL/L (ref 3–18)
BUN SERPL-MCNC: 25 MG/DL (ref 7–18)
BUN/CREAT SERPL: 4 (ref 12–20)
CALCIUM SERPL-MCNC: 9.3 MG/DL (ref 8.5–10.1)
CHLORIDE SERPL-SCNC: 103 MMOL/L (ref 100–108)
CO2 SERPL-SCNC: 32 MMOL/L (ref 21–32)
CRD SYSTOLIC BP: 164
CREAT SERPL-MCNC: 6 MG/DL (ref 0.6–1.3)
END DIASTOLIC PRESSURE: 20
ERYTHROCYTE [DISTWIDTH] IN BLOOD BY AUTOMATED COUNT: 14.2 % (ref 11.6–14.5)
GLUCOSE SERPL-MCNC: 178 MG/DL (ref 74–99)
HCT VFR BLD AUTO: 34.4 % (ref 36–48)
HGB BLD-MCNC: 10.9 G/DL (ref 13–16)
INR PPP: 1 (ref 0.8–1.2)
MCH RBC QN AUTO: 29.8 PG (ref 24–34)
MCHC RBC AUTO-ENTMCNC: 31.7 G/DL (ref 31–37)
MCV RBC AUTO: 94 FL (ref 74–97)
PLATELET # BLD AUTO: 256 K/UL (ref 135–420)
PMV BLD AUTO: 10 FL (ref 9.2–11.8)
POTASSIUM SERPL-SCNC: 4.1 MMOL/L (ref 3.5–5.5)
PROTHROMBIN TIME: 12.8 SEC (ref 11.5–15.2)
RBC # BLD AUTO: 3.66 M/UL (ref 4.7–5.5)
SODIUM SERPL-SCNC: 141 MMOL/L (ref 136–145)
WBC # BLD AUTO: 6.5 K/UL (ref 4.6–13.2)

## 2019-04-11 PROCEDURE — C1760 CLOSURE DEV, VASC: HCPCS | Performed by: INTERNAL MEDICINE

## 2019-04-11 PROCEDURE — 74011250636 HC RX REV CODE- 250/636

## 2019-04-11 PROCEDURE — 74011250636 HC RX REV CODE- 250/636: Performed by: INTERNAL MEDICINE

## 2019-04-11 PROCEDURE — 93458 L HRT ARTERY/VENTRICLE ANGIO: CPT | Performed by: INTERNAL MEDICINE

## 2019-04-11 PROCEDURE — 80048 BASIC METABOLIC PNL TOTAL CA: CPT

## 2019-04-11 PROCEDURE — C1894 INTRO/SHEATH, NON-LASER: HCPCS | Performed by: INTERNAL MEDICINE

## 2019-04-11 PROCEDURE — 77030004558 HC CATH ANGI DX SUPR TORQ CARD -A: Performed by: INTERNAL MEDICINE

## 2019-04-11 PROCEDURE — 85610 PROTHROMBIN TIME: CPT

## 2019-04-11 PROCEDURE — 99152 MOD SED SAME PHYS/QHP 5/>YRS: CPT | Performed by: INTERNAL MEDICINE

## 2019-04-11 PROCEDURE — 99153 MOD SED SAME PHYS/QHP EA: CPT | Performed by: INTERNAL MEDICINE

## 2019-04-11 PROCEDURE — 85027 COMPLETE CBC AUTOMATED: CPT

## 2019-04-11 PROCEDURE — 74011636320 HC RX REV CODE- 636/320: Performed by: INTERNAL MEDICINE

## 2019-04-11 PROCEDURE — 93971 EXTREMITY STUDY: CPT

## 2019-04-11 RX ORDER — BRIMONIDINE TARTRATE, TIMOLOL MALEATE 2; 5 MG/ML; MG/ML
1 SOLUTION/ DROPS OPHTHALMIC 3 TIMES DAILY
COMMUNITY

## 2019-04-11 RX ORDER — OXYCODONE AND ACETAMINOPHEN 5; 325 MG/1; MG/1
1-2 TABLET ORAL
Status: DISCONTINUED | OUTPATIENT
Start: 2019-04-11 | End: 2019-04-11

## 2019-04-11 RX ORDER — NITROGLYCERIN 400 UG/1
1 SPRAY ORAL
Status: DISCONTINUED | OUTPATIENT
Start: 2019-04-11 | End: 2019-04-11 | Stop reason: HOSPADM

## 2019-04-11 RX ORDER — LIDOCAINE HYDROCHLORIDE 10 MG/ML
INJECTION, SOLUTION EPIDURAL; INFILTRATION; INTRACAUDAL; PERINEURAL AS NEEDED
Status: DISCONTINUED | OUTPATIENT
Start: 2019-04-11 | End: 2019-04-11 | Stop reason: HOSPADM

## 2019-04-11 RX ORDER — FENTANYL CITRATE 50 UG/ML
INJECTION, SOLUTION INTRAMUSCULAR; INTRAVENOUS AS NEEDED
Status: DISCONTINUED | OUTPATIENT
Start: 2019-04-11 | End: 2019-04-11 | Stop reason: HOSPADM

## 2019-04-11 RX ORDER — HYDRALAZINE HYDROCHLORIDE 20 MG/ML
INJECTION INTRAMUSCULAR; INTRAVENOUS AS NEEDED
Status: DISCONTINUED | OUTPATIENT
Start: 2019-04-11 | End: 2019-04-11 | Stop reason: HOSPADM

## 2019-04-11 RX ORDER — HYDRALAZINE HYDROCHLORIDE 100 MG/1
100 TABLET, FILM COATED ORAL 2 TIMES DAILY
Qty: 60 TAB | Refills: 6 | Status: SHIPPED | OUTPATIENT
Start: 2019-04-11 | End: 2019-10-11 | Stop reason: ALTCHOICE

## 2019-04-11 RX ORDER — HYDRALAZINE HYDROCHLORIDE 20 MG/ML
20 INJECTION INTRAMUSCULAR; INTRAVENOUS
Status: DISCONTINUED | OUTPATIENT
Start: 2019-04-11 | End: 2019-04-11 | Stop reason: HOSPADM

## 2019-04-11 RX ORDER — ACETAMINOPHEN 325 MG/1
650 TABLET ORAL
Status: DISCONTINUED | OUTPATIENT
Start: 2019-04-11 | End: 2019-04-11 | Stop reason: HOSPADM

## 2019-04-11 RX ORDER — OXYCODONE AND ACETAMINOPHEN 5; 325 MG/1; MG/1
1-2 TABLET ORAL
Status: DISCONTINUED | OUTPATIENT
Start: 2019-04-11 | End: 2019-04-11 | Stop reason: HOSPADM

## 2019-04-11 RX ORDER — MIDAZOLAM HYDROCHLORIDE 1 MG/ML
INJECTION, SOLUTION INTRAMUSCULAR; INTRAVENOUS AS NEEDED
Status: DISCONTINUED | OUTPATIENT
Start: 2019-04-11 | End: 2019-04-11 | Stop reason: HOSPADM

## 2019-04-11 RX ORDER — SODIUM CHLORIDE 9 MG/ML
25 INJECTION, SOLUTION INTRAVENOUS CONTINUOUS
Status: DISCONTINUED | OUTPATIENT
Start: 2019-04-11 | End: 2019-04-11 | Stop reason: HOSPADM

## 2019-04-11 RX ADMIN — HYDRALAZINE HYDROCHLORIDE 20 MG: 20 INJECTION INTRAMUSCULAR; INTRAVENOUS at 10:40

## 2019-04-11 NOTE — Clinical Note
TRANSFER - IN REPORT:  
 
Verbal report received from: Ye Matos. Report consisted of patient's Situation, Background, Assessment and  
Recommendations(SBAR). Opportunity for questions and clarification was provided. Assessment completed upon patient's arrival to unit and care assumed. Patient transported with a Cardiac Cath Tech / Patient Care Tech.

## 2019-04-11 NOTE — H&P
HISTORY OF PRESENT ILLNESS Sabino Booth is a 62 y.o. male. 
  
Hypertension Associated symptoms include shortness of breath. Pertinent negatives include no chest pain, no abdominal pain and no headaches. Shortness of Breath Pertinent negatives include no fever, no headaches, no cough, no wheezing, no PND, no orthopnea, no chest pain, no vomiting, no abdominal pain, no rash, no leg swelling and no claudication.  
  
Patient presents for a follow-up office visit. He was initially referred here for long-standing poorly controlled hypertension. The patient also has a history of chronic kidney disease, now end-stage recently started on hemodialysis in March 2018, long-standing poorly controlled diabetes, dyslipidemia, and intermittent compliance with his medications.  
  
He underwent an echocardiogram in October 2016 which showed moderate to marked concentric LVH with preserved LV systolic function, EF 43-17% without any significant valvular heart disease. He was hospitalized at [facility] in March 2017 following a syncopal episode. He was diagnosed with significant autonomic dysfunction on a tilt table test where his systolic blood pressure dropped from 200-97 while in the upright 70° position and administered 1 spray of sublingual nitroglycerin. His heart rate did increase appropriately from 75 to 110 bpm.  Because of the significant drop in blood pressure, his blood pressure medications were significantly decreased as were some of his diuretics. 
  
The patient was last seen by me in the office by me approximately a year ago and by our nurse practitioner 5 months ago. He returns today feeling well. He states that his swelling has improved and his weight is down since being on hemodialysis. His blood pressure has been on the low side often times at dialysis so as result he is on less medication.   He is currently being worked up for a possible kidney transplant at [facility], which is requesting a cardiac catheterization as part of a workup in the near future. He does complain of chronic shortness of breath which has not changed. No new chest pain, no orthopnea, PND. 
  
    
Past Medical History:  
Diagnosis Date  Blindness of one eye    
  left  Cardiac echocardiogram 10/21/2016  
  EF 60-65%. No WMA. Mod-marked LVH. Normal diastolic fx. No significant valvular heart disease.  Cardiac treadmill stress test, low risk 11/02/2012  
  Negative maximal exercise treadmill test.  Ex time 7 min 45 sec.  Cardiovascular LE peripheral arterial testing 03/22/2016  
  No significant peripheral arterial disease at rest bilaterally. ABIs deferred due to calcified vessels.  Cardiovascular LLE venous duplex 06/06/2012  
  Left leg:  No DVT.  Cardiovascular renal duplex 10/21/2016  
  No significant renal artery stenosis.  Chronic kidney disease    
  [id number]  
 CKD (chronic kidney disease), stage V ([address]    
 Diabetes mellitus ([address] 3/12/2010  Diabetic retinopathy ([facility] [address] 5/4/2010  Edema of both legs    
 Eye examination 5/4/10  
  OU: 20/20; OD: 20/25; OS: 20/25 without correction.  GERD (gastroesophageal reflux disease)    
 Glaucoma 08/17/2017  
  NORA muro  
 HLD (hyperlipidemia) 3/12/2010  
 HTN (hypertension) 3/12/2010  Orthostatic hypertension    
  
      
Current Outpatient Medications Medication Sig Dispense Refill  hydrALAZINE (APRESOLINE) 100 mg tablet Take 50 mg by mouth. 50mg BID on non-dialysis days.      
 losartan (COZAAR) 100 mg tablet Take 100 mg by mouth daily.      
 finasteride (PROSCAR) 5 mg tablet Take 1 Tab by mouth daily. APPOINTMENT IS NEEDED BEFORE NEXT REFILL. 30 Tab 2  cloNIDine (CATAPRES) 0.3 mg/24 hr APPLY 1 PATCH TO SKIN ONCE EVERY 7 DAYS 16 Patch 3  
 amLODIPine (NORVASC) 10 mg tablet TAKE 1 TABLET BY MOUTH DAILY. 30 Tab 6  lidocaine-prilocaine (EMLA) topical cream Apply  to affected area as needed for Pain (apply to left arm 30 minutes prior to dialysis). 30 g 12  
 b complex-vitamin c-folic acid (NEPHROCAPS) 1 mg capsule Take 1 Cap by mouth daily. 30 Cap 6  calcium acetate (PHOSLO) 667 mg cap Take 1 Cap by mouth three (3) times daily (with meals). 90 Cap 2  
 insulin lispro (HUMALOG) 100 unit/mL injection Blood Sugar (mg/dL): <150 =0 units; 150 -199 =2 units; 200 -249 =4 units; 250 -299 =6 units; 300 -349 =8 units; 350 and above =10 units. 1 Vial 2  
 Insulin Syringe-Needle U-100 (INSULIN SYRINGE ULTRAFINE) 0.5 mL 29 gauge x 1/2\" syrg 1 Each by Does Not Apply route Before breakfast, lunch, dinner and at bedtime. And for use with lantus qhs. 150 Syringe 2  
 acetaminophen (TYLENOL EXTRA STRENGTH) 500 mg tablet Take  by mouth every six (6) hours as needed for Pain.      
 cyclobenzaprine (FLEXERIL) 10 mg tablet Take 1 Tab by mouth three (3) times daily as needed for Muscle Spasm(s). 30 Tab 0  
 rosuvastatin (CRESTOR) 20 mg tablet Take 1 Tab by mouth nightly. 30 Tab 6  
 docusate sodium (COLACE) 100 mg capsule Take 1 Cap by mouth two (2) times a day. 60 Cap 0  
 glucose blood VI test strips (ASCENSIA AUTODISC VI, ONE TOUCH ULTRA TEST VI) strip Test twice daily:  Fasting before breakfast and 2 hours after dinner (log readings), One touch ultra 2 test strips please; Dx: 250.02 1 Package 11  
 fluticasone (FLONASE) 50 mcg/actuation nasal spray 1 [place] by Both Nostrils route two (2) times a day. (Patient taking differently: 1 Spray by Both Nostrils route as needed.) 1 Bottle 2  
 Lancets (ONE TOUCH DELICA) Misc Test twice daily: Once in the morning fasting, then two hours after dinner (log results) 1 Package 11  Blood-Glucose Meter monitoring kit by Does Not Apply route. One touch ultra2 1 Kit 0  
 cyanocobalamin (VITAMIN B-12) 1,000 mcg tablet Take 1,000 mcg by mouth daily.      
  
     
Allergies Allergen Reactions  Bactrim [Sulfamethoprim Ds] Nausea and Vomiting  Coreg [Carvedilol] Nausea and Vomiting  
    Difficulty walking  
  
  
Social History  
  
    
Tobacco Use  Smoking status: Never Smoker  Smokeless tobacco: Never Used Substance Use Topics  Alcohol use: No  
 Drug use: No  
  
  
  
Review of Systems Constitutional: Negative for chills, fever and weight loss. HENT: Negative for nosebleeds. Eyes: Negative for blurred vision and double vision. Respiratory: Positive for shortness of breath. Negative for cough and wheezing. Cardiovascular: Negative for chest pain, palpitations, orthopnea, claudication, leg swelling and PND. Gastrointestinal: Negative for abdominal pain, heartburn, nausea and vomiting. Genitourinary: Negative for dysuria and hematuria. Musculoskeletal: Negative for falls and myalgias. Skin: Negative for rash. Neurological: Negative for dizziness, focal weakness and headaches. Endo/Heme/Allergies: Does not bruise/bleed easily. Psychiatric/Behavioral: Negative for substance abuse.  
  
Visit Vitals /60 Pulse 73 Ht 5' 6\" (1.676 m) Wt 80.7 kg (178 lb) SpO2 97% BMI 28.73 kg/m²  
  
  
Physical Exam  
Constitutional: He is oriented to person, place, and time. He appears well-developed and well-nourished. HENT:  
Head: Normocephalic and atraumatic. Eyes: Conjunctivae are normal.  
Neck: Neck supple. No JVD present. Carotid bruit is present ( Left). Cardiovascular: Normal rate, regular rhythm, S1 normal, S2 normal and normal pulses. Exam reveals distant heart sounds. Exam reveals no gallop and no S3. No murmur heard. Pulmonary/Chest: Breath sounds normal. He has no wheezes. He has no rales. Abdominal: Soft. Bowel sounds are normal. There is no tenderness. Musculoskeletal: He exhibits no edema, tenderness or deformity. Neurological: He is alert and oriented to person, place, and time. Skin: Skin is warm and dry. Psychiatric: He has a normal mood and affect. His behavior is normal. Thought content normal.  
  
EKG: Normal sinus rhythm, normal axis, normal QTc interval, borderline voltage criteria for LVH, nonspecific T wave abnormality. No change compared to the previous tracing. 
  
ASSESSMENT and PLAN 
  
Hypertensive cardiovascular disease. Patient last underwent an echocardiogram in July 2018 which showed preserved LV systolic function, EF 02% with moderate concentric LVH, mild mitral regurgitation normal PA pressures. Patient blood pressure now appears recently well controlled on his current medical regimen. Is being adjusted by his nephrologist.  He does not take his hydralazine on the morning of dialysis days. I will continue his current regimen. 
  
Dyslipidemia. Patient is managed with Crestor. This is followed by his PCP. 
  
Diabetes mellitus, type II, insulin dependent. Historically this has been poorly controlled. 
  
End-stage renal disease. Patient was started on hemodialysis in March 2018. He is slowly adjusting to this. He is now being evaluated for possible kidney transplant and will require a cardiac catheterization as part of the workup. Will proceed with cardiac catheterization today as scheduled.

## 2019-04-11 NOTE — DISCHARGE INSTRUCTIONS
DISCHARGE SUMMARY from Nurse:                     Cardiac Catheterization/Angiography Discharge Instructions    *Check the puncture site frequently for swelling or bleeding. If you see any bleeding, lie down and apply pressure over the area with a clean town or washcloth. Notify your doctor for any redness, swelling, drainage or oozing from the puncture site. Notify your doctor for any fever or chills. *If the leg or arm with the puncture becomes cold, numb or painful, call Dr Nohemy Pryor. Betty Larson MD at  [phone]. *Activity should be limited for the next 48 hours. Climb stairs as little as possible and avoid any stooping, bending or strenuous activity for 48 hours. No heavy lifting (anything over 10 pounds) for three days. *Do not drive for 48 hours. *You may resume your usual diet. Drink more fluids than usual.    *Have a responsible person drive you home and stay with you for at least 24 hours after your heart catheterization/angiography. *You may remove the bandage from your Left and Groin in 24 hours. You may shower in 24 hours. No tub baths, hot tubs or swimming for one week. Do not place any lotions, creams, powders, ointments over the puncture site for one week. You may place a clean band-aid over the puncture site each day for 5 days. Change this daily. PATIENT INSTRUCTIONS:    After general anesthesia or intravenous sedation, for 24 hours or while taking prescription Narcotics:  · Limit your activities  · Do not drive and operate hazardous machinery  · Do not make important personal or business decisions  · Do  not drink alcoholic beverages  · If you have not urinated within 8 hours after discharge, please contact your surgeon on call.     Report the following to your surgeon:  · Excessive pain, swelling, redness or odor of or around the surgical area  · Temperature over 100.5  · Nausea and vomiting lasting longer than 4 hours or if unable to take medications  · Any signs of decreased circulation or nerve impairment to extremity: change in color, persistent  numbness, tingling, coldness or increase pain  · Any questions    What to do at Home:  Recommended activity: No lifting, Driving, or Strenuous exercise for 24 hours. If you experience any of the following symptoms bleeding,swelling,acute pain or numbness, fever, please follow up with NICOLÁS Hoffman MD.    *  Please give a list of your current medications to your Primary Care Provider. *  Please update this list whenever your medications are discontinued, doses are      changed, or new medications (including over-the-counter products) are added. *  Please carry medication information at all times in case of emergency situations. These are general instructions for a healthy lifestyle:    No smoking/ No tobacco products/ Avoid exposure to second hand smoke  Surgeon General's Warning:  Quitting smoking now greatly reduces serious risk to your health. Obesity, smoking, and sedentary lifestyle greatly increases your risk for illness    A healthy diet, regular physical exercise & weight monitoring are important for maintaining a healthy lifestyle    You may be retaining fluid if you have a history of heart failure or if you experience any of the following symptoms:  Weight gain of 3 pounds or more overnight or 5 pounds in a week, increased swelling in our hands or feet or shortness of breath while lying flat in bed. Please call your doctor as soon as you notice any of these symptoms; do not wait until your next office visit. Recognize signs and symptoms of STROKE:    F-face looks uneven    A-arms unable to move or move unevenly    S-speech slurred or non-existent    T-time-call 911 as soon as signs and symptoms begin-DO NOT go       Back to bed or wait to see if you get better-TIME IS BRAIN. Warning Signs of HEART ATTACK     Call 911 if you have these symptoms:   Chest discomfort.  Most heart attacks involve discomfort in the center of the chest that lasts more than a few minutes, or that goes away and comes back. It can feel like uncomfortable pressure, squeezing, fullness, or pain.  Discomfort in other areas of the upper body. Symptoms can include pain or discomfort in one or both arms, the back, neck, jaw, or stomach.  Shortness of breath with or without chest discomfort.  Other signs may include breaking out in a cold sweat, nausea, or lightheadedness. Don't wait more than five minutes to call 911 - MINUTES MATTER! Fast action can save your life. Calling 911 is almost always the fastest way to get lifesaving treatment. Emergency Medical Services staff can begin treatment when they arrive -- up to an hour sooner than if someone gets to the hospital by car. The discharge information has been reviewed with the patient. The patient verbalized understanding. Discharge medications reviewed with the patient and appropriate educational materials and side effects teaching were provided. Patient armband removed and shredded  MyChart Activation    Thank you for requesting access to [facility]. Please follow the instructions below to securely access and download your online medical record. [facility] allows you to send messages to your doctor, view your test results, renew your prescriptions, schedule appointments, and more. How Do I Sign Up? 1. In your internet browser, go to [email or web address]. 2. Click on the First Time User? Click Here link in the Sign In box. You will see the New Member Sign Up page. 3. Enter your [facility] Access Code exactly as it appears below. You will not need to use this code after youve completed the sign-up process. If you do not sign up before the expiration date, you must request a new code. [facility] Access Code: [email or web address]  Expires: 2019 10:45 AM (This is the date your [facility] access code will )    4.  Enter the last four digits of your Social Security Number (xxxx) and Date of Birth (mm/dd/yyyy) as indicated and click Submit. You will be taken to the next sign-up page. 5. Create a [employer] ID. This will be your [employer] login ID and cannot be changed, so think of one that is secure and easy to remember. 6. Create a [employer] password. You can change your password at any time. 7. Enter your Password Reset Question and Answer. This can be used at a later time if you forget your password. 8. Enter your e-mail address. You will receive e-mail notification when new information is available in [address] 9. Click Sign Up. You can now view and download portions of your medical record. 10. Click the Download Summary menu link to download a portable copy of your medical information. Additional Information    If you have questions, please visit the Frequently Asked Questions section of the [employer] website at [email or web address] Remember, [employer] is NOT to be used for urgent needs.  For medical emergencies, dial 911.    ___________________________________________________________________________________________________________________________________

## 2019-04-11 NOTE — Clinical Note
TRANSFER - OUT REPORT:  
 
Verbal report given to: Alok Floyd. Report consisted of patient's Situation, Background, Assessment and  
Recommendations(SBAR). Opportunity for questions and clarification was provided. Patient transported with a Cardiac Cath Tech / Patient Care Tech.

## 2019-04-11 NOTE — Clinical Note
Contrast Dose Calculator:  
Patient's age: 62.  
Patient's sex: Male. Patient weight (kg) = 80.7. Creatinine level (mg/dL) = 6. Creatinine clearance (mL/min): 15.32. Contrast concentration (mg/mL) = 300. MACD = 67.25 mL. Max Contrast dose per Creatinine Cl calculator = 34.47 mL.

## 2019-04-11 NOTE — ROUTINE PROCESS
A+Ox4, denied any complaints. Left groin dressing intact, no bleeding or swelling. Discharge information reviewed. Able to stand and transfer to wheelchair with assistance. Escorted to car tot his wife for transport.

## 2019-04-30 RX ORDER — AMLODIPINE BESYLATE 10 MG/1
TABLET ORAL
Qty: 30 TAB | Refills: 6 | Status: SHIPPED | OUTPATIENT
Start: 2019-04-30 | End: 2019-05-06 | Stop reason: SDUPTHER

## 2019-05-06 RX ORDER — AMLODIPINE BESYLATE 10 MG/1
TABLET ORAL
Qty: 30 TAB | Refills: 6 | Status: SHIPPED | OUTPATIENT
Start: 2019-05-06

## 2019-05-14 RX ORDER — FINASTERIDE 5 MG/1
5 TABLET, FILM COATED ORAL DAILY
Qty: 30 TAB | Refills: 2 | Status: SHIPPED | OUTPATIENT
Start: 2019-05-14 | End: 2019-08-10 | Stop reason: SDUPTHER

## 2019-05-23 ENCOUNTER — TELEPHONE (OUTPATIENT)
Dept: FAMILY MEDICINE CLINIC | Age: 59
End: 2019-05-23

## 2019-05-23 NOTE — TELEPHONE ENCOUNTER
Patient called requesting a referral to be put in for the patient to go to a rehab facility to gain strength for him to have a kidney transplant surgery. Patient states that the kidney specialist wants him to have this done within the next 3 months. Patient asked to be contacted back at [phone], to further discuss with the nurse. Please advise.

## 2019-05-28 NOTE — TELEPHONE ENCOUNTER
Spoke with Bruna Aaron, permission to release on file, to inform to have that provider send notes or request to support request for referral to rehab. Fax number was given.  [place] verbalized understanding.

## 2019-05-30 ENCOUNTER — TELEPHONE (OUTPATIENT)
Dept: FAMILY MEDICINE CLINIC | Age: 59
End: 2019-05-30

## 2019-05-30 DIAGNOSIS — R53.81 DECLINING FUNCTIONAL STATUS: Primary | ICD-10-CM

## 2019-05-30 NOTE — TELEPHONE ENCOUNTER
Spoke with Eleanor Negrete, permission to release on file, to inform that correspondence from Reginald Morrison MD was received along with referral request to improve patient's functional status. Request has gone to Dr. Inez Salmon. Informed that referral will need to be processed and that patient should receive a phone call from physical therapy in a timely manner to schedule appointment, if not, to contact our office. Mrs. Momo Arellano verbalized understanding.

## 2019-06-11 ENCOUNTER — OFFICE VISIT (OUTPATIENT)
Dept: FAMILY MEDICINE CLINIC | Age: 59
End: 2019-06-11

## 2019-06-11 VITALS
HEIGHT: 66 IN | RESPIRATION RATE: 18 BRPM | WEIGHT: 178 LBS | TEMPERATURE: 98.2 F | SYSTOLIC BLOOD PRESSURE: 130 MMHG | OXYGEN SATURATION: 96 % | HEART RATE: 73 BPM | DIASTOLIC BLOOD PRESSURE: 80 MMHG | BODY MASS INDEX: 28.61 KG/M2

## 2019-06-11 DIAGNOSIS — M70.22 OLECRANON BURSITIS OF LEFT ELBOW: Primary | ICD-10-CM

## 2019-06-11 RX ORDER — PREDNISONE 20 MG/1
20 TABLET ORAL
Qty: 5 TAB | Refills: 0 | Status: SHIPPED | OUTPATIENT
Start: 2019-06-11 | End: 2019-06-16

## 2019-06-11 NOTE — PROGRESS NOTES
HISTORY OF PRESENT ILLNESS Josias Dennis is a 62 y.o. male. HPI Patient is here today for evaluation and treatment of: Raised Area On Left Elbow: 
 
Raised Area On Left Elbow:     
Present X 1 week; No pain; Has not had this before. Area has gotten smaller since it formed. Current Outpatient Medications:  
 
  finasteride (PROSCAR) 5 mg tablet, TAKE 1 TAB BY MOUTH DAILY. APPOINTMENT IS NEEDED BEFORE NEXT REFILL. , Disp: 30 Tab, Rfl: 2 
  amLODIPine (NORVASC) 10 mg tablet, TAKE 1 TABLET BY MOUTH DAILY. , Disp: 30 Tab, Rfl: 6 
  netarsudil (RHOPRESSA) 0.02 % drop, Apply  to eye., Disp: , Rfl:  
  brimonidine-timolol (COMBIGAN) 0.2-0.5 % drop ophthalmic solution, 1 Drop every twelve (12) hours. , Disp: , Rfl:  
  hydrALAZINE (APRESOLINE) 100 mg tablet, Take 1 Tab by mouth two (2) times a day. on non-dialysis days. Indications: high blood pressure, Disp: 60 Tab, Rfl: 6 
  losartan (COZAAR) 100 mg tablet, Take 100 mg by mouth daily. , Disp: , Rfl:  
  cloNIDine (CATAPRES) 0.3 mg/24 hr, APPLY 1 PATCH TO SKIN ONCE EVERY 7 DAYS, Disp: 16 Patch, Rfl: 3 
  lidocaine-prilocaine (EMLA) topical cream, Apply  to affected area as needed for Pain (apply to left arm 30 minutes prior to dialysis). , Disp: 30 g, Rfl: 12 
  b complex-vitamin c-folic acid (NEPHROCAPS) 1 mg capsule, Take 1 Cap by mouth daily. , Disp: 30 Cap, Rfl: 6 
  calcium acetate (PHOSLO) 667 mg cap, Take 1 Cap by mouth three (3) times daily (with meals). , Disp: 90 Cap, Rfl: 2 
  insulin lispro (HUMALOG) 100 unit/mL injection, Blood Sugar (mg/dL): <150 =0 units; 150 -199 =2 units; 200 -249 =4 units; 250 -299 =6 units; 300 -349 =8 units; 350 and above =10 units. , Disp: 1 Vial, Rfl: 2 
  acetaminophen (TYLENOL EXTRA STRENGTH) 500 mg tablet, Take  by mouth every six (6) hours as needed for Pain., Disp: , Rfl:  
  cyclobenzaprine (FLEXERIL) 10 mg tablet, Take 1 Tab by mouth three (3) times daily as needed for Muscle Spasm(s). , Disp: 30 Tab, Rfl: 0 
   rosuvastatin (CRESTOR) 20 mg tablet, Take 1 Tab by mouth nightly., Disp: 30 Tab, Rfl: 6 
  docusate sodium (COLACE) 100 mg capsule, Take 1 Cap by mouth two (2) times a day., Disp: 60 Cap, Rfl: 0 
  fluticasone (FLONASE) 50 mcg/actuation nasal spray, 1 [place] by Both Nostrils route two (2) times a day. (Patient taking differently: 1 Spray by Both Nostrils route as needed.), Disp: 1 Bottle, Rfl: 2   cyanocobalamin (VITAMIN B-12) 1,000 mcg tablet, Take 1,000 mcg by mouth daily. , Disp: , Rfl:  
  soft lens rinse,store solution (SHYLA SALINE SENSITIVE EYES), by Does Not Apply route., Disp: , Rfl:  
 
 
PMH,  Meds, Allergies, Family History, Social history reviewed Review of Systems Constitutional: Negative for chills and fever. Cardiovascular: Negative for chest pain and palpitations. Physical Exam  
Visit Vitals /80 Pulse 73 Temp 98.2 °F (36.8 °C) (Oral) Resp 18 Ht 5' 6\" (1.676 m) Wt 178 lb (80.7 kg) SpO2 96% BMI 28.73 kg/m² Left elbow with a soft fluid filled sac c/w olecranon bursitis ASSESSMENT and PLAN 
  ICD-10-CM ICD-9-CM 1. Olecranon bursitis of left elbow M70.22 726.33 predniSONE (DELTASONE) 20 mg tablet As above, new Improving on its on 
 treatment plan as listed below Orders Placed This Encounter  predniSONE (DELTASONE) 20 mg tablet Advised that blood sugars may transiently increase while on steroid Pathophysiology reviewed Monitor for now Follow-up and Dispositions · Return in about 4 months (around 10/11/2019), or if symptoms worsen or fail to improve, for htn. An After Visit Summary was printed and given to the patient. This has been fully explained to the patient, who indicates understanding.

## 2019-06-11 NOTE — PROGRESS NOTES
Patient is here today for treatment and evaluation of raised area on the elbow. 1. Have you been to the ER, urgent care clinic since your last visit? Hospitalized since your last visit? No 
 
2. Have you seen or consulted any other health care providers outside of the [address] [place] [address] since your last visit? Include any pap smears or colon screening.  No

## 2019-06-11 NOTE — PATIENT INSTRUCTIONS
Bursitis of the Elbow: Care Instructions Your Care Instructions Bursitis is pain and swelling of the bursae. These are sacs of fluid that help your joints move smoothly. Olecranon bursitis is a type of bursitis that affects the back of the elbow. This is sometimes called Cholo elbow because the bump that develops looks like the cartoon character Cholo's elbow. Injury, overuse, or prolonged pressure on your elbow can cause this form of bursitis. Sometimes it happens when people have arthritis. It also can occur for unknown reasons. Treatment may include draining fluid from the bursa with a needle. If your doctor thought there was infection, he or she may have prescribed antibiotics. You also may get shots of medicine into the bursa to help the swelling go down. Your elbow should get better in a few days or weeks. Follow-up care is a key part of your treatment and safety. Be sure to make and go to all appointments, and call your doctor if you are having problems. It's also a good idea to know your test results and keep a list of the medicines you take. How can you care for yourself at home? · Take pain medicines exactly as directed. ? If the doctor gave you a prescription medicine for pain, take it as prescribed. ? If you are not taking a prescription pain medicine, ask your doctor if you can take an over-the-counter medicine. ? Do not take two or more pain medicines at the same time unless the doctor told you to. Many pain medicines have acetaminophen, which is Tylenol. Too much acetaminophen (Tylenol) can be harmful. · If your doctor prescribed antibiotics, take them as directed. Do not stop taking them just because you feel better. You need to take the full course of antibiotics. · If your doctor gave you a sling, an elastic bandage, or a compression sleeve, wear it exactly as instructed. · Put ice or a cold pack on your elbow for 10 to 20 minutes at a time.  Try to do this every 1 to 2 hours for the next 3 days (when you are awake) or until the swelling goes down. Put a thin cloth between the ice and your skin. · After 3 days, you can try heat, or alternate heat and ice. · Rest your elbow. Try to stop or reduce any activity that causes pain. · Wear elbow pads during physical activity to prevent injury. · Do not lean your elbows on tables or armrests. When should you call for help? Call your doctor now or seek immediate medical care if: 
  · You have new or worse symptoms of infection, such as: 
? Increased pain, swelling, warmth, or redness. ? Red streaks leading from the area. ? Pus draining from the area. ? A fever.  
 Watch closely for changes in your health, and be sure to contact your doctor if: 
  · You do not get better as expected. Where can you learn more? Go to http://hoang-jayden.info/. Enter  in the search box to learn more about \"Bursitis of the Elbow: Care Instructions. \" Current as of: September 20, 2018 Content Version: 11.9 © 1624-5226 [employer]. Care instructions adapted under license by [employer] (which disclaims liability or warranty for this information). If you have questions about a medical condition or this instruction, always ask your healthcare professional. [address] any warranty or liability for your use of this information.

## 2019-06-17 ENCOUNTER — TELEPHONE (OUTPATIENT)
Dept: FAMILY MEDICINE CLINIC | Age: 59
End: 2019-06-17

## 2019-06-17 DIAGNOSIS — N18.4 CHRONIC KIDNEY DISEASE, STAGE IV (SEVERE) (HCC): Primary | ICD-10-CM

## 2019-06-17 NOTE — TELEPHONE ENCOUNTER
Pt's wife is here to check on the status of the referral for physical therapy. Note was attached to the referral stating they require a specific reason for why the pt needs to be seen. Wife is stressing that this needs to be done as soon as possible b/c he needs this before his kidney transplant. PT's wife would like a call once it has been changed. Please Advise.

## 2019-06-19 NOTE — TELEPHONE ENCOUNTER
Spoke with Marcelo Gallagher, permission to release on file, to inform that another referral to [facility] has been placed in the system. Informed that physical therapy will contact patient to schedule an appointment in a timely manner. Ms. Patricia Meneses verbalized understanding.

## 2019-06-24 DIAGNOSIS — N18.6 ESRD (END STAGE RENAL DISEASE) (HCC): Primary | ICD-10-CM

## 2019-06-24 DIAGNOSIS — N18.30 STAGE 3 CHRONIC KIDNEY DISEASE (HCC): ICD-10-CM

## 2019-06-24 DIAGNOSIS — Z89.422 STATUS POST AMPUTATION OF TOE OF LEFT FOOT (HCC): ICD-10-CM

## 2019-06-24 NOTE — PROGRESS NOTES
Pt needs a new dx for the PT referral for renal transplant list.  Pt has had amputation of left foot.

## 2019-06-25 ENCOUNTER — TELEPHONE (OUTPATIENT)
Dept: FAMILY MEDICINE CLINIC | Age: 59
End: 2019-06-25

## 2019-06-25 NOTE — TELEPHONE ENCOUNTER
Left message at [facility] office of Samira Garcia M.D., to have progress note faxed to physical therapy due to support needed for physical therapy.

## 2019-06-25 NOTE — TELEPHONE ENCOUNTER
Spoke with Sandra Le at [facility] to inform that as per Dr. Rolf Barnett, the diagnosis needs to match the supporting documentation, therefore, had to leave a message to [facility] for a covered diagnosis with supporting documentation. Also, requested that office reach out to physical therapy for assistance and that our office can do referral with needed documentation. Sandra Le verbalized understanding.

## 2019-06-25 NOTE — TELEPHONE ENCOUNTER
Spoke with Ghulam Molina, permission to release on file, to inform that Dr. Lizet Mesa has attempted several times to place a referral to physical therapy according to the office note of Zoey Hardy M.D. Informed that diagnoses are not covered and that Dr. Selwyn Fisher note needs to support the diagnosis. Informed that a message has been left at the office of Zoey Hardy stating that a covered diagnosis is needed along with supporting documentation and for that office to reach out to [facility].  Maame Maurice stated that physical therapy may not be needed due to the patient has a possible donor. Will inform provider.  Maame Maurice verbalized understanding.

## 2019-06-25 NOTE — TELEPHONE ENCOUNTER
Olman Louis is calling from [facility] b/c they received a referral for the pt for pt for chronic kidney disease and they need it to be more specific reason as to why they are seeing him for physical therapy. [phone]

## 2019-06-25 NOTE — TELEPHONE ENCOUNTER
Left message at [facility], office of Zoey Hardy M.D., to inform that physical therapy is not accepting diagnosis and Dr. Lizet Mesa has attempted several times. Informed that [facility] needs a covered diagnosis with supporting documentation and that our office can give assistance if necessary.

## 2019-06-25 NOTE — TELEPHONE ENCOUNTER
Spoke with Debra Sapp at [facility] that stated diagnosis is not covered and not giving any direction to what needs to be done. Informed that Dr. Criag Bates did put amputation of toe of left foot. Debra Sapp was viewing the referral dated 06/19/2019 instead of referral dated 06/24/2019. Debra Sapp viewed diagnoses and still needed more information about the physical therapy needs for the patient. Informed that Dr. Craig Bates has tried to send patient to physical therapy at specialist's request, unfortunately, the progress note from the specialist was not detailed about the needs, just basically stated that physical therapy could strengthen. Will attempt another referral and have specialist fax the progress note. Debra Sapp verbalized understanding.

## 2019-07-02 ENCOUNTER — HOSPITAL ENCOUNTER (OUTPATIENT)
Dept: PHYSICAL THERAPY | Age: 59
Discharge: HOME | End: 2019-07-02
Payer: COMMERCIAL

## 2019-07-02 PROCEDURE — 97110 THERAPEUTIC EXERCISES: CPT

## 2019-07-02 PROCEDURE — 97162 PT EVAL MOD COMPLEX 30 MIN: CPT

## 2019-07-02 NOTE — PROGRESS NOTES
PT DAILY TREATMENT NOTE/NEURO EVAL 10-18 Patient Name: Grant Diggs Date:2019 : 1960 [x]  Patient  Verified Payor: [place] MEDICARE / Plan: Zane Benoit y / Product Type: Medicare / In time:1:38  Out time:2:25 Total Treatment Time (min): 47 Visit #: 1 of - Medicare/BCBS Only Total Timed Codes (min):  15 1:1 Treatment Time:  47  
 
Treatment Area: Weakness [R53.1] SUBJECTIVE Pain Level (0-10 scale): 5/10 []constant []intermittent []improving []worsening []no change since onset Any medication changes, allergies to medications, adverse drug reactions, diagnosis change, or new procedure performed?: [x] No    [] Yes (see summary sheet for update) Subjective functional status/changes:    
 
 Pt is a 63 yo M who presents with weakness with progressive worsening since  after an amputation of 2 toes d/t gangrene. He also reports complete vision loss the same year following an eye surgery. His greatest complaint is balance deficits and weakness limiting ambulatory ability. Pt denied any abnormal sx. His wife takes his BP 3x per day and keeps a log.    
  
Barriers: []pain []financial []time []transportation []other Motivation: high Substance use: []Alcohol []Tobacco []other:  
FABQ Score: []low []elevate Cognition: A & O x 4    Other: OBJECTIVE/EXAMINATION 
 
 
32 min [x]Eval                  []Re-Eval  
 
 
10 min Therapeutic Exercise:  [] See flow sheet : see HEP Rationale: increase ROM and increase strength to improve the patients ability to reduce fall risk 5 min Therapeutic Activity:  []  See flow sheet :  
Rationale: Educated patient regarding findings of evaluation, 3 systems of balance, new therex technique and purpose, purpose of therapy, and therapy plan in order to ensure pt understanding/compliance with therapy With 
 [] TE 
 [] TA 
 [] neuro 
 [] other: Patient Education: [x] Review HEP   
 [] Progressed/Changed HEP based on:  
[] positioning   [] body mechanics   [] transfers   [] heat/ice application   
[] other:   
 
Other Objective/Functional Measures:  
 
/96, HR 76 bpm taken manually in right UE prior to therapy /92, HR 81 bpm taken electronically with wrist cuff on right wrist following session Physical Therapy Evaluation  Neurologic Posture: [] Poor    [] Fair    [] Good    Describe:    
 
Gait: [] Normal    [x] Abnormal    Device:     
Describe: decreased benjamin holding SPC on right and therapist's arm or wife's arm on left, shuffling gait pattern ROM: WFL BLE Strength (MMT): 
   
Hip L (1-5) R (1-5) Hip Flexion 5 4+ Hip Ext Hip ABD Hip ADD Hip ER 4+ 4+ Hip IR 4+ 4+ Knee L (1-5) R (1-5) Knee Flexion 4+ 4+ Knee Extension 4+ 4+ Ankle PF Ankle DF 4+ 4+ Other Sensation: sensation intact to light touch with therapist's fingers BLE, monofilament testing not performed d/t time constraint Reflexes: [x] Not Tested Balance/ Equilibrium:  
  
    Sitting Balance: Static:  [x] Good    [] Fair    [] Poor Dynamic:   [x] Good    [] Fair    [] Poor Standing Balance: Static:   [] Good    [x] Fair    [] Poor Dynamic:   [] Good    [] Fair    [x] Poor Single Leg Stance: Eyes Open  Eyes Closed L Unable without UE support L   
R 1 second  R Functional Mobility Bed Mobility:   
  Scooting: Not assessed d/t time constraint Rolling: Not assessed d/t time constraint Sit-Supine: Not assessed d/t time constraint Transfers: 
     Sit-Stand: Steven, requiring UE support Other test /comments: 
 
Romberg Floor 30 seconds Romberg Foam: forearms bumping parallel bars every 3-5 seconds Ambulated with therapist from eval room to gym and negotiated gym with therapist giving verbal cues such \"10 more feet and then a quarter turn to your right\" - decreased shuffling and increased benjamin noted when given verbal cues regarding surrounding environment Left lean with standing interventions in parallel bars and during ambulation, required cues for COG over GISELE Pt gave verbal permission for therapist to speak with wife regarding patient's past medical history, findings of evaluation, and therapy plan Pt gave verbal permission for therapist to call cardiologist (Dr. Angel Morales) to ask for BP cutoff for therapy. Pain Level (0-10 scale) post treatment: 5/10 BLE 
 
ASSESSMENT/Changes in Function: See POC Patient will continue to benefit from skilled PT services to modify and progress therapeutic interventions, address functional mobility deficits, address ROM deficits, address strength deficits, analyze and address soft tissue restrictions, analyze and cue movement patterns, assess and modify postural abnormalities, address imbalance/dizziness and instruct in home and community integration to attain remaining goals. [x]  See Plan of Care 
[]  See progress note/recertification 
[]  See Discharge Summary Progress towards goals / Updated goals: 
See POC PLAN [x]  Upgrade activities as tolerated     []  Continue plan of care [x]  Update interventions per flow sheet      
[]  Discharge due to:_ 
[]  Other:_ Rito Mcmanus, PT 7/2/2019  1:47 PM

## 2019-07-02 NOTE — PROGRESS NOTES
[facility] [address] [facility] 
[phone] fax Plan of Care/ Statement of Necessity for Physical Therapy Services Patient name: Roderick Peñaloza Start of Care: 2019 Referral source: Tommy Harley MD : 1960 Medical Diagnosis: Weakness [R53.1] Payor: [place] MEDICARE / Plan: Jacky Cardenas / Product Type: Medicare /  Onset Date:3 years ago Treatment Diagnosis: Balance, Unsteady Gait, BLE Weakness Prior Hospitalization: see medical history Provider#: [id number] Medications: Verified on Patient summary List  
 Comorbidities: vision loss, dialysis (M, W, F), HTN, orthostatic hypotension, amputation of left toes, end stage renal disease Prior Level of Function: lives with wife in a 1 story home with 2 steps to enter without handrail, SPC for all ambulation, rollator for prolonged community ambulation, Ind with ADLs The Plan of Care and following information is based on the information from the initial evaluation. Assessment/ key information: Pt is a 63 yo M who presents with weakness with progressive worsening since 2016 after an amputation of 4th and 5th digits of left foot d/t gangrene. He also reports complete vision loss the same year following an eye surgery. His greatest complaints are balance deficits and weakness limiting ambulatory ability. Pt is on the donor list for a new kidney but was told he needs to increase his strength before he is eligible. Pt ambulates with SPC for household ambulation and rollator for community ambulation. Pt presents to clinic ambulating with decreased benjamin, shuffling gait pattern, and using SPC on right and wife for support on left. Pt reports peripheral neuropathy pain/numbness that extends to mid-thigh B.   Sensation to light touch is grossly intact with therapist's fingers BLE; however, monofilament testing was not completed d/t time constraint. Mild strength deficits are evident BLE. He is able to maintain Romberg on non-compliant surface; however, static balance on compliant surface is impaired. Pt leans left during ambulation and static standing, likely d/t altered GISELE from amputation. He is not able to perform SLS on left without UE support. Pt will benefit from skilled PT to address BLE strength, endurance, static/dynamic balance, and functional mobility deficits in order to reduce fall risk, improve ease/independence with daily tasks, and improve QOL. Pt also reports uncontrolled orthostatic hypotension /96, HR 76 bpm taken manually in right UE prior to therapy /92, HR 81 bpm taken electronically with wrist cuff on right wrist following session BP taken in sitting position Evaluation Complexity History HIGH Complexity :3+ comorbidities / personal factors will impact the outcome/ POC ; Examination HIGH Complexity : 4+ Standardized tests and measures addressing body structure, function, activity limitation and / or participation in recreation  ;Presentation HIGH Complexity : Unstable and unpredictable characteristics  ; Clinical Decision Making MEDIUM Complexity : FOTO score of 26-74 Overall Complexity Rating: MEDIUM Problem List: pain affecting function, decrease ROM, decrease strength, impaired gait/ balance, decrease ADL/ functional abilitiies, decrease activity tolerance, decrease flexibility/ joint mobility and decrease transfer abilities Treatment Plan may include any combination of the following: Therapeutic exercise, Therapeutic activities, Neuromuscular re-education, Physical agent/modality, Gait/balance training, Manual therapy, Patient education, Self Care training, Functional mobility training, Home safety training and Stair training Patient / Family readiness to learn indicated by: asking questions, trying to perform skills and interest 
 Persons(s) to be included in education: patient (P) Barriers to Learning/Limitations: None Patient Goal (s): strengthen Patient Self Reported Health Status: fair Rehabilitation Potential: fair Short Term Goals: To be accomplished in 1 weeks: 1. Pt will be compliant with initial HEP in order to optimize therapy outcomes. Long Term Goals: To be accomplished in 8 weeks: 1. Pt will improve FOTO score by 9 points in order to demonstrate functional improvement. 2. Pt will maintain Romberg stance on compliant surface for 10 seconds in order to demonstrate improved stability in uneven environments. 3. Pt will be able to maintain left SLS for 1 second without UE support in order to improve stability with stance phase of gait. Frequency / Duration: Patient to be seen 2-3 times per week for 8 weeks. Patient/ Caregiver education and instruction: Diagnosis, prognosis, exercises 
 [x]  Plan of care has been reviewed with PTA Certification Period: 7/2/19 to 8/30/19 Bhaskar Tee, PT 7/2/2019 1:41 PM 
 
________________________________________________________________________ I certify that the above Therapy Services are being furnished while the patient is under my care. I agree with the treatment plan and certify that this therapy is necessary. [de-identified] Signature:____________Date:_________TIME:________ 
 
[employer], Date and Time must be completed for valid certification ** Please sign and return to [address] [facility] 
[phone] fax

## 2019-07-03 ENCOUNTER — OFFICE VISIT (OUTPATIENT)
Dept: FAMILY MEDICINE CLINIC | Age: 59
End: 2019-07-03

## 2019-07-03 VITALS
DIASTOLIC BLOOD PRESSURE: 80 MMHG | BODY MASS INDEX: 29.12 KG/M2 | WEIGHT: 181.2 LBS | RESPIRATION RATE: 18 BRPM | HEIGHT: 66 IN | OXYGEN SATURATION: 97 % | HEART RATE: 66 BPM | SYSTOLIC BLOOD PRESSURE: 120 MMHG | TEMPERATURE: 98.6 F

## 2019-07-03 DIAGNOSIS — M70.22 OLECRANON BURSITIS OF LEFT ELBOW: Primary | ICD-10-CM

## 2019-07-03 NOTE — PROGRESS NOTES
HISTORY OF PRESENT ILLNESS Mandie Dugan is a 62 y.o. male. HPI Patient is here today for evaluation and treatment of:  Left Elbow Discomfort: 
 
Left Elbow Discomfort:  The left olecranon bursitis is improving. He did take the prednisone and this did seem to help. He is now in Therapy for strengthening for preparing possibly for  renal transplant. His BP is initially elevated. Better at second check; On meds as listed below. Current Outpatient Medications:  
  brinzolamide (AZOPT OP), Apply  to eye. Indications: 3 times daily, Disp: , Rfl:  
  finasteride (PROSCAR) 5 mg tablet, TAKE 1 TAB BY MOUTH DAILY. APPOINTMENT IS NEEDED BEFORE NEXT REFILL. , Disp: 30 Tab, Rfl: 2 
  amLODIPine (NORVASC) 10 mg tablet, TAKE 1 TABLET BY MOUTH DAILY. , Disp: 30 Tab, Rfl: 6 
  netarsudil (RHOPRESSA) 0.02 % drop, Apply  to eye., Disp: , Rfl:  
  brimonidine-timolol (COMBIGAN) 0.2-0.5 % drop ophthalmic solution, 1 Drop every twelve (12) hours. , Disp: , Rfl:  
  soft lens rinse,store solution (SHYLA SALINE SENSITIVE EYES), by Does Not Apply route., Disp: , Rfl:  
  hydrALAZINE (APRESOLINE) 100 mg tablet, Take 1 Tab by mouth two (2) times a day. on non-dialysis days. Indications: high blood pressure, Disp: 60 Tab, Rfl: 6 
  losartan (COZAAR) 100 mg tablet, Take 100 mg by mouth daily. , Disp: , Rfl:  
  cloNIDine (CATAPRES) 0.3 mg/24 hr, APPLY 1 PATCH TO SKIN ONCE EVERY 7 DAYS, Disp: 16 Patch, Rfl: 3 
  lidocaine-prilocaine (EMLA) topical cream, Apply  to affected area as needed for Pain (apply to left arm 30 minutes prior to dialysis). , Disp: 30 g, Rfl: 12 
  b complex-vitamin c-folic acid (NEPHROCAPS) 1 mg capsule, Take 1 Cap by mouth daily. , Disp: 30 Cap, Rfl: 6 
  calcium acetate (PHOSLO) 667 mg cap, Take 1 Cap by mouth three (3) times daily (with meals). , Disp: 90 Cap, Rfl: 2 
  insulin lispro (HUMALOG) 100 unit/mL injection, Blood Sugar (mg/dL): <150 =0 units; 150 -199 =2 units; 200 -249 =4 units; 250 -299 =6 units; 300 -349 =8 units; 350 and above =10 units. , Disp: 1 Vial, Rfl: 2 
  acetaminophen (TYLENOL EXTRA STRENGTH) 500 mg tablet, Take  by mouth every six (6) hours as needed for Pain., Disp: , Rfl:  
  cyclobenzaprine (FLEXERIL) 10 mg tablet, Take 1 Tab by mouth three (3) times daily as needed for Muscle Spasm(s). , Disp: 30 Tab, Rfl: 0 
  rosuvastatin (CRESTOR) 20 mg tablet, Take 1 Tab by mouth nightly., Disp: 30 Tab, Rfl: 6 
  docusate sodium (COLACE) 100 mg capsule, Take 1 Cap by mouth two (2) times a day., Disp: 60 Cap, Rfl: 0 
  fluticasone (FLONASE) 50 mcg/actuation nasal spray, 1 [place] by Both Nostrils route two (2) times a day. (Patient taking differently: 1 Spray by Both Nostrils route as needed.), Disp: 1 Bottle, Rfl: 2   cyanocobalamin (VITAMIN B-12) 1,000 mcg tablet, Take 1,000 mcg by mouth daily. , Disp: , Rfl:  
 
 
 
  PMH,  Meds, Allergies, Family History, Social history reviewed Review of Systems Constitutional: Negative for chills and fever. Respiratory: Negative for shortness of breath and wheezing. Cardiovascular: Negative for chest pain and palpitations. Physical Exam  
Visit Vitals /80 Pulse 66 Temp 98.6 °F (37 °C) (Oral) Resp 18 Ht 5' 6\" (1.676 m) Wt 181 lb 3.2 oz (82.2 kg) SpO2 97% BMI 29.25 kg/m² General appearance: alert, cooperative, no distress, appears stated age Lungs: clear to auscultation bilaterally Heart: regular rate and rhythm, S1, S2 normal, no murmur, click, rub or gallop Extremities: left elbow with some mild soft tissue swelling at the olecranon bursa;  Nontender, no induration ; No warmth; No tenderness;  Smaller than previous. ASSESSMENT and PLAN 
  ICD-10-CM ICD-9-CM 1. Olecranon bursitis of left elbow M70.22 726.33 As above, better 
 treatment plan as listed below Orders Placed This Encounter  brinzolamide (AZOPT OP) med rec Follow-up and Dispositions · Return in about 4 months (around 11/3/2019). An After Visit Summary was printed and given to the patient. [employer] explain

## 2019-07-03 NOTE — PROGRESS NOTES
Patient is here today for follow up on left elbow discomfort. 1. Have you been to the ER, urgent care clinic since your last visit? Hospitalized since your last visit? No 
 
2. Have you seen or consulted any other health care providers outside of the [address] [place] [address] since your last visit? Include any pap smears or colon screening.  No

## 2019-07-09 ENCOUNTER — HOSPITAL ENCOUNTER (OUTPATIENT)
Dept: PHYSICAL THERAPY | Age: 59
Discharge: HOME | End: 2019-07-09
Payer: COMMERCIAL

## 2019-07-09 PROCEDURE — 97110 THERAPEUTIC EXERCISES: CPT

## 2019-07-09 NOTE — PROGRESS NOTES
PT DAILY TREATMENT NOTE 10-18 Patient Name: Ronald Sandoval Date:2019 : 1960 [x]  Patient  Verified Payor: [place] MEDICARE / Plan: Zane Fajardo / Product Type: Medicare / In time:3:03  Out time:3:40 Total Treatment Time (min): 37 Visit #: 2 of - Medicare/BCBS Only Total Timed Codes (min):  37 1:1 Treatment Time:  37 Treatment Area: Lack of coordination [R27.9] Gait instability [R26.81] Lower extremity weakness [R29.898] SUBJECTIVE Pain Level (0-10 scale): 0/10 Any medication changes, allergies to medications, adverse drug reactions, diagnosis change, or new procedure performed?: [x] No    [] Yes (see summary sheet for update) Subjective functional status/changes:   [] No changes reported Pt reports he has been working on his exercises at home. They have a donor for a kidney in [place]. He wants to improve his strength for ability to walk better. His wife generally walks with him on the left side. He gets neuropathy sensations at different times that limit his ability to easily stand from a chair. 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 OBJECTIVE 37 min Therapeutic Exercise:  [x] See flow sheet :  
Rationale: increase ROM, increase strength, improve coordination, improve balance and increase proprioception to improve the patients ability to ambulate with decreased fall risk and strengthen to qualify for a kidney transplant With 
 [] TE 
 [] TA 
 [] neuro 
 [] other: Patient Education: [x] Review HEP [] Progressed/Changed HEP based on:  
[] positioning   [] body mechanics   [] transfers   [] heat/ice application   
[] other:   
 
Other Objective/Functional Measures:  
BP: 164/84 HR: 78 bpm 
Discussed with wife and pt and they would like BP checked after session as well moving forward Pt did well with verbal guidance and tactile cues to ambulate in the clinic Able to perform most standing exercises with 1 UE Pt felt LAQ/HSC were easy so may benefit from performing on the machines Slight left knee pain with sit to stands but able to perform without UEs Pain Level (0-10 scale) post treatment: 0/10 ASSESSMENT/Changes in Function: Pt with good initiation of exercise program and compliance of initial HEP. He is motivated to improve strength and balance for ease of ambulation and to be able to get a kidney transplant. He has decreased balance due to left partial foot amputation and neuropathy. Will continue with general strengthening and balance to improve safety at home and in the community with decreased fall risk and improved gait. Progress towards goals / Updated goals: 
Short Term Goals: To be accomplished in 1 weeks: 1. Pt will be compliant with initial HEP in order to optimize therapy outcomes. MET Long Term Goals: To be accomplished in 8 weeks: 1. Pt will improve FOTO score by 9 points in order to demonstrate functional improvement. 2. Pt will maintain Romberg stance on compliant surface for 10 seconds in order to demonstrate improved stability in uneven environments. 3. Pt will be able to maintain left SLS for 1 second without UE support in order to improve stability with stance phase of gait. PLAN [x]  Upgrade activities as tolerated     [x]  Continue plan of care 
[]  Update interventions per flow sheet      
[]  Discharge due to:_ 
[]  Other:_ Emiliana Pierce PTA 7/9/2019  2:33 PM 
 
Future Appointments Date Time Provider Alyce Brown 7/9/2019  3:00 PM Sal Streeter PTA [facility]  
7/11/2019 11:00 AM ASYA Espino [facility]  
7/16/2019  3:30 PM Sal Streeter PTA [facility]  
7/19/2019  1:00 PM Sal Streeter PTA [facility]  
7/22/2019 11:30 AM Sal Streeter PTA [facility]  
7/25/2019  1:30 PM Osmin Wilson PT [facility]  
7/30/2019  8:00 AM Gerson Hardy [facility]  
 8/1/2019  1:00 PM Kane Murphy, PT [facility]  
8/6/2019  9:00 AM Brie Valera PTA [facility]  
8/8/2019  9:30 AM Kane Murphy, PT [facility]  
8/13/2019  9:30 AM Brie Valera PTA [facility]  
8/15/2019  9:30 AM Kane Mruphy, PT [facility]  
8/15/2019 12:40 PM Gaye Harvey MD [facility]  
8/20/2019  9:30 AM Brie Valera PTA [facility]  
8/22/2019 10:30 AM Kane Muprhy, PT [facility]  
10/11/2019 11:20 AM Gonzalo Chowdhury MD [address] [place] [address]

## 2019-07-10 ENCOUNTER — APPOINTMENT (OUTPATIENT)
Dept: PHYSICAL THERAPY | Age: 59
End: 2019-07-10
Payer: COMMERCIAL

## 2019-07-11 ENCOUNTER — HOSPITAL ENCOUNTER (OUTPATIENT)
Dept: PHYSICAL THERAPY | Age: 59
Discharge: HOME | End: 2019-07-11
Payer: COMMERCIAL

## 2019-07-11 PROCEDURE — 97110 THERAPEUTIC EXERCISES: CPT

## 2019-07-11 PROCEDURE — 97112 NEUROMUSCULAR REEDUCATION: CPT

## 2019-07-11 NOTE — PROGRESS NOTES
PT DAILY TREATMENT NOTE 10-18 Patient Name: Dorothea Sanchez Date:2019 : 1960 [x]  Patient  Verified Payor: VA MEDICARE / Plan: Zane Benoit y / Product Type: Medicare / In time:10:55  Out time:11:42 Total Treatment Time (min): 47 Visit #: 3 of - Medicare/BCBS Only Total Timed Codes (min):  47 1:1 Treatment Time:  41  
 
 
Treatment Area: Other symptoms and signs involving the musculoskeletal system [R29.898] Unspecified lack of coordination [R27.9] Unsteadiness on feet [R26.81] SUBJECTIVE Pain Level (0-10 scale): 0/10 Any medication changes, allergies to medications, adverse drug reactions, diagnosis change, or new procedure performed?: [x] No    [] Yes (see summary sheet for update) Subjective functional status/changes:   [] No changes reported Pt reports that his wife describes his food on his plate as if he's looking at the face of a clock. They have walked a little with his wife describing the direction as if he's looking at a clock. OBJECTIVE 14 min Therapeutic Exercise:  [x] See flow sheet :  
Rationale: increase strength to improve the patients ability to improve ease of prolonged ambulation and ADLs 33 min Neuromuscular Re-education:  [x]  See flow sheet :  
Rationale: increase strength, improve coordination, improve balance and increase proprioception  to improve the patients ability to improve ease of prolonged ambulation and ADLs With 
 [] TE 
 [] TA 
 [] neuro 
 [] other: Patient Education: [x] Review HEP [] Progressed/Changed HEP based on:  
[] positioning   [] body mechanics   [] transfers   [] heat/ice application   
[] other:   
 
Other Objective/Functional Measures:  
 
Challenged with using 1 UE support for single limb interventions Cues to attempt to decrease WB through UE and maintain balance more using LE musculature, decreased UE WB following cuing Pt reported challenge with initiation of 2# ankle weights with standing interventions Pt reported challenge with leg press and knee flexion machine but was pleased to be exercising with machines. Pt reports neuropathy pain comes and goes Pain Level (0-10 scale) post treatment: 8/10 neuropathy pain ASSESSMENT/Changes in Function:  
 
Pt is making slow progress towards initial goals in therapy. He continues to demonstrate decreased SLS with excessive UE WB on parallel bars; however, he was able to decrease weight placed through UE with cuing this visit. Pt was pleased with initiation of LE machines for strengthening. Will continue to address strength and static/dynamic balance deficits in order to reduce fall risk and improve ease/safety with daily tasks. Patient will continue to benefit from skilled PT services to modify and progress therapeutic interventions, address functional mobility deficits, address ROM deficits, address strength deficits, analyze and address soft tissue restrictions, analyze and cue movement patterns, analyze and modify body mechanics/ergonomics, assess and modify postural abnormalities, address imbalance/dizziness and instruct in home and community integration to attain remaining goals. Progress towards goals / Updated goals: 
Short Term Goals: To be accomplished in 1 weeks: 1. Pt will be compliant with initial HEP in order to optimize therapy outcomes. Goal met. 7/9/19 Long Term Goals: To be accomplished in 8 weeks: 1. Pt will improve FOTO score by 9 points in order to demonstrate functional improvement. 2. Pt will maintain Romberg stance on compliant surface for 10 seconds in order to demonstrate improved stability in uneven environments. 3. Pt will be able to maintain left SLS for 1 second without UE support in order to improve stability with stance phase of gait. Not met.   Continues to require UE support 7/12/19 
  
PLAN 
 [x]  Upgrade activities as tolerated     [x]  Continue plan of care [x]  Update interventions per flow sheet      
[]  Discharge due to:_ 
[]  Other:_ Eran Lane, PT 7/11/2019  12:56 PM 
 
Future Appointments Date Time Provider Alyce Brown 7/16/2019  3:30 PM Memo aJmes, PTA [facility]  
7/19/2019  1:00 PM Memo James PTA [facility]  
7/22/2019 11:30 AM Memo James, PTA [facility]  
7/25/2019  1:30 PM Toy Hein, PT [facility]  
7/30/2019  8:00 AM Harjinder Miller [facility]  
8/1/2019  1:00 PM Toy Hein, PT [facility]  
8/6/2019  9:00 AM Memo James, PTA [facility]  
8/8/2019  9:30 AM Toy Hein, PT [facility]  
8/13/2019  9:30 AM Memo James PTA [facility]  
8/15/2019  9:30 AM Toy Hein, PT PXLLLSM [facility]  
8/15/2019 12:40 PM Osmar Nur MD [facility]  
8/20/2019  9:30 AM Memo James PTA [facility]  
8/22/2019 10:30 AM Toy Hein, PT [facility]  
10/11/2019 11:20 AM Oni Rod MD [address] [place] [address]

## 2019-07-16 ENCOUNTER — HOSPITAL ENCOUNTER (OUTPATIENT)
Dept: PHYSICAL THERAPY | Age: 59
Discharge: HOME | End: 2019-07-16
Payer: COMMERCIAL

## 2019-07-16 ENCOUNTER — TELEPHONE (OUTPATIENT)
Dept: FAMILY MEDICINE CLINIC | Age: 59
End: 2019-07-16

## 2019-07-16 PROCEDURE — 97110 THERAPEUTIC EXERCISES: CPT

## 2019-07-16 NOTE — PROGRESS NOTES
PT DAILY TREATMENT NOTE 10-18 Patient Name: Ari Dukes Date:2019 : 1960 [x]  Patient  Verified Payor: VA MEDICARE / Plan: Brendan Rivas / Product Type: Medicare / In time: 3:36  Out time: 4:10 Total Treatment Time (min): 34 Visit #: 4 of  Medicare/BCBS Only Total Timed Codes (min):  34 1:1 Treatment Time:  25 Treatment Area: Other symptoms and signs involving the musculoskeletal system [R29.898] Unspecified lack of coordination [R27.9] Unsteadiness on feet [R26.81] SUBJECTIVE Pain Level (0-10 scale): 0/10 Any medication changes, allergies to medications, adverse drug reactions, diagnosis change, or new procedure performed?: [x] No    [] Yes (see summary sheet for update) Subjective functional status/changes:   [] No changes reported Pt reports a little soreness after last visit but felt good working out on the machines and would like to do them again. His wife states the patient wanted to get a picture on the machines working out. OBJECTIVE 34 min Therapeutic Exercise:  [x] See flow sheet :  
Rationale: increase strength to improve the patients ability to improve ease of prolonged ambulation and ADLs With 
 [] TE 
 [] TA 
 [] neuro 
 [] other: Patient Education: [x] Review HEP [] Progressed/Changed HEP based on:  
[] positioning   [] body mechanics   [] transfers   [] heat/ice application   
[] other:   
 
Other Objective/Functional Measures:  
BP: 137/83 HR: 75 bpm 
Educated pt we could switch which machines we use each session or do one on one day and a different one another in order to continue with focus on balance as well due to time constraint of getting on the machines Pt pleased with performing leg exercises Continues to have increase in neuropathy pain with session Challenged with 1 UE support for // exercises Pain Level (0-10 scale) post treatment: 8/10 ASSESSMENT/Changes in Function: Pt is making slow, steady progress with improving LE strength and balance. He continues to rely on his UE support for balance with gait. Will work on further strengthening that incorporates balance as well for decreased fall risk and improved ease of household and community ambulation. Progress towards goals / Updated goals: 
Short Term Goals: To be accomplished in 1 weeks: 1. Pt will be compliant with initial HEP in order to optimize therapy outcomes. Goal met. 7/9/19 Long Term Goals: To be accomplished in 8 weeks: 1. Pt will improve FOTO score by 9 points in order to demonstrate functional improvement. 2. Pt will maintain Romberg stance on compliant surface for 10 seconds in order to demonstrate improved stability in uneven environments. 3. Pt will be able to maintain left SLS for 1 second without UE support in order to improve stability with stance phase of gait. Not met. Continues to require UE support 7/12/19 
  
PLAN [x]  Upgrade activities as tolerated     [x]  Continue plan of care [x]  Update interventions per flow sheet      
[]  Discharge due to:_ 
[]  Other:_ Nicole Razo PTA 7/16/2019  12:56 PM 
 
Future Appointments Date Time Provider Alyce Brown 7/16/2019  3:30 PM Kamilahandriy Macias, PTA [facility]  
7/19/2019  1:00 PM Kamilah Macias, PTA [facility]  
7/22/2019 11:30 AM Kamilahandriy Macias, PTA [facility]  
7/25/2019  1:30 PM Doneta Jair, PT [facility]  
7/30/2019  8:00 AM Branden Carrillo [facility]  
8/1/2019  1:00 PM Doneta Jair, PT [facility]  
8/6/2019  9:00 AM Kamilahandriy Macias, PTA [facility]  
8/8/2019  9:30 AM Doneta Jair, PT [facility]  
8/13/2019  9:30 AM Kamilahandriy Macias, PTA [facility]  
8/15/2019  9:30 AM Yocasta Adam, PT [id number] [facility]  
8/15/2019 12:40 PM Gabriel Catalan MD [address] [place] [address]  
8/20/2019  9:30 AM Kamilah Macias, MARIA ANTONIA [facility]  
 8/22/2019 10:30 AM Angela Witt PT [facility]  
10/11/2019 11:20 AM Mandy Kirby MD [address] [place] [address]

## 2019-07-17 DIAGNOSIS — N18.6 ESRD (END STAGE RENAL DISEASE) (HCC): Primary | ICD-10-CM

## 2019-07-18 ENCOUNTER — TELEPHONE (OUTPATIENT)
Dept: FAMILY MEDICINE CLINIC | Age: 59
End: 2019-07-18

## 2019-07-18 NOTE — TELEPHONE ENCOUNTER
Mr Camden Nguyen was made aware that consult to internal medicine was done.  He stated that he will come to the office tomorrow to  copy of the referral.

## 2019-07-19 ENCOUNTER — HOSPITAL ENCOUNTER (OUTPATIENT)
Dept: PHYSICAL THERAPY | Age: 59
Discharge: HOME | End: 2019-07-19
Payer: COMMERCIAL

## 2019-07-19 PROCEDURE — 97110 THERAPEUTIC EXERCISES: CPT

## 2019-07-19 PROCEDURE — 97112 NEUROMUSCULAR REEDUCATION: CPT

## 2019-07-19 NOTE — PROGRESS NOTES
PT DAILY TREATMENT NOTE 10-18    Patient Name: Gala Ghosh  Date:2019  : 1960  [x]  Patient  Verified  Payor: Yamil Cluster / Plan: VA MEDICARE PART A & B / Product Type: Medicare /    In time: 1:08 Out time: 1:50  Total Treatment Time (min):42  Visit #: 5 of     Medicare/BCBS Only   Total Timed Codes (min):  42 1:1 Treatment Time:  38       Treatment Area: Other symptoms and signs involving the musculoskeletal system [R29.898]  Unspecified lack of coordination [R27.9]  Unsteadiness on feet [R26.81]    SUBJECTIVE  Pain Level (0-10 scale): 9/10  Any medication changes, allergies to medications, adverse drug reactions, diagnosis change, or new procedure performed?: [x] No    [] Yes (see summary sheet for update)  Subjective functional status/changes:   [] No changes reported  Pt reports dialysis before therapy but still wanted to participate. He states the neuropathy is really bad but he wants to still work through it. He states burning in the left ankle but pains down his legs and arms. They are going to [address] Tuesday about the kidney transplant to meet with the donor.      OBJECTIVE    27 min Therapeutic Exercise:  [x] See flow sheet :   Rationale: increase strength to improve the patients ability to improve ease of prolonged ambulation and ADLs    15 minutes of Neuromuscular Re-education working on standing balance in // and step taps with decreased UE use        With   [] TE   [] TA   [] neuro   [] other: Patient Education: [x] Review HEP    [] Progressed/Changed HEP based on:   [] positioning   [] body mechanics   [] transfers   [] heat/ice application    [] other:      Other Objective/Functional Measures:   BP: 158/89  HR: 78 bpm  Challenged with foam balance with increased swaying  Pt still wanted to perform leg machines at end  Able to decrease to 1 UE for marches and step taps with good form  Fatigue from machines towards the end  3 Sitting rest breaks during the session     Pain Level (0-10 scale) post treatment: 9/10    ASSESSMENT/Changes in Function: Pt progressing with decrease use of UE assist for standing exercises. He struggles with neuropathy pain but is motivated to improve strength and balance. He continues to have most difficulty on compliant surfaces. Will work to improve strength and balance to decrease fall risk with household/community ambulation. Progress towards goals / Updated goals:  Short Term Goals: To be accomplished in 1 weeks:  1. Pt will be compliant with initial HEP in order to optimize therapy outcomes. Goal met. 7/9/19  Long Term Goals: To be accomplished in 8 weeks:  1. Pt will improve FOTO score by 9 points in order to demonstrate functional improvement. 2. Pt will maintain Romberg stance on compliant surface for 10 seconds in order to demonstrate improved stability in uneven environments. 3. Pt will be able to maintain left SLS for 1 second without UE support in order to improve stability with stance phase of gait. Not met.   Continues to require UE support 7/12/19     PLAN  [x]  Upgrade activities as tolerated     [x]  Continue plan of care  [x]  Update interventions per flow sheet       []  Discharge due to:_  []  Other:_      Martine Pisano PTA 7/19/2019  12:56 PM    Future Appointments   Date Time Provider Alyce Brown   7/22/2019 11:30 AM Rey Wesley PTA [facility]   7/25/2019  1:30 PM Aracely Tony PT [facility]   7/30/2019  8:00 AM Ankur Molina [facility]   8/1/2019  1:00 PM Aracely Tony PT [facility]   8/6/2019  9:00 AM Rey Wesley PTA [facility]   8/8/2019  9:30 AM Aracely Tony PT [facility]   8/13/2019  9:30 AM Rey Wesley PTA [facility]   8/15/2019  9:30 AM Aracely Tony PT VYRRONN [facility]   8/15/2019 12:40 PM Sarah Christensen MD [facility]   8/20/2019  9:30 AM Rey Wesley, PTA [facility]   8/22/2019 10:30 AM Aracely Tony PT [facility]   10/11/2019 11:20 AM Fermín Mixon, Jonna Cortez MD [address] [place] [address]

## 2019-07-22 ENCOUNTER — HOSPITAL ENCOUNTER (OUTPATIENT)
Dept: PHYSICAL THERAPY | Age: 59
Discharge: HOME | End: 2019-07-22
Payer: COMMERCIAL

## 2019-07-22 PROCEDURE — 97112 NEUROMUSCULAR REEDUCATION: CPT

## 2019-07-22 NOTE — PROGRESS NOTES
PT DAILY TREATMENT NOTE 10-18    Patient Name: Avis Huff  Date:2019  : 1960  [x]  Patient  Verified  Payor: Sudarshan Grant / Plan: VA MEDICARE PART A & B / Product Type: Medicare /    In time:11:35  Out time: 12:11  Total Treatment Time (min): 36  Visit #: 6 of -    Medicare/BCBS Only   Total Timed Codes (min): 36  1:1 Treatment Time:  31       Treatment Area: Other symptoms and signs involving the musculoskeletal system [R29.898]  Unspecified lack of coordination [R27.9]  Unsteadiness on feet [R26.81]    SUBJECTIVE  Pain Level (0-10 scale): 9/10  Any medication changes, allergies to medications, adverse drug reactions, diagnosis change, or new procedure performed?: [x] No    [] Yes (see summary sheet for update)  Subjective functional status/changes:   [] No changes reported  Pt states he stays in pain and numbness. He has dialysis today at 1 P.M. And goes to [facility] tomorrow to meet with the kidney donor. He states on dialysis days he can't take BP medicine but wears a patch.      OBJECTIVE    10 min Therapeutic Exercise:  [x] See flow sheet :   Rationale: increase strength to improve the patients ability to improve ease of prolonged ambulation and ADLs     26 minutes of Neuromuscular Re-education working on standing balance in // and step taps with decreased UE use        With   [] TE   [] TA   [] neuro   [] other: Patient Education: [x] Review HEP    [] Progressed/Changed HEP based on:   [] positioning   [] body mechanics   [] transfers   [] heat/ice application    [] other:      Other Objective/Functional Measures:   BP: 197/99  HR: 84 bpm  Manual BP: 192/90  Manual BP post exercise: 170/88  Romberg on compliant surface: 30 seconds  SLS left: unable  Decreased to 2 finger balance for standing exercises  Left knee pain with trial of sit to stands so switched to mini squats  Educated on heel strike during ambulation and added incline stretch to help mobility to perform       Pain Level (0-10 scale) post treatment: 9/10    ASSESSMENT/Changes in Function: pt continues to have elevated pain due to neuropathy but does have improving balance evidenced by ability to stand on compliant surface 30 seconds without LOB and by ability to decrease UE use for standing exercises. He will continue to benefit from further therapy to improve strength and balance for kidney transplant and for decreased fall risk with ambulation within the home and out in the community. Progress towards goals / Updated goals:  Short Term Goals: To be accomplished in 1 weeks:  1. Pt will be compliant with initial HEP in order to optimize therapy outcomes. Goal met. 7/9/19  Long Term Goals: To be accomplished in 8 weeks:  1. Pt will improve FOTO score by 9 points in order to demonstrate functional improvement. 2. Pt will maintain Romberg stance on compliant surface for 10 seconds in order to demonstrate improved stability in uneven environments. Met 30 seconds (7/22/19)  3. Pt will be able to maintain left SLS for 1 second without UE support in order to improve stability with stance phase of gait. Not met.   Continues to require UE support 7/12/19 1 UE support; able to perform MSR without LOB (7/22/19)     PLAN  [x]  Upgrade activities as tolerated     [x]  Continue plan of care  [x]  Update interventions per flow sheet       []  Discharge due to:_  []  Other:_      She Gee PTA 7/22/2019  12:56 PM    Future Appointments   Date Time Provider Alyce Bronw   7/22/2019 11:30 AM Rhett Sanabria PTA [facility]   7/25/2019  1:30 PM Mikie Sanchez PT [facility]   7/30/2019  8:00 AM Reji Neville [facility]   8/1/2019  1:00 PM Mikie Sanchez PT [facility]   8/6/2019  9:00 AM Rhett Sanabria PTA [facility]   8/8/2019  9:30 AM Mikie Sanchez PT [facility]   8/13/2019  9:30 AM Rhett Sanabria PTA [facility]   8/15/2019  9:30 AM Mikie Sanchez, PT VAWYSXW [facility]   8/15/2019 12:40 PM Dae Yeung Monty Chong MD [facility]   8/20/2019  9:30 AM Mariam Tobin, PTA [facility]   8/22/2019 10:30 AM Kathryn Mccarthy, PT [facility]   10/11/2019 11:20 AM Delicia Clark MD [address] [place] [address]

## 2019-07-25 ENCOUNTER — HOSPITAL ENCOUNTER (OUTPATIENT)
Dept: PHYSICAL THERAPY | Age: 59
Discharge: HOME | End: 2019-07-25
Payer: COMMERCIAL

## 2019-07-25 PROCEDURE — 97110 THERAPEUTIC EXERCISES: CPT

## 2019-07-25 NOTE — PROGRESS NOTES
PT DAILY TREATMENT NOTE 10-18    Patient Name: Sandra Valles  Date:2019  : 1960  [x]  Patient  Verified  Payor: James Castellon / Plan: [place] MEDICARE PART A & B / Product Type: Medicare /    In time: 10:57 Out time: 11:46  Total Treatment Time (min): 49  Visit #: 7 of -    Medicare/BCBS Only   Total Timed Codes (min):  49 1:1 Treatment Time:  28       Treatment Area: Other symptoms and signs involving the musculoskeletal system [R29.898]  Unspecified lack of coordination [R27.9]  Unsteadiness on feet [R26.81]    SUBJECTIVE  Pain Level (0-10 scale): 810  Any medication changes, allergies to medications, adverse drug reactions, diagnosis change, or new procedure performed?: [x] No    [] Yes (see summary sheet for update)  Subjective functional status/changes:   [] No changes reported  Pt and wife report when in [place] they were told 70% improvement and that he still needed more strength to do the surgery. He was also offered a kidney with Hep A or Hep C and was told it could be pre-treated for the disease. He is frustrated that he was not given more information on what needs to be strengthened before being approved for the transplant.     OBJECTIVE    30 min Therapeutic Exercise:  [x] See flow sheet :   Rationale: increase strength to improve the patients ability to improve ease of prolonged ambulation and ADLs     19 minutes of Neuromuscular Re-education working on standing balance in // and step taps with decreased UE use        With   [] TE   [] TA   [] neuro   [] other: Patient Education: [x] Review HEP    [] Progressed/Changed HEP based on:   [] positioning   [] body mechanics   [] transfers   [] heat/ice application    [] other:      Other Objective/Functional Measures:   BP: 158/85  HR: 81 bpm  Discussed with pt and wife that we would work to figure out what specifications in strength were needed to help assist with preparation for transplant  Pt with improved strength on all machines  Balance is improving with decreased UE assist in // for all exercises  Pt continues to have elevated pain from neuropathy but is willing to participate with all exercises     Pain Level (0-10 scale) post  8/10:     ASSESSMENT/Changes in Function: Pt is progressing towards all goals with improving balance and strength. He continues to struggle with high pain levels from neuropathy but is motivated to improve in order to receive kidney transplant. Will continue progressing and work on contacting MD for better guidance on specific strengths that need to be achieved prior to surgery. Progress towards goals / Updated goals:  Short Term Goals: To be accomplished in 1 weeks:  1. Pt will be compliant with initial HEP in order to optimize therapy outcomes. Goal met. 7/9/19  Long Term Goals: To be accomplished in 8 weeks:  1. Pt will improve FOTO score by 9 points in order to demonstrate functional improvement. 2. Pt will maintain Romberg stance on compliant surface for 10 seconds in order to demonstrate improved stability in uneven environments. Met 30 seconds (7/22/19)  3. Pt will be able to maintain left SLS for 1 second without UE support in order to improve stability with stance phase of gait. Not met.   Continues to require UE support 7/12/19 1 UE support; able to perform MSR without LOB (7/22/19)     PLAN  [x]  Upgrade activities as tolerated     [x]  Continue plan of care  [x]  Update interventions per flow sheet       []  Discharge due to:_  []  Other:_      Emiliana Pierce PTA 7/25/2019  12:56 PM    Future Appointments   Date Time Provider Alyce Brown   7/25/2019 11:00 AM Sal Streeter PTA [facility]   7/30/2019  8:00 AM Gerson Hardy [facility]   8/1/2019  1:00 PM Osmin Wilson PT [facility]   8/6/2019  9:00 AM Sal Streeter PTA [facility]   8/8/2019  9:30 AM Osmin Wilson PT [facility]   8/13/2019  9:30 AM Sal Streeter PTA [facility]   8/15/2019  9:30 AM Marielena Chavis, PT [facility]   8/15/2019 12:40 PM Gaby Gamez MD [address] [facility]   8/20/2019  9:30 AM Areli Chaparro PTA [facility]   8/22/2019 10:30 AM Marielena Chavis, PT [facility]   10/11/2019 11:20 AM Fely Ham MD [address] [place] [address]

## 2019-07-26 ENCOUNTER — TELEPHONE (OUTPATIENT)
Dept: CARDIOLOGY CLINIC | Age: 59
End: 2019-07-26

## 2019-07-26 NOTE — TELEPHONE ENCOUNTER
Edouard Castillo from [facility] called wanting to know what is the highest and safest bp for the patient while participating in physical therapy.  I spoke with Dr Michael Sanchez who stated the patient's bp stays around 200 so as long as he doesn't exceed 220 he should be ok

## 2019-07-30 ENCOUNTER — HOSPITAL ENCOUNTER (OUTPATIENT)
Dept: PHYSICAL THERAPY | Age: 59
Discharge: HOME | End: 2019-07-30
Payer: COMMERCIAL

## 2019-07-30 PROCEDURE — 97110 THERAPEUTIC EXERCISES: CPT

## 2019-07-30 PROCEDURE — 97112 NEUROMUSCULAR REEDUCATION: CPT

## 2019-08-01 ENCOUNTER — HOSPITAL ENCOUNTER (OUTPATIENT)
Dept: PHYSICAL THERAPY | Age: 59
Discharge: HOME | End: 2019-08-01
Payer: COMMERCIAL

## 2019-08-01 PROCEDURE — 97110 THERAPEUTIC EXERCISES: CPT

## 2019-08-01 PROCEDURE — 97164 PT RE-EVAL EST PLAN CARE: CPT

## 2019-08-01 NOTE — PROGRESS NOTES
[facility] [place] [address] [facility] 
[phone] fax Continued Plan of Care/ Re-certification for Physical Therapy Services Patient name: Dorothea Sanchez Start of Care: 19 Referral source: Mik Garsia MD : 1960 Medical/Treatment Diagnosis: Other symptoms and signs involving the musculoskeletal system [R29.898] Unspecified lack of coordination [R27.9] Unsteadiness on feet [R26.81] Payor: [place] MEDICARE / Plan: [employer] / Product Type: Medicare /  Onset Date:3 years ago Prior Hospitalization: see medical history Provider#: [id number] Medications: Verified on Patient Summary List   
 Comorbidities: vision loss, dialysis (M, W, F), HTN, orthostatic hypotension, amputation of left toes, end stage renal disease Prior Level of Function: lives with wife in a 1 story home with 2 steps to enter without handrail, SPC for all ambulation, rollator for prolonged community ambulation, Ind with ADLs Visits from Start of Care: 9    Missed Visits: 0 The Plan of Care and following information is based on the patient's current status: 
Goal: Pt will be compliant with initial HEP in order to optimize therapy outcomes.  
Status at last note/certification: Goal met. Current Status: met Goal: Pt will improve FOTO score by 9 points in order to demonstrate functional improvement. Status at last note/certification: Goal met. Improved 24 points Current Status: met Goal: Pt will maintain Romberg stance on compliant surface for 10 seconds in order to demonstrate improved stability in uneven environments. Status at last note/certification: Goal met. 30 seconds Current Status: met Goal: Pt will be able to maintain left SLS for 1 second without UE support in order to improve stability with stance phase of gait. Status at last note/certification: Goal met. Left SLS: 2 seconds Right SLS: 1 second Current Status: met Key functional changes: improving endurance, improving static/dynamic balance, improving FOTO score Problems/ barriers to goal attainment: none Problem List: pain affecting function, decrease strength, impaired gait/ balance, decrease ADL/ functional abilitiies, decrease activity tolerance, decrease flexibility/ joint mobility and decrease transfer abilities Treatment Plan: Therapeutic exercise, Therapeutic activities, Neuromuscular re-education, Physical agent/modality, Gait/balance training, Manual therapy, Patient education, Self Care training, Functional mobility training, Home safety training and Stair training Patient Goal (s) has been updated and includes: be eligible for kidney transplant Goals for this certification period to be accomplished in 4 weeks: 1. Pt will complete  Transplant Frailty Assessment to determine remaining deficits that need to be addressed in therapy for pt to be eligible for kidney transplant. Frequency / Duration: Patient to be seen 2-3 times per week for 4 weeks: 
 
Assessment / Recommendations:Pt is making steady progress in therapy, meeting 100% of initial goals. He demonstrates significant improvement in static balance and reports % overall improvement since start of care. Significant improvement in FOTO score is indicative of functional improvement. Pt had a follow up appointment in [address]  and was told he was still not eligible for a kidney transplant because he needed more strength. Called Dr. Lance Swain office to determine therapy focus to work towards eligibility for the transplant. Pt needs to score >/= 3/5 on the  Transplant Frailty Assessment, which Dr. Lance Swain office faxed to this facility. This assessment will be completed next session to determine continued therapy plan. Certification Period: 8/1/19 to 8/30/19 Jiles Collet, PT 8/1/2019 12:58 PM 
 
 ________________________________________________________________________ I certify that the above Therapy Services are being furnished while the patient is under my care. I agree with the treatment plan and certify that this therapy is necessary. [] I have read the above and request that my patient continue as recommended. [] I have read the above report and request that my patient continue therapy with the following changes/special instructions: _______________________________________ [] I have read the above report and request that my patient be discharged from therapy [de-identified] Signature:____________Date:_________TIME:________ 
 
[employer], Date and Time must be completed for valid certification ** Please sign and return to [address] [facility] 
[phone] fax

## 2019-08-01 NOTE — PROGRESS NOTES
PT DAILY TREATMENT NOTE 10-18 Patient Name: Saranya Tong Date:2019 : 1960 [x]  Patient  Verified Payor: [place] MEDICARE / Plan: Zane Hitchcocky / Product Type: Medicare / In time:12:58  Out time:1:30 Total Treatment Time (min): 32 Visit #: 9 of 16-24 Medicare/BCBS Only Total Timed Codes (min):  15 1:1 Treatment Time:  31  
 
 
Treatment Area: Other symptoms and signs involving the musculoskeletal system [R29.898] Unspecified lack of coordination [R27.9] Unsteadiness on feet [R26.81] SUBJECTIVE Pain Level (0-10 scale): 5/10 neuropathy Any medication changes, allergies to medications, adverse drug reactions, diagnosis change, or new procedure performed?: [x] No    [] Yes (see summary sheet for update) Subjective functional status/changes:   [] No changes reported Pt reports % with therapy. He would like to continue strengthening independently at the gym following DC from therapy. He does not know what the physical requirements are for him to be eligible for the transplant. OBJECTIVE 17 min []Eval                  [x]Re-Eval  
 
 
15 min Therapeutic Exercise:  [] See flow sheet :  
Rationale: increase strength, improve coordination, improve balance and increase proprioception to improve the patients ability to improve ease of prolonged ambulation With 
 [] TE 
 [] TA 
 [] neuro 
 [] other: Patient Education: [x] Review HEP [] Progressed/Changed HEP based on:  
[] positioning   [] body mechanics   [] transfers   [] heat/ice application   
[] other:   
 
Other Objective/Functional Measures:  
 
Left SLS: 2 seconds Right SLS: 1 second Challenged with standing therex with 3# ankle weight, required seated rest break following standing therex Discussed plan for therapist to call Dr. Eder Louise office to determine remaining goals for therapy focus in order to meet eligibility requirements for kidney transplant Pt gave verbal permission for therapist to contact Dr. Robyn Montana office FOTO 62 Pain Level (0-10 scale) post treatment: 0/10 ASSESSMENT/Changes in Function: See Progress Note Patient will continue to benefit from skilled PT services to modify and progress therapeutic interventions, address functional mobility deficits, address ROM deficits, address strength deficits, analyze and address soft tissue restrictions, analyze and cue movement patterns, assess and modify postural abnormalities, address imbalance/dizziness and instruct in home and community integration to attain remaining goals. []  See Plan of Care [x]  See progress note/recertification 
[]  See Discharge Summary Progress towards goals / Updated goals: 
See Recert PLAN [x]  Upgrade activities as tolerated     [x]  Continue plan of care 
[]  Update interventions per flow sheet      
[]  Discharge due to:_ 
[]  Other:_ Betty Wilson, PT 8/1/2019  12:54 PM 
 
Future Appointments Date Time Provider Alyce Brown 8/1/2019  1:00 PM Osmin Wilson, PT [facility]  
8/6/2019  9:00 AM Sal Streeter PTA [facility]  
8/8/2019  9:30 AM Osmin Wilson, PT [facility]  
8/13/2019  9:30 AM Sal Streeter PTA [facility]  
8/15/2019  9:30 AM Osmin Wilson, PT [facility]  
8/15/2019 12:40 PM Josias Lock MD [facility]  
8/20/2019  9:30 AM Sal Streeter PTA [facility]  
8/22/2019 10:30 AM Osmin Wilson, PT [facility]  
10/11/2019 11:20 AM Wallace Degroot MD [address] [place] [address]

## 2019-08-06 ENCOUNTER — HOSPITAL ENCOUNTER (OUTPATIENT)
Dept: PHYSICAL THERAPY | Age: 59
Discharge: HOME | End: 2019-08-06
Payer: COMMERCIAL

## 2019-08-06 PROCEDURE — 97164 PT RE-EVAL EST PLAN CARE: CPT

## 2019-08-06 PROCEDURE — 97530 THERAPEUTIC ACTIVITIES: CPT

## 2019-08-06 NOTE — PROGRESS NOTES
PT DAILY TREATMENT NOTE 10-18 Patient Name: Han Naranjo Date:2019 : 1960 [x]  Patient  Verified Payor: [place] MEDICARE / Plan: Sky Rios / Product Type: Medicare / In time:8:30  Out time:9:05 Total Treatment Time (min): 35 Visit #: 10 of  Medicare/BCBS Only Total Timed Codes (min):  10 1:1 Treatment Time:  35 Treatment Area: Other symptoms and signs involving the musculoskeletal system [R29.898] Unspecified lack of coordination [R27.9] Unsteadiness on feet [R26.81] SUBJECTIVE Pain Level (0-10 scale): 5/10 Any medication changes, allergies to medications, adverse drug reactions, diagnosis change, or new procedure performed?: [x] No    [] Yes (see summary sheet for update) Subjective functional status/changes:   [] No changes reported Pt reports that he is walking ~20 minutes daily around the store. He is also doing his HEP 2x per day for ~10 minutes each time. OBJECTIVE 25 min []Eval                  [x]Re-Eval  
 
 
10 min Therapeutic Activity:  [] See flow sheet :  
Rationale: Reviewed findings of  Transplant Frailty Assessment, use of putty for ~1 minute per day (avoiding overuse and progressing time/resistance as tolerated) to address decreased  strength, and discussion of therapy plan with focus on endurance With 
 [] TE 
 [] TA 
 [] neuro 
 [] other: Patient Education: [x] Review HEP [] Progressed/Changed HEP based on:  
[] positioning   [] body mechanics   [] transfers   [] heat/ice application   
[] other:   
 
Other Objective/Functional Measures:   
 
 Transplant Frailty Assessment: 2/5 Pt used therapist's arm for guidance d/t lack of vision for timed walk test with  Transplant Frailty Assessment No pain following session Pain Level (0-10 scale) post treatment: 0/10 ASSESSMENT/Changes in Function: See Progress Note Patient will continue to benefit from skilled PT services to modify and progress therapeutic interventions, address functional mobility deficits, address ROM deficits, address strength deficits, analyze and address soft tissue restrictions, analyze and cue movement patterns, analyze and modify body mechanics/ergonomics, assess and modify postural abnormalities, address imbalance/dizziness and instruct in home and community integration to attain remaining goals. []  See Plan of Care [x]  See progress note/recertification 
[]  See Discharge Summary Progress towards goals / Updated goals: 1. Pt will complete KP Transplant Frailty Assessment to determine remaining deficits that need to be addressed in therapy for pt to be eligible for kidney transplant. Goal met. 8/6/19 
  
PLAN [x]  Upgrade activities as tolerated     [x]  Continue plan of care [x]  Update interventions per flow sheet      
[]  Discharge due to:_ 
[]  Other:_ Xavier Rob, PT 8/6/2019  12:35 PM 
 
Future Appointments Date Time Provider Alyce Brown 8/9/2019 10:30 AM Maxine Son PTA [facility]  
8/13/2019  3:00 PM Terrace Speed [facility]  
8/15/2019  9:30 AM Jeromy Vasquez, PT [facility]  
8/15/2019 12:40 PM Phoenix James MD [facility]  
8/20/2019  9:30 AM Maxine Son PTA [facility]  
8/22/2019 10:30 AM Jeromy Vasquez, PT [facility]  
10/11/2019 11:20 AM Lary Leone MD [address] [place] [address]

## 2019-08-07 NOTE — PROGRESS NOTES
[facility] [address] [facility] [address] [facility] 
[phone] fax Continued Plan of Care/ Re-certification for Physical Therapy Services Patient name: Starr Parker Start of Care: 19 Referral source: Lonnie Huff MD : 1960 Medical/Treatment Diagnosis: Other symptoms and signs involving the musculoskeletal system [R29.898] Unspecified lack of coordination [R27.9] Unsteadiness on feet [R26.81] Payor: [place] MEDICARE / Plan: [employer] / Product Type: Medicare /  Onset Date:3 years ago Prior Hospitalization: see medical history Provider#: [id number] Medications: Verified on Patient Summary List   
 Comorbidities: vision loss, dialysis (M, W, F), HTN, orthostatic hypotension, amputation of left toes, end stage renal disease 
 Prior Level of Function: lives with wife in a 1 story home with 2 steps to enter without handrail, SPC for all ambulation, rollator for prolonged community ambulation, Ind with ADLs Visits from Start of Care: 10    Missed Visits: 0 The Plan of Care and following information is based on the patient's current status: 
Goal: Pt will complete KP Transplant Frailty Assessment to determine remaining deficits that need to be addressed in therapy for pt to be eligible for kidney transplant. Status at last note/certification: Goal met. Current Status: met Key functional changes: improving FOTO score, increased gait speed, improved static balance, increased activity level Problems/ barriers to goal attainment: none Problem List: pain affecting function, decrease ROM, decrease strength, impaired gait/ balance, decrease ADL/ functional abilitiies, decrease activity tolerance, decrease flexibility/ joint mobility and decrease transfer abilities Treatment Plan: Therapeutic exercise, Therapeutic activities, Neuromuscular re-education, Physical agent/modality, Gait/balance training, Manual therapy, Patient education, Self Care training, Functional mobility training, Home safety training and Stair training Patient Goal (s) has been updated and includes: meet the requirements to be eligible for kidney transplant Goals for this certification period to be accomplished in 8 weeks: 1. Pt will improve right  strength to 32 kg in order to improve ease of gripping with ADLs and to demonstrate reduced frailty with  Transplant Frailty Assessment to increase pt's eligibility for kidney transplant. 2. Pt will perform stationary bike for 10 minutes without rest break in order to demonstrate improved endurance/activity level in order to improve ease of ADLs and to demonstrate reduced frailty with  Transplant Frailty Assessment to increase pt's eligibility for kidney transplant. Frequency / Duration: Patient to be seen 2-3 times per week for 8 weeks: 
 
Assessment / Recommendations:Pt is making steady progress in therapy, meeting 100% of goals established since start of care. Static balance has significantly improved, and pt reports % overall improvement since start of care. 24 point improvement in FOTO score is indicative of functional improvement (predicted improvement score for FOTO was 9 points). He completed  Transplant Frailty Assessment with score of 2/5, placing him in intermediate/pre frail category. Pt's deficits with  Transplant Frailty Assessment were decreased  strength and exhaustion. Dr. Jeancarlos Vogel office has been notified of results of  Transplant Frailty Assessment. Pt will benefit from continued skilled PT to address remaining strength, balance, and endurance deficits in order to improve ease of ADLs/daily tasks, improve eligibility for kidney transplant, and improve QOL. Certification Period: 8/6/19 to 10/7/19 Bard Rosales, PT 8/7/2019 12:20 PM 
 
________________________________________________________________________ I certify that the above Therapy Services are being furnished while the patient is under my care. I agree with the treatment plan and certify that this therapy is necessary. [] I have read the above and request that my patient continue as recommended. [] I have read the above report and request that my patient continue therapy with the following changes/special instructions: _______________________________________ [] I have read the above report and request that my patient be discharged from therapy [de-identified] Signature:____________Date:_________TIME:________ 
 
[employer], Date and Time must be completed for valid certification ** Please sign and return to [address] [facility] 
[phone] fax

## 2019-08-08 ENCOUNTER — APPOINTMENT (OUTPATIENT)
Dept: PHYSICAL THERAPY | Age: 59
End: 2019-08-08
Payer: COMMERCIAL

## 2019-08-09 ENCOUNTER — HOSPITAL ENCOUNTER (OUTPATIENT)
Dept: PHYSICAL THERAPY | Age: 59
Discharge: HOME | End: 2019-08-09
Payer: COMMERCIAL

## 2019-08-09 PROCEDURE — 97110 THERAPEUTIC EXERCISES: CPT

## 2019-08-09 NOTE — PROGRESS NOTES
PT DAILY TREATMENT NOTE 10-18 Patient Name: Ankur Ellis Date:2019 : 1960 [x]  Patient  Verified Payor: [place] MEDICARE / Plan: Peter Riley / Product Type: Medicare / In time:10:39  Out time:11:15 Total Treatment Time (min): 36 Visit #: 10 of - Medicare/BCBS Only Total Timed Codes (min):  36 1:1 Treatment Time:  23 Treatment Area: Other symptoms and signs involving the musculoskeletal system [R29.898] Unspecified lack of coordination [R27.9] Unsteadiness on feet [R26.81] SUBJECTIVE Pain Level (0-10 scale): 5/10 Any medication changes, allergies to medications, adverse drug reactions, diagnosis change, or new procedure performed?: [x] No    [] Yes (see summary sheet for update) Subjective functional status/changes:   [] No changes reported Pt's wife states they have floor pedals he could perform at home. She states the foot doctor told them to carefully treat his foot. He is motivated to work on endurance. He has dialysis this afternoon. OBJECTIVE 36 min Therapeutic Exercise:  [x] See flow sheet :  
Rationale: to improve strength and endurance for ease of ambulation With 
 [] TE 
 [] TA 
 [] neuro 
 [] other: Patient Education: [x] Review HEP [] Progressed/Changed HEP based on:  
[] positioning   [] body mechanics   [] transfers   [] heat/ice application   
[] other:   
 
Other Objective/Functional Measures:   
Bike at end for 5 minutes One seated rest break during session Knee pain with mini squats Discussed with PT with focus on gripper strength and endurance walking along with focus of increased benjamin to be initiated next session Pain Level (0-10 scale) post treatment: 5/10 ASSESSMENT/Changes in Function: Pt progressing in therapy with improving strength and endurance. He was able to bike for 5 minutes at the end of the session without a rest break.  He continues to have varying pain depending on neuropathy. Will continue to focus on strength, endurance and balance to assist with eligibility for kidney transplant to improve QOL. Goals for this certification period to be accomplished in 8 weeks: 1. Pt will improve right  strength to 32 kg in order to improve ease of gripping with ADLs and to demonstrate reduced frailty with  Transplant Frailty Assessment to increase pt's eligibility for kidney transplant. 2. Pt will perform stationary bike for 10 minutes without rest break in order to demonstrate improved endurance/activity level in order to improve ease of ADLs and to demonstrate reduced frailty with  Transplant Frailty Assessment to increase pt's eligibility for kidney transplant.   
  
PLAN [x]  Upgrade activities as tolerated     [x]  Continue plan of care 
[]  Update interventions per flow sheet      
[]  Discharge due to:_ 
[]  Other:_ She Gee PTA 8/9/2019  12:35 PM 
 
Future Appointments Date Time Provider Alyce Brown 8/13/2019  3:00 PM Paige Malloy [facility]  
8/15/2019  9:30 AM Mikie Sanchez, PT [id number]  
8/15/2019 12:40 PM Era Jeong MD [address] [place] [address]  
8/20/2019  9:30 AM Rhett Sanabria PTA [facility]  
8/22/2019 10:30 AM Mikie Sanchez, PT [facility]  
10/11/2019 11:20 AM Danii Mclean MD [address] [place] [address]

## 2019-08-13 ENCOUNTER — HOSPITAL ENCOUNTER (OUTPATIENT)
Dept: PHYSICAL THERAPY | Age: 59
Discharge: HOME | End: 2019-08-13
Payer: COMMERCIAL

## 2019-08-13 PROCEDURE — 97110 THERAPEUTIC EXERCISES: CPT

## 2019-08-13 PROCEDURE — 97112 NEUROMUSCULAR REEDUCATION: CPT

## 2019-08-13 NOTE — PROGRESS NOTES
PT DAILY TREATMENT NOTE 10-18 Patient Name: Leah Blanco Date:2019 : 1960 [x]  Patient  Verified Payor: [place] MEDICARE / Plan: Manuel De Souza / Product Type: Medicare / In time:3:03  Out time:3:40 Total Treatment Time (min): 37 Visit #: 11 of  Medicare/BCBS Only Total Timed Codes (min):  37 1:1 Treatment Time:  37 Treatment Area: Other symptoms and signs involving the musculoskeletal system [R29.898] Unspecified lack of coordination [R27.9] Unsteadiness on feet [R26.81] SUBJECTIVE Pain Level (0-10 scale): 6 Any medication changes, allergies to medications, adverse drug reactions, diagnosis change, or new procedure performed?: [x] No    [] Yes (see summary sheet for update) Subjective functional status/changes:   [] No changes reported Pt reports some fatigue OBJECTIVE: 
 
27 min Therapeutic Exercise:  [x] See flow sheet :  
Rationale: increase ROM and increase strength to improve the patients ability to perform ADLs 10 min Neuromuscular Re-education:  [x]  See flow sheet :  
Rationale: increase strength, improve balance and increase proprioception  to improve the patients ability to perform functional tasks With 
 [] TE 
 [] TA 
 [] neuro 
 [] other: Patient Education: [x] Review HEP [] Progressed/Changed HEP based on:  
[] positioning   [] body mechanics   [] transfers   [] heat/ice application   
[] other:   
 
Other Objective/Functional Measures: Added side stepping in bars and 1/2 stance Pain Level (0-10 scale) post treatment: 3 
 
ASSESSMENT/Changes in Function: Pt req'd 1 seated rest break during standing exercises in parallel bars. 1/2 stance was good challenge. No c/o incr'd knee pain with squats today.   
 
Patient will continue to benefit from skilled PT services to modify and progress therapeutic interventions, address functional mobility deficits, address ROM deficits, address strength deficits, analyze and address soft tissue restrictions, analyze and cue movement patterns, analyze and modify body mechanics/ergonomics and address imbalance/dizziness to attain remaining goals. []  See Plan of Care 
[]  See progress note/recertification 
[]  See Discharge Summary Progress towards goals / Updated goals: 1.  Pt will improve right  strength to 32 kg in order to improve ease of gripping with ADLs and to demonstrate reduced frailty with  Transplant Frailty Assessment to increase pt's eligibility for kidney transplant.   
2. Pt will perform stationary bike for 10 minutes without rest break in order to demonstrate improved endurance/activity level in order to improve ease of ADLs and to demonstrate reduced frailty with  Transplant Frailty Assessment to increase pt's eligibility for kidney transplant.   
  
 
PLAN 
[]  Upgrade activities as tolerated     [x]  Continue plan of care 
[]  Update interventions per flow sheet      
[]  Discharge due to:_ 
[]  Other:_ Connie Del Rio PTA 8/13/2019  3:03 PM 
 
Future Appointments Date Time Provider Alyce Brown 8/15/2019  9:30 AM Edinson Donahue, PT [facility]  
8/15/2019 12:40 PM Francis Rendon MD [address] [place] [address]  
8/20/2019  9:30 AM Maryann Post, PTA [facility]  
8/22/2019 10:30 AM Eder Castro, PT [facility]  
10/11/2019 11:20 AM Kervin Collazo MD [address] [place] [address]

## 2019-08-15 ENCOUNTER — HOSPITAL ENCOUNTER (OUTPATIENT)
Dept: PHYSICAL THERAPY | Age: 59
Discharge: HOME | End: 2019-08-15
Payer: COMMERCIAL

## 2019-08-15 ENCOUNTER — OFFICE VISIT (OUTPATIENT)
Dept: FAMILY MEDICINE CLINIC | Age: 59
End: 2019-08-15

## 2019-08-15 VITALS
OXYGEN SATURATION: 98 % | HEART RATE: 73 BPM | SYSTOLIC BLOOD PRESSURE: 138 MMHG | TEMPERATURE: 98.3 F | WEIGHT: 176 LBS | RESPIRATION RATE: 18 BRPM | HEIGHT: 66 IN | DIASTOLIC BLOOD PRESSURE: 67 MMHG | BODY MASS INDEX: 28.28 KG/M2

## 2019-08-15 DIAGNOSIS — E78.00 HYPERCHOLESTEREMIA: ICD-10-CM

## 2019-08-15 DIAGNOSIS — Z00.00 MEDICARE ANNUAL WELLNESS VISIT, SUBSEQUENT: Primary | ICD-10-CM

## 2019-08-15 PROCEDURE — 97110 THERAPEUTIC EXERCISES: CPT

## 2019-08-15 PROCEDURE — 97112 NEUROMUSCULAR REEDUCATION: CPT

## 2019-08-15 NOTE — PROGRESS NOTES
PT DAILY TREATMENT NOTE 10-18 Patient Name: Dylon Rosario Date:8/15/2019 : 1960 [x]  Patient  Verified Payor: [place] MEDICARE / Plan: Zane Benoit [facility] / Product Type: Medicare / In time:932  Out time:1012 Total Treatment Time (min): 37 Visit #: 13 of - Medicare/BCBS Only Total Timed Codes (min):  40 1:1 Treatment Time:  28 Treatment Area: Other symptoms and signs involving the musculoskeletal system [R29.898] Unspecified lack of coordination [R27.9] Unsteadiness on feet [R26.81] SUBJECTIVE Pain Level (0-10 scale): 6 Any medication changes, allergies to medications, adverse drug reactions, diagnosis change, or new procedure performed?: [x] No    [] Yes (see summary sheet for update) Subjective functional status/changes:   [] No changes reported Pt reports some fatigue today OBJECTIVE: 
 
BP at start 119/77mmHg At end 116/72mmHg 20 min Therapeutic Exercise:  [x] See flow sheet :  
Rationale: increase ROM and increase strength to improve the patients ability to perform ADLs 8 min Neuromuscular Re-education:  [x]  See flow sheet :  
Rationale: increase strength, improve balance and increase proprioception  to improve the patients ability to perform functional tasks With 
 [] TE 
 [] TA 
 [] neuro 
 [] other: Patient Education: [x] Review HEP [] Progressed/Changed HEP based on:  
[] positioning   [] body mechanics   [] transfers   [] heat/ice application   
[] other:   
 
Other Objective/Functional Measures: Added side stepping in bars with 2 fingers Added angle (small) step overs in // bars Added REO and MSR Pain Level (0-10 scale) post treatment: 4 
 
ASSESSMENT/Changes in Function: 
Pt challenged with MSR in // bars and unable to hold > 10 sec without LOB and CGA to correct. Vitals stable throughout session and rest breaks prn.  
Patient will continue to benefit from skilled PT services to modify and progress therapeutic interventions, address functional mobility deficits, address ROM deficits, address strength deficits, analyze and address soft tissue restrictions, analyze and cue movement patterns, analyze and modify body mechanics/ergonomics and address imbalance/dizziness to attain remaining goals. []  See Plan of Care 
[]  See progress note/recertification 
[]  See Discharge Summary Progress towards goals / Updated goals: 1.  Pt will improve right  strength to 32 kg in order to improve ease of gripping with ADLs and to demonstrate reduced frailty with  Transplant Frailty Assessment to increase pt's eligibility for kidney transplant.   
2. Pt will perform stationary bike for 10 minutes without rest break in order to demonstrate improved endurance/activity level in order to improve ease of ADLs and to demonstrate reduced frailty with  Transplant Frailty Assessment to increase pt's eligibility for kidney transplant. -PROGRESSING 5min 8/15/19  
  
 
PLAN 
[]  Upgrade activities as tolerated     [x]  Continue plan of care 
[]  Update interventions per flow sheet      
[]  Discharge due to:_ 
[]  Other:_   
 
Chelsy Qureshi, PT 8/15/2019  3:03 PM 
 
Future Appointments Date Time Provider Alyce Brown 8/15/2019 12:40 PM Sue Torres MD [address] [place] [address]  
8/20/2019  9:30 AM Angle Mcknight, PTA [facility]  
8/22/2019 10:30 AM Donna Mulligan, PT [facility]  
10/11/2019 11:20 AM Theodore Way MD [address] [place] [address]

## 2019-08-15 NOTE — PATIENT INSTRUCTIONS
Medicare Wellness Visit, Male The best way to live healthy is to have a lifestyle where you eat a well-balanced diet, exercise regularly, limit alcohol use, and quit all forms of tobacco/nicotine, if applicable. Regular preventive services are another way to keep healthy. Preventive services (vaccines, screening tests, monitoring & exams) can help personalize your care plan, which helps you manage your own care. Screening tests can find health problems at the earliest stages, when they are easiest to treat. [facility] follows the current, evidence-based guidelines published by the [facility] Kyle Travis ([facility]) when recommending preventive services for our patients. Because we follow these guidelines, sometimes recommendations change over time as research supports it. (For example, a prostate screening blood test is no longer routinely recommended for men with no symptoms.) Of course, you and your doctor may decide to screen more often for some diseases, based on your risk and co-morbidities (chronic disease you are already diagnosed with). Preventive services for you include: - Medicare offers their members a free annual wellness visit, which is time for you and your primary care provider to discuss and plan for your preventive service needs. Take advantage of this benefit every year! 
-All adults over age 72 should receive the recommended pneumonia vaccines. Current USPSTF guidelines recommend a series of two vaccines for the best pneumonia protection.  
-All adults should have a flu vaccine yearly and an ECG.  All adults age 61 and older should receive a shingles vaccine once in their lifetime.   
-All adults age 38-68 who are overweight should have a diabetes screening test once every three years.  
-Other screening tests & preventive services for persons with diabetes include: an eye exam to screen for diabetic retinopathy, a kidney function test, a foot exam, and stricter control over your cholesterol.  
-Cardiovascular screening for adults with routine risk involves an electrocardiogram (ECG) at intervals determined by the provider.  
-Colorectal cancer screening should be done for adults age 54-65 with no increased risk factors for colorectal cancer. There are a number of acceptable methods of screening for this type of cancer. Each test has its own benefits and drawbacks. Discuss with your provider what is most appropriate for you during your annual wellness visit. The different tests include: colonoscopy (considered the best screening method), a fecal occult blood test, a fecal DNA test, and sigmoidoscopy. 
-All adults born between [facility] should be screened once for Hepatitis C. 
-An Abdominal Aortic Aneurysm (AAA) Screening is recommended for men age 73-68 who has ever smoked in their lifetime. Here is a list of your current Health Maintenance items (your personalized list of preventive services) with a due date: 
Health Maintenance Due Topic Date Due  
 Flu Vaccine  08/01/2019  Cholesterol Test   09/13/2019

## 2019-08-15 NOTE — PROGRESS NOTES
This is the Subsequent Medicare Annual Wellness Exam, performed 12 months or more after the Initial AWV or the last Subsequent AWV I have reviewed the patient's medical history in detail and updated the computerized patient record. Pt in need of labs; History Past Medical History:  
Diagnosis Date  Blindness of one eye   
 left  Cardiac echocardiogram 10/21/2016 EF 60-65%. No WMA. Mod-marked LVH. Normal diastolic fx. No significant valvular heart disease.  Cardiac treadmill stress test, low risk 11/02/2012 Negative maximal exercise treadmill test.  Ex time 7 min 45 sec.  Cardiovascular LE peripheral arterial testing 03/22/2016 No significant peripheral arterial disease at rest bilaterally. ABIs deferred due to calcified vessels.  Cardiovascular LLE venous duplex 06/06/2012 Left leg:  No DVT.  Cardiovascular renal duplex 10/21/2016 No significant renal artery stenosis.  Chronic kidney disease   
 [id number]  
 CKD (chronic kidney disease), stage V ([address]  Diabetes mellitus ([address] 3/12/2010  Diabetic retinopathy ([address] 5/4/2010  Edema of both legs  Eye examination 5/4/10 OU: 20/20; OD: 20/25; OS: 20/25 without correction.  GERD (gastroesophageal reflux disease)  Glaucoma 08/17/2017  
 Aleida Goodpasture  
 HLD (hyperlipidemia) 3/12/2010  
 HTN (hypertension) 3/12/2010  Orthostatic hypertension Past Surgical History:  
Procedure Laterality Date  COLONOSCOPY N/A 1/10/2019 COLONOSCOPY w polyp[ectomy performed by Ronald Donovan MD at [address] [facility] [address] Left TOES-small  HX HERNIA REPAIR  2005  HX OTHER SURGICAL  11/07/2017  
 glaucoma valve- Ahmed  HX VASCULAR ACCESS    
 HD catheter right neck Current Outpatient Medications Medication Sig Dispense Refill  finasteride (PROSCAR) 5 mg tablet Take 1 Tab by mouth daily. 30 Tab 2  
 brinzolamide (AZOPT OP) Apply  to eye. Indications: 3 times daily  amLODIPine (NORVASC) 10 mg tablet TAKE 1 TABLET BY MOUTH DAILY. 30 Tab 6  
 netarsudil (RHOPRESSA) 0.02 % drop Apply  to eye.  brimonidine-timolol (COMBIGAN) 0.2-0.5 % drop ophthalmic solution 1 Drop every twelve (12) hours.  soft lens rinse,store solution (SHYLA SALINE SENSITIVE EYES) by Does Not Apply route.  hydrALAZINE (APRESOLINE) 100 mg tablet Take 1 Tab by mouth two (2) times a day. on non-dialysis days. Indications: high blood pressure 60 Tab 6  
 losartan (COZAAR) 100 mg tablet Take 100 mg by mouth daily.  cloNIDine (CATAPRES) 0.3 mg/24 hr APPLY 1 PATCH TO SKIN ONCE EVERY 7 DAYS 16 Patch 3  
 lidocaine-prilocaine (EMLA) topical cream Apply  to affected area as needed for Pain (apply to left arm 30 minutes prior to dialysis). 30 g 12  
 b complex-vitamin c-folic acid (NEPHROCAPS) 1 mg capsule Take 1 Cap by mouth daily. 30 Cap 6  calcium acetate (PHOSLO) 667 mg cap Take 1 Cap by mouth three (3) times daily (with meals). 90 Cap 2  
 insulin lispro (HUMALOG) 100 unit/mL injection Blood Sugar (mg/dL): <150 =0 units; 150 -199 =2 units; 200 -249 =4 units; 250 -299 =6 units; 300 -349 =8 units; 350 and above =10 units. 1 Vial 2  
 acetaminophen (TYLENOL EXTRA STRENGTH) 500 mg tablet Take  by mouth every six (6) hours as needed for Pain.  cyclobenzaprine (FLEXERIL) 10 mg tablet Take 1 Tab by mouth three (3) times daily as needed for Muscle Spasm(s). 30 Tab 0  
 rosuvastatin (CRESTOR) 20 mg tablet Take 1 Tab by mouth nightly. 30 Tab 6  
 docusate sodium (COLACE) 100 mg capsule Take 1 Cap by mouth two (2) times a day. 60 Cap 0  
 fluticasone (FLONASE) 50 mcg/actuation nasal spray 1 [place] by Both Nostrils route two (2) times a day. (Patient taking differently: 1 Spray by Both Nostrils route as needed.) 1 Bottle 2  
 cyanocobalamin (VITAMIN B-12) 1,000 mcg tablet Take 1,000 mcg by mouth daily. Allergies Allergen Reactions  Bactrim [Sulfamethoprim Ds] Nausea and Vomiting  Coreg [Carvedilol] Nausea and Vomiting Difficulty walking Family History Problem Relation Age of Onset  Diabetes Sister  Sickle Cell Anemia Sister  Diabetes Sister Social History Tobacco Use  Smoking status: Never Smoker  Smokeless tobacco: Never Used Substance Use Topics  Alcohol use: No  
 
Patient Active Problem List  
Diagnosis Code  HLD (hyperlipidemia) E78.5  
 HTN (hypertension) I10  
 Diabetic retinopathy ([facility] E11.319  
 Noncompliance with treatment Z91.19  Type II or unspecified type diabetes mellitus without mention of complication, uncontrolled E11.65  Paresthesias/numbness R20.9  Diabetes mellitus with renal manifestations, uncontrolled ([facility]) E11.29, E11.65  Diabetic foot ulcer ([facility] E11.621, L97.509  Gangrene of toe ([facility]) I96  
 Dry gangrene ([facility] C13  Chronic kidney disease, stage IV (severe) ([facility] N18.4  Renal failure (ARF), acute on chronic (HCC) N17.9, N18.9  Diabetic nephropathy ([facility]) E11.21  
 Hypertension I10  
 Malignant hypertension I10  
 CKD (chronic kidney disease) N18.9  Hyperlipidemia E78.5  Type 2 diabetes mellitus with complication, with long-term current use of insulin ([facility]) E11.8, Z79.4  Orthostatic hypotension I95.1  Chest pain R07.9  Stage 5 chronic kidney disease not on chronic dialysis ([facility] N18.5  Diabetes mellitus due to underlying condition, uncontrolled, with diabetic nephropathy, with long-term current use of insulin ([facility]) E08.21, E08.65, Z79.4  Elevated troponin R74.8  Kidney disease N28.9  ESRD (end stage renal disease) ([facility] N18.6  Anemia D64.9  Hypoalbuminemia E88.09  
 Blindness of one eye H54.40  ESRD on dialysis ([facility] N18.6, Z99.2  Status post amputation of foot ([facility] C15.716  Status post amputation of toe of left foot ([facility] Y02.207 Depression Risk Factor Screening: 3 most recent PHQ Screens 8/15/2019 Little interest or pleasure in doing things Not at all Feeling down, depressed, irritable, or hopeless Not at all Total Score PHQ 2 0 Trouble falling or staying asleep, or sleeping too much - Feeling tired or having little energy - Poor appetite, weight loss, or overeating - Feeling bad about yourself - or that you are a failure or have let yourself or your family down - Trouble concentrating on things such as school, work, reading, or watching TV - Moving or speaking so slowly that other people could have noticed; or the opposite being so fidgety that others notice - Thoughts of being better off dead, or hurting yourself in some way -  
PHQ 9 Score - How difficult have these problems made it for you to do your work, take care of your home and get along with others - Alcohol Risk Factor Screening: You do not drink alcohol or very rarely. Functional Ability and Level of Safety:  
Hearing Loss Hearing is good. Activities of Daily Living The home contains: no safety equipment. Patient does total self care Transportation 
ambulation Fall Risk No flowsheet data found. No falls Abuse Screen Patient is not abused Cognitive Screening Evaluation of Cognitive Function: 
Has your family/caregiver stated any concerns about your memory: no 
Normal  
 
PE: 
Visit Vitals /67 (BP 1 Location: Left arm, BP Patient Position: Sitting) Pulse 73 Temp 98.3 °F (36.8 °C) (Oral) Resp 18 Ht 5' 6\" (1.676 m) Wt 176 lb (79.8 kg) SpO2 98% BMI 28.41 kg/m² General appearance: alert, cooperative, no distress, appears stated age Lungs: clear to auscultation bilaterally Heart: regular rate and rhythm, S1, S2 normal, no murmur, click, rub or gallop Patient Care Team  
Patient Care Team: 
Charles Venegas MD as PCP - General 
Areli Long MD (Cardiology) Claudia Betts NP (Nurse Practitioner) NEYMAR Rodriguez PA (Physician Assistant) Lab Results Component Value Date/Time Cholesterol, total 313 (H) 08/17/2019 08:42 AM  
 HDL Cholesterol 56 08/17/2019 08:42 AM  
 LDL, calculated 232.4 (H) 08/17/2019 08:42 AM  
 VLDL, calculated 24.6 08/17/2019 08:42 AM  
 Triglyceride 123 08/17/2019 08:42 AM  
 CHOL/HDL Ratio 5.6 (H) 08/17/2019 08:42 AM  
 
 
Lab Results Component Value Date/Time Sodium 136 08/17/2019 04:55 PM  
 Potassium 4.1 08/17/2019 04:55 PM  
 Chloride 96 (L) 08/17/2019 04:55 PM  
 CO2 31 08/17/2019 04:55 PM  
 Anion gap 9 08/17/2019 04:55 PM  
 Glucose 191 (H) 08/17/2019 04:55 PM  
 BUN 30 (H) 08/17/2019 04:55 PM  
 Creatinine 6.77 (H) 08/17/2019 04:55 PM  
 BUN/Creatinine ratio 4 (L) 08/17/2019 04:55 PM  
 GFR est AA 10 (L) 08/17/2019 04:55 PM  
 GFR est non-AA 8 (L) 08/17/2019 04:55 PM  
 Calcium 9.1 08/17/2019 04:55 PM  
 
 
 
Assessment/Plan Education and counseling provided: 
Are appropriate based on today's review and evaluation End-of-Life planning (with patient's consent) Diagnoses and all orders for this visit: 
 
1. Medicare annual wellness visit, subsequent 2. Hypercholesteremia- for lab only -     LIPID PANEL; Future -     METABOLIC PANEL, BASIC; Future 3. Diabetes mellitus due to underlying condition, uncontrolled, with diabetic nephropathy, with long-term current use of insulin ([facility]- for lab only 
-     HEMOGLOBIN A1C WITH EAG; Future Health Maintenance Due Topic Date Due  Influenza Age 5 to Adult  08/01/2019 As above, temporal arteritis. stable Orders Placed This Encounter  LIPID PANEL  
 METABOLIC PANEL, BASIC  
 HEMOGLOBIN A1C WITH EAG Follow-up and Dispositions · Return in about 4 months (around 12/15/2019) for htn. An After Visit Summary was printed and given to the patient. This has been fully explained to the patient, who indicates understanding.

## 2019-08-15 NOTE — PROGRESS NOTES
Patient here for medicare wellness visit 1. Have you been to the ER, urgent care clinic since your last visit? Hospitalized since your last visit? No 
 
2. Have you seen or consulted any other health care providers outside of the [address] [place] [address] since your last visit?   Include any pap smears or colon screen No

## 2019-08-17 ENCOUNTER — HOSPITAL ENCOUNTER (OUTPATIENT)
Dept: LAB | Age: 59
Discharge: HOME | End: 2019-08-17
Payer: COMMERCIAL

## 2019-08-17 DIAGNOSIS — E78.00 HYPERCHOLESTEREMIA: ICD-10-CM

## 2019-08-17 LAB
ANION GAP SERPL CALC-SCNC: 9 MMOL/L (ref 3–18)
BUN SERPL-MCNC: 30 MG/DL (ref 7–18)
BUN/CREAT SERPL: 4 (ref 12–20)
CALCIUM SERPL-MCNC: 9.1 MG/DL (ref 8.5–10.1)
CHLORIDE SERPL-SCNC: 96 MMOL/L (ref 100–111)
CHOLEST SERPL-MCNC: 313 MG/DL
CO2 SERPL-SCNC: 31 MMOL/L (ref 21–32)
CREAT SERPL-MCNC: 6.77 MG/DL (ref 0.6–1.3)
EST. AVERAGE GLUCOSE BLD GHB EST-MCNC: 174 MG/DL
GLUCOSE SERPL-MCNC: 191 MG/DL (ref 74–99)
HBA1C MFR BLD: 7.7 % (ref 4.2–5.6)
HDLC SERPL-MCNC: 56 MG/DL (ref 40–60)
HDLC SERPL: 5.6 {RATIO} (ref 0–5)
LDLC SERPL CALC-MCNC: 232.4 MG/DL (ref 0–100)
LIPID PROFILE,FLP: ABNORMAL
POTASSIUM SERPL-SCNC: 4.1 MMOL/L (ref 3.5–5.5)
SODIUM SERPL-SCNC: 136 MMOL/L (ref 136–145)
TRIGL SERPL-MCNC: 123 MG/DL (ref ?–150)
VLDLC SERPL CALC-MCNC: 24.6 MG/DL

## 2019-08-17 PROCEDURE — 80061 LIPID PANEL: CPT

## 2019-08-17 PROCEDURE — 83036 HEMOGLOBIN GLYCOSYLATED A1C: CPT

## 2019-08-17 PROCEDURE — 80048 BASIC METABOLIC PNL TOTAL CA: CPT

## 2019-08-20 ENCOUNTER — HOSPITAL ENCOUNTER (OUTPATIENT)
Dept: PHYSICAL THERAPY | Age: 59
Discharge: HOME | End: 2019-08-20
Payer: COMMERCIAL

## 2019-08-20 PROCEDURE — 97110 THERAPEUTIC EXERCISES: CPT

## 2019-08-20 PROCEDURE — 97112 NEUROMUSCULAR REEDUCATION: CPT

## 2019-08-20 NOTE — PROGRESS NOTES
PT DAILY TREATMENT NOTE 10-18    Patient Name: Ari Dukes  Date:2019  : 1960  [x]  Patient  Verified  Payor: [place] MEDICARE / Plan: VA MEDICARE PART A & B / Product Type: Medicare /    In time: 9:38 Out time: 10:17  Total Treatment Time (min): 39  Visit #: 14 of 16-24    Medicare/BCBS Only   Total Timed Codes (min):  39 1:1 Treatment Time:  29       Treatment Area: Other symptoms and signs involving the musculoskeletal system [R29.898]  Unspecified lack of coordination [R27.9]  Unsteadiness on feet [R26.81]    SUBJECTIVE  Pain Level (0-10 scale): 7/10  Any medication changes, allergies to medications, adverse drug reactions, diagnosis change, or new procedure performed?: [x] No    [] Yes (see summary sheet for update)  Subjective functional status/changes:   [] No changes reported  Pt reports just neuropathic pain today in both legs. He wants to continue with exercises and endurance. He does a lot each day between doctors appts and dialysis.  He notes his PCP says that the note written needs to make sure to go to the [facility] MD.        OBJECTIVE:    10 minutes NC for bike at end level 1 to work on endurance to meet requirements for kidney transplant    14 min Therapeutic Exercise:  [x] See flow sheet :   Rationale: increase ROM and increase strength to improve the patients ability to perform ADLs     15 min Neuromuscular Re-education:  [x]  See flow sheet :   Rationale: increase strength, improve balance and increase proprioception  to improve the patients ability to perform functional tasks            With   [] TE   [] TA   [] neuro   [] other: Patient Education: [x] Review HEP    [] Progressed/Changed HEP based on:   [] positioning   [] body mechanics   [] transfers   [] heat/ice application    [] other:      Other Objective/Functional Measures:   Confirmed with Monica PT that the progress note/recert was sent to the transplant MD  Only 2 rest breaks today during session  Increased reps and decreased UE use  Challenged with head turns during balance exercises  Progressed weight of exercises and increased bike time to 10 minutes to build endurance    Pain Level (0-10 scale) post treatment: 7/10    ASSESSMENT/Changes in Function: Pt is progressing with increased bike time to 10 minutes and improving endurance with fewer rest breaks. He continues to have elevated neuropathic pain but is highly motivated to participate and improve in therapy. Will continue with general strengthening and endurance conditioning to improve eligibility for kidney transplant to improve QOL. Progress towards goals / Updated goals:  1.  Pt will improve right  strength to 32 kg in order to improve ease of gripping with ADLs and to demonstrate reduced frailty with  Transplant Frailty Assessment to increase pt's eligibility for kidney transplant.    2.  Pt will perform stationary bike for 10 minutes without rest break in order to demonstrate improved endurance/activity level in order to improve ease of ADLs and to demonstrate reduced frailty with  Transplant Frailty Assessment to increase pt's eligibility for kidney transplant. -PROGRESSING 5min 8/15/19        PLAN  [x]  Upgrade activities as tolerated     [x]  Continue plan of care  []  Update interventions per flow sheet       []  Discharge due to:_  []  Other:_      Willian Soares PTA 8/20/2019  3:03 PM    Future Appointments   Date Time Provider Alyce Brown   8/20/2019  9:30 AM Butler Holstein, PTA [facility]   8/22/2019 10:30 AM Leocadia Opitz, PT [facility]   8/27/2019  2:00 PM Leocadia Opitz, PT [facility]   8/29/2019 10:30 AM Rosemary Cao PTA [facility]   9/3/2019 10:00 AM Butler Holstein, PTA [facility]   9/5/2019 11:00 AM Rosemary Cao PTA [facility]   9/10/2019 10:30 AM Butler Holstein, PTA [facility]   9/12/2019 10:30 AM Man Holstein, PTA [facility]   10/11/2019 11:20 AM Felicia Bowman MD [address] [place] [address] 12/16/2019  8:40 AM Riley Escalona MD [address] [place] [address]

## 2019-08-22 ENCOUNTER — HOSPITAL ENCOUNTER (OUTPATIENT)
Dept: PHYSICAL THERAPY | Age: 59
Discharge: HOME | End: 2019-08-22
Payer: COMMERCIAL

## 2019-08-22 PROCEDURE — 97110 THERAPEUTIC EXERCISES: CPT

## 2019-08-22 NOTE — PROGRESS NOTES
PT DAILY TREATMENT NOTE 10-18    Patient Name: Gala Ghosh  Date:2019  : 1960  [x]  Patient  Verified  Payor: VA MEDICARE / Plan: VA MEDICARE PART A & B / Product Type: Medicare /    In time:1027  Out time:1110  Total Treatment Time (min): 43  Visit #: 15 of -    Medicare/BCBS Only   Total Timed Codes (min):  43 1:1 Treatment Time:  33       Treatment Area: Other symptoms and signs involving the musculoskeletal system [R29.898]  Unspecified lack of coordination [R27.9]  Unsteadiness on feet [R26.81]    SUBJECTIVE  Pain Level (0-10 scale): 8/10  Any medication changes, allergies to medications, adverse drug reactions, diagnosis change, or new procedure performed?: [x] No    [] Yes (see summary sheet for update)  Subjective functional status/changes:   [] No changes reported  Pt stated that he is doing ok today    OBJECTIVE    43 min Therapeutic Exercise:  [x] See flow sheet :   Rationale: increase ROM and increase strength to improve the patients ability to increase ease with ADLs    With   [x] TE   [] TA   [] neuro   [] other: Patient Education: [x] Review HEP    [] Progressed/Changed HEP based on:   [] positioning   [] body mechanics   [] transfers   [] heat/ice application    [] other:      Other Objective/Functional Measures:   Was fatigued with 5# weights  Needed seated rest break during session  Pt took very good peña steps forward and backward in the parallel bars with prompting  Had no LOB with static balance     Pain Level (0-10 scale) post treatment: 6/10    ASSESSMENT/Changes in Function:   Pt is slowly progressing toward goals. Pt cont with decreased strength in B LE's. Pt was able to complete all standing exercises with only 1 seated rest break.  Pt was able to ride stationary bike for 10 minutes without rest break    Patient will continue to benefit from skilled PT services to modify and progress therapeutic interventions, address functional mobility deficits, address ROM deficits, address strength deficits, analyze and cue movement patterns, assess and modify postural abnormalities, address imbalance/dizziness and instruct in home and community integration to attain remaining goals. []  See Plan of Care  []  See progress note/recertification  []  See Discharge Summary         Progress towards goals / Updated goals:  Progress towards goals / Updated goals:  1.  Pt will improve right  strength to 32 kg in order to improve ease of gripping with ADLs and to demonstrate reduced frailty with  Transplant Frailty Assessment to increase pt's eligibility for kidney transplant.    2. Pt will perform stationary bike for 10 minutes without rest break in order to demonstrate improved endurance/activity level in order to improve ease of ADLs and to demonstrate reduced frailty with  Transplant Frailty Assessment to increase pt's eligibility for kidney transplant.   Goal met.  Pt was able to ride bike for 10 minutes without resting. 8/22/19    PLAN  []  Upgrade activities as tolerated     [x]  Continue plan of care  []  Update interventions per flow sheet       []  Discharge due to:_  []  Other:_      Stephanie Chench, MARIA ANTONIA 8/22/2019  10:17 AM    Future Appointments   Date Time Provider Alyce Brown   8/22/2019 10:30 AM Sindy Lewis, PTA [facility]   8/27/2019  2:00 PM Aries Colmenares, PT [facility]   8/29/2019 10:30 AM Destin Cotter PTA [facility]   9/3/2019 10:00 AM Winsome Paez PTA [facility]   9/5/2019 11:00 AM Destin Cotter PTA [facility]   9/10/2019 10:30 AM Winsome Paez, PTA [facility]   9/12/2019 10:30 AM Winsome Paez, PTA [facility]   10/11/2019 11:20 AM Margareth Quiroga MD [address] [place] [address]   12/16/2019  8:40 AM Mik Garsia MD [address] [place] [address]

## 2019-08-27 ENCOUNTER — HOSPITAL ENCOUNTER (OUTPATIENT)
Dept: PHYSICAL THERAPY | Age: 59
Discharge: HOME | End: 2019-08-27
Payer: COMMERCIAL

## 2019-08-27 PROCEDURE — 97164 PT RE-EVAL EST PLAN CARE: CPT

## 2019-08-27 PROCEDURE — 97530 THERAPEUTIC ACTIVITIES: CPT

## 2019-08-27 NOTE — PROGRESS NOTES
PT DAILY TREATMENT NOTE 10-18    Patient Name: Mandie Dugan  Date:2019  : 1960  [x]  Patient  Verified  Payor: Dejah Cheng / Plan: VA MEDICARE PART A & B / Product Type: Medicare /    In time:1:55  Out time:3:03  Total Treatment Time (min): 68 (-21 minutes of unskilled intervention while pt was on bike = 47 minutes)  Visit #: 16 of -    Medicare/BCBS Only   Total Timed Codes (min):  33 1:1 Treatment Time:  34       Treatment Area: Other symptoms and signs involving the musculoskeletal system [R29.898]  Unspecified lack of coordination [R27.9]  Unsteadiness on feet [R26.81]    SUBJECTIVE  Pain Level (0-10 scale): 0/10  Any medication changes, allergies to medications, adverse drug reactions, diagnosis change, or new procedure performed?: [x] No    [] Yes (see summary sheet for update)  Subjective functional status/changes:   [] No changes reported  Pt reports that his primary MD sent a referral for pt to be seen again by transplant MD.  He has a question and answer session at dialysis tomorrow with [facility], so he is hoping to have some of his questions answered with the transplant process. Reviewed activity portion of  Transplant Frailty Assessment and pt reports he is still walking 20 minutes daily, doing his HEP (~10 minutes) 2x daily, and his is now stationary cycling in therapy >10 minutes 2x per week. With the exhaustion portion of the assessment, he still reports \"I felt that everything I did was an effort\" daily, and he never feels like \"I could not get going\".        OBJECTIVE    21 minutes stationary bike     14 min []Eval                  [x]Re-Eval       18 min Therapeutic Exercise:  [x] See flow sheet :   Rationale: increase ROM, increase strength, improve coordination, improve balance and increase proprioception to improve the patients ability to improve ease of prolonged ambulation and ADLs    15 min Therapeutic Activity:  []  See flow sheet : Discussion of progress in therapy and therapy plan   Rationale: Educated patient and wife regarding results of re-evaluation, progress with therapy, importance of continuing cardio with stationary bike as well as HEP following DC, and plan to transition towards final HEP and DC within the next few weeks. Pt and wife agreed          With   [] TE   [] TA   [] neuro   [] other: Patient Education: [x] Review HEP    [] Progressed/Changed HEP based on:   [] positioning   [] body mechanics   [] transfers   [] heat/ice application    [] other:      Other Objective/Functional Measures:     KP Transplant Frailty Assessment:  Weight Loss: 0  Exhaustion: 1   Walking Speed: 0    Strength: 1  Physical Activity: 0  Total: 2      Right Hand  Strength 3 Trials  Trial 1: 23 kg  Trial 2: 24 kg  Trial 3: 22 kg  Average: 23 kg      Timed 10 Meter Walk Test:   Trail 1: 6 seconds, walking speed 1 m/s  Trial 2: 5 seconds, walking speed 0.83 m/s    Pt instructed on fast gait speed with 10 Meter Walk Test  Performed with SPC and with Britany from therapist with pt's hand lightly contacting therapist's hand to avoid lateral path deviation d/t blindness    Attempted 10 meter walk test without assistance from therapist, but pt deviated to the right outside of testing area d/t blindness     21 minutes nonbillable time following session when pt was riding stationary bike, pt completed for ~12 minutes but had to have feet adjusted a few times during session d/t feet slipping out of pedals.      Pain Level (0-10 scale) post treatment: 0/10    ASSESSMENT/Changes in Function: See Progress Note    Patient will continue to benefit from skilled PT services to modify and progress therapeutic interventions, address functional mobility deficits, address ROM deficits, address strength deficits, analyze and address soft tissue restrictions, analyze and cue movement patterns, analyze and modify body mechanics/ergonomics, assess and modify postural abnormalities, address imbalance/dizziness and instruct in home and community integration to attain remaining goals.      []  See Plan of Care  [x]  See progress note/recertification  []  See Discharge Summary         Progress towards goals / Updated goals:  See Progress Note    PLAN  [x]  Upgrade activities as tolerated     []  Continue plan of care  [x]  Update interventions per flow sheet       []  Discharge due to:_  []  Other:_      Tracy Tavares, PT 8/27/2019  2:14 PM    Future Appointments   Date Time Provider Alyce Brown   8/29/2019 10:30 AM Adkins New, PTA [facility]   9/3/2019 10:00 AM Anita Daubs, PTA [facility]   9/5/2019 11:00 AM Adkins New, PTA [facility]   9/10/2019 10:30 AM Anita Daubs, PTA [facility]   9/12/2019 10:30 AM Anita Daubs, PTA [facility]   10/11/2019 11:20 [place] [id number] [facility]   12/16/2019  8:40 AM Anastacio Reynolds MD [facility]

## 2019-08-28 NOTE — PROGRESS NOTES
[facility]  [employer]  [address] [facility]  [phone] fax    Continued Plan of Care/ Re-certification for Physical Therapy Services    Patient name: Han Naranjo Start of Care: 19   Referral source: Easton Eng MD : 1960   Medical/Treatment Diagnosis: Other symptoms and signs involving the musculoskeletal system [R29.898]  Unspecified lack of coordination [R27.9]  Unsteadiness on feet [R26.81]  Payor: VA MEDICARE / Plan: VA MEDICARE PART A & B / Product Type: Medicare /  Onset Date:3 years ago     Prior Hospitalization: see medical history Provider#: [id number]   Medications: Verified on Patient Summary List    Comorbidities: vision loss, dialysis (M, W, F), HTN, orthostatic hypotension, amputation of left toes, end stage renal disease   Prior Level of Function: lives with wife in a 1 story home with 2 steps to enter without handrail, SPC for all ambulation, rollator for prolonged community ambulation, Ind with ADLs    Visits from Start of Care: 16    Missed Visits: 0    The Plan of Care and following information is based on the patient's current status:  Goal: Pt will improve right  strength to 32 kg in order to improve ease of gripping with ADLs and to demonstrate reduced frailty with  Transplant Frailty Assessment to increase pt's eligibility for kidney transplant. Status at last note/certification: Not met. 23 kg  Current Status: not met    Goal: Pt will perform stationary bike for 10 minutes without rest break in order to demonstrate improved endurance/activity level in order to improve ease of ADLs and to demonstrate reduced frailty with  Transplant Frailty Assessment to increase pt's eligibility for kidney transplant.   Status at last note/certification: Goal met.  10 minutes without resting   Current Status: met    Key functional changes: improving strength, improving endurance, improving activity tolerance Problems/ barriers to goal attainment: blindness affecting gait speed     Problem List: pain affecting function, decrease ROM, decrease strength, impaired gait/ balance, decrease ADL/ functional abilitiies, decrease activity tolerance, decrease flexibility/ joint mobility and decrease transfer abilities    Treatment Plan: Therapeutic exercise, Therapeutic activities, Neuromuscular re-education, Physical agent/modality, Gait/balance training, Manual therapy, Patient education, Self Care training, Functional mobility training, Home safety training and Stair training     Patient Goal (s) has been updated and includes:   1.  Pt will improve right  strength to 32 kg in order to improve ease of gripping with ADLs and to demonstrate reduced frailty with KP Transplant Frailty Assessment to increase pt's eligibility for kidney transplant.    2. Pt will demonstrate understanding/compliance with final HEP in order to allow for continued progression independently following DC. Goals for this certification period to be accomplished in 4 weeks:  1.  Pt will improve right  strength to 32 kg in order to improve ease of gripping with ADLs and to demonstrate reduced frailty with KP Transplant Frailty Assessment to increase pt's eligibility for kidney transplant.    2. Pt will demonstrate understanding/compliance with final HEP in order to allow for continued progression independently following DC. Frequency / Duration: Patient to be seen 2-3 times per week for 4 weeks:    Assessment / Recommendations: Pt is making slow, steady progress towards updated goals in therapy. Activity tolerance is steadily improving, and pt is able to complete stationary bike for 10 minutes without rest break. Reps/resistance with therapy interventions continue to increase. KP Transplant Frailty Assessment score has remained 2/5; however, activity continues to increase.    strength continues to be decreased, and pt was given upgraded putty for HEP to continue to address decreased  strength. He continues to demonstrate decreased gait speed with ambulation d/t blindness and feeling his surroundings; however, he was able to perform 10 Meter Walk Test with gait speed of 1 m/s with first trial and 0.83 m/s with second trial when provided with Britany (light touch with his hand on therapist's hand) to walk in a straight line with test.  Therapist called Dr. Ramila Acosta office and left message to see if there was anything else that therapy needed to be addressing to increase pt's eligibility for kidney transplant and is waiting for a return call. Pt will benefit from continued skilled PT for a few final sessions in order to address remaining deficits and establish/transition to final HEP in order to allow for continued progression independently following DC. Certification Period: 8/27/19 to 9/25/19    Anabela Willoughby, PT 8/28/2019 10:40 AM    ________________________________________________________________________  I certify that the above Therapy Services are being furnished while the patient is under my care. I agree with the treatment plan and certify that this therapy is necessary. [] I have read the above and request that my patient continue as recommended.   [] I have read the above report and request that my patient continue therapy with the following changes/special instructions: _______________________________________  [] I have read the above report and request that my patient be discharged from therapy    Physician's Signature:____________Date:_________TIME:________    ** Signature, Date and Time must be completed for valid certification **    Please sign and return to [facility]  [facility] [address] [employer]  [phone] fax

## 2019-08-29 ENCOUNTER — HOSPITAL ENCOUNTER (OUTPATIENT)
Dept: PHYSICAL THERAPY | Age: 59
Discharge: HOME | End: 2019-08-29
Payer: COMMERCIAL

## 2019-08-29 PROCEDURE — 97110 THERAPEUTIC EXERCISES: CPT

## 2019-08-29 PROCEDURE — 97112 NEUROMUSCULAR REEDUCATION: CPT

## 2019-08-29 NOTE — PROGRESS NOTES
PT DAILY TREATMENT NOTE 10-18    Patient Name: Madnie Dugan  Date:2019  : 1960  [x]  Patient  Verified  Payor: VA MEDICARE / Plan: VA MEDICARE PART A & B / Product Type: Medicare /    In time:1027  Out time:1102  Total Treatment Time (min): 35  Visit #: 17 of -    Medicare/BCBS Only   Total Timed Codes (min):  23 1:1 Treatment Time:  23       Treatment Area: Other symptoms and signs involving the musculoskeletal system [R29.898]  Unspecified lack of coordination [R27.9]  Unsteadiness on feet [R26.81]    SUBJECTIVE  Pain Level (0-10 scale): 8/10  Any medication changes, allergies to medications, adverse drug reactions, diagnosis change, or new procedure performed?: [x] No    [] Yes (see summary sheet for update)  Subjective functional status/changes:   [] No changes reported  Pt c/o neuropathy pain over entire body today. Pt's wife present and states she's noticing improvements in pt's balance. OBJECTIVE    15 Min Therapeutic Exercise:  [x] See flow sheet :   Rationale: increase strength and improve coordination to improve the patients ability to increase ease of longer ambulation distances and ADLs       8 min Neuromuscular Re-education:  [x]  See flow sheet : Various balance on different surfaces, stepping/ambulation within parallel bars with facilitation to L LE for placement and sequencing.    Rationale: improve coordination, improve balance and increase proprioception  to improve the patients ability to safely navigate environment and perform functional tasks       With   [x] TE   [] TA   [x] neuro   [] other: Patient Education: [x] Review HEP    [] Progressed/Changed HEP based on:   [] positioning   [] body mechanics   [] transfers   [] heat/ice application    [] other:      Other Objective/Functional Measures:   Pt demos decrease awareness and proprioception of  L LE, often places it far forward and abducted with NMR  Favorable response to L LE facilitation for placement and sequencing with fair + carryover  Pt demos short narrowed steps, improved with verbal cues  Pt minimally verbal, but wife is supportive, helpful historian  BP in R UE prior to tx session: 140/81 mmHg, HR: 72 bpm  Increased repetitions overall  Added MSR on foam with feet slightly  2x30\" B for further challenge to static standing balance  Pt demos 2 minor LOB with Romberg on foam requiring CGA to correct     Pain Level (0-10 scale) post treatment: 5/10    ASSESSMENT/Changes in Function: Pt tolerated increased repetitions and balance challenges well overall. Pt demos decreased awareness and proprioception of L LE, however responds favorably to facilitation for placement and sequencing. Pt continues to be challenged with balance on compliant surface demonstrating 2 minor LOB requiring CGA to recover. Pt will benefit from increased challenge to static and dynamic balance/gait with respect to PMHx of blindness. Patient will continue to benefit from skilled PT services to modify and progress therapeutic interventions, address functional mobility deficits, address strength deficits and analyze and cue movement patterns to attain remaining goals. []  See Plan of Care  [x]  See progress note/recertification  []  See Discharge Summary         Progress towards goals / Updated goals:  to be accomplished in 4 weeks:  1.  Pt will improve right  strength to 32 kg in order to improve ease of gripping with ADLs and to demonstrate reduced frailty with  Transplant Frailty Assessment to increase pt's eligibility for kidney transplant.    2. Pt will demonstrate understanding/compliance with final HEP in order to allow for continued progression independently following DC.      PLAN  [x]  Upgrade activities as tolerated     [x]  Continue plan of care  []  Update interventions per flow sheet       []  Discharge due to:_  []  Other:_      Amy Kidd PTA 8/29/2019  1:36 PM    Future Appointments   Date Time Provider Department Center   9/3/2019 10:00 AM Anita Daubs, PTA [facility]   9/5/2019 11:00 AM Adkins Xander, PTA [facility]   9/10/2019 10:30 AM Anita Daubs, PTA [facility]   9/12/2019 10:30 AM Anita Daubs, PTA [facility]   10/11/2019 11:[place] [id number] [facility]   12/16/2019  8:40 AM Anastacio Reynolds MD [address] [place] [address]

## 2019-09-03 ENCOUNTER — HOSPITAL ENCOUNTER (OUTPATIENT)
Dept: PHYSICAL THERAPY | Age: 59
Discharge: HOME | End: 2019-09-03
Payer: COMMERCIAL

## 2019-09-03 PROCEDURE — 97112 NEUROMUSCULAR REEDUCATION: CPT

## 2019-09-03 PROCEDURE — 97110 THERAPEUTIC EXERCISES: CPT

## 2019-09-03 NOTE — PROGRESS NOTES
PT DAILY TREATMENT NOTE 10-18    Patient Name: Park Altman  Date:9/3/2019  : 1960  [x]  Patient  Verified  Payor: [place] MED[place]ARE / Plan: [place] MEDICARE PART A & B / Product Type: Medicare /    In time: 10:00  Out time:10:40  Total Treatment Time (min): 40  Visit #: 18 of -24    Medicare/BCBS Only   Total Timed Codes (min): 40  1:1 Treatment Time:  30       Treatment Area: Other symptoms and signs involving the musculoskeletal system [R29.898]  Unspecified lack of coordination [R27.9]  Unsteadiness on feet [R26.81]    SUBJECTIVE  Pain Level (0-10 scale): 8/10   Any medication changes, allergies to medications, adverse drug reactions, diagnosis change, or new procedure performed?: [x] No    [] Yes (see summary sheet for update)  Subjective functional status/changes:   [] No changes reported  Pt reports continued increased neuropathic pain. He is working on exercises at home with the putty for his  strength. His wife notes sometimes the pt feels she is dragging him when they are walking.        OBJECTIVE    20 Min Therapeutic Exercise:  [x] See flow sheet :   Rationale: increase strength and improve coordination to improve the patients ability to increase ease of longer ambulation distances and ADLs    10 minutes NC at end to ride bike for endurance conditioning     10 min Neuromuscular Re-education:  [x]  See flow sheet :   Rationale: improve coordination, improve balance and increase proprioception  to improve the patients ability to safely navigate environment and perform functional tasks       With   [x] TE   [] TA   [x] neuro   [] other: Patient Education: [x] Review HEP    [] Progressed/Changed HEP based on:   [] positioning   [] body mechanics   [] transfers   [] heat/ice application    [] other:      Other Objective/Functional Measures:   BP: 176/99  Able to perform slow marches and hip exercises with 1 UE  Challenged with 1/2 foam balance  Sway but no LOB with head turns during romberg  Educated on getting adjustable gripper for home strengthening  Able to perform 25# gripper bilaterally    Pain Level (0-10 scale) post treatment: 5/10    ASSESSMENT/Changes in Function: Pt progressing with strength and balance. He has improved endurance with decreased rest breaks during sessions. He continues to need work on  strength. Will progress towards updating HEP for independence upon D/C. Progress towards goals / Updated goals:  to be accomplished in 4 weeks:  1.  Pt will improve right  strength to 32 kg in order to improve ease of gripping with ADLs and to demonstrate reduced frailty with  Transplant Frailty Assessment to increase pt's eligibility for kidney transplant.    2. Pt will demonstrate understanding/compliance with final HEP in order to allow for continued progression independently following DC.      PLAN  [x]  Upgrade activities as tolerated     [x]  Continue plan of care  []  Update interventions per flow sheet       []  Discharge due to:_  []  Other:_      Sujey Rivera PTA 9/3/2019  1:36 PM    Future Appointments   Date Time Provider Alyce Brown   9/3/2019 10:00 AM Diana Karon, PTA [facility]   9/5/2019 11:00 AM Jorge Valdovinos, PTA [facility]   9/10/2019 10:30 AM Diana Karon, PTA [facility]   9/12/2019 10:30 AM Diana Karon, PTA [facility]   10/11/2019 11:20 AM Kris Campos MD [address] [place] [address]   12/16/2019  8:40 AM Devin Fonseca MD [address] [place] [address]

## 2019-09-05 ENCOUNTER — HOSPITAL ENCOUNTER (OUTPATIENT)
Dept: PHYSICAL THERAPY | Age: 59
Discharge: HOME | End: 2019-09-05
Payer: COMMERCIAL

## 2019-09-05 PROCEDURE — 97110 THERAPEUTIC EXERCISES: CPT

## 2019-09-05 NOTE — PROGRESS NOTES
PT DAILY TREATMENT NOTE 10-18    Patient Name: Dorothea Sanchez  Date:2019  : 1960  [x]  Patient  Verified  Payor: Nury Soliman / Plan: [place] MEDICARE PART A & B / Product Type: Medicare /    In time: 11:05  Out time: 11:30  Total Treatment Time (min): 25  Visit #: 19 of 16-24    Medicare/BCBS Only   Total Timed Codes (min):  25 1:1 Treatment Time:  25       Treatment Area: Other symptoms and signs involving the musculoskeletal system [R29.898]  Unspecified lack of coordination [R27.9]  Unsteadiness on feet [R26.81]    SUBJECTIVE  Pain Level (0-10 scale):  8/10  Any medication changes, allergies to medications, adverse drug reactions, diagnosis change, or new procedure performed?: [x] No    [] Yes (see summary sheet for update)  Subjective functional status/changes:   [] No changes reported  Pt. Reports he is doing pretty good. He reports he is planning on getting a gripper for home. OBJECTIVE    25 min Therapeutic Exercise:  [x] See flow sheet :   Rationale: increase ROM and increase strength to improve the patients ability to increase ease of ADLs          With   [x] TE   [] TA   [] neuro   [] other: Patient Education: [x] Review HEP    [] Progressed/Changed HEP based on:   [] positioning   [] body mechanics   [] transfers   [] heat/ice application    [] other:      Other Objective/Functional Measures: Added more  exercises today  He has decreased balance on foam  He was challenged with Rhomberg balance with HT     Pain Level (0-10 scale) post treatment: 8/10    ASSESSMENT/Changes in Function:  Pt. Is progressing slowly towards goals. He continues to have decreased  strength. He continues to have decreased balance with side stepping and backward ambulation.      Patient will continue to benefit from skilled PT services to modify and progress therapeutic interventions, address functional mobility deficits, address ROM deficits, address strength deficits, analyze and address soft tissue restrictions, analyze and cue movement patterns, analyze and modify body mechanics/ergonomics and assess and modify postural abnormalities to attain remaining goals. Progress towards goals / Updated goals:  1.  Pt will improve right  strength to 32 kg in order to improve ease of gripping with ADLs and to demonstrate reduced frailty with KP Transplant Frailty Assessment to increase pt's eligibility for kidney transplant.    Pt. Is challenged with 25# gripper (9/5/19)  2.  Pt will demonstrate understanding/compliance with final HEP in order to allow for continued progression independently following DC.     PLAN  []  Upgrade activities as tolerated     [x]  Continue plan of care  []  Update interventions per flow sheet       []  Discharge due to:_  []  Other:_      Dakotah Vergara, PT 9/5/2019  11:04 AM    Future Appointments   Date Time Provider Alyce Daniellei   9/10/2019 10:30 AM Cassandra Ferreira PTA [facility] [address] [facility]   9/12/2019 10:30 AM Cassandra Ferreira PTA [facility]   10/11/2019 11:20 AM Kaylynn Mujica MD [address] [place] [address]   12/16/2019  8:40 AM Natasha Thomas MD [address] [facility]

## 2019-09-10 ENCOUNTER — HOSPITAL ENCOUNTER (OUTPATIENT)
Dept: PHYSICAL THERAPY | Age: 59
Discharge: HOME | End: 2019-09-10
Payer: COMMERCIAL

## 2019-09-10 PROCEDURE — 97110 THERAPEUTIC EXERCISES: CPT

## 2019-09-10 NOTE — PROGRESS NOTES
PT DAILY TREATMENT NOTE 10-18    Patient Name: Park Altman  Date:9/10/2019  : 1960  [x]  Patient  Verified  Payor: Caio Kelsey / Plan: [place] MEDICARE PART A & B / Product Type: Medicare /    In time: 10:30  Out time: 11:10  Total Treatment Time (min): 40  Visit #: 20 of 16-24    Medicare/BCBS Only   Total Timed Codes (min):  40 1:1 Treatment Time:  30       Treatment Area: Other symptoms and signs involving the musculoskeletal system [R29.898]  Unspecified lack of coordination [R27.9]  Unsteadiness on feet [R26.81]    SUBJECTIVE  Pain Level (0-10 scale):  5/10  Any medication changes, allergies to medications, adverse drug reactions, diagnosis change, or new procedure performed?: [x] No    [] Yes (see summary sheet for update)  Subjective functional status/changes:   [] No changes reported  Pt reports he hasn't gotten a gripper yet. He just took his BP medicine around 9 A. M. His wife would like a copy of his notes (eval, progress notes, frailty test) for their records. OBJECTIVE    40 min Therapeutic Exercise:  [x] See flow sheet :   Rationale: increase ROM and increase strength to improve the patients ability to increase ease of ADLs          With   [x] TE   [] TA   [] neuro   [] other: Patient Education: [x] Review HEP    [] Progressed/Changed HEP based on:   [] positioning   [] body mechanics   [] transfers   [] heat/ice application    [] other:      Other Objective/Functional Measures:   Pt signed paperwork release form for notes  Placed in envelope to be given next session  Continues with decreased  strength will formally measure next session  Educated on deficits that remain per frailty test to work on  Will D/C next session     Pain Level (0-10 scale) post treatment: 5/10    ASSESSMENT/Changes in Function: Pt with progress towards final goals working on independent strengthening for  and endurance.  Will continue one final session to remeasure  strength and update final HEP for continued progression independently to increase eligibility for kidney transplant. Progress towards goals / Updated goals:  1.  Pt will improve right  strength to 32 kg in order to improve ease of gripping with ADLs and to demonstrate reduced frailty with KP Transplant Frailty Assessment to increase pt's eligibility for kidney transplant.    Pt. Is challenged with 25# gripper (9/5/19)  2.  Pt will demonstrate understanding/compliance with final HEP in order to allow for continued progression independently following DC.     PLAN  [x]  Upgrade activities as tolerated     [x]  Continue plan of care  []  Update interventions per flow sheet       []  Discharge due to:_  []  Other:_      Sandra Kirkpatrick PTA 9/10/2019  11:04 AM    Future Appointments   Date Time Provider Alyce Brown   9/12/2019 10:30 AM Memo James PTA [facility]   10/11/2019 11:20 AM Oni Rod MD [address] [place] [address]   12/16/2019  8:40 AM Osmar Nur MD [address] [facility]

## 2019-09-12 ENCOUNTER — HOSPITAL ENCOUNTER (OUTPATIENT)
Dept: PHYSICAL THERAPY | Age: 59
Discharge: HOME | End: 2019-09-12
Payer: COMMERCIAL

## 2019-09-12 PROCEDURE — 97110 THERAPEUTIC EXERCISES: CPT

## 2019-09-12 NOTE — PROGRESS NOTES
PT DAILY TREATMENT NOTE 10-18    Patient Name: Ashley Harris  Date:2019  : 1960  [x]  Patient  Verified  Payor: Sd Rahman / Plan: [place] MEDICARE PART A & B / Product Type: Medicare /    In time: 10:36  Out time: 11:00  Total Treatment Time (min): 24  Visit #: 21 of -    Medicare/BCBS Only   Total Timed Codes (min):  24 1:1 Treatment Time:  24       Treatment Area: Other symptoms and signs involving the musculoskeletal system [R29.898]  Unspecified lack of coordination [R27.9]  Unsteadiness on feet [R26.81]    SUBJECTIVE  Pain Level (0-10 scale):  8/10  Any medication changes, allergies to medications, adverse drug reactions, diagnosis change, or new procedure performed?: [x] No    [] Yes (see summary sheet for update)  Subjective functional status/changes:   [] No changes reported  Pt states increased neuropathy pain today but willing to do exercises. He got a gripper to start strengthening at home. He hasn't gotten another appt to follow up with the transplant office. Pt reports no longer feeling exhaustion daily or that everything he does is an effort.      OBJECTIVE    24 min Therapeutic Exercise:  [x] See flow sheet :   Rationale: increase ROM and increase strength to improve the patients ability to increase ease of ADLs          With   [x] TE   [] TA   [] neuro   [] other: Patient Education: [x] Review HEP    [] Progressed/Changed HEP based on:   [] positioning   [] body mechanics   [] transfers   [] heat/ice application    [] other:      Other Objective/Functional Measures:   Pt given copies of eval, notes and frailty assessment for their records after signing release form   strength: 25 kg, 25 kg, 24 kg measured on right dominant hand  Able to ambulate with increased benjamin with CGA when educated on straight pathway without restrictions due to vision loss  No further questions/concerns  Pt will be notified when D/C summary is complete to  as well for records with most recent  strength recordings    Pain Level (0-10 scale) post treatment: 8/10    ASSESSMENT/Changes in Function: Pt progressed well with improved endurance and  strength. He did not meet final  strength goal but is independent with strengthening and purchased a gripper to continue strengthening at home. He no longer feels exhaustion daily and has improved endurance. He is compliant with HEP and continuing to workout independently at home to further build strength, balance and endurance to be eligible for a kidney transplant. Progress towards goals / Updated goals:  1.  Pt will improve right  strength to 32 kg in order to improve ease of gripping with ADLs and to demonstrate reduced frailty with KP Transplant Frailty Assessment to increase pt's eligibility for kidney transplant.    Pt. Is challenged with 25# gripper (9/5/19)  Not met but progressed since last measure to 25 kg, 25 kg, 24 kg on right hand    2.  Pt will demonstrate understanding/compliance with final HEP in order to allow for continued progression independently following DC. MET    PLAN  []  Upgrade activities as tolerated     []  Continue plan of care  []  Update interventions per flow sheet       [x]  Discharge due to:_  []  Other:_      [employer], PTA 9/12/2019  11:04 AM    Future Appointments   Date Time Provider Alyce Daniellei   10/11/2019 11:20 AM Lary Leone MD [address] [place] [address]   12/16/2019  8:40 AM Phoenix James MD 11 [facility]

## 2019-09-20 NOTE — PROGRESS NOTES
[facility] [address] [facility] 
[phone] fax Physical Therapy Discharge Summary Patient name: Ankur Ellis Start of Care: 19 Referral source: Janine Means MD : 1960 Medical/Treatment Diagnosis: Other symptoms and signs involving the musculoskeletal system [R29.898] Unspecified lack of coordination [R27.9] Unsteadiness on feet [R26.81] Payor: [place] MEDICARE / Plan: DATY / Product Type: Medicare /  Onset Date:3 years ago Prior Hospitalization: see medical history Provider#: [id number] Medications: Verified on Patient Summary List   
Comorbidities: vision loss, dialysis (M, W, F), HTN, orthostatic hypotension, amputation of left toes, end stage renal disease 
 Prior Level of Function: lives with wife in a 1 story home with 2 steps to enter without handrail, SPC for all ambulation, rollator for prolonged community ambulation, Ind with ADLs Visits from Start of Care: 21    Missed Visits: 0 Reporting Period : 19 to 19 Summary of Care: 
Goal: Pt will improve right  strength to 32 kg in order to improve ease of gripping with ADLs and to demonstrate reduced frailty with KP Transplant Frailty Assessment to increase pt's eligibility for kidney transplant. Status at last note/certification: Not met but progressed since last measure to 25 kg, 25 kg, 24 kg on right hand Status at discharge: not met Goal: Pt will demonstrate understanding/compliance with final HEP in order to allow for continued progression independently following DC Status at last note/certification: Goal met. Status at discharge: met ASSESSMENT/RECOMMENDATIONS: 
 
Pt has made steady progress in therapy, meeting 1/2 final goals and progressing towards  strength goal.  He demonstrates significant improvement in endurance with therapy sessions and reports he is no longer experiencing exhaustion daily.  strength continues to be decreased but is progressing, and pt has purchased a gripper and is independent with strengthening at home. He is compliant with final HEP to allow for continued progression with stength, balance, and endurance to be eligible for a kidney transplant. DC at this time as pt has met or is progressing towards therapy goals, and skilled intervention is no longer required. [x]Discontinue therapy: [x]Patient has reached or is progressing toward set goals []Patient is non-compliant or has abdicated 
    []Due to lack of appreciable progress towards set goals Ric Mata PT 9/20/2019 7:57 AM

## 2019-10-08 ENCOUNTER — PATIENT OUTREACH (OUTPATIENT)
Dept: FAMILY MEDICINE CLINIC | Age: 59
End: 2019-10-08

## 2019-10-08 NOTE — Clinical Note
Patient states Crestor causes hiccups. He and wife are requesting alternative. They are aware patient's cholesterol is elevated and he needs to be on something. Was wondering if you thought he would be a good candidate for one of the PCSK9 inhibitors. Last LDL was 232.4 on 8/17/19.  Eyad Gamez, [employer]mb[employer] Care Manager

## 2019-10-08 NOTE — Clinical Note
Sarmad Ruiz patient that was prescribed hydralazine 100 mg BID but is only taking 50 mg BID. States 100 mg causes dizziness. Can you just make Dr. Sarmad Ruiz aware? Patient has f/u with Dr. Sarmad Ruiz on 10/11/19. Thank you!

## 2019-10-08 NOTE — PROGRESS NOTES
Complex Case Management Date/Time:  10/8/2019 2:26 PM 
 
Method of communication with patient:phone Nurse Navigator (NN) contacted the patient and patient's wife  by telephone to perform Ambulatory Care Coordination. Verified name and  with patient  as identifiers. Provided introduction to self, and explanation of the Ambulatory Care Manager's role. Reviewed most recent clinic visit w/ patient and wife  who verbalized understanding. Patient and wife  given an opportunity to ask questions. Top Challenges reviewed with the patient 1. Coordinating follow up with transplant team at [facility] 2. Blood pressure management 3. Diabetes management Spoke with wife and patient together on speaker phone. Patient reported: He is doing \"fair\". States he was unable to receive his kidney transplant due to weakness. [facility] renal transplant team wanted patient to have outpatient physical therapy to help strengthen patient prior to transplant. Patient has recently completed therapy but he and wife state that they received a call from Ori today to inform him that Ori was no longer under contract with [facility] renal and he would have to have the transplant done at [facility]. Wife relates that [facility] told him that his case with Alana Neighbor would have to be closed before they could go forward with transplant. Patient states he already has a donor selected who lives in [place]. Wife gave the following information: 
Lucita-outreach nurse with [facility] renal team phone  [phone] Marycarmen Muñoz - with [facility] renal team phone [phone] Alexandra Mccall -coordinator with [facility] renal team phone [phone] Satnam Aguila worker with [facility] phone [phone] extension [phone] [facility] with [facility] phone [phone] Patient is currently going to dialysis M,W,F at [facility] dialysis center in [place]. Wife provides transportation. Diabetes management discussed. Wife reports that patient checks blood sugar daily and today's reading was 143. She states that his sugar is usually well managed and they use sliding scale insulin when needed. She reports highest reading most recently was 240 and lowest was 70. Wife states patients feet are kept clean and dry at all times and he purchases a new pair of diabetic shoes yearly. Wife relates patient is blind. Blood pressure management discussed. Wife relates patient's blood pressure has been controlled much better recently and she attributes this to medication adjustments. She does state that patient is taking hydralazine 50 mg BID instead of the prescribed 100 mg BID because the higher dose causes \"dizziness\". Wife was advised to inform Dr. Bharati Garcia of this adjustment at [facility] on 10/11/19 for his advisement. She states his blood pressure today was 140/73. Wife and patient verbalized that patient has hiccups \"all night\" every night after he takes Crestor 10 mg and OTC antacid. They tried holding antacid but the hiccups still occur after taking Crestor so they are requesting an alternative. However wife knows that patient needs something as cholesterol is elevated. Will forward to Dr. Sonia Brown for advisement. Writer informed patient and wife that attempt to contact [facility] and [facility] would be made to clarify requirements for patient to transfer  case to [address] [place] [address]. Spoke with Alesia Martínez, outreach nurse with [facility] renal. She stated she is unaware that [facility] would not be covering transplant at their facility. She relates that last financial outreach was done on 9/27/19 and authorization was received. Hungjignesh said that if the case was closed then patient's dialysis unit would need to send new referral to [facility]. She provided the phone number of Cindi Bejarano,  [phone] Called Sherren Feeler,  with [facility]. Voicemail left for return call. Called Hafsa Friday, coordinator with [facility]. Voicemail left for return call. The patient and patient's wife agrees to contact the PCP office or the [address] [place] [address] for questions related to their healthcare. Provided contact information for future reference. Disease Specific:   n/a Home Health Active: No 
 
DME Active: No 
 
Barriers to care? Coordination of transplant evaluation between [facility] and [facility] Advance Care Planning:  
Does patient have an Advance Directive:  not on file. Will discuss at a later date once relationship has been established. Medication(s):  
Medication reconciliation was performed with patient's wife , who verbalizes understanding of administration of home medications. There were no barriers to obtaining medications identified at this time. Referral to Pharm D needed: no  
 
Current Outpatient Medications Medication Sig  
 finasteride (PROSCAR) 5 mg tablet Take 1 Tab by mouth daily.  brinzolamide (AZOPT OP) Apply  to eye. Indications: 3 times daily  amLODIPine (NORVASC) 10 mg tablet TAKE 1 TABLET BY MOUTH DAILY.  netarsudil (RHOPRESSA) 0.02 % drop Apply  to eye.  brimonidine-timolol (COMBIGAN) 0.2-0.5 % drop ophthalmic solution Administer 1 Drop to both eyes three (3) times daily.  soft lens rinse,store solution (SHYLA SALINE SENSITIVE EYES) by Does Not Apply route.  hydrALAZINE (APRESOLINE) 100 mg tablet Take 1 Tab by mouth two (2) times a day. on non-dialysis days. Indications: high blood pressure (Patient taking differently: Take 50 mg by mouth two (2) times a day. on non-dialysis days. Indications: high blood pressure)  losartan (COZAAR) 100 mg tablet Take 100 mg by mouth daily.  cloNIDine (CATAPRES) 0.3 mg/24 hr APPLY 1 PATCH TO SKIN ONCE EVERY 7 DAYS  lidocaine-prilocaine (EMLA) topical cream Apply  to affected area as needed for Pain (apply to left arm 30 minutes prior to dialysis).  b complex-vitamin c-folic acid (NEPHROCAPS) 1 mg capsule Take 1 Cap by mouth daily.  calcium acetate (PHOSLO) 667 mg cap Take 1 Cap by mouth three (3) times daily (with meals). (Patient taking differently: Take 3 Caps by mouth three (3) times daily (with meals). )  insulin lispro (HUMALOG) 100 unit/mL injection Blood Sugar (mg/dL): <150 =0 units; 150 -199 =2 units; 200 -249 =4 units; 250 -299 =6 units; 300 -349 =8 units; 350 and above =10 units.  acetaminophen (TYLENOL EXTRA STRENGTH) 500 mg tablet Take  by mouth every six (6) hours as needed for Pain.  cyclobenzaprine (FLEXERIL) 10 mg tablet Take 1 Tab by mouth three (3) times daily as needed for Muscle Spasm(s).  rosuvastatin (CRESTOR) 20 mg tablet Take 1 Tab by mouth nightly.  docusate sodium (COLACE) 100 mg capsule Take 1 Cap by mouth two (2) times a day.  fluticasone (FLONASE) 50 mcg/actuation nasal spray 1 [place] by Both Nostrils route two (2) times a day. (Patient taking differently: 1 Spray by Both Nostrils route as needed.)  cyanocobalamin (VITAMIN B-12) 1,000 mcg tablet Take 1,000 mcg by mouth daily. No current facility-administered medications for this visit. [facility] follow up appointment(s):  
Future Appointments Date Time Provider [facility] 10/11/2019 11:20 AM Jin Pennington MD [address] [place] [address]  
12/16/2019  8:40 AM Angella Loco MD [address] [place] [address] Non-[facility] follow up appointment(s): none at this time Goals  Attends follow-up appointments as directed.  Patient verbalizes understanding of self -management goals of living with Diabetes.  Prevent complications post hospitalization.  Reduce risk of comorbid complications related to diabetes (renal disease, heart disease, amputation, and retinopathy).

## 2019-10-09 ENCOUNTER — PATIENT OUTREACH (OUTPATIENT)
Dept: FAMILY MEDICINE CLINIC | Age: 59
End: 2019-10-09

## 2019-10-09 NOTE — PROGRESS NOTES
Called Migdalia Sullivan, Donor Coordinator at [facility] renal transplant. Was informed by Ms. Grace that her computer was down and she was unable to look up the patient, however she is the donor coordinator and it would probably be best to reach out to the recipient coordinator, Yessi Dodd at [phone]. Called Yessi Dodd. MsTimur Bharti Washburn stated her computer was also down (system wide issue) but she would look up patient when computer system is working again and call back to assist with patient. Call back number provided.

## 2019-10-11 ENCOUNTER — PATIENT OUTREACH (OUTPATIENT)
Dept: FAMILY MEDICINE CLINIC | Age: 59
End: 2019-10-11

## 2019-10-11 ENCOUNTER — OFFICE VISIT (OUTPATIENT)
Dept: CARDIOLOGY CLINIC | Age: 59
End: 2019-10-11

## 2019-10-11 VITALS
HEART RATE: 71 BPM | WEIGHT: 177 LBS | SYSTOLIC BLOOD PRESSURE: 150 MMHG | OXYGEN SATURATION: 97 % | HEIGHT: 66 IN | DIASTOLIC BLOOD PRESSURE: 92 MMHG | BODY MASS INDEX: 28.45 KG/M2

## 2019-10-11 DIAGNOSIS — E11.8 TYPE 2 DIABETES MELLITUS WITH COMPLICATION, WITH LONG-TERM CURRENT USE OF INSULIN (HCC): ICD-10-CM

## 2019-10-11 DIAGNOSIS — Z79.4 TYPE 2 DIABETES MELLITUS WITH COMPLICATION, WITH LONG-TERM CURRENT USE OF INSULIN (HCC): ICD-10-CM

## 2019-10-11 DIAGNOSIS — I10 ESSENTIAL HYPERTENSION: Primary | ICD-10-CM

## 2019-10-11 DIAGNOSIS — N18.6 END STAGE RENAL DISEASE (HCC): ICD-10-CM

## 2019-10-11 RX ORDER — HYDRALAZINE HYDROCHLORIDE 100 MG/1
50 TABLET, FILM COATED ORAL 2 TIMES DAILY
COMMUNITY
End: 2019-10-11

## 2019-10-11 RX ORDER — HYDRALAZINE HYDROCHLORIDE 50 MG/1
50 TABLET, FILM COATED ORAL 2 TIMES DAILY
Qty: 180 TAB | Refills: 3 | Status: SHIPPED | OUTPATIENT
Start: 2019-10-11 | End: 2020-01-01

## 2019-10-11 NOTE — PROGRESS NOTES
Spoke with Rober Tijerina, [facility] renal receipient coordinator. Thurmon Angelucci states patient's case was closed at the end of July because it was felt he would not be a good candidate for a renal transplant due to wekaness/mobility issues. Writer informed Julee Brooks that patient has completed physical therapy since that time. Thurmon Angelucci stated an appeal could be made for patient's case to be reopened. She gave the following information for submission of appeal letter and physical therapy notes: Fax [phone] or [phone] or email: Thurmon Angelucci. [email or web address] 
 
 
Spoke with patient's wife, Yobany Overton ([address] [place] [address]) and informed her of appeal requirements. Wife requested letter and records be sent on patient's behalf. Will request appeal letter from  Dr. Jaleel Packer.

## 2019-10-11 NOTE — PROGRESS NOTES
Imtiaz Mcdonald presents today for Chief Complaint Patient presents with  Hypertension 6 month follow up - no cardiac complaints Imtiaz Mcdonald preferred language for health care discussion is english/other. Is someone accompanying this pt? Wife Is the patient using any DME equipment during OV? Florette Party Depression Screening: 
3 most recent PHQ Screens 8/15/2019 Little interest or pleasure in doing things Not at all Feeling down, depressed, irritable, or hopeless Not at all Total Score PHQ 2 0 Trouble falling or staying asleep, or sleeping too much - Feeling tired or having little energy - Poor appetite, weight loss, or overeating - Feeling bad about yourself - or that you are a failure or have let yourself or your family down - Trouble concentrating on things such as school, work, reading, or watching TV - Moving or speaking so slowly that other people could have noticed; or the opposite being so fidgety that others notice - Thoughts of being better off dead, or hurting yourself in some way -  
PHQ 9 Score - How difficult have these problems made it for you to do your work, take care of your home and get along with others - Learning Assessment: 
Learning Assessment 4/5/2019 PRIMARY LEARNER Patient PRIMARY LANGUAGE ENGLISH  
LEARNER PREFERENCE PRIMARY LISTENING  
  -  
  -  
ANSWERED BY Patient RELATIONSHIP SELF Abuse Screening: No flowsheet data found. Fall Risk No flowsheet data found. Pt currently taking Anticoagulant therapy? no 
 
Coordination of Care: 1. Have you been to the ER, urgent care clinic since your last visit? Hospitalized since your last visit? 4/11 for cath 2. Have you seen or consulted any other health care providers outside of the [address] [place] [address] since your last visit? Include any pap smears or colon screening.  no

## 2019-10-11 NOTE — PROGRESS NOTES
HISTORY OF PRESENT ILLNESS Dalton Mason is a 62 y.o. male. Hypertension Pertinent negatives include no chest pain, no abdominal pain, no headaches and no shortness of breath. Patient presents for a follow-up office visit. He was initially referred here for long-standing poorly controlled hypertension. The patient also has a history of chronic kidney disease, now end-stage recently started on hemodialysis in March 2018, long-standing poorly controlled diabetes, dyslipidemia, and intermittent compliance with his medications. He underwent an echocardiogram in October 2016 which showed moderate to marked concentric LVH with preserved LV systolic function, EF 76-83% without any significant valvular heart disease. He was hospitalized at [facility] in March 2017 following a syncopal episode. He was diagnosed with significant autonomic dysfunction on a tilt table test where his systolic blood pressure dropped from 200-97 while in the upright 70° position and administered 1 spray of sublingual nitroglycerin. His heart rate did increase appropriately from 75 to 110 bpm.  Because of the significant drop in blood pressure, his blood pressure medications were significantly decreased as were some of his diuretics. The patient underwent a cardiac catheterization in April 2019 and part of a renal transplant work-up. He was found to have no significant obstructive coronary disease, preserved LV function, and a mildly elevated LVEDP of 20 mmHg. Since his cardiac catheterization, is been doing well. His family member state that the increased dose of hydralazine made him feel quite fatigued, so is only taking half the dosage twice a day. He is doing well with hemodialysis. No significant low blood pressure readings during dialysis or no chest pain during dialysis. No leg swelling, no orthopnea, no PND. No prolonged dizzy spells, syncope or near syncope. Past Medical History: Diagnosis Date  Blindness of one eye   
 left  Cardiac echocardiogram 10/21/2016 EF 60-65%. No WMA. Mod-marked LVH. Normal diastolic fx. No significant valvular heart disease.  Cardiac treadmill stress test, low risk 11/02/2012 Negative maximal exercise treadmill test.  Ex time 7 min 45 sec.  Cardiovascular LE peripheral arterial testing 03/22/2016 No significant peripheral arterial disease at rest bilaterally. ABIs deferred due to calcified vessels.  Cardiovascular LLE venous duplex 06/06/2012 Left leg:  No DVT.  Cardiovascular renal duplex 10/21/2016 No significant renal artery stenosis.  Chronic kidney disease   
 [id number]  
 CKD (chronic kidney disease), stage V ([address]  Diabetes mellitus ([facility] 3/12/2010  Diabetic retinopathy ([facility] 5/4/2010  Edema of both legs  Eye examination 5/4/10 OU: 20/20; OD: 20/25; OS: 20/25 without correction.  GERD (gastroesophageal reflux disease)  Glaucoma 08/17/2017  
 [facility]  
 HLD (hyperlipidemia) 3/12/2010  
 HTN (hypertension) 3/12/2010  Orthostatic hypertension Current Outpatient Medications Medication Sig Dispense Refill  hydrALAZINE (APRESOLINE) 50 mg tablet Take 1 Tab by mouth two (2) times a day. 180 Tab 3  
 finasteride (PROSCAR) 5 mg tablet Take 1 Tab by mouth daily. 30 Tab 2  
 brinzolamide (AZOPT OP) Apply  to eye. Indications: 3 times daily  amLODIPine (NORVASC) 10 mg tablet TAKE 1 TABLET BY MOUTH DAILY. 30 Tab 6  
 netarsudil (RHOPRESSA) 0.02 % drop Apply  to eye.  brimonidine-timolol (COMBIGAN) 0.2-0.5 % drop ophthalmic solution Administer 1 Drop to both eyes three (3) times daily.  soft lens rinse,store solution (SHYLA SALINE SENSITIVE EYES) by Does Not Apply route.  losartan (COZAAR) 100 mg tablet Take 100 mg by mouth daily.     
 cloNIDine (CATAPRES) 0.3 mg/24 hr APPLY 1 PATCH TO SKIN ONCE EVERY 7 DAYS 16 Patch 3  
  lidocaine-prilocaine (EMLA) topical cream Apply  to affected area as needed for Pain (apply to left arm 30 minutes prior to dialysis). 30 g 12  
 b complex-vitamin c-folic acid (NEPHROCAPS) 1 mg capsule Take 1 Cap by mouth daily. 30 Cap 6  calcium acetate (PHOSLO) 667 mg cap Take 1 Cap by mouth three (3) times daily (with meals). (Patient taking differently: Take 3 Caps by mouth three (3) times daily (with meals). ) 90 Cap 2  
 insulin lispro (HUMALOG) 100 unit/mL injection Blood Sugar (mg/dL): <150 =0 units; 150 -199 =2 units; 200 -249 =4 units; 250 -299 =6 units; 300 -349 =8 units; 350 and above =10 units. 1 Vial 2  
 acetaminophen (TYLENOL EXTRA STRENGTH) 500 mg tablet Take  by mouth every six (6) hours as needed for Pain.  cyclobenzaprine (FLEXERIL) 10 mg tablet Take 1 Tab by mouth three (3) times daily as needed for Muscle Spasm(s). 30 Tab 0  
 rosuvastatin (CRESTOR) 20 mg tablet Take 1 Tab by mouth nightly. 30 Tab 6  
 docusate sodium (COLACE) 100 mg capsule Take 1 Cap by mouth two (2) times a day. 60 Cap 0  
 fluticasone (FLONASE) 50 mcg/actuation nasal spray 1 [place] by Both Nostrils route two (2) times a day. (Patient taking differently: 1 Spray by Both Nostrils route as needed.) 1 Bottle 2  
 cyanocobalamin (VITAMIN B-12) 1,000 mcg tablet Take 1,000 mcg by mouth daily. Allergies Allergen Reactions  Bactrim [Sulfamethoprim Ds] Nausea and Vomiting  Coreg [Carvedilol] Nausea and Vomiting Difficulty walking Social History Tobacco Use  Smoking status: Never Smoker  Smokeless tobacco: Never Used Substance Use Topics  Alcohol use: No  
 Drug use: No  
 
 
 
Review of Systems Constitutional: Positive for malaise/fatigue. Negative for chills, fever and weight loss. HENT: Negative for nosebleeds. Eyes: Negative for blurred vision and double vision. Respiratory: Negative for cough, shortness of breath and wheezing. Cardiovascular: Negative for chest pain, palpitations, orthopnea, claudication, leg swelling and PND. Gastrointestinal: Negative for abdominal pain, heartburn, nausea and vomiting. Genitourinary: Negative for dysuria and hematuria. Musculoskeletal: Negative for falls and myalgias. Skin: Negative for rash. Neurological: Negative for dizziness, focal weakness and headaches. Endo/Heme/Allergies: Does not bruise/bleed easily. Psychiatric/Behavioral: Negative for substance abuse. Visit Vitals BP (!) 150/92 (BP 1 Location: Right arm, BP Patient Position: Sitting) Pulse 71 Ht 5' 6\" (1.676 m) Wt 80.3 kg (177 lb) SpO2 97% BMI 28.57 kg/m² Physical Exam  
Constitutional: He is oriented to person, place, and time. He appears well-developed and well-nourished. HENT:  
Head: Normocephalic and atraumatic. Eyes: Conjunctivae are normal.  
Neck: Neck supple. No JVD present. Carotid bruit is present ( Left). Cardiovascular: Normal rate, regular rhythm, S1 normal, S2 normal and normal pulses. Exam reveals distant heart sounds. Exam reveals no gallop and no S3. No murmur heard. Pulmonary/Chest: Breath sounds normal. He has no wheezes. He has no rales. Abdominal: Soft. Bowel sounds are normal. There is no tenderness. Musculoskeletal: He exhibits no edema, tenderness or deformity. Neurological: He is alert and oriented to person, place, and time. Skin: Skin is warm and dry. Psychiatric: He has a normal mood and affect. His behavior is normal. Thought content normal.  
 
EKG: Normal sinus rhythm, normal axis, normal QTc interval, borderline voltage criteria for LVH, nonspecific T wave abnormality. No change compared to the previous tracing.  
 
ASSESSMENT and PLAN 
 
 Hypertensive cardiovascular disease. Patient last underwent an echocardiogram in July 2018 which showed preserved LV systolic function, EF 15% with moderate concentric LVH, mild mitral regurgitation normal PA pressures. Patient blood pressure remains reasonably well-controlled on his current medical regimen. Patient is only been taking hydralazine 50 mg twice daily instead of the prescribed 100 mg. Apparently he cannot tolerate a higher dose due to increased fatigue. I have change the prescription for the lower dosage. Dyslipidemia. Patient is managed with Crestor. This is followed by his PCP. Diabetes mellitus, type II, insulin dependent. Historically this has been poorly controlled. End-stage renal disease. Patient was started on hemodialysis in March 2018. No contraindication from a cardiac standpoint to pursue a renal transplant. He underwent a cardiac catheterization in April 2019 which showed no significant CAD. Follow-up annually, sooner if needed.

## 2019-10-17 ENCOUNTER — PATIENT OUTREACH (OUTPATIENT)
Dept: FAMILY MEDICINE CLINIC | Age: 59
End: 2019-10-17

## 2019-10-17 RX ORDER — CLONIDINE 0.3 MG/24H
PATCH, EXTENDED RELEASE TRANSDERMAL
Qty: 16 PATCH | Refills: 3 | Status: SHIPPED | OUTPATIENT
Start: 2019-10-17 | End: 2019-10-23 | Stop reason: SDUPTHER

## 2019-10-17 NOTE — PROGRESS NOTES
Complex Case Management  Follow-Up Date/Time:  10/17/2019 3:14 PM 
  
CTN (Care Transitions Nurse) contacted patient's wife  Sree Powell ([address] verified)  for Complex Case Management  follow up. Spoke to patient's wife. Introduced self/role and reason for call. patient's wife  reported:  
Patient was resting at the time of the call. She states he has good and bad days. Reports his oral intake is good. Currently going to dialysis M,W,F. Pertinent negatives: 
Chest pain, shortness of breath, dizziness, nausea, vomiting, diarrhea, constipation Informed Mrs. Chester Brantley that Dr. Haile Quintana would prefer that patient's nephrologist write appeal letter for [facility] to reopen transplant case. Wife reported patient's nephrologist is Dr. Shirlene Vargas Called Dr. Burgess Piña office at [phone] and spoke with Don Rod. Don Rod stated that she would give Dr. Sarah Liu the message but dialysis patient's are seen at the dialysis center by Dr. Sarah Liu and it may be best to call over there. Called [facility] dialysis center in [place] at [phone] and spoke with Tom Lee. She stated she would send an email to the , [facility] and ask that she write the appeal letter. Ms. Jese Matthews took writer's contact name and phone number to relay to . Specialist appointments since last outreach? no 
If so, specialist and date: n/a Medications:  
New medications since last outreach: no 
Does patient need refills on any medications: yes. Wife requested refill on patient's clonidine patch. Darren Laboy LPN, nurse at Dr. Ilene Valadez office made aware. Medication changes since last outreach (dose adjustments or discontinued meds): no 
 
Home Health company: n/a Date of discharge: n/a Barriers to care? None at this time Patient's wife  reminded that there are physicians on call 24 hours a day / 7 days a week (M-F 5pm to 8am and from Friday 5pm until Monday 8a for the weekend) should the patient have questions or concerns. Future Appointments Date Time Provider Alyce Daniellei 12/16/2019  8:40 AM Melina Gillette MD [facility]  
10/19/2020  8:00 AM Jin Pennington  [facility] Other upcoming appointments: 
n/a 
 
Goals  Appeal letter written to [facility] to reopen transplant case. 10/17/19 Spoke with Palma Lewis at Dr. Ilana Mahmood office (patient's nephrologist). Was directed to call [facility] [place] 10/17/19 Spoke with Cedric Crystal at [facility] [place]. Message was sent to center's  requesting appeal letter.  Takes Medications as prescribed 10/17/19 Wife reports patient is taking medications as prescribed. She requested a refill for patient's clonidine patch. Brian Wiseman LPN at Dr. Governor Arevalo office made aware.

## 2019-10-22 ENCOUNTER — PATIENT OUTREACH (OUTPATIENT)
Dept: FAMILY MEDICINE CLINIC | Age: 59
End: 2019-10-22

## 2019-10-22 NOTE — PROGRESS NOTES
[address] [facility] and requested to speak with [facility],  to follow up on appeal letter. Was told she was out of the office and would return tomorrow. Left contact information and requested a return call.

## 2019-10-23 RX ORDER — CLONIDINE 0.3 MG/24H
PATCH, EXTENDED RELEASE TRANSDERMAL
Qty: 4 PATCH | Refills: 6 | Status: SHIPPED | OUTPATIENT
Start: 2019-10-23 | End: 2020-01-01

## 2019-10-24 ENCOUNTER — PATIENT OUTREACH (OUTPATIENT)
Dept: FAMILY MEDICINE CLINIC | Age: 59
End: 2019-10-24

## 2019-10-24 NOTE — PROGRESS NOTES
Complex Case Management  Follow-Up Date/Time:  10/24/2019 1:28 PM 
  
Ambulatory Care  Nurse contacted patient for Transitions of Care Coordination  follow up. Spoke to patient. Introduced self/role and reason for call. Patient reports he is doing the same. States blood sugars have been in the 100s-1 teens. He reports that he checks his blood sugars four times daily. Patient reports taking all medications as prescribed and denies needing any refills at this time. Informed patient of discussion with [address] [place] [address] . Patient confirmed he has Medicare part A&B and that his COBRA has run out. He confirmed that he applied for Medicaid two weeks ago and is awaiting his decision. Patient states he was told that he would have to wait until September 2020 for Medicare to cover his renal transplant. Writer is awaiting return call from [facility] renal transplant coordinator to confirm this. Patient and wife were informed that they would be updated as information became available. Specialist appointments since last outreach? no 
If so, specialist and date: n/a Medications:  
New medications since last outreach: no 
Does patient need refills on any medications: no 
Medication changes since last outreach (dose adjustments or discontinued meds): no 
 
Home Health company: n/a Date of discharge: n/a Barriers to care? None at this time Patient reminded that there are physicians on call 24 hours a day / 7 days a week (M-F 5pm to 8am and from Friday 5pm until Monday 8a for the weekend) should the patient have questions or concerns. Future Appointments Date Time Provider Alyce Brown 12/16/2019  8:40 AM Mayte Leigh MD 11 [facility]  
10/19/2020  8:00 AM Jeni Stuart MD [address] [place] [address] Other upcoming appointments: 
n/a 
 
Goals  Appeal letter written to [facility] to reopen transplant case.    
  10/17/19 Spoke with Leeann Rudolph at Dr. Armani Ackerman office (patient's nephrologist). Was directed to call [facility] dialysis [place] 10/17/19 Spoke with Haroon Mealra at [facility]. Message was sent to center's  requesting appeal letter.  Takes Medications as prescribed 10/17/19 Wife reports patient is taking medications as prescribed. She requested a refill for patient's clonidine patch. Diana Skiff, LPN at Dr. Eugene Vanessa office made aware.

## 2019-10-24 NOTE — PROGRESS NOTES
Carylon Dakins,  for [facility] regarding appeal letter for patient's renal transplant. Danilo Conde states patient is having insurance issue. He previously had COBRA insurance but that has recently been exhausted. He applied for Medicaid 2 weeks ago and has not yet been granted a decision. Patient also has Medicare part A&B. Called Twyla Lou, renal transplant coordinator with [facility] to inquire if Medicare alone would cover transplant. LM on VM for her to call back.

## 2019-10-28 ENCOUNTER — PATIENT OUTREACH (OUTPATIENT)
Dept: FAMILY MEDICINE CLINIC | Age: 59
End: 2019-10-28

## 2019-10-28 NOTE — PROGRESS NOTES
Received return call from Hector Turk, [facility] transplant coordinator. She stated that there should be no waiting period for medicare to cover transplant.

## 2019-10-28 NOTE — PROGRESS NOTES
[facility]  to inform. Was told  is out for a week. I spoke with Aydee Luciano and provided her with information as well as fax and email to submit appeal letter. She stated that she would get in touch with Lotus Gates, , to find out how to proceed with letter.

## 2019-10-31 ENCOUNTER — CLINICAL SUPPORT (OUTPATIENT)
Dept: FAMILY MEDICINE CLINIC | Age: 59
End: 2019-10-31

## 2019-10-31 ENCOUNTER — PATIENT OUTREACH (OUTPATIENT)
Dept: FAMILY MEDICINE CLINIC | Age: 59
End: 2019-10-31

## 2019-10-31 VITALS
HEIGHT: 66 IN | BODY MASS INDEX: 28.73 KG/M2 | SYSTOLIC BLOOD PRESSURE: 168 MMHG | HEART RATE: 77 BPM | WEIGHT: 178.8 LBS | TEMPERATURE: 98.8 F | RESPIRATION RATE: 18 BRPM | DIASTOLIC BLOOD PRESSURE: 97 MMHG | OXYGEN SATURATION: 95 %

## 2019-10-31 DIAGNOSIS — Z23 ENCOUNTER FOR IMMUNIZATION: ICD-10-CM

## 2019-10-31 NOTE — PROGRESS NOTES
Wife came to office to  a copy of patient's flu shot. Face to face introduction provided. Wife reports patient is doing \"ok\". He has no current needs at this time. Informed wife that I am awaiting an update on appeal letter for renal transplant from [facility]'s , who is out for the week. Wife verbalized understanding.

## 2019-11-06 ENCOUNTER — PATIENT OUTREACH (OUTPATIENT)
Dept: FAMILY MEDICINE CLINIC | Age: 59
End: 2019-11-06

## 2019-11-06 NOTE — PROGRESS NOTES
Received return call from [facility],  at [employer]. She informed me that she needs to call [facility] to see if patient would be covered for transplant on Medicare alone since he does not have a secondary policy. She reports that his Medicaid application was denied. Writer provided [facility] with phone number and point of contact at [facility] and requested a follow up call once she spoke with [facility]. [facility] agreed to do so.

## 2019-11-06 NOTE — PROGRESS NOTES
Carlota Pugh,  with [facility] to follow up on renal transplant appeal letter. Was told she was on a conference call. Contact information provided for return call.

## 2019-11-10 RX ORDER — FINASTERIDE 5 MG/1
TABLET, FILM COATED ORAL
Qty: 90 TAB | Refills: 0 | Status: SHIPPED | OUTPATIENT
Start: 2019-11-10 | End: 2020-01-01

## 2019-11-11 ENCOUNTER — PATIENT OUTREACH (OUTPATIENT)
Dept: FAMILY MEDICINE CLINIC | Age: 59
End: 2019-11-11

## 2019-11-18 ENCOUNTER — PATIENT OUTREACH (OUTPATIENT)
Dept: FAMILY MEDICINE CLINIC | Age: 59
End: 2019-11-18

## 2019-11-18 NOTE — PROGRESS NOTES
Complex Case Management  Follow-Up Date/Time:  11/18/2019 2:56 PM 
 
 Ambulatory Care Manager contacted patient wife, [facility]) for Complex Case Management  follow up. Spoke to patient's wife . Introduced self/role and reason for call. Wife reported:  
Patient is at dialysis at the time of call. Wife states patient is doing \"okay\". She reports he is continuing to do home strengthening exercises. Wife states that the dialysis social worker informed her that she has written the appeal letter for his renal transplant and she has forwarded it to the physician to sign. Patient reports taking medications as prescribed and denies needing any refills at this time. Wife denies any needs or concern at this time. Specialist appointments since last outreach? no 
If so, specialist and date: n/a Medications:  
New medications since last outreach: no 
Does patient need refills on any medications: no 
Medication changes since last outreach (dose adjustments or discontinued meds): no 
 
Home Health company: n/a Date of discharge: n/a Barriers to care? None at this time. Wife reminded that there are physicians on call 24 hours a day / 7 days a week (M-F 5pm to 8am and from Friday 5pm until Monday 8a for the weekend) should the patient have questions or concerns. Future Appointments Date Time Provider Alyce Brown 12/16/2019  8:40 AM Marv Mcbride MD 11 [facility]  
10/19/2020  8:00 AM Floridalma Aguilar  [facility] Other upcoming appointments: 
n/a 
 
Goals  Appeal letter written to [facility] to reopen transplant case. 10/17/19 Spoke with Myra Dodson at Dr. Yanira French office (patient's nephrologist). Was directed to call [facility] dialysis center 10/17/19 Spoke with Corrinne Baar at [facility] dialysis center. Message was sent to center's  requesting appeal letter.  Takes Medications as prescribed 10/17/19 Wife reports patient is taking medications as prescribed. She requested a refill for patient's clonidine patch. Ban Varma LPN at Dr. Hudson Carter office made aware.

## 2019-11-25 ENCOUNTER — PATIENT OUTREACH (OUTPATIENT)
Dept: FAMILY MEDICINE CLINIC | Age: 59
End: 2019-11-25

## 2019-11-25 NOTE — PROGRESS NOTES
Complex Case Management  Follow-Up Date/Time:  11/25/2019 3:35 PM 
 
 Ambulatory Care Manager contacted patient for Complex Case Management  follow up. Spoke to wife with patient on speaker phone. Introduced self/role and reason for call. Patient reported: He had just returned from dialysis so he is feeling tired. Denies any problems or concerns at this time. Wife reports his blood pressure has been doing \"good. \" Sodium intake was discussed. Wife states patient does not add salt to food. He was encouraged to be mindful of sodium intake over  the Thanksgiving holiday. Writer called the  at [facility] to follow up on renal transplant appeal letter and was told she was off today and should return tomorrow. Writer requested to leave a message but was told her voicemail was not set up Specialist appointments since last outreach? no 
If so, specialist and date: n/a Medications:  
New medications since last outreach: no 
Does patient need refills on any medications: no 
Medication changes since last outreach (dose adjustments or discontinued meds): no 
 
Home Health company: n/a Date of discharge: n/a Barriers to care? None at this time. Patient reminded that there are physicians on call 24 hours a day / 7 days a week (M-F 5pm to 8am and from Friday 5pm until Monday 8a for the weekend) should the patient have questions or concerns. Future Appointments Date Time Provider Alyce Brown 12/16/2019  8:40 AM Rula Sanz MD 11 [facility]  
10/19/2020  8:00 AM Soo Pritchard  [facility] Other upcoming appointments: 
n/a 
 
Goals  Appeal letter written to [facility] to reopen transplant case. 10/17/19 Spoke with Adalgisa Valencia at Dr. Rey Lin office (patient's nephrologist). Was directed to call [facility] 10/17/19 Spoke with Flori Rudolph at [facility].  Message was sent to center's  requesting appeal letter. 11/18/19  Wife states that the dialysis social worker informed her that she has written the appeal letter for his renal transplant and she has forwarded it to the physician to sign  Takes Medications as prescribed 10/17/19 Wife reports patient is taking medications as prescribed. She requested a refill for patient's clonidine patch. Bailee Orozco LPN at Dr. Cameron Harley office made aware. 11/18/19 Patient reports taking medications as prescribed and denies needing any refills at this time.

## 2019-12-02 ENCOUNTER — PATIENT OUTREACH (OUTPATIENT)
Dept: FAMILY MEDICINE CLINIC | Age: 59
End: 2019-12-02

## 2019-12-02 ENCOUNTER — PATIENT OUTREACH (OUTPATIENT)
Dept: INTERNAL MEDICINE CLINIC | Age: 59
End: 2019-12-02

## 2019-12-02 NOTE — PROGRESS NOTES
Carlota Pugh,  at [facility] to follow up on appeal letter for renal transplant. Was told she was not in today but would be back tomorrow.

## 2019-12-02 NOTE — PROGRESS NOTES
Social Work Assessment Date of referral: 12/2/2019 Referral received from: Sil Matt Reason for referral: Follow up for social needs in obtaining appeal letter for kidney transplant. Previous SW Referral:  No 
 
HISTORY OF PRESENT ILLNESS Marko Quiroz is a 61 y.o. male. ASSESSMENT and PLAN 
MSW received referral on Mr Sofia Saul who is in need of an appeal letter from [facility] in order for the patient to be approved for his kidney transplant. MSW contacted the [facility] facility on [facility] to speak with SW regarding letter. MSW was informed that the SW (Markel Au) is scheduled to return to work on 12/03/2019. MSW attempted to contact Dacia Robertson (MSW for [facility]) and message was left on her voicemail to return call. MSW will continue to follow up for social needs.

## 2019-12-04 ENCOUNTER — PATIENT OUTREACH (OUTPATIENT)
Dept: INTERNAL MEDICINE CLINIC | Age: 59
End: 2019-12-04

## 2019-12-05 ENCOUNTER — OFFICE VISIT (OUTPATIENT)
Dept: VASCULAR SURGERY | Age: 59
End: 2019-12-05

## 2019-12-05 VITALS
HEART RATE: 76 BPM | RESPIRATION RATE: 14 BRPM | HEIGHT: 66 IN | BODY MASS INDEX: 28.61 KG/M2 | SYSTOLIC BLOOD PRESSURE: 152 MMHG | DIASTOLIC BLOOD PRESSURE: 70 MMHG | WEIGHT: 178 LBS

## 2019-12-05 DIAGNOSIS — N18.6 ESRD (END STAGE RENAL DISEASE) ON DIALYSIS (HCC): Primary | ICD-10-CM

## 2019-12-05 DIAGNOSIS — Z99.2 ESRD (END STAGE RENAL DISEASE) ON DIALYSIS (HCC): Primary | ICD-10-CM

## 2019-12-05 NOTE — PROGRESS NOTES
1. Have you been to an emergency room or urgent care clinic since your last visit?  
no 
Hospitalized since your last visit? If yes, where, when, and reason for visit?  
no 
2. Have you seen or consulted any other health care providers outside of the [facility] since your last visit including any procedures, health maintenance items. If yes, where, when and reason for visit?

## 2019-12-05 NOTE — PROGRESS NOTES
Carla Padron Chief Complaint Patient presents with  End Stage Renal Disease History and Physical   
Mr Vanessa Dupree is here at his own request to have his AVG assessed It has been in use for over a year - we removed his catheter at his last visit last fall He is not having any reported issues from dialysis However he and his wife say that there are times he has some longer bleeding They relate that it seems to depend on who sticks him Past Medical History:  
Diagnosis Date  Blindness of one eye   
 left  Cardiac echocardiogram 10/21/2016 EF 60-65%. No WMA. Mod-marked LVH. Normal diastolic fx. No significant valvular heart disease.  Cardiac treadmill stress test, low risk 11/02/2012 Negative maximal exercise treadmill test.  Ex time 7 min 45 sec.  Cardiovascular LE peripheral arterial testing 03/22/2016 No significant peripheral arterial disease at rest bilaterally. ABIs deferred due to calcified vessels.  Cardiovascular LLE venous duplex 06/06/2012 Left leg:  No DVT.  Cardiovascular renal duplex 10/21/2016 No significant renal artery stenosis.  Chronic kidney disease   
 [id number]  
 CKD (chronic kidney disease), stage V ([address]  Diabetes mellitus ([address] 3/12/2010  Diabetic retinopathy ([address] 5/4/2010  Edema of both legs  Eye examination 5/4/10 OU: 20/20; OD: 20/25; OS: 20/25 without correction.  GERD (gastroesophageal reflux disease)  Glaucoma 08/17/2017  
 Marlysjordi Kumars  
 HLD (hyperlipidemia) 3/12/2010  
 HTN (hypertension) 3/12/2010  Orthostatic hypertension Patient Active Problem List  
Diagnosis Code  HLD (hyperlipidemia) E78.5  
 HTN (hypertension) I10  
 Diabetic retinopathy ([address] E11.319  
 Noncompliance with treatment Z91.19  Type II or unspecified type diabetes mellitus without mention of complication, uncontrolled E11.65  Paresthesias/numbness R20.9  Diabetes mellitus with renal manifestations, uncontrolled ([facility]) E11.29, E11.65  Diabetic foot ulcer ([facility] E11.621, L97.509  Gangrene of toe ([facility]) I96  
 Dry gangrene ([facility] C62  Chronic kidney disease, stage IV (severe) ([facility] N18.4  Renal failure (ARF), acute on chronic ([facility]) N17.9, N18.9  Diabetic nephropathy ([facility]) E11.21  
 Hypertension I10  
 Malignant hypertension I10  
 CKD (chronic kidney disease) N18.9  Hyperlipidemia E78.5  Type 2 diabetes mellitus with complication, with long-term current use of insulin ([facility]) E11.8, Z79.4  Orthostatic hypotension I95.1  Chest pain R07.9  Stage 5 chronic kidney disease not on chronic dialysis ([facility] N18.5  Diabetes mellitus due to underlying condition, uncontrolled, with diabetic nephropathy, with long-term current use of insulin ([facility]) E08.21, E08.65, Z79.4  Elevated troponin R79.89  
 Kidney disease N28.9  ESRD (end stage renal disease) ([facility] N18.6  Anemia D64.9  Hypoalbuminemia E88.09  
 Blindness of one eye H54.40  ESRD on dialysis ([facility] N18.6, Z99.2  Status post amputation of foot ([facility] X65.630  Status post amputation of toe of left foot ([facility] K39.040 Past Surgical History:  
Procedure Laterality Date  COLONOSCOPY N/A 1/10/2019 COLONOSCOPY w polyp[ectomy performed by Luke Che MD at [address] Left TOES-small  HX HERNIA REPAIR  2005  HX OTHER SURGICAL  11/07/2017  
 glaucoma valve- Ahmed  HX VASCULAR ACCESS    
 HD catheter right neck Current Outpatient Medications Medication Sig Dispense Refill  finasteride (PROSCAR) 5 mg tablet TAKE 1 TABLET BY MOUTH EVERY DAY 90 Tab 0  cloNIDine (CATAPRES) 0.3 mg/24 hr APPLY 1 PATCH TO SKIN ONCE EVERY 7 DAYS 4 Patch 6  
 hydrALAZINE (APRESOLINE) 50 mg tablet Take 1 Tab by mouth two (2) times a day. 180 Tab 3  
 brinzolamide (AZOPT OP) Apply  to eye. Indications: 3 times daily  amLODIPine (NORVASC) 10 mg tablet TAKE 1 TABLET BY MOUTH DAILY. 30 Tab 6  
 netarsudil (RHOPRESSA) 0.02 % drop Apply  to eye.  brimonidine-timolol (COMBIGAN) 0.2-0.5 % drop ophthalmic solution Administer 1 Drop to both eyes three (3) times daily.  soft lens rinse,store solution (SHYLA SALINE SENSITIVE EYES) by Does Not Apply route.  losartan (COZAAR) 100 mg tablet Take 100 mg by mouth daily.  lidocaine-prilocaine (EMLA) topical cream Apply  to affected area as needed for Pain (apply to left arm 30 minutes prior to dialysis). 30 g 12  
 b complex-vitamin c-folic acid (NEPHROCAPS) 1 mg capsule Take 1 Cap by mouth daily. 30 Cap 6  calcium acetate (PHOSLO) 667 mg cap Take 1 Cap by mouth three (3) times daily (with meals). (Patient taking differently: Take 3 Caps by mouth three (3) times daily (with meals). ) 90 Cap 2  
 insulin lispro (HUMALOG) 100 unit/mL injection Blood Sugar (mg/dL): <150 =0 units; 150 -199 =2 units; 200 -249 =4 units; 250 -299 =6 units; 300 -349 =8 units; 350 and above =10 units. 1 Vial 2  
 acetaminophen (TYLENOL EXTRA STRENGTH) 500 mg tablet Take  by mouth every six (6) hours as needed for Pain.  cyclobenzaprine (FLEXERIL) 10 mg tablet Take 1 Tab by mouth three (3) times daily as needed for Muscle Spasm(s). 30 Tab 0  
 rosuvastatin (CRESTOR) 20 mg tablet Take 1 Tab by mouth nightly. 30 Tab 6  
 docusate sodium (COLACE) 100 mg capsule Take 1 Cap by mouth two (2) times a day. 60 Cap 0  
 fluticasone (FLONASE) 50 mcg/actuation nasal spray 1 [place] by Both Nostrils route two (2) times a day. (Patient taking differently: 1 Spray by Both Nostrils route as needed.) 1 Bottle 2  
 cyanocobalamin (VITAMIN B-12) 1,000 mcg tablet Take 1,000 mcg by mouth daily. Allergies Allergen Reactions  Bactrim [Sulfamethoprim Ds] Nausea and Vomiting  Coreg [Carvedilol] Nausea and Vomiting Difficulty walking Physical  
Visit Vitals /70 (BP 1 Location: Right arm, BP Patient Position: Sitting) Pulse 76 Resp 14 Ht 5' 6\" (1.676 m) Wt 178 lb (80.7 kg) BMI 28.73 kg/m² General:  Alert, cooperative, no distress. Head:  Normocephalic, without obvious abnormality, atraumatic. Eyes: 
  Conjunctivae/corneas clear. Pupils equal, round, reactive to light. Extraocular movements intact. Extremities: Patent left arm AVG with good thrill and bruit Pulses: Palpable radial pulse Skin: No overlying graft skin issues/concerns Impression/Plan: ICD-10-CM ICD-9-CM 1. ESRD (end stage renal disease) on dialysis ([facility]) N18.6 585.6 DUPLEX HEMODIALYSIS ACCESS LEFT  
 Z99.2 V45.11 No orders of the defined types were placed in this encounter. Will plan on duplex study of graft and call back with results and recommendations Will reach out to nephrologist as well to ensure no problems we aren't otherwise aware of 
 
 
NAZANIN Chew Portions of this note have been entered using voice recognition software.

## 2019-12-09 ENCOUNTER — PATIENT OUTREACH (OUTPATIENT)
Dept: INTERNAL MEDICINE CLINIC | Age: 59
End: 2019-12-09

## 2019-12-09 NOTE — PROGRESS NOTES
Social Work Assessment Date of referral: 12/02/2019 Referral received from: Flaquita Johnson - [facility] 
Reason for referral: To assist with social needs in obtaining appeal letter for kidney transplant Previous  Referral:  No 
 
 
HISTORY OF PRESENT ILLNESS Gertrude Bernheim is a 61 y.o. male. ASSESSMENT and PLAN 
MSW contacted the SW at [facility] on [facility] for updates on appeal letter. She informed that the letter is completed, however she is waiting on the Nephrologist signature in order to send to [address] [place] [address]. She also mentioned that she has a packet prepared to be sent with the letter. MSW offered to  letter and will work on obtaining the [address]. MSW contacted [facility] dialysis unit and was informed that physician should be in the building today to see his patients who are receiving dialysis. MSW will plan to meet with physician to obtain signature on the appeal letter and tosubmit letter to [address] [place] [address].    
 
9:55am 
MSW contacted the Nephrologist office ([phone]) and  informs that he is scheduled to be in his office at 2:00pm. She shares that she will have SW at [facility] to send the letter to her and she will obtain his signature and fax it over to MSW once completed. MSW will continue to follow for completion. 10:40am 
Call received from the Nephrologist Juanjose Waters who reported that he signed the appeal letter last Tuesday or Wednesday and left it for the SW at [facility] to submit to [address] [place] [address].  
10:50am 
MSW contacted the SW at [facility] who informed that she just got off the phone with the Nephrologist and she plans on submitting packet to [address] [place] [address] today. MSW requested for a copy of letter to be sent and  reported that the pt and his spouse is uncertain of this MSW's role and were apprehensive in having any information sent over. Once MSW provided information to SW, she shared that she will inform the patient and spouse in order to obtain approval to send copy of letter.

## 2019-12-10 ENCOUNTER — PATIENT OUTREACH (OUTPATIENT)
Dept: INTERNAL MEDICINE CLINIC | Age: 59
End: 2019-12-10

## 2019-12-10 NOTE — PROGRESS NOTES
Social Work Assessment Date of referral: 12/02/2019 Referral received from: Jaswinder Kimble Reason for referral: To assist with social needs in obtaining appeal letter for kidney transplant Previous SW Referral:No 
 
 
Income Information (if needed):N/A Support System: Spouse Medication Cost assistance needed: N/A Referral to LTC/Medicaid needed: N/A Referral to Medicaid extra Help/LIS program needed: N/A Small home repair needed: N/A 
 
 
HISTORY OF PRESENT ILLNESS Bessy Wiseman is a 61 y.o. male. ASSESSMENT and PLAN 
MSW attempted to contact SW at [facility] to confirm submission of appeal letter to [facility]. SW not available at work today. MSW will follow up for updates.

## 2019-12-12 ENCOUNTER — PATIENT OUTREACH (OUTPATIENT)
Dept: INTERNAL MEDICINE CLINIC | Age: 59
End: 2019-12-12

## 2019-12-12 NOTE — PROGRESS NOTES
Social Work Assessment Date of referral: 12/02/2019 Referral received from: Katelyn Bradshaw Reason for referral: To assist with social needs in obtaining appeal letter for kidney transplant Previous SW Referral:  no If yes, brief summary and outcome:  N/A Income Information (if needed): N/A Support System: Spouse Medication Cost assistance needed: N/A Referral to LTC/Medicaid needed: N/A Referral to Medicaid extra Help/LIS program needed: N/A Small home repair needed: N/A 
 
HISTORY OF PRESENT ILLNESS Nery Champion is a 61 y.o. male. ASSESSMENT and PLAN 
MSW attempted to contact SW at [facility] and was informed that SW was not available today but will return tomorrow. MSW will continue to follow up for updates on submission on appeal letter to [facility].

## 2019-12-16 ENCOUNTER — TELEPHONE (OUTPATIENT)
Dept: FAMILY MEDICINE CLINIC | Age: 59
End: 2019-12-16

## 2019-12-16 ENCOUNTER — PATIENT OUTREACH (OUTPATIENT)
Dept: INTERNAL MEDICINE CLINIC | Age: 59
End: 2019-12-16

## 2019-12-16 NOTE — PROGRESS NOTES
Social Work Assessment Date of referral: 12/20/2019 Referral received from: Shani Eddy RN 
Reason for referral: To assist with social needs in obtaining appeal letter for kidney transplant Previous SW Referral:  no If yes, brief summary and outcome:  n/a Income Information (if needed): n/a Support System: Spouse Medication Cost assistance needed: n/a Referral to LTC/Medicaid needed: n/a Referral to Medicaid extra Help/LIS program needed: n/a Small home repair needed: n/a HISTORY OF PRESENT ILLNESS Chelo Giron is a 61 y.o. male. ASSESSMENT and PLAN 
MSW was informed by ASAD - Shani Davies that the patient's spouse informed her that [facility] has scheduled renal transplant workup for the patient on 3/17/19. MSW will assist as needed.
No

## 2019-12-16 NOTE — TELEPHONE ENCOUNTER
Wife of patient requesting a call back from Tino.  Would only state that it was in reference to her . Contact # [phone]

## 2019-12-16 NOTE — TELEPHONE ENCOUNTER
Returned Timur Pancho José Manuelmichael call. (PHI verified) Wife called to inform ACM that patient has been scheduled for renal transplant workup at [facility] on 3/17/19.

## 2019-12-30 ENCOUNTER — PATIENT OUTREACH (OUTPATIENT)
Dept: FAMILY MEDICINE CLINIC | Age: 59
End: 2019-12-30

## 2019-12-30 NOTE — PROGRESS NOTES
Complex Case Management  Follow-Up    Date/Time:  12/30/2019 12:48 PM     Ambulatory Care Manager contacted patient's wife Tomrom [facility]) for Complex Case Management  follow up. Spoke to Wife. Introduced self/role and reason for call. Wife reported:   Patient is doing well. He is currently at dialysis. He is taking medications as prescribed. Appointment for renal transplant evaluation at [facility] is 3/17/19. She and patient are looking forward to it. Wife denies any other needs or concerns at this time. Upcoming appointments were reviewed with patient's wife. Patient's wife  verbalized acknowledgement. Pertinent negatives:  Patient denies cp, sob, dizziness, n/v, fever, diarrhea, constipation, urinary frequency or retention. Specialist appointments since last outreach? no  If so, specialist and date: n/a    Medications:   New medications since last outreach: no  Does patient need refills on any medications: no  Medication changes since last outreach (dose adjustments or discontinued meds): no    Home Health company: n/a  Date of discharge: n/a    Barriers to care? None at this time. Patient's wife reminded that there are physicians on call 24 hours a day / 7 days a week (M-F 5pm to 8am and from Friday 5pm until Monday 8a for the weekend) should the patient have questions or concerns. Future Appointments   Date Time Provider Alyce Brown   1/2/2020  9:00 AM [facility] 2 2VV MARYELLEN SCHED   1/28/2020 12:40 PM Vidal Aranda MD [facility]   10/19/2020  8:00 AM Shaquille Simpson MD [address] [place] [address]        Other upcoming appointments:  [facility] renal transplant evaluation 3/17/20      Patient has graduated from the Complex Case Management  program on 12/30/19. Patient's symptoms are stable at this time. Patient/family has the ability to self-manage. Care management goals have been completed at this time. No further nurse navigator follow up scheduled.     Goals Addressed                 This Visit's Progress     COMPLETED: Appeal letter written to [facility] to reopen transplant case. 10/17/19 Spoke with Son Rodriguez at Dr. Anum Craft office (patient's nephrologist). Was directed to call [facility]  10/17/19 Spoke with Leland Morrison at [facility]. Message was sent to center's  requesting appeal letter. 11/18/19  Wife states that the dialysis social worker informed her that she has written the appeal letter for his renal transplant and she has forwarded it to the physician to sign    11/25/19 Called  at [facility] and was told she is off today. 12/30/19 Appeal letter written and submitted. Patient scheduled for renal transplant evaluation at [facility] on 3/17/20.  COMPLETED: Takes Medications as prescribed        10/17/19 Wife reports patient is taking medications as prescribed. She requested a refill for patient's clonidine patch. Armando Lyles LPN at Dr. Maliha Gallagher office made aware. 11/18/19 Patient reports taking medications as prescribed and denies needing any refills at this time. 11/25/19 Patient reports taking medications as prescribed and denies needing any refills at this time. 12/30/19 Patient's wife reports patient is taking medications as prescribed and denies needing any refills at this time. Pt has nurse navigator's contact information for any further questions, concerns, or needs.   Patients upcoming visits:    Future Appointments   Date Time Provider Alyce Brown   1/2/2020  9:00 AM [facility] Vishal Haywoodpaxtonrussell Tellez [address]   1/28/2020 12:40 PM Marybeth Chu MD 11 [facility]   10/19/2020  8:00 AM Alexander Larsen  [facility]

## 2020-01-01 ENCOUNTER — ANESTHESIA (OUTPATIENT)
Dept: SURGERY | Age: 60
End: 2020-01-01
Payer: MEDICARE

## 2020-01-01 ENCOUNTER — PATIENT OUTREACH (OUTPATIENT)
Dept: CASE MANAGEMENT | Age: 60
End: 2020-01-01

## 2020-01-01 ENCOUNTER — APPOINTMENT (OUTPATIENT)
Dept: GENERAL RADIOLOGY | Age: 60
End: 2020-01-01
Attending: EMERGENCY MEDICINE
Payer: MEDICARE

## 2020-01-01 ENCOUNTER — HOSPITAL ENCOUNTER (OUTPATIENT)
Age: 60
Setting detail: OUTPATIENT SURGERY
Discharge: HOME | End: 2020-12-14
Attending: PODIATRIST | Admitting: PODIATRIST
Payer: MEDICARE

## 2020-01-01 ENCOUNTER — OFFICE VISIT (OUTPATIENT)
Dept: CARDIOLOGY CLINIC | Age: 60
End: 2020-01-01
Payer: MEDICARE

## 2020-01-01 ENCOUNTER — HOSPITAL ENCOUNTER (EMERGENCY)
Age: 60
Discharge: HOME | End: 2020-12-10
Attending: EMERGENCY MEDICINE
Payer: MEDICARE

## 2020-01-01 ENCOUNTER — OFFICE VISIT (OUTPATIENT)
Dept: FAMILY MEDICINE CLINIC | Age: 60
End: 2020-01-01
Payer: MEDICARE

## 2020-01-01 ENCOUNTER — HOSPITAL ENCOUNTER (OUTPATIENT)
Dept: LAB | Age: 60
Discharge: HOME | End: 2020-09-28
Payer: MEDICARE

## 2020-01-01 ENCOUNTER — DOCUMENTATION ONLY (OUTPATIENT)
Dept: FAMILY MEDICINE CLINIC | Age: 60
End: 2020-01-01

## 2020-01-01 ENCOUNTER — OFFICE VISIT (OUTPATIENT)
Dept: FAMILY MEDICINE CLINIC | Age: 60
End: 2020-01-01

## 2020-01-01 ENCOUNTER — ANESTHESIA EVENT (OUTPATIENT)
Dept: SURGERY | Age: 60
End: 2020-01-01
Payer: MEDICARE

## 2020-01-01 VITALS
SYSTOLIC BLOOD PRESSURE: 120 MMHG | HEART RATE: 85 BPM | RESPIRATION RATE: 16 BRPM | TEMPERATURE: 98 F | DIASTOLIC BLOOD PRESSURE: 80 MMHG | BODY MASS INDEX: 28.12 KG/M2 | OXYGEN SATURATION: 100 % | HEIGHT: 66 IN | WEIGHT: 175 LBS

## 2020-01-01 VITALS
SYSTOLIC BLOOD PRESSURE: 152 MMHG | TEMPERATURE: 98.7 F | HEART RATE: 69 BPM | BODY MASS INDEX: 26.93 KG/M2 | HEIGHT: 66 IN | OXYGEN SATURATION: 97 % | WEIGHT: 167.6 LBS | DIASTOLIC BLOOD PRESSURE: 82 MMHG | RESPIRATION RATE: 16 BRPM

## 2020-01-01 VITALS
BODY MASS INDEX: 28.45 KG/M2 | SYSTOLIC BLOOD PRESSURE: 187 MMHG | HEART RATE: 69 BPM | HEIGHT: 66 IN | TEMPERATURE: 99.6 F | DIASTOLIC BLOOD PRESSURE: 73 MMHG | RESPIRATION RATE: 14 BRPM | OXYGEN SATURATION: 100 % | WEIGHT: 177 LBS

## 2020-01-01 VITALS
WEIGHT: 175.4 LBS | OXYGEN SATURATION: 97 % | SYSTOLIC BLOOD PRESSURE: 160 MMHG | DIASTOLIC BLOOD PRESSURE: 84 MMHG | HEIGHT: 66 IN | BODY MASS INDEX: 28.19 KG/M2 | HEART RATE: 79 BPM

## 2020-01-01 VITALS
RESPIRATION RATE: 20 BRPM | WEIGHT: 174 LBS | OXYGEN SATURATION: 99 % | DIASTOLIC BLOOD PRESSURE: 90 MMHG | HEIGHT: 66 IN | HEART RATE: 79 BPM | BODY MASS INDEX: 27.97 KG/M2 | SYSTOLIC BLOOD PRESSURE: 214 MMHG | TEMPERATURE: 97.5 F

## 2020-01-01 DIAGNOSIS — E78.00 HYPERCHOLESTEREMIA: ICD-10-CM

## 2020-01-01 DIAGNOSIS — Z89.422 STATUS POST AMPUTATION OF TOE OF LEFT FOOT (HCC): ICD-10-CM

## 2020-01-01 DIAGNOSIS — E11.8 TYPE 2 DIABETES MELLITUS WITH COMPLICATION, WITH LONG-TERM CURRENT USE OF INSULIN (HCC): ICD-10-CM

## 2020-01-01 DIAGNOSIS — L97.528 DIABETIC ULCER OF OTHER PART OF LEFT FOOT ASSOCIATED WITH DIABETES MELLITUS DUE TO UNDERLYING CONDITION, WITH OTHER ULCER SEVERITY (HCC): ICD-10-CM

## 2020-01-01 DIAGNOSIS — Z79.4 TYPE 2 DIABETES MELLITUS WITH COMPLICATION, WITH LONG-TERM CURRENT USE OF INSULIN (HCC): ICD-10-CM

## 2020-01-01 DIAGNOSIS — I10 HTN (HYPERTENSION), MALIGNANT: Primary | ICD-10-CM

## 2020-01-01 DIAGNOSIS — Z89.432 STATUS POST AMPUTATION OF LEFT FOOT (HCC): ICD-10-CM

## 2020-01-01 DIAGNOSIS — E08.621 DIABETIC ULCER OF OTHER PART OF LEFT FOOT ASSOCIATED WITH DIABETES MELLITUS DUE TO UNDERLYING CONDITION, WITH OTHER ULCER SEVERITY (HCC): ICD-10-CM

## 2020-01-01 DIAGNOSIS — Z00.00 MEDICARE ANNUAL WELLNESS VISIT, SUBSEQUENT: Primary | ICD-10-CM

## 2020-01-01 DIAGNOSIS — R73.9 HYPERGLYCEMIA: ICD-10-CM

## 2020-01-01 DIAGNOSIS — E78.5 HYPERLIPIDEMIA, UNSPECIFIED HYPERLIPIDEMIA TYPE: ICD-10-CM

## 2020-01-01 DIAGNOSIS — N18.6 ESRD (END STAGE RENAL DISEASE) ON DIALYSIS (HCC): ICD-10-CM

## 2020-01-01 DIAGNOSIS — N18.6 ESRD (END STAGE RENAL DISEASE) (HCC): ICD-10-CM

## 2020-01-01 DIAGNOSIS — Z99.2 ESRD (END STAGE RENAL DISEASE) ON DIALYSIS (HCC): ICD-10-CM

## 2020-01-01 DIAGNOSIS — I96 GANGRENE OF TOE OF LEFT FOOT (HCC): Primary | ICD-10-CM

## 2020-01-01 DIAGNOSIS — I96 GANGRENE (HCC): ICD-10-CM

## 2020-01-01 DIAGNOSIS — I10 ESSENTIAL HYPERTENSION: Primary | ICD-10-CM

## 2020-01-01 LAB
ALBUMIN SERPL-MCNC: 4 G/DL (ref 3.4–5)
ALBUMIN/GLOB SERPL: 1.1 {RATIO} (ref 0.8–1.7)
ALP SERPL-CCNC: 96 U/L (ref 45–117)
ALT SERPL-CCNC: 36 U/L (ref 16–61)
ANION GAP SERPL CALC-SCNC: 11 MMOL/L (ref 3–18)
ANION GAP SERPL CALC-SCNC: 8 MMOL/L (ref 3–18)
AST SERPL-CCNC: 20 U/L (ref 10–38)
BACTERIA SPEC CULT: NORMAL
BASOPHILS # BLD: 0 K/UL (ref 0–0.06)
BASOPHILS NFR BLD: 0 % (ref 0–3)
BILIRUB SERPL-MCNC: 0.4 MG/DL (ref 0.2–1)
BUN SERPL-MCNC: 18 MG/DL (ref 7–18)
BUN SERPL-MCNC: 53 MG/DL (ref 7–18)
BUN/CREAT SERPL: 3 (ref 12–20)
BUN/CREAT SERPL: 5 (ref 12–20)
CALCIUM SERPL-MCNC: 8.6 MG/DL (ref 8.5–10.1)
CALCIUM SERPL-MCNC: 9.7 MG/DL (ref 8.5–10.1)
CHLORIDE SERPL-SCNC: 94 MMOL/L (ref 100–111)
CHLORIDE SERPL-SCNC: 94 MMOL/L (ref 100–111)
CHOLEST SERPL-MCNC: 207 MG/DL
CO2 SERPL-SCNC: 30 MMOL/L (ref 21–32)
CO2 SERPL-SCNC: 32 MMOL/L (ref 21–32)
COVID-19 RAPID TEST, COVR: NOT DETECTED
CREAT SERPL-MCNC: 10.1 MG/DL (ref 0.6–1.3)
CREAT SERPL-MCNC: 5.68 MG/DL (ref 0.6–1.3)
DIFFERENTIAL METHOD BLD: ABNORMAL
EOSINOPHIL # BLD: 0.2 K/UL (ref 0–0.4)
EOSINOPHIL NFR BLD: 2 % (ref 0–5)
ERYTHROCYTE [DISTWIDTH] IN BLOOD BY AUTOMATED COUNT: 13.9 % (ref 11.6–14.5)
EST. AVERAGE GLUCOSE BLD GHB EST-MCNC: 174 MG/DL
GLOBULIN SER CALC-MCNC: 3.6 G/DL (ref 2–4)
GLUCOSE BLD STRIP.AUTO-MCNC: 228 MG/DL (ref 70–110)
GLUCOSE BLD STRIP.AUTO-MCNC: 88 MG/DL (ref 70–110)
GLUCOSE SERPL-MCNC: 132 MG/DL (ref 74–99)
GLUCOSE SERPL-MCNC: 299 MG/DL (ref 74–99)
HBA1C MFR BLD: 7.7 % (ref 4.2–5.6)
HCT VFR BLD AUTO: 28.9 % (ref 36–48)
HDLC SERPL-MCNC: 63 MG/DL (ref 40–60)
HDLC SERPL: 3.3 {RATIO} (ref 0–5)
HGB BLD-MCNC: 9.3 G/DL (ref 13–16)
LDLC SERPL CALC-MCNC: 128.2 MG/DL (ref 0–100)
LIPID PROFILE,FLP: ABNORMAL
LYMPHOCYTES # BLD: 1.7 K/UL (ref 0.8–3.5)
LYMPHOCYTES NFR BLD: 14 % (ref 20–51)
MCH RBC QN AUTO: 30 PG (ref 24–34)
MCHC RBC AUTO-ENTMCNC: 32.2 G/DL (ref 31–37)
MCV RBC AUTO: 93.2 FL (ref 74–97)
MONOCYTES # BLD: 1.2 K/UL (ref 0–1)
MONOCYTES NFR BLD: 10 % (ref 2–9)
NEUTS SEG # BLD: 8.8 K/UL (ref 1.8–8)
NEUTS SEG NFR BLD: 74 % (ref 42–75)
PLATELET # BLD AUTO: 400 K/UL (ref 135–420)
PLATELET COMMENTS,PCOM: ABNORMAL
PMV BLD AUTO: 9.7 FL (ref 9.2–11.8)
POTASSIUM SERPL-SCNC: 3.6 MMOL/L (ref 3.5–5.5)
POTASSIUM SERPL-SCNC: 4.2 MMOL/L (ref 3.5–5.5)
PROT SERPL-MCNC: 7.6 G/DL (ref 6.4–8.2)
RBC # BLD AUTO: 3.1 M/UL (ref 4.7–5.5)
RBC MORPH BLD: ABNORMAL
RBC MORPH BLD: ABNORMAL
SERVICE CMNT-IMP: NORMAL
SODIUM SERPL-SCNC: 132 MMOL/L (ref 136–145)
SODIUM SERPL-SCNC: 137 MMOL/L (ref 136–145)
SOURCE, COVRS: NORMAL
SPECIMEN TYPE, XMCV1T: NORMAL
TRIGL SERPL-MCNC: 79 MG/DL (ref ?–150)
VLDLC SERPL CALC-MCNC: 15.8 MG/DL
WBC # BLD AUTO: 11.9 K/UL (ref 4.6–13.2)

## 2020-01-01 PROCEDURE — 74011250636 HC RX REV CODE- 250/636: Performed by: EMERGENCY MEDICINE

## 2020-01-01 PROCEDURE — 87040 BLOOD CULTURE FOR BACTERIA: CPT

## 2020-01-01 PROCEDURE — 99284 EMERGENCY DEPT VISIT MOD MDM: CPT

## 2020-01-01 PROCEDURE — 74011000258 HC RX REV CODE- 258: Performed by: NURSE ANESTHETIST, CERTIFIED REGISTERED

## 2020-01-01 PROCEDURE — 74011636637 HC RX REV CODE- 636/637: Performed by: EMERGENCY MEDICINE

## 2020-01-01 PROCEDURE — 99285 EMERGENCY DEPT VISIT HI MDM: CPT

## 2020-01-01 PROCEDURE — G9231 DOC ESRD DIA TRANS PREG: HCPCS | Performed by: INTERNAL MEDICINE

## 2020-01-01 PROCEDURE — G8427 DOCREV CUR MEDS BY ELIG CLIN: HCPCS | Performed by: INTERNAL MEDICINE

## 2020-01-01 PROCEDURE — 3017F COLORECTAL CA SCREEN DOC REV: CPT | Performed by: FAMILY MEDICINE

## 2020-01-01 PROCEDURE — 36415 COLL VENOUS BLD VENIPUNCTURE: CPT

## 2020-01-01 PROCEDURE — 99213 OFFICE O/P EST LOW 20 MIN: CPT | Performed by: FAMILY MEDICINE

## 2020-01-01 PROCEDURE — G9231 DOC ESRD DIA TRANS PREG: HCPCS | Performed by: FAMILY MEDICINE

## 2020-01-01 PROCEDURE — 82962 GLUCOSE BLOOD TEST: CPT

## 2020-01-01 PROCEDURE — 88311 DECALCIFY TISSUE: CPT

## 2020-01-01 PROCEDURE — 74011000250 HC RX REV CODE- 250: Performed by: PODIATRIST

## 2020-01-01 PROCEDURE — G8419 CALC BMI OUT NRM PARAM NOF/U: HCPCS | Performed by: INTERNAL MEDICINE

## 2020-01-01 PROCEDURE — 74011250636 HC RX REV CODE- 250/636: Performed by: NURSE ANESTHETIST, CERTIFIED REGISTERED

## 2020-01-01 PROCEDURE — 76210000021 HC REC RM PH II 0.5 TO 1 HR: Performed by: PODIATRIST

## 2020-01-01 PROCEDURE — 96365 THER/PROPH/DIAG IV INF INIT: CPT

## 2020-01-01 PROCEDURE — 87075 CULTR BACTERIA EXCEPT BLOOD: CPT

## 2020-01-01 PROCEDURE — 80048 BASIC METABOLIC PNL TOTAL CA: CPT

## 2020-01-01 PROCEDURE — 3051F HG A1C>EQUAL 7.0%<8.0%: CPT | Performed by: FAMILY MEDICINE

## 2020-01-01 PROCEDURE — 2709999900 HC NON-CHARGEABLE SUPPLY: Performed by: PODIATRIST

## 2020-01-01 PROCEDURE — 3051F HG A1C>EQUAL 7.0%<8.0%: CPT | Performed by: INTERNAL MEDICINE

## 2020-01-01 PROCEDURE — G8419 CALC BMI OUT NRM PARAM NOF/U: HCPCS | Performed by: FAMILY MEDICINE

## 2020-01-01 PROCEDURE — 93000 ELECTROCARDIOGRAM COMPLETE: CPT | Performed by: INTERNAL MEDICINE

## 2020-01-01 PROCEDURE — 74011636637 HC RX REV CODE- 636/637: Performed by: NURSE ANESTHETIST, CERTIFIED REGISTERED

## 2020-01-01 PROCEDURE — 83036 HEMOGLOBIN GLYCOSYLATED A1C: CPT

## 2020-01-01 PROCEDURE — 74011000250 HC RX REV CODE- 250: Performed by: NURSE ANESTHETIST, CERTIFIED REGISTERED

## 2020-01-01 PROCEDURE — G0439 PPPS, SUBSEQ VISIT: HCPCS | Performed by: FAMILY MEDICINE

## 2020-01-01 PROCEDURE — 2022F DILAT RTA XM EVC RTNOPTHY: CPT | Performed by: FAMILY MEDICINE

## 2020-01-01 PROCEDURE — 2022F DILAT RTA XM EVC RTNOPTHY: CPT | Performed by: INTERNAL MEDICINE

## 2020-01-01 PROCEDURE — G8510 SCR DEP NEG, NO PLAN REQD: HCPCS | Performed by: FAMILY MEDICINE

## 2020-01-01 PROCEDURE — 3017F COLORECTAL CA SCREEN DOC REV: CPT | Performed by: INTERNAL MEDICINE

## 2020-01-01 PROCEDURE — 99214 OFFICE O/P EST MOD 30 MIN: CPT | Performed by: INTERNAL MEDICINE

## 2020-01-01 PROCEDURE — 76060000032 HC ANESTHESIA 0.5 TO 1 HR: Performed by: PODIATRIST

## 2020-01-01 PROCEDURE — 88305 TISSUE EXAM BY PATHOLOGIST: CPT

## 2020-01-01 PROCEDURE — 76010000138 HC OR TIME 0.5 TO 1 HR: Performed by: PODIATRIST

## 2020-01-01 PROCEDURE — G8510 SCR DEP NEG, NO PLAN REQD: HCPCS | Performed by: INTERNAL MEDICINE

## 2020-01-01 PROCEDURE — 77030031139 HC SUT VCRL2 J&J -A: Performed by: PODIATRIST

## 2020-01-01 PROCEDURE — 85025 COMPLETE CBC W/AUTO DIFF WBC: CPT

## 2020-01-01 PROCEDURE — 73630 X-RAY EXAM OF FOOT: CPT

## 2020-01-01 PROCEDURE — 80053 COMPREHEN METABOLIC PANEL: CPT

## 2020-01-01 PROCEDURE — 80061 LIPID PANEL: CPT

## 2020-01-01 PROCEDURE — G8427 DOCREV CUR MEDS BY ELIG CLIN: HCPCS | Performed by: FAMILY MEDICINE

## 2020-01-01 PROCEDURE — 96366 THER/PROPH/DIAG IV INF ADDON: CPT

## 2020-01-01 PROCEDURE — 87635 SARS-COV-2 COVID-19 AMP PRB: CPT

## 2020-01-01 PROCEDURE — 76210000006 HC OR PH I REC 0.5 TO 1 HR: Performed by: PODIATRIST

## 2020-01-01 PROCEDURE — 01480 ANES OPEN PX LOWER L/A/F NOS: CPT | Performed by: ANESTHESIOLOGY

## 2020-01-01 RX ORDER — FENTANYL CITRATE 50 UG/ML
25 INJECTION, SOLUTION INTRAMUSCULAR; INTRAVENOUS AS NEEDED
Status: DISCONTINUED | OUTPATIENT
Start: 2020-01-01 | End: 2020-01-01 | Stop reason: HOSPADM

## 2020-01-01 RX ORDER — SODIUM CHLORIDE 0.9 % (FLUSH) 0.9 %
5-40 SYRINGE (ML) INJECTION EVERY 8 HOURS
Status: DISCONTINUED | OUTPATIENT
Start: 2020-01-01 | End: 2020-01-01 | Stop reason: HOSPADM

## 2020-01-01 RX ORDER — LEVOFLOXACIN 750 MG/1
750 TABLET ORAL DAILY
Qty: 7 TAB | Refills: 0 | Status: SHIPPED | OUTPATIENT
Start: 2020-01-01 | End: 2020-01-01

## 2020-01-01 RX ORDER — HYDROMORPHONE HYDROCHLORIDE 2 MG/ML
0.5 INJECTION, SOLUTION INTRAMUSCULAR; INTRAVENOUS; SUBCUTANEOUS
Status: DISCONTINUED | OUTPATIENT
Start: 2020-01-01 | End: 2020-01-01 | Stop reason: HOSPADM

## 2020-01-01 RX ORDER — HYDRALAZINE HYDROCHLORIDE 100 MG/1
TABLET, FILM COATED ORAL
Qty: 60 TAB | Refills: 6 | Status: SHIPPED | OUTPATIENT
Start: 2020-01-01 | End: 2020-01-01 | Stop reason: ALTCHOICE

## 2020-01-01 RX ORDER — FINASTERIDE 5 MG/1
TABLET, FILM COATED ORAL
Qty: 90 TAB | Refills: 0 | Status: SHIPPED | OUTPATIENT
Start: 2020-01-01 | End: 2021-01-01

## 2020-01-01 RX ORDER — FENTANYL CITRATE 50 UG/ML
INJECTION, SOLUTION INTRAMUSCULAR; INTRAVENOUS AS NEEDED
Status: DISCONTINUED | OUTPATIENT
Start: 2020-01-01 | End: 2020-01-01 | Stop reason: HOSPADM

## 2020-01-01 RX ORDER — VANCOMYCIN 1.75 GRAM/500 ML IN 0.9 % SODIUM CHLORIDE INTRAVENOUS
1750 ONCE
Status: COMPLETED | OUTPATIENT
Start: 2020-01-01 | End: 2020-01-01

## 2020-01-01 RX ORDER — MIDAZOLAM HYDROCHLORIDE 1 MG/ML
INJECTION, SOLUTION INTRAMUSCULAR; INTRAVENOUS AS NEEDED
Status: DISCONTINUED | OUTPATIENT
Start: 2020-01-01 | End: 2020-01-01 | Stop reason: HOSPADM

## 2020-01-01 RX ORDER — SODIUM CHLORIDE 0.9 % (FLUSH) 0.9 %
5-40 SYRINGE (ML) INJECTION AS NEEDED
Status: DISCONTINUED | OUTPATIENT
Start: 2020-01-01 | End: 2020-01-01 | Stop reason: HOSPADM

## 2020-01-01 RX ORDER — MAGNESIUM SULFATE 100 %
4 CRYSTALS MISCELLANEOUS AS NEEDED
Status: DISCONTINUED | OUTPATIENT
Start: 2020-01-01 | End: 2020-01-01 | Stop reason: HOSPADM

## 2020-01-01 RX ORDER — CLONIDINE 0.3 MG/24H
PATCH, EXTENDED RELEASE TRANSDERMAL
Qty: 12 PATCH | Refills: 2 | Status: SHIPPED | OUTPATIENT
Start: 2020-01-01

## 2020-01-01 RX ORDER — LIDOCAINE HYDROCHLORIDE 20 MG/ML
INJECTION, SOLUTION EPIDURAL; INFILTRATION; INTRACAUDAL; PERINEURAL AS NEEDED
Status: DISCONTINUED | OUTPATIENT
Start: 2020-01-01 | End: 2020-01-01 | Stop reason: HOSPADM

## 2020-01-01 RX ORDER — INSULIN LISPRO 100 [IU]/ML
INJECTION, SOLUTION INTRAVENOUS; SUBCUTANEOUS ONCE
Status: COMPLETED | OUTPATIENT
Start: 2020-01-01 | End: 2020-01-01

## 2020-01-01 RX ORDER — SODIUM CHLORIDE, SODIUM LACTATE, POTASSIUM CHLORIDE, CALCIUM CHLORIDE 600; 310; 30; 20 MG/100ML; MG/100ML; MG/100ML; MG/100ML
75 INJECTION, SOLUTION INTRAVENOUS CONTINUOUS
Status: DISCONTINUED | OUTPATIENT
Start: 2020-01-01 | End: 2020-01-01 | Stop reason: HOSPADM

## 2020-01-01 RX ORDER — FLUTICASONE PROPIONATE 50 MCG
SPRAY, SUSPENSION (ML) NASAL
Qty: 1 BOTTLE | Refills: 2 | Status: SHIPPED | OUTPATIENT
Start: 2020-01-01

## 2020-01-01 RX ORDER — FINASTERIDE 5 MG/1
TABLET, FILM COATED ORAL
Qty: 30 TAB | Refills: 2 | Status: SHIPPED | OUTPATIENT
Start: 2020-01-01 | End: 2020-01-01

## 2020-01-01 RX ORDER — INSULIN LISPRO 100 [IU]/ML
INJECTION, SOLUTION INTRAVENOUS; SUBCUTANEOUS ONCE
Status: DISCONTINUED | OUTPATIENT
Start: 2020-01-01 | End: 2020-01-01 | Stop reason: HOSPADM

## 2020-01-01 RX ORDER — HYDRALAZINE HYDROCHLORIDE 50 MG/1
50 TABLET, FILM COATED ORAL 2 TIMES DAILY
Qty: 180 TAB | Refills: 3 | Status: SHIPPED | OUTPATIENT
Start: 2020-01-01

## 2020-01-01 RX ORDER — FINASTERIDE 5 MG/1
TABLET, FILM COATED ORAL
Qty: 90 TAB | Refills: 0 | Status: SHIPPED | OUTPATIENT
Start: 2020-01-01 | End: 2020-01-01

## 2020-01-01 RX ORDER — DEXTROSE 50 % IN WATER (D50W) INTRAVENOUS SYRINGE
25-50 AS NEEDED
Status: DISCONTINUED | OUTPATIENT
Start: 2020-01-01 | End: 2020-01-01 | Stop reason: HOSPADM

## 2020-01-01 RX ADMIN — INSULIN LISPRO 6 UNITS: 100 INJECTION, SOLUTION INTRAVENOUS; SUBCUTANEOUS at 08:16

## 2020-01-01 RX ADMIN — FENTANYL CITRATE 50 MCG: 50 INJECTION, SOLUTION INTRAMUSCULAR; INTRAVENOUS at 09:15

## 2020-01-01 RX ADMIN — VANCOMYCIN HYDROCHLORIDE 1750 MG: 10 INJECTION, POWDER, LYOPHILIZED, FOR SOLUTION INTRAVENOUS at 19:00

## 2020-01-01 RX ADMIN — FENTANYL CITRATE 50 MCG: 50 INJECTION, SOLUTION INTRAMUSCULAR; INTRAVENOUS at 09:22

## 2020-01-01 RX ADMIN — FAMOTIDINE 20 MG: 10 INJECTION INTRAVENOUS at 08:16

## 2020-01-01 RX ADMIN — DEXMEDETOMIDINE HYDROCHLORIDE 10 MCG: 100 INJECTION, SOLUTION, CONCENTRATE INTRAVENOUS at 09:15

## 2020-01-01 RX ADMIN — SODIUM CHLORIDE, SODIUM LACTATE, POTASSIUM CHLORIDE, AND CALCIUM CHLORIDE 75 ML/HR: 600; 310; 30; 20 INJECTION, SOLUTION INTRAVENOUS at 07:59

## 2020-01-01 RX ADMIN — MIDAZOLAM HYDROCHLORIDE 2 MG: 2 INJECTION, SOLUTION INTRAMUSCULAR; INTRAVENOUS at 09:13

## 2020-01-03 ENCOUNTER — TELEPHONE (OUTPATIENT)
Dept: VASCULAR SURGERY | Age: 60
End: 2020-01-03

## 2020-01-03 NOTE — TELEPHONE ENCOUNTER
----- Message from Alma Rosa Dickerson sent at 1/3/2020  1:03 PM EST -----  Can you let him know his ultrasound of his arm looks fine and just make a note?   Don't see anything that would require intervention at this time  thanks

## 2020-01-03 NOTE — TELEPHONE ENCOUNTER
Called and let patient know that us is fine and no surgical intervention needed at this time. Pt verbalized understanding.

## 2020-02-17 ENCOUNTER — TELEPHONE (OUTPATIENT)
Dept: FAMILY MEDICINE CLINIC | Age: 60
End: 2020-02-17

## 2020-02-17 NOTE — TELEPHONE ENCOUNTER
Spoke with patient's wife, permission to release on file, to request if patient could take a sooner appointment in the morning. Mrs. Graf Barrier verbalized understanding and took the appointment scheduled for tomorrow.

## 2020-02-18 ENCOUNTER — HOSPITAL ENCOUNTER (OUTPATIENT)
Dept: LAB | Age: 60
Discharge: HOME | End: 2020-02-18
Payer: MEDICARE

## 2020-02-18 ENCOUNTER — OFFICE VISIT (OUTPATIENT)
Dept: FAMILY MEDICINE CLINIC | Age: 60
End: 2020-02-18

## 2020-02-18 VITALS
BODY MASS INDEX: 28.12 KG/M2 | HEART RATE: 91 BPM | TEMPERATURE: 98.6 F | WEIGHT: 175 LBS | DIASTOLIC BLOOD PRESSURE: 98 MMHG | RESPIRATION RATE: 16 BRPM | HEIGHT: 66 IN | SYSTOLIC BLOOD PRESSURE: 190 MMHG | OXYGEN SATURATION: 98 %

## 2020-02-18 DIAGNOSIS — E78.00 HYPERCHOLESTEREMIA: ICD-10-CM

## 2020-02-18 DIAGNOSIS — I10 HTN (HYPERTENSION), MALIGNANT: Primary | ICD-10-CM

## 2020-02-18 DIAGNOSIS — E11.8 TYPE 2 DIABETES MELLITUS WITH COMPLICATION, WITH LONG-TERM CURRENT USE OF INSULIN (HCC): ICD-10-CM

## 2020-02-18 DIAGNOSIS — I10 HTN (HYPERTENSION), MALIGNANT: ICD-10-CM

## 2020-02-18 DIAGNOSIS — Z79.4 TYPE 2 DIABETES MELLITUS WITH COMPLICATION, WITH LONG-TERM CURRENT USE OF INSULIN (HCC): ICD-10-CM

## 2020-02-18 DIAGNOSIS — M62.838 MUSCLE SPASMS OF NECK: ICD-10-CM

## 2020-02-18 LAB
ALT SERPL-CCNC: 21 U/L (ref 16–61)
ANION GAP SERPL CALC-SCNC: 7 MMOL/L (ref 3–18)
AST SERPL-CCNC: 18 U/L (ref 10–38)
BUN SERPL-MCNC: 23 MG/DL (ref 7–18)
BUN/CREAT SERPL: 3 (ref 12–20)
CALCIUM SERPL-MCNC: 9.4 MG/DL (ref 8.5–10.1)
CHLORIDE SERPL-SCNC: 103 MMOL/L (ref 100–111)
CHOLEST SERPL-MCNC: 289 MG/DL
CO2 SERPL-SCNC: 29 MMOL/L (ref 21–32)
CREAT SERPL-MCNC: 6.89 MG/DL (ref 0.6–1.3)
EST. AVERAGE GLUCOSE BLD GHB EST-MCNC: 160 MG/DL
GLUCOSE SERPL-MCNC: 137 MG/DL (ref 74–99)
HBA1C MFR BLD: 7.2 % (ref 4.2–5.6)
HDLC SERPL-MCNC: 65 MG/DL (ref 40–60)
HDLC SERPL: 4.4 {RATIO} (ref 0–5)
LDLC SERPL CALC-MCNC: 205.4 MG/DL (ref 0–100)
LIPID PROFILE,FLP: ABNORMAL
POTASSIUM SERPL-SCNC: 4.1 MMOL/L (ref 3.5–5.5)
SODIUM SERPL-SCNC: 139 MMOL/L (ref 136–145)
TRIGL SERPL-MCNC: 93 MG/DL (ref ?–150)
VLDLC SERPL CALC-MCNC: 18.6 MG/DL

## 2020-02-18 PROCEDURE — 84460 ALANINE AMINO (ALT) (SGPT): CPT

## 2020-02-18 PROCEDURE — 80048 BASIC METABOLIC PNL TOTAL CA: CPT

## 2020-02-18 PROCEDURE — 84450 TRANSFERASE (AST) (SGOT): CPT

## 2020-02-18 PROCEDURE — 83036 HEMOGLOBIN GLYCOSYLATED A1C: CPT

## 2020-02-18 PROCEDURE — 36415 COLL VENOUS BLD VENIPUNCTURE: CPT

## 2020-02-18 PROCEDURE — 80061 LIPID PANEL: CPT

## 2020-02-18 RX ORDER — CYCLOBENZAPRINE HCL 10 MG
10 TABLET ORAL
Qty: 30 TAB | Refills: 0 | Status: SHIPPED | OUTPATIENT
Start: 2020-02-18

## 2020-02-18 RX ORDER — FLUTICASONE PROPIONATE 50 MCG
1 SPRAY, SUSPENSION (ML) NASAL 2 TIMES DAILY
Qty: 1 BOTTLE | Refills: 2 | Status: SHIPPED | OUTPATIENT
Start: 2020-02-18 | End: 2020-01-01

## 2020-02-18 NOTE — PROGRESS NOTES
1. Have you been to the ER, urgent care clinic since your last visit? Hospitalized since your last visit? No 
 
2. Have you seen or consulted any other health care providers outside of the [address] [place] [address] since your last visit? Include any pap smears or colon screening.  No

## 2020-02-18 NOTE — PATIENT INSTRUCTIONS
DASH Diet: Care Instructions Your Care Instructions The DASH diet is an eating plan that can help lower your blood pressure. DASH stands for Dietary Approaches to Stop Hypertension. Hypertension is high blood pressure. The DASH diet focuses on eating foods that are high in calcium, potassium, and magnesium. These nutrients can lower blood pressure. The foods that are highest in these nutrients are fruits, vegetables, low-fat dairy products, nuts, seeds, and legumes. But taking calcium, potassium, and magnesium supplements instead of eating foods that are high in those nutrients does not have the same effect. The DASH diet also includes whole grains, fish, and poultry. The DASH diet is one of several lifestyle changes your doctor may recommend to lower your high blood pressure. Your doctor may also want you to decrease the amount of sodium in your diet. Lowering sodium while following the DASH diet can lower blood pressure even further than just the DASH diet alone. Follow-up care is a key part of your treatment and safety. Be sure to make and go to all appointments, and call your doctor if you are having problems. It's also a good idea to know your test results and keep a list of the medicines you take. How can you care for yourself at home? Following the DASH diet · Eat 4 to 5 servings of fruit each day. A serving is 1 medium-sized piece of fruit, ½ cup chopped or canned fruit, 1/4 cup dried fruit, or 4 ounces (½ cup) of fruit juice. Choose fruit more often than fruit juice. · Eat 4 to 5 servings of vegetables each day. A serving is 1 cup of lettuce or raw leafy vegetables, ½ cup of chopped or cooked vegetables, or 4 ounces (½ cup) of vegetable juice. Choose vegetables more often than vegetable juice. · Get 2 to 3 servings of low-fat and fat-free dairy each day. A serving is 8 ounces of milk, 1 cup of yogurt, or 1 ½ ounces of cheese. · Eat 6 to 8 servings of grains each day. A serving is 1 slice of bread, 1 ounce of dry cereal, or ½ cup of cooked rice, pasta, or cooked cereal. Try to choose whole-grain products as much as possible. · Limit lean meat, poultry, and fish to 2 servings each day. A serving is 3 ounces, about the size of a deck of cards. · Eat 4 to 5 servings of nuts, seeds, and legumes (cooked dried beans, lentils, and split peas) each week. A serving is 1/3 cup of nuts, 2 tablespoons of seeds, or ½ cup of cooked beans or peas. · Limit fats and oils to 2 to 3 servings each day. A serving is 1 teaspoon of vegetable oil or 2 tablespoons of salad dressing. · Limit sweets and added sugars to 5 servings or less a week. A serving is 1 tablespoon jelly or jam, ½ cup sorbet, or 1 cup of lemonade. · Eat less than 2,300 milligrams (mg) of sodium a day. If you limit your sodium to 1,500 mg a day, you can lower your blood pressure even more. Tips for success · Start small. Do not try to make dramatic changes to your diet all at once. You might feel that you are missing out on your favorite foods and then be more likely to not follow the plan. Make small changes, and stick with them. Once those changes become habit, add a few more changes. · Try some of the following: ? Make it a goal to eat a fruit or vegetable at every meal and at snacks. This will make it easy to get the recommended amount of fruits and vegetables each day. ? Try yogurt topped with fruit and nuts for a snack or healthy dessert. ? Add lettuce, tomato, cucumber, and onion to sandwiches. ? Combine a ready-made pizza crust with low-fat mozzarella cheese and lots of vegetable toppings. Try using tomatoes, squash, spinach, broccoli, carrots, cauliflower, and onions. ? Have a variety of cut-up vegetables with a low-fat dip as an appetizer instead of chips and dip. ? Sprinkle sunflower seeds or chopped almonds over salads.  Or try adding chopped walnuts or almonds to cooked vegetables. ? Try some vegetarian meals using beans and peas. Add garbanzo or kidney beans to salads. Make burritos and tacos with mashed estrada beans or black beans. Where can you learn more? Go to http://hoang-jayden.info/. Enter Q840 in the search box to learn more about \"DASH Diet: Care Instructions. \" Current as of: April 9, 2019 Content Version: 12.2 © 0811-5482 [employer]. Care instructions adapted under license by [employer] (which disclaims liability or warranty for this information). If you have questions about a medical condition or this instruction, always ask your healthcare professional. [address] any warranty or liability for your use of this information.

## 2020-02-18 NOTE — PROGRESS NOTES
HISTORY OF PRESENT ILLNESS Elisha Cutler is a 61 y.o. male. HPI Patient is here today for evaluation and treatment of: Hypertension Hypertension:  Initial BP is up. Wife reports BPs at home are more stable ranging in the  145-150/90's range. He has no new particular complaints relative to his BP. Mima Lopez C/o hiccoughs for 3 weeks, worse last night and today. Better this am.  His wife thinks that this is related to crestor. His wife stopped his crestor 3 weeks ago. The hiccoughs continued. Pt has hypercholesterolemia; He attempts a lower cholesterol diet. Pt requests a refill on flexeril. He gets neck spasms at times. He is aware of the potential sedative effects Pt has DM; He is due for lab testing; including A1c testing. Current Outpatient Medications:   cyclobenzaprine (FLEXERIL) 10 mg tablet, Take 1 Tab by mouth three (3) times daily as needed for Muscle Spasm(s). , Disp: 30 Tab, Rfl: 0 
  fluticasone propionate (FLONASE) 50 mcg/actuation nasal spray, 1 [place] by Both Nostrils route two (2) times a day., Disp: 1 Bottle, Rfl: 2 
  finasteride (PROSCAR) 5 mg tablet, TAKE 1 TABLET BY MOUTH EVERY DAY, Disp: 90 Tab, Rfl: 0 
  cloNIDine (CATAPRES) 0.3 mg/24 hr, APPLY 1 PATCH TO SKIN ONCE EVERY 7 DAYS, Disp: 4 Patch, Rfl: 6 
  hydrALAZINE (APRESOLINE) 50 mg tablet, Take 1 Tab by mouth two (2) times a day., Disp: 180 Tab, Rfl: 3 
  brinzolamide (AZOPT OP), Apply  to eye. Indications: 3 times daily, Disp: , Rfl:  
  amLODIPine (NORVASC) 10 mg tablet, TAKE 1 TABLET BY MOUTH DAILY. , Disp: 30 Tab, Rfl: 6 
  netarsudil (RHOPRESSA) 0.02 % drop, Apply  to eye., Disp: , Rfl:  
  brimonidine-timolol (COMBIGAN) 0.2-0.5 % drop ophthalmic solution, Administer 1 Drop to both eyes three (3) times daily. , Disp: , Rfl:  
  soft lens rinse,store solution (SHYLA SALINE SENSITIVE EYES), by Does Not Apply route., Disp: , Rfl:  
  losartan (COZAAR) 100 mg tablet, Take 50 mg by mouth two (2) times a day., Disp: , Rfl:  
  b complex-vitamin c-folic acid (NEPHROCAPS) 1 mg capsule, Take 1 Cap by mouth daily. , Disp: 30 Cap, Rfl: 6 
  calcium acetate (PHOSLO) 667 mg cap, Take 1 Cap by mouth three (3) times daily (with meals). , Disp: 90 Cap, Rfl: 2 
  insulin lispro (HUMALOG) 100 unit/mL injection, Blood Sugar (mg/dL): <150 =0 units; 150 -199 =2 units; 200 -249 =4 units; 250 -299 =6 units; 300 -349 =8 units; 350 and above =10 units. , Disp: 1 Vial, Rfl: 2 
  acetaminophen (TYLENOL EXTRA STRENGTH) 500 mg tablet, Take  by mouth every six (6) hours as needed for Pain., Disp: , Rfl:  
  rosuvastatin (CRESTOR) 20 mg tablet, Take 1 Tab by mouth nightly., Disp: 30 Tab, Rfl: 6 
  docusate sodium (COLACE) 100 mg capsule, Take 1 Cap by mouth two (2) times a day., Disp: 60 Cap, Rfl: 0 
  cyanocobalamin (VITAMIN B-12) 1,000 mcg tablet, Take 1,000 mcg by mouth daily. , Disp: , Rfl:  
  lidocaine-prilocaine (EMLA) topical cream, Apply  to affected area as needed for Pain (apply to left arm 30 minutes prior to dialysis). , Disp: 30 g, Rfl: 12 PMH,  Meds, Allergies, Family History, Social history reviewed Review of Systems Constitutional: Negative for chills and fever. Respiratory: Negative for shortness of breath and wheezing. Cardiovascular: Negative for chest pain and palpitations. Physical Exam 
Vitals signs and nursing note reviewed. Constitutional:   
   General: He is not in acute distress. Appearance: Normal appearance. He is not ill-appearing. Cardiovascular:  
   Rate and Rhythm: Normal rate and regular rhythm. Heart sounds: Normal heart sounds. No murmur. No friction rub. No gallop. Neurological:  
   Mental Status: He is alert. Manual BP- 190/98 ASSESSMENT and PLAN 
  ICD-10-CM ICD-9-CM 1. HTN (hypertension), malignant K52 601.9 METABOLIC PANEL, BASIC 2. Hypercholesteremia E78.00 272.0 LIPID PANEL  
   AST ALT 3. Type 2 diabetes mellitus with complication, with long-term current use of insulin (HCC) E11.8 250.90 HEMOGLOBIN A1C WITH EAG  
 Z79.4 V58.67   
4. Muscle spasms of neck M62.838 728.85 As above,  
above all stable unless otherwise noted 
 treatment plan as listed below Orders Placed This Encounter  LIPID PANEL  
 METABOLIC PANEL, BASIC  AST  ALT  HEMOGLOBIN A1C WITH EAG  
 cyclobenzaprine (FLEXERIL) 10 mg tablet  fluticasone propionate (FLONASE) 50 mcg/actuation nasal spray Labs as ordered Follow-up and Dispositions · Return in about 8 weeks (around 4/14/2020) for htn. An After Visit Summary was printed and given to the patient. This has been fully explained to the patient, who indicates understanding.

## 2020-02-23 RX ORDER — ROSUVASTATIN CALCIUM 20 MG/1
TABLET, COATED ORAL
Qty: 90 TAB | Refills: 2 | Status: SHIPPED | OUTPATIENT
Start: 2020-02-23

## 2020-06-30 NOTE — PATIENT INSTRUCTIONS

## 2020-06-30 NOTE — PROGRESS NOTES
HPI: 
Jennifer Blevins is a 61 y.o. male who presents today with Chief Complaint Patient presents with  Hypertension Pt is here for HTN:  He is on meds as listed below; Pt is on dialysis; his reports are \" good\" at dialysis per pt. And his wife Pt had testing at [facility] for a stress test for anticipated kidney transplant ; BP was elevated that day and there was some difficulty getting it down that day. BP is relatively stable today No refills needed Last a1c was 6.5% There is a question of the last cholesterol level done by renal; ( see notes); value was 198 but this appears to be from September 2019. He feels well; has no new complaints that he needs to address. Lab Results Component Value Date/Time Cholesterol, total 289 (H) 02/18/2020 10:34 AM  
 HDL Cholesterol 65 (H) 02/18/2020 10:34 AM  
 LDL, calculated 205.4 (H) 02/18/2020 10:34 AM  
 VLDL, calculated 18.6 02/18/2020 10:34 AM  
 Triglyceride 93 02/18/2020 10:34 AM  
 CHOL/HDL Ratio 4.4 02/18/2020 10:34 AM  
 
 
 
 
 
3 most recent PHQ Screens 6/30/2020 Little interest or pleasure in doing things Not at all Feeling down, depressed, irritable, or hopeless Not at all Total Score PHQ 2 0 Trouble falling or staying asleep, or sleeping too much - Feeling tired or having little energy - Poor appetite, weight loss, or overeating - Feeling bad about yourself - or that you are a failure or have let yourself or your family down - Trouble concentrating on things such as school, work, reading, or watching TV - Moving or speaking so slowly that other people could have noticed; or the opposite being so fidgety that others notice - Thoughts of being better off dead, or hurting yourself in some way -  
PHQ 9 Score - How difficult have these problems made it for you to do your work, take care of your home and get along with others -  
 
 
 
 
 
 
PMH,  Meds, Allergies, Family History, Social history reviewed Current Outpatient Medications Medication Sig Dispense Refill  finasteride (PROSCAR) 5 mg tablet TAKE 1 TABLET BY MOUTH EVERY DAY 30 Tab 2  
 fluticasone propionate (FLONASE) 50 mcg/actuation nasal spray INSTILL 1 SPRAY IN BOTH NOSTRILS TWICE DAILY 1 Bottle 2  
 rosuvastatin (CRESTOR) 20 mg tablet TAKE 1 TABLET BY MOUTH EVERY DAY 90 Tab 2  cyclobenzaprine (FLEXERIL) 10 mg tablet Take 1 Tab by mouth three (3) times daily as needed for Muscle Spasm(s). 30 Tab 0  cloNIDine (CATAPRES) 0.3 mg/24 hr APPLY 1 PATCH TO SKIN ONCE EVERY 7 DAYS 4 Patch 6  
 hydrALAZINE (APRESOLINE) 50 mg tablet Take 1 Tab by mouth two (2) times a day. 180 Tab 3  
 brinzolamide (AZOPT OP) Apply  to eye. Indications: 3 times daily  amLODIPine (NORVASC) 10 mg tablet TAKE 1 TABLET BY MOUTH DAILY. 30 Tab 6  
 netarsudil (RHOPRESSA) 0.02 % drop Apply  to eye.  brimonidine-timolol (COMBIGAN) 0.2-0.5 % drop ophthalmic solution Administer 1 Drop to both eyes three (3) times daily.  soft lens rinse,store solution (SHYLA SALINE SENSITIVE EYES) by Does Not Apply route.  losartan (COZAAR) 100 mg tablet Take 50 mg by mouth two (2) times a day.  b complex-vitamin c-folic acid (NEPHROCAPS) 1 mg capsule Take 1 Cap by mouth daily. 30 Cap 6  calcium acetate (PHOSLO) 667 mg cap Take 1 Cap by mouth three (3) times daily (with meals). 90 Cap 2  
 insulin lispro (HUMALOG) 100 unit/mL injection Blood Sugar (mg/dL): <150 =0 units; 150 -199 =2 units; 200 -249 =4 units; 250 -299 =6 units; 300 -349 =8 units; 350 and above =10 units. 1 Vial 2  
 acetaminophen (TYLENOL EXTRA STRENGTH) 500 mg tablet Take  by mouth every six (6) hours as needed for Pain.  docusate sodium (COLACE) 100 mg capsule Take 1 Cap by mouth two (2) times a day. 60 Cap 0  
 cyanocobalamin (VITAMIN B-12) 1,000 mcg tablet Take 1,000 mcg by mouth daily. Allergies Allergen Reactions  Bactrim [Sulfamethoprim Ds] Nausea and Vomiting  Coreg [Carvedilol] Nausea and Vomiting Difficulty walking Review of Systems Constitutional: Negative for chills and fever. Respiratory: Negative for shortness of breath and wheezing. Cardiovascular: Negative for chest pain and palpitations. All other systems reviewed and are negative. Visit Vitals /82 (BP 1 Location: Right arm, BP Patient Position: Sitting) Pulse 69 Temp 98.7 °F (37.1 °C) (Temporal) Resp 16 Ht 5' 6\" (1.676 m) Wt 167 lb 9.6 oz (76 kg) SpO2 97% BMI 27.05 kg/m² Physical Exam 
Vitals signs and nursing note reviewed. Constitutional:   
   Appearance: Normal appearance. Cardiovascular:  
   Rate and Rhythm: Normal rate and regular rhythm. Pulmonary:  
   Effort: Pulmonary effort is normal. No respiratory distress. Breath sounds: Normal breath sounds. No wheezing. Musculoskeletal:     
   General: No swelling. Neurological:  
   Mental Status: He is alert. Visit Vitals /82 (BP 1 Location: Right arm, BP Patient Position: Sitting) Pulse 69 Temp 98.7 °F (37.1 °C) (Temporal) Resp 16 Ht 5' 6\" (1.676 m) Wt 167 lb 9.6 oz (76 kg) SpO2 97% BMI 27.05 kg/m² Lab Results Component Value Date/Time Cholesterol, total 289 (H) 02/18/2020 10:34 AM  
 HDL Cholesterol 65 (H) 02/18/2020 10:34 AM  
 LDL, calculated 205.4 (H) 02/18/2020 10:34 AM  
 VLDL, calculated 18.6 02/18/2020 10:34 AM  
 Triglyceride 93 02/18/2020 10:34 AM  
 CHOL/HDL Ratio 4.4 02/18/2020 10:34 AM  
 
Lab Results Component Value Date/Time  Sodium 139 02/18/2020 10:34 AM  
 Potassium 4.1 02/18/2020 10:34 AM  
 Chloride 103 02/18/2020 10:34 AM  
 CO2 29 02/18/2020 10:34 AM  
 Anion gap 7 02/18/2020 10:34 AM  
 Glucose 137 (H) 02/18/2020 10:34 AM  
 BUN 23 (H) 02/18/2020 10:34 AM  
 Creatinine 6.89 (H) 02/18/2020 10:34 AM  
 BUN/Creatinine ratio 3 (L) 02/18/2020 10:34 AM  
 GFR est AA 10 (L) 02/18/2020 10:34 AM  
 GFR est non-AA 8 (L) 02/18/2020 10:34 AM  
 Calcium 9.4 02/18/2020 10:34 AM  
 
Lab Results Component Value Date/Time Hemoglobin A1c 7.2 (H) 02/18/2020 10:34 AM  
 Hemoglobin A1c (POC) 9.6 04/09/2019 02:10 PM  
 Hemoglobin A1c, External 8.3 08/25/2016 Assessment/Plan: 
 
 
Diagnoses and all orders for this visit: 1. HTN (hypertension), malignant- moderate control 2. Hypercholesteremia- elevated. Need to verify latest cholesterol value - 198 vs that from 2/2020 which was 289.; if 289 will increase crestor though wife states the higher dose gave him hiccoughs. Continue other current meds Follow-up and Dispositions · Return in about 2 months (around 8/30/2020) for medicare well. An After Visit Summary was printed and given to the patient. This has been fully explained to the patient, who indicates understanding. Follow-up and Dispositions · Return in about 2 months (around 8/30/2020) for medicare well.  
  
  
 
 
Gala Armando MD

## 2020-06-30 NOTE — PROGRESS NOTES
Chief Complaint Patient presents with  Hypertension 1. Have you been to the ER, urgent care clinic since your last visit? Hospitalized since your last visit? No  
 
2. Have you seen or consulted any other health care providers outside of the [address] [place] [address] since your last visit? Include any pap smears or colon screening.  Follow with Cardiology and Vascular at [facility]

## 2020-09-28 NOTE — PATIENT INSTRUCTIONS

## 2020-09-28 NOTE — PROGRESS NOTES
Josias Dennis presents today for Chief Complaint Patient presents with Kaitlin Vazquez Annual Wellness Visit Is someone accompanying this pt? Yes (wife) Is the patient using any DME equipment during OV? cane Depression Screening: 
3 most recent PHQ Screens 9/28/2020 Little interest or pleasure in doing things Not at all Feeling down, depressed, irritable, or hopeless Not at all Total Score PHQ 2 0 Trouble falling or staying asleep, or sleeping too much - Feeling tired or having little energy - Poor appetite, weight loss, or overeating - Feeling bad about yourself - or that you are a failure or have let yourself or your family down - Trouble concentrating on things such as school, work, reading, or watching TV - Moving or speaking so slowly that other people could have noticed; or the opposite being so fidgety that others notice - Thoughts of being better off dead, or hurting yourself in some way -  
PHQ 9 Score - How difficult have these problems made it for you to do your work, take care of your home and get along with others - Learning Assessment: 
Learning Assessment 12/5/2019 PRIMARY LEARNER Patient PRIMARY LANGUAGE ENGLISH  
LEARNER PREFERENCE PRIMARY LISTENING  
  -  
  -  
ANSWERED BY patient RELATIONSHIP SELF Health Maintenance reviewed and discussed and ordered per Provider. Health Maintenance Due Topic Date Due  Shingrix Vaccine Age 50> (1 of 2) 10/14/2010  Pneumococcal 0-64 years (2 of 3 - PCV13) 04/05/2017  MICROALBUMIN Q1  03/21/2018  Medicare Yearly Exam  08/15/2020  Flu Vaccine (1) 09[facility]01/2020 [facility] Coord[facility] of Care: 1. Have you been to the ER, urgent care clinic since your last visit? Hospitalized since your last visit? no 
 
2. Have you seen or consulted any other health care providers outside of the [address] [place] [address] since your last visit? Include any pap smears or colon screening.  no

## 2020-10-03 NOTE — PROGRESS NOTES
This is the Subsequent Medicare Annual Wellness Exam, performed 12 months or more after the Initial AWV or the last Subsequent AWV I have reviewed the patient's medical history in detail and updated the computerized patient record. Doing well On renal transplant list at [facility]; he has ESRD No new complaints Pt has a h/o diabetic foot ulcer with amputation; History Patient Active Problem List  
Diagnosis Code  HLD (hyperlipidemia) E78.5  
 HTN (hypertension) I10  
 Diabetic retinopathy ([address] E11.319  
 Noncompliance with treatment Z91.19  Type II or unspecified type diabetes mellitus without mention of complication, uncontrolled [id number]  Paresthesias/numbness [id number]  Diabetes mellitus with renal manifestations, uncontrolled (HCC) E11.29, E11.65  Diabetic foot ulcer ([address] E11.621, L97.509  Gangrene of toe (HCC) I96  
 Dry gangrene ([address] C74  Chronic kidney disease, stage IV (severe) ([address] N18.4  Renal failure (ARF), acute on chronic (HCC) N17.9, N18.9  Diabetic nephropathy (HCC) E11.21  
 Hypertension I10  
 Malignant hypertension I10  
 CKD (chronic kidney disease) N18.9  Hyperlipidemia E78.5  Type 2 diabetes mellitus with complication, with long-term current use of insulin (HCC) E11.8, Z79.4  Orthostatic hypotension I95.1  Chest pain R07.9  Stage 5 chronic kidney disease not on chronic dialysis ([address] N18.5  Diabetes mellitus due to underlying condition, uncontrolled, with diabetic nephropathy, with long-term current use of insulin ([facility]) E08.21, E08.65, Z79.4  Elevated troponin R79.89  
 Kidney disease N28.9  ESRD (end stage renal disease) ([address] N18.6  Anemia D64.9  Hypoalbuminemia E88.09  
 Blindness of one eye H54.40  ESRD on dialysis ([address] N18.6, Z99.2  Status post amputation of foot ([address] W24.645  Status post amputation of toe of left foot ([address] B20.020 Past Medical History:  
Diagnosis Date  Blindness of one eye left  
 Cardiac echocardiogram 10/21/2016 EF 60-65%. No WMA. Mod-marked LVH. Normal diastolic fx. No significant valvular heart disease.  Cardiac treadmill stress test, low risk 11/02/2012 Negative maximal exercise treadmill test.  Ex time 7 min 45 sec.  Cardiovascular LE peripheral arterial testing 03/22/2016 No significant peripheral arterial disease at rest bilaterally. ABIs deferred due to calcified vessels.  Cardiovascular LLE venous duplex 06/06/2012 Left leg:  No DVT.  Cardiovascular renal duplex 10/21/2016 No significant renal artery stenosis.  Chronic kidney disease   
 hd-m-w-f  
 CKD (chronic kidney disease), stage V ([address]  Diabetes mellitus ([facility] 3/12/2010  Diabetic retinopathy ([facility] [facility] 5/4/2010  Edema of both legs  Eye examination 5/4/10 OU: 20/20; OD: 20/25; OS: 20/25 without correction.  GERD (gastroesophageal reflux disease)  Glaucoma 08/17/2017  
 Shellye Canal  
 HLD (hyperlipidemia) 3/12/2010  
 HTN (hypertension) 3/12/2010  Orthostatic hypertension Past Surgical History:  
Procedure Laterality Date  COLONOSCOPY N/A 1/10/2019 COLONOSCOPY w polyp[ectomy performed by Lissa Luciano MD at [address] Left TOES-small  HX HERNIA REPAIR  2005  HX OTHER SURGICAL  11/07/2017  
 glaucoma valve- Ahmed  HX VASCULAR ACCESS    
 HD catheter right neck Current Outpatient Medications Medication Sig Dispense Refill  finasteride (PROSCAR) 5 mg tablet TAKE 1 TABLET BY MOUTH EVERY DAY 90 Tab 0  cloNIDine (CATAPRES) 0.3 mg/24 hr APPLY 1 PATCH TO SKIN ONCE EVERY 7 DAYS 12 Patch 2  
 fluticasone propionate (FLONASE) 50 mcg/actuation nasal spray INSTILL 1 SPRAY IN BOTH NOSTRILS TWICE DAILY 1 Bottle 2  
 rosuvastatin (CRESTOR) 20 mg tablet TAKE 1 TABLET BY MOUTH EVERY DAY 90 Tab 2  cyclobenzaprine (FLEXERIL) 10 mg tablet Take 1 Tab by mouth three (3) times daily as needed for Muscle Spasm(s). 30 Tab 0  
 hydrALAZINE (APRESOLINE) 50 mg tablet Take 1 Tab by mouth two (2) times a day. 180 Tab 3  
 brinzolamide (AZOPT OP) Apply  to eye. Indications: 3 times daily  amLODIPine (NORVASC) 10 mg tablet TAKE 1 TABLET BY MOUTH DAILY. 30 Tab 6  
 netarsudil (RHOPRESSA) 0.02 % drop Apply  to eye.  brimonidine-timolol (COMBIGAN) 0.2-0.5 % drop ophthalmic solution Administer 1 Drop to both eyes three (3) times daily.  soft lens rinse,store solution (SHYLA SALINE SENSITIVE EYES) by Does Not Apply route.  losartan (COZAAR) 100 mg tablet Take 50 mg by mouth two (2) times a day.  b complex-vitamin c-folic acid (NEPHROCAPS) 1 mg capsule Take 1 Cap by mouth daily. 30 Cap 6  calcium acetate (PHOSLO) 667 mg cap Take 1 Cap by mouth three (3) times daily (with meals). 90 Cap 2  
 insulin lispro (HUMALOG) 100 unit/mL injection Blood Sugar (mg/dL): <150 =0 units; 150 -199 =2 units; 200 -249 =4 units; 250 -299 =6 units; 300 -349 =8 units; 350 and above =10 units. 1 Vial 2  
 acetaminophen (TYLENOL EXTRA STRENGTH) 500 mg tablet Take  by mouth every six (6) hours as needed for Pain.  docusate sodium (COLACE) 100 mg capsule Take 1 Cap by mouth two (2) times a day. 60 Cap 0  
 cyanocobalamin (VITAMIN B-12) 1,000 mcg tablet Take 1,000 mcg by mouth daily. Allergies Allergen Reactions  Bactrim [Sulfamethoprim Ds] Nausea and Vomiting  Coreg [Carvedilol] Nausea and Vomiting Difficulty walking Family History Problem Relation Age of Onset  Diabetes Sister  Sickle Cell Anemia Sister  Diabetes Sister Social History Tobacco Use  Smoking status: Never Smoker  Smokeless tobacco: Never Used Substance Use Topics  Alcohol use: No  
 
 
Depression Risk Factor Screening:  
 
3 most recent PHQ Screens 9/28/2020 Little interest or pleasure in doing things Not at all Feeling down, depressed, irritable, or hopeless Not at all Total Score PHQ 2 0 Trouble falling or staying asleep, or sleeping too much - Feeling tired or having little energy - Poor appetite, weight loss, or overeating - Feeling bad about yourself - or that you are a failure or have let yourself or your family down - Trouble concentrating on things such as school, work, reading, or watching TV - Moving or speaking so slowly that other people could have noticed; or the opposite being so fidgety that others notice - Thoughts of being better off dead, or hurting yourself in some way -  
PHQ 9 Score - How difficult have these problems made it for you to do your work, take care of your home and get along with others - Alcohol Risk Screen Do you average more than 2 drinks per night or 14 drinks a week: No 
 
On any one occasion in the past three months have you have had more than 4 drinks containing alcohol:  No 
 
 
 
Functional Ability and Level of Safety:  
Hearing: Hearing is good. Activities of Daily Living: The home contains: no safety equipment. Patient does total self care Ambulation: with mild difficulty Fall Risk: 
No flowsheet data found. Abuse Screen: 
Patient is not abused Cognitive Screening Has your family/caregiver stated any concerns about your memory: no 
 
Cognitive Screening: cognition intact Patient Care Team  
Patient Care Team: 
Warren Terrell MD as PCP - Irlanda Olsen MD as PCP - [facility] Fide Cooper MD (Cardiology) Israel Lockwood NP (Nurse Practitioner) NEYMAR Barrera PA (Physician Assistant) Assessment/Plan Education and counseling provided: 
Are appropriate based on today's review and evaluation Diagnoses and all orders for this visit: 
 
1. Medicare annual wellness visit, subsequent 2. Status post amputation of toe of left foot ([facility] 3. Diabetic ulcer of other part of left foot associated with diabetes mellitus due to underlying condition, with other ulcer severity ([facility] -     LIPID PANEL; Future -     METABOLIC PANEL, COMPREHENSIVE; Future 
-     HEMOGLOBIN A1C WITH EAG; Future 4. ESRD (end stage renal disease) on dialysis ([facility] Health Maintenance Due Topic Date Due  Shingrix Vaccine Age 50> (1 of 2) 10/14/2010  Pneumococcal 0-64 years (2 of 3 - PCV13) 04/05/2017  MICROALBUMIN Q1  03/21/2018 As above,  treatment plan as listed below Orders Placed This Encounter  LIPID PANEL  
 METABOLIC PANEL, COMPREHENSIVE  
 HEMOGLOBIN A1C WITH EAG Follow-up and Dispositions · Return in about 4 months (around 1/28/2021) for Chol, htn. This has been fully explained to the patient, who indicates understanding. An After Visit Summary was printed and given to the patient.  
 
 
Marga Zaragoza MD

## 2020-10-03 NOTE — PROGRESS NOTES
This is the Subsequent Medicare Annual Wellness Exam, performed 12 months or more after the Initial AWV or the last Subsequent AWV I have reviewed the patient's medical history in detail and updated the computerized patient record. Doing well On renal transplant list at [facility]; he has ESRD No new complaints Pt has a h/o diabetic foot ulcer with amputation; History Patient Active Problem List  
Diagnosis Code  HLD (hyperlipidemia) E78.5  
 HTN (hypertension) I10  
 Diabetic retinopathy ([address] E11.319  
 Noncompliance with treatment Z91.19  Type II or unspecified type diabetes mellitus without mention of complication, uncontrolled [id number]  Paresthesias/numbness [id number]  Diabetes mellitus with renal manifestations, uncontrolled (HCC) E11.29, E11.65  Diabetic foot ulcer ([address] E11.621, L97.509  Gangrene of toe (HCC) I96  
 Dry gangrene ([address] T33  Chronic kidney disease, stage IV (severe) ([address] N18.4  Renal failure (ARF), acute on chronic (HCC) N17.9, N18.9  Diabetic nephropathy (HCC) E11.21  
 Hypertension I10  
 Malignant hypertension I10  
 CKD (chronic kidney disease) N18.9  Hyperlipidemia E78.5  Type 2 diabetes mellitus with complication, with long-term current use of insulin (HCC) E11.8, Z79.4  Orthostatic hypotension I95.1  Chest pain R07.9  Stage 5 chronic kidney disease not on chronic dialysis ([address] N18.5  Diabetes mellitus due to underlying condition, uncontrolled, with diabetic nephropathy, with long-term current use of insulin ([facility]) E08.21, E08.65, Z79.4  Elevated troponin R79.89  
 Kidney disease N28.9  ESRD (end stage renal disease) ([address] N18.6  Anemia D64.9  Hypoalbuminemia E88.09  
 Blindness of one eye H54.40  ESRD on dialysis ([address] N18.6, Z99.2  Status post amputation of foot ([address] N73.158  Status post amputation of toe of left foot ([address] U16.101 Past Medical History:  
Diagnosis Date  Blindness of one eye left  
 Cardiac echocardiogram 10/21/2016 EF 60-65%. No WMA. Mod-marked LVH. Normal diastolic fx. No significant valvular heart disease.  Cardiac treadmill stress test, low risk 11/02/2012 Negative maximal exercise treadmill test.  Ex time 7 min 45 sec.  Cardiovascular LE peripheral arterial testing 03/22/2016 No significant peripheral arterial disease at rest bilaterally. ABIs deferred due to calcified vessels.  Cardiovascular LLE venous duplex 06/06/2012 Left leg:  No DVT.  Cardiovascular renal duplex 10/21/2016 No significant renal artery stenosis.  Chronic kidney disease   
 hd-m-w-f  
 CKD (chronic kidney disease), stage V ([address]  Diabetes mellitus ([facility] 3/12/2010  Diabetic retinopathy ([facility] [facility] 5/4/2010  Edema of both legs  Eye examination 5/4/10 OU: 20/20; OD: 20/25; OS: 20/25 without correction.  GERD (gastroesophageal reflux disease)  Glaucoma 08/17/2017  
 Kyle Puri  
 HLD (hyperlipidemia) 3/12/2010  
 HTN (hypertension) 3/12/2010  Orthostatic hypertension Past Surgical History:  
Procedure Laterality Date  COLONOSCOPY N/A 1/10/2019 COLONOSCOPY w polyp[ectomy performed by Wayne Machado MD at [address] Left TOES-small  HX HERNIA REPAIR  2005  HX OTHER SURGICAL  11/07/2017  
 glaucoma valve- Ahmed  HX VASCULAR ACCESS    
 HD catheter right neck Current Outpatient Medications Medication Sig Dispense Refill  finasteride (PROSCAR) 5 mg tablet TAKE 1 TABLET BY MOUTH EVERY DAY 90 Tab 0  cloNIDine (CATAPRES) 0.3 mg/24 hr APPLY 1 PATCH TO SKIN ONCE EVERY 7 DAYS 12 Patch 2  
 fluticasone propionate (FLONASE) 50 mcg/actuation nasal spray INSTILL 1 SPRAY IN BOTH NOSTRILS TWICE DAILY 1 Bottle 2  
 rosuvastatin (CRESTOR) 20 mg tablet TAKE 1 TABLET BY MOUTH EVERY DAY 90 Tab 2  cyclobenzaprine (FLEXERIL) 10 mg tablet Take 1 Tab by mouth three (3) times daily as needed for Muscle Spasm(s). 30 Tab 0  
 hydrALAZINE (APRESOLINE) 50 mg tablet Take 1 Tab by mouth two (2) times a day. 180 Tab 3  
 brinzolamide (AZOPT OP) Apply  to eye. Indications: 3 times daily  amLODIPine (NORVASC) 10 mg tablet TAKE 1 TABLET BY MOUTH DAILY. 30 Tab 6  
 netarsudil (RHOPRESSA) 0.02 % drop Apply  to eye.  brimonidine-timolol (COMBIGAN) 0.2-0.5 % drop ophthalmic solution Administer 1 Drop to both eyes three (3) times daily.  soft lens rinse,store solution (SHYLA SALINE SENSITIVE EYES) by Does Not Apply route.  losartan (COZAAR) 100 mg tablet Take 50 mg by mouth two (2) times a day.  b complex-vitamin c-folic acid (NEPHROCAPS) 1 mg capsule Take 1 Cap by mouth daily. 30 Cap 6  calcium acetate (PHOSLO) 667 mg cap Take 1 Cap by mouth three (3) times daily (with meals). 90 Cap 2  
 insulin lispro (HUMALOG) 100 unit/mL injection Blood Sugar (mg/dL): <150 =0 units; 150 -199 =2 units; 200 -249 =4 units; 250 -299 =6 units; 300 -349 =8 units; 350 and above =10 units. 1 Vial 2  
 acetaminophen (TYLENOL EXTRA STRENGTH) 500 mg tablet Take  by mouth every six (6) hours as needed for Pain.  docusate sodium (COLACE) 100 mg capsule Take 1 Cap by mouth two (2) times a day. 60 Cap 0  
 cyanocobalamin (VITAMIN B-12) 1,000 mcg tablet Take 1,000 mcg by mouth daily. Allergies Allergen Reactions  Bactrim [Sulfamethoprim Ds] Nausea and Vomiting  Coreg [Carvedilol] Nausea and Vomiting Difficulty walking Family History Problem Relation Age of Onset  Diabetes Sister  Sickle Cell Anemia Sister  Diabetes Sister Social History Tobacco Use  Smoking status: Never Smoker  Smokeless tobacco: Never Used Substance Use Topics  Alcohol use: No  
 
 
Depression Risk Factor Screening:  
 
3 most recent PHQ Screens 9/28/2020 Little interest or pleasure in doing things Not at all Feeling down, depressed, irritable, or hopeless Not at all Total Score PHQ 2 0 Trouble falling or staying asleep, or sleeping too much - Feeling tired or having little energy - Poor appetite, weight loss, or overeating - Feeling bad about yourself - or that you are a failure or have let yourself or your family down - Trouble concentrating on things such as school, work, reading, or watching TV - Moving or speaking so slowly that other people could have noticed; or the opposite being so fidgety that others notice - Thoughts of being better off dead, or hurting yourself in some way -  
PHQ 9 Score - How difficult have these problems made it for you to do your work, take care of your home and get along with others - Alcohol Risk Screen Do you average more than 2 drinks per night or 14 drinks a week: No 
 
On any one occasion in the past three months have you have had more than 4 drinks containing alcohol:  No 
 
 
 
Functional Ability and Level of Safety:  
Hearing: Hearing is good. Activities of Daily Living: The home contains: no safety equipment. Patient does total self care Ambulation: with mild difficulty Fall Risk: 
No flowsheet data found. Abuse Screen: 
Patient is not abused Cognitive Screening Has your family/caregiver stated any concerns about your memory: no 
 
Cognitive Screening: cognition intact Patient Care Team  
Patient Care Team: 
Curry Hernandez MD as PCP - Gwynneth Aschoff, Gaylene Buff, MD as PCP - [facility] Nikolay Best MD (Cardiology) Shaka Garcia NP (Nurse Practitioner) NEYMAR Samaniego PA (Physician Assistant) Assessment/Plan Education and counseling provided: 
Are appropriate based on today's review and evaluation Diagnoses and all orders for this visit: 
 
1. Medicare annual wellness visit, subsequent 2. Status post amputation of toe of left foot ([facility] [address] 3. Diabetic ulcer of other part of left foot associated with diabetes mellitus due to underlying condition, with other ulcer severity ([facility] -     LIPID PANEL; Future -     METABOLIC PANEL, COMPREHENSIVE; Future 
-     HEMOGLOBIN A1C WITH EAG; Future 4. ESRD (end stage renal disease) on dialysis ([facility] Health Maintenance Due Topic Date Due  Shingrix Vaccine Age 50> (1 of 2) 10/14/2010  Pneumococcal 0-64 years (2 of 3 - PCV13) 04/05/2017  MICROALBUMIN Q1  03/21/2018 As above,  treatment plan as listed below Orders Placed This Encounter  LIPID PANEL  
 METABOLIC PANEL, COMPREHENSIVE  
 HEMOGLOBIN A1C WITH EAG Follow-up and Dispositions · Return in about 4 months (around 1/28/2021) for Chol, htn. This has been fully explained to the patient, who indicates understanding. An After Visit Summary was printed and given to the patient.  
 
 
Anthony Sauceda MD

## 2020-10-03 NOTE — PROGRESS NOTES
This is the Subsequent Medicare Annual Wellness Exam, performed 12 months or more after the Initial AWV or the last Subsequent AWV I have reviewed the patient's medical history in detail and updated the computerized patient record. Doing well On renal transplant list at [facility]; he has ESRD No new complaints Pt has a h/o diabetic foot ulcer with amputation; History Patient Active Problem List  
Diagnosis Code  HLD (hyperlipidemia) E78.5  
 HTN (hypertension) I10  
 Diabetic retinopathy ([address] E11.319  
 Noncompliance with treatment Z91.19  Type II or unspecified type diabetes mellitus without mention of complication, uncontrolled [id number]  Paresthesias/numbness [id number]  Diabetes mellitus with renal manifestations, uncontrolled (HCC) E11.29, E11.65  Diabetic foot ulcer ([address] E11.621, L97.509  Gangrene of toe (HCC) I96  
 Dry gangrene ([address] B95  Chronic kidney disease, stage IV (severe) ([address] N18.4  Renal failure (ARF), acute on chronic (HCC) N17.9, N18.9  Diabetic nephropathy (HCC) E11.21  
 Hypertension I10  
 Malignant hypertension I10  
 CKD (chronic kidney disease) N18.9  Hyperlipidemia E78.5  Type 2 diabetes mellitus with complication, with long-term current use of insulin (HCC) E11.8, Z79.4  Orthostatic hypotension I95.1  Chest pain R07.9  Stage 5 chronic kidney disease not on chronic dialysis ([address] N18.5  Diabetes mellitus due to underlying condition, uncontrolled, with diabetic nephropathy, with long-term current use of insulin ([facility]) E08.21, E08.65, Z79.4  Elevated troponin R79.89  
 Kidney disease N28.9  ESRD (end stage renal disease) ([address] N18.6  Anemia D64.9  Hypoalbuminemia E88.09  
 Blindness of one eye H54.40  ESRD on dialysis ([address] N18.6, Z99.2  Status post amputation of foot ([address] K65.364  Status post amputation of toe of left foot ([address] P47.347 Past Medical History:  
Diagnosis Date  Blindness of one eye left  
 Cardiac echocardiogram 10/21/2016 EF 60-65%. No WMA. Mod-marked LVH. Normal diastolic fx. No significant valvular heart disease.  Cardiac treadmill stress test, low risk 11/02/2012 Negative maximal exercise treadmill test.  Ex time 7 min 45 sec.  Cardiovascular LE peripheral arterial testing 03/22/2016 No significant peripheral arterial disease at rest bilaterally. ABIs deferred due to calcified vessels.  Cardiovascular LLE venous duplex 06/06/2012 Left leg:  No DVT.  Cardiovascular renal duplex 10/21/2016 No significant renal artery stenosis.  Chronic kidney disease   
 hd-m-w-f  
 CKD (chronic kidney disease), stage V ([address]  Diabetes mellitus ([facility] 3/12/2010  Diabetic retinopathy ([facility] 5/4/2010  Edema of both legs  Eye examination 5/4/10 OU: 20/20; OD: 20/25; OS: 20/25 without correction.  GERD (gastroesophageal reflux disease)  Glaucoma 08/17/2017  
 [facility]  
 HLD (hyperlipidemia) 3/12/2010  
 HTN (hypertension) 3/12/2010  Orthostatic hypertension Past Surgical History:  
Procedure Laterality Date  COLONOSCOPY N/A 1/10/2019 COLONOSCOPY w polyp[ectomy performed by Chely Tinajero MD at [address] Left TOES-small  HX HERNIA REPAIR  2005  HX OTHER SURGICAL  11/07/2017  
 glaucoma valve- Ahmed  HX VASCULAR ACCESS    
 HD catheter right neck Current Outpatient Medications Medication Sig Dispense Refill  finasteride (PROSCAR) 5 mg tablet TAKE 1 TABLET BY MOUTH EVERY DAY 90 Tab 0  cloNIDine (CATAPRES) 0.3 mg/24 hr APPLY 1 PATCH TO SKIN ONCE EVERY 7 DAYS 12 Patch 2  
 fluticasone propionate (FLONASE) 50 mcg/actuation nasal spray INSTILL 1 SPRAY IN BOTH NOSTRILS TWICE DAILY 1 Bottle 2  
 rosuvastatin (CRESTOR) 20 mg tablet TAKE 1 TABLET BY MOUTH EVERY DAY 90 Tab 2  cyclobenzaprine (FLEXERIL) 10 mg tablet Take 1 Tab by mouth three (3) times daily as needed for Muscle Spasm(s). 30 Tab 0  
 hydrALAZINE (APRESOLINE) 50 mg tablet Take 1 Tab by mouth two (2) times a day. 180 Tab 3  
 brinzolamide (AZOPT OP) Apply  to eye. Indications: 3 times daily  amLODIPine (NORVASC) 10 mg tablet TAKE 1 TABLET BY MOUTH DAILY. 30 Tab 6  
 netarsudil (RHOPRESSA) 0.02 % drop Apply  to eye.  brimonidine-timolol (COMBIGAN) 0.2-0.5 % drop ophthalmic solution Administer 1 Drop to both eyes three (3) times daily.  soft lens rinse,store solution (SHYLA SALINE SENSITIVE EYES) by Does Not Apply route.  losartan (COZAAR) 100 mg tablet Take 50 mg by mouth two (2) times a day.  b complex-vitamin c-folic acid (NEPHROCAPS) 1 mg capsule Take 1 Cap by mouth daily. 30 Cap 6  calcium acetate (PHOSLO) 667 mg cap Take 1 Cap by mouth three (3) times daily (with meals). 90 Cap 2  
 insulin lispro (HUMALOG) 100 unit/mL injection Blood Sugar (mg/dL): <150 =0 units; 150 -199 =2 units; 200 -249 =4 units; 250 -299 =6 units; 300 -349 =8 units; 350 and above =10 units. 1 Vial 2  
 acetaminophen (TYLENOL EXTRA STRENGTH) 500 mg tablet Take  by mouth every six (6) hours as needed for Pain.  docusate sodium (COLACE) 100 mg capsule Take 1 Cap by mouth two (2) times a day. 60 Cap 0  
 cyanocobalamin (VITAMIN B-12) 1,000 mcg tablet Take 1,000 mcg by mouth daily. Allergies Allergen Reactions  Bactrim [Sulfamethoprim Ds] Nausea and Vomiting  Coreg [Carvedilol] Nausea and Vomiting Difficulty walking Family History Problem Relation Age of Onset  Diabetes Sister  Sickle Cell Anemia Sister  Diabetes Sister Social History Tobacco Use  Smoking status: Never Smoker  Smokeless tobacco: Never Used Substance Use Topics  Alcohol use: No  
 
 
Depression Risk Factor Screening:  
 
3 most recent PHQ Screens 9/28/2020 Little interest or pleasure in doing things Not at all Feeling down, depressed, irritable, or hopeless Not at all Total Score PHQ 2 0 Trouble falling or staying asleep, or sleeping too much - Feeling tired or having little energy - Poor appetite, weight loss, or overeating - Feeling bad about yourself - or that you are a failure or have let yourself or your family down - Trouble concentrating on things such as school, work, reading, or watching TV - Moving or speaking so slowly that other people could have noticed; or the opposite being so fidgety that others notice - Thoughts of being better off dead, or hurting yourself in some way -  
PHQ 9 Score - How difficult have these problems made it for you to do your work, take care of your home and get along with others - Alcohol Risk Screen Do you average more than 2 drinks per night or 14 drinks a week: No 
 
On any one occasion in the past three months have you have had more than 4 drinks containing alcohol:  No 
 
 
 
Functional Ability and Level of Safety:  
Hearing: Hearing is good. Activities of Daily Living: The home contains: no safety equipment. Patient does total self care Ambulation: with mild difficulty Fall Risk: 
No flowsheet data found. Abuse Screen: 
Patient is not abused Cognitive Screening Has your family/caregiver stated any concerns about your memory: no 
 
Cognitive Screening: cognition intact Patient Care Team  
Patient Care Team: 
Sophia Thacker MD as PCP - Libby Contreras, Tashia Dodson MD as PCP - [facility] Sharlene Patten MD (Cardiology) Thea Triana NP (Nurse Practitioner) NEYMAR Gamino PA (Physician Assistant) Assessment/Plan Education and counseling provided: 
Are appropriate based on today's review and evaluation Diagnoses and all orders for this visit: 
 
1. Medicare annual wellness visit, subsequent 2. Status post amputation of toe of left foot ([facility] 3. Diabetic ulcer of other part of left foot associated with diabetes mellitus due to underlying condition, with other ulcer severity ([facility] -     LIPID PANEL; Future -     METABOLIC PANEL, COMPREHENSIVE; Future 
-     HEMOGLOBIN A1C WITH EAG; Future 4. ESRD (end stage renal disease) on dialysis ([facility] Health Maintenance Due Topic Date Due  Shingrix Vaccine Age 50> (1 of 2) 10/14/2010  Pneumococcal 0-64 years (2 of 3 - PCV13) 04/05/2017  MICROALBUMIN Q1  03/21/2018 As above,  treatment plan as listed below Orders Placed This Encounter  LIPID PANEL  
 METABOLIC PANEL, COMPREHENSIVE  
 HEMOGLOBIN A1C WITH EAG Follow-up and Dispositions · Return in about 4 months (around 1/28/2021) for Chol, htn. This has been fully explained to the patient, who indicates understanding. An After Visit Summary was printed and given to the patient.  
 
 
Elizabeth Bermudez MD

## 2020-10-03 NOTE — PROGRESS NOTES
This is the Subsequent Medicare Annual Wellness Exam, performed 12 months or more after the Initial AWV or the last Subsequent AWV I have reviewed the patient's medical history in detail and updated the computerized patient record. Doing well On renal transplant list at [facility]; he has ESRD No new complaints Pt has a h/o diabetic foot ulcer with amputation; History Patient Active Problem List  
Diagnosis Code  HLD (hyperlipidemia) E78.5  
 HTN (hypertension) I10  
 Diabetic retinopathy ([address] E11.319  
 Noncompliance with treatment Z91.19  Type II or unspecified type diabetes mellitus without mention of complication, uncontrolled [id number]  Paresthesias/numbness [id number]  Diabetes mellitus with renal manifestations, uncontrolled (HCC) E11.29, E11.65  Diabetic foot ulcer ([address] E11.621, L97.509  Gangrene of toe (HCC) I96  
 Dry gangrene ([address] Z47  Chronic kidney disease, stage IV (severe) ([address] N18.4  Renal failure (ARF), acute on chronic (HCC) N17.9, N18.9  Diabetic nephropathy (HCC) E11.21  
 Hypertension I10  
 Malignant hypertension I10  
 CKD (chronic kidney disease) N18.9  Hyperlipidemia E78.5  Type 2 diabetes mellitus with complication, with long-term current use of insulin (HCC) E11.8, Z79.4  Orthostatic hypotension I95.1  Chest pain R07.9  Stage 5 chronic kidney disease not on chronic dialysis ([address] N18.5  Diabetes mellitus due to underlying condition, uncontrolled, with diabetic nephropathy, with long-term current use of insulin ([facility]) E08.21, E08.65, Z79.4  Elevated troponin R79.89  
 Kidney disease N28.9  ESRD (end stage renal disease) ([address] N18.6  Anemia D64.9  Hypoalbuminemia E88.09  
 Blindness of one eye H54.40  ESRD on dialysis ([address] N18.6, Z99.2  Status post amputation of foot ([address] X45.410  Status post amputation of toe of left foot ([address] L18.319 Past Medical History:  
Diagnosis Date  Blindness of one eye left  
 Cardiac echocardiogram 10/21/2016 EF 60-65%. No WMA. Mod-marked LVH. Normal diastolic fx. No significant valvular heart disease.  Cardiac treadmill stress test, low risk 11/02/2012 Negative maximal exercise treadmill test.  Ex time 7 min 45 sec.  Cardiovascular LE peripheral arterial testing 03/22/2016 No significant peripheral arterial disease at rest bilaterally. ABIs deferred due to calcified vessels.  Cardiovascular LLE venous duplex 06/06/2012 Left leg:  No DVT.  Cardiovascular renal duplex 10/21/2016 No significant renal artery stenosis.  Chronic kidney disease   
 hd-m-w-f  
 CKD (chronic kidney disease), stage V ([address]  Diabetes mellitus ([facility] 3/12/2010  Diabetic retinopathy ([facility] 5/4/2010  Edema of both legs  Eye examination 5/4/10 OU: 20/20; OD: 20/25; OS: 20/25 without correction.  GERD (gastroesophageal reflux disease)  Glaucoma 08/17/2017  
 [place]  
 HLD (hyperlipidemia) 3/12/2010  
 HTN (hypertension) 3/12/2010  Orthostatic hypertension Past Surgical History:  
Procedure Laterality Date  COLONOSCOPY N/A 1/10/2019 COLONOSCOPY w polyp[ectomy performed by Anne Marie Garcia MD at [address] [facility] [address] Left TOES-small  HX HERNIA REPAIR  2005  HX OTHER SURGICAL  11/07/2017  
 glaucoma valve- Ahmed  HX VASCULAR ACCESS    
 HD catheter right neck Current Outpatient Medications Medication Sig Dispense Refill  finasteride (PROSCAR) 5 mg tablet TAKE 1 TABLET BY MOUTH EVERY DAY 90 Tab 0  cloNIDine (CATAPRES) 0.3 mg/24 hr APPLY 1 PATCH TO SKIN ONCE EVERY 7 DAYS 12 Patch 2  
 fluticasone propionate (FLONASE) 50 mcg/actuation nasal spray INSTILL 1 SPRAY IN BOTH NOSTRILS TWICE DAILY 1 Bottle 2  
 rosuvastatin (CRESTOR) 20 mg tablet TAKE 1 TABLET BY MOUTH EVERY DAY 90 Tab 2  cyclobenzaprine (FLEXERIL) 10 mg tablet Take 1 Tab by mouth three (3) times daily as needed for Muscle Spasm(s). 30 Tab 0  
 hydrALAZINE (APRESOLINE) 50 mg tablet Take 1 Tab by mouth two (2) times a day. 180 Tab 3  
 brinzolamide (AZOPT OP) Apply  to eye. Indications: 3 times daily  amLODIPine (NORVASC) 10 mg tablet TAKE 1 TABLET BY MOUTH DAILY. 30 Tab 6  
 netarsudil (RHOPRESSA) 0.02 % drop Apply  to eye.  brimonidine-timolol (COMBIGAN) 0.2-0.5 % drop ophthalmic solution Administer 1 Drop to both eyes three (3) times daily.  soft lens rinse,store solution (SHYLA SALINE SENSITIVE EYES) by Does Not Apply route.  losartan (COZAAR) 100 mg tablet Take 50 mg by mouth two (2) times a day.  b complex-vitamin c-folic acid (NEPHROCAPS) 1 mg capsule Take 1 Cap by mouth daily. 30 Cap 6  calcium acetate (PHOSLO) 667 mg cap Take 1 Cap by mouth three (3) times daily (with meals). 90 Cap 2  
 insulin lispro (HUMALOG) 100 unit/mL injection Blood Sugar (mg/dL): <150 =0 units; 150 -199 =2 units; 200 -249 =4 units; 250 -299 =6 units; 300 -349 =8 units; 350 and above =10 units. 1 Vial 2  
 acetaminophen (TYLENOL EXTRA STRENGTH) 500 mg tablet Take  by mouth every six (6) hours as needed for Pain.  docusate sodium (COLACE) 100 mg capsule Take 1 Cap by mouth two (2) times a day. 60 Cap 0  
 cyanocobalamin (VITAMIN B-12) 1,000 mcg tablet Take 1,000 mcg by mouth daily. Allergies Allergen Reactions  Bactrim [Sulfamethoprim Ds] Nausea and Vomiting  Coreg [Carvedilol] Nausea and Vomiting Difficulty walking Family History Problem Relation Age of Onset  Diabetes Sister  Sickle Cell Anemia Sister  Diabetes Sister Social History Tobacco Use  Smoking status: Never Smoker  Smokeless tobacco: Never Used Substance Use Topics  Alcohol use: No  
 
 
Depression Risk Factor Screening:  
 
3 most recent PHQ Screens 9/28/2020 Little interest or pleasure in doing things Not at all Feeling down, depressed, irritable, or hopeless Not at all Total Score PHQ 2 0 Trouble falling or staying asleep, or sleeping too much - Feeling tired or having little energy - Poor appetite, weight loss, or overeating - Feeling bad about yourself - or that you are a failure or have let yourself or your family down - Trouble concentrating on things such as school, work, reading, or watching TV - Moving or speaking so slowly that other people could have noticed; or the opposite being so fidgety that others notice - Thoughts of being better off dead, or hurting yourself in some way -  
PHQ 9 Score - How difficult have these problems made it for you to do your work, take care of your home and get along with others - Alcohol Risk Screen Do you average more than 2 drinks per night or 14 drinks a week: No 
 
On any one occasion in the past three months have you have had more than 4 drinks containing alcohol:  No 
 
 
 
Functional Ability and Level of Safety:  
Hearing: Hearing is good. Activities of Daily Living: The home contains: no safety equipment. Patient does total self care Ambulation: with mild difficulty Fall Risk: 
No flowsheet data found. Abuse Screen: 
Patient is not abused Cognitive Screening Has your family/caregiver stated any concerns about your memory: no 
 
Cognitive Screening: cognition intact Patient Care Team  
Patient Care Team: 
Ayla Dejesus MD as PCP - Jermain Morales MD as PCP - Mindy Robles Provider Eric Holder MD (Cardiology) Zeina Manley NP (Nurse Practitioner) NEYMAR Millan PA (Physician Assistant) Assessment/Plan Education and counseling provided: 
Are appropriate based on today's review and evaluation Diagnoses and all orders for this visit: 
 
1. Medicare annual wellness visit, subsequent 2. Status post amputation of toe of left foot ([facility] 3. Diabetic ulcer of other part of left foot associated with diabetes mellitus due to underlying condition, with other ulcer severity ([facility] -     LIPID PANEL; Future -     METABOLIC PANEL, COMPREHENSIVE; Future 
-     HEMOGLOBIN A1C WITH EAG; Future 4. ESRD (end stage renal disease) on dialysis ([facility] Health Maintenance Due Topic Date Due  Shingrix Vaccine Age 50> (1 of 2) 10/14/2010  Pneumococcal 0-64 years (2 of 3 - PCV13) 04/05/2017  MICROALBUMIN Q1  03/21/2018 As above,  treatment plan as listed below Orders Placed This Encounter  LIPID PANEL  
 METABOLIC PANEL, COMPREHENSIVE  
 HEMOGLOBIN A1C WITH EAG Follow-up and Dispositions · Return in about 4 months (around 1/28/2021) for Chol, htn. This has been fully explained to the patient, who indicates understanding. An After Visit Summary was printed and given to the patient.  
 
 
Ba Ibarra MD

## 2020-10-03 NOTE — PROGRESS NOTES
This is the Subsequent Medicare Annual Wellness Exam, performed 12 months or more after the Initial AWV or the last Subsequent AWV I have reviewed the patient's medical history in detail and updated the computerized patient record. Doing well On renal transplant list at [facility]; he has ESRD No new complaints Pt has a h/o diabetic foot ulcer with amputation; History Patient Active Problem List  
Diagnosis Code  HLD (hyperlipidemia) E78.5  
 HTN (hypertension) I10  
 Diabetic retinopathy ([address] E11.319  
 Noncompliance with treatment Z91.19  Type II or unspecified type diabetes mellitus without mention of complication, uncontrolled [id number]  Paresthesias/numbness [id number]  Diabetes mellitus with renal manifestations, uncontrolled (HCC) E11.29, E11.65  Diabetic foot ulcer ([address] E11.621, L97.509  Gangrene of toe (HCC) I96  
 Dry gangrene ([address] N58  Chronic kidney disease, stage IV (severe) ([address] N18.4  Renal failure (ARF), acute on chronic (HCC) N17.9, N18.9  Diabetic nephropathy (HCC) E11.21  
 Hypertension I10  
 Malignant hypertension I10  
 CKD (chronic kidney disease) N18.9  Hyperlipidemia E78.5  Type 2 diabetes mellitus with complication, with long-term current use of insulin (HCC) E11.8, Z79.4  Orthostatic hypotension I95.1  Chest pain R07.9  Stage 5 chronic kidney disease not on chronic dialysis ([address] N18.5  Diabetes mellitus due to underlying condition, uncontrolled, with diabetic nephropathy, with long-term current use of insulin ([facility]) E08.21, E08.65, Z79.4  Elevated troponin R79.89  
 Kidney disease N28.9  ESRD (end stage renal disease) ([address] N18.6  Anemia D64.9  Hypoalbuminemia E88.09  
 Blindness of one eye H54.40  ESRD on dialysis ([address] N18.6, Z99.2  Status post amputation of foot ([address] F33.248  Status post amputation of toe of left foot ([address] U36.536 Past Medical History:  
Diagnosis Date  Blindness of one eye left  
 Cardiac echocardiogram 10/21/2016 EF 60-65%. No WMA. Mod-marked LVH. Normal diastolic fx. No significant valvular heart disease.  Cardiac treadmill stress test, low risk 11/02/2012 Negative maximal exercise treadmill test.  Ex time 7 min 45 sec.  Cardiovascular LE peripheral arterial testing 03/22/2016 No significant peripheral arterial disease at rest bilaterally. ABIs deferred due to calcified vessels.  Cardiovascular LLE venous duplex 06/06/2012 Left leg:  No DVT.  Cardiovascular renal duplex 10/21/2016 No significant renal artery stenosis.  Chronic kidney disease   
 hd-m-w-f  
 CKD (chronic kidney disease), stage V ([address]  Diabetes mellitus ([facility] 3/12/2010  Diabetic retinopathy ([facility] [facility] 5/4/2010  Edema of both legs  Eye examination 5/4/10 OU: 20/20; OD: 20/25; OS: 20/25 without correction.  GERD (gastroesophageal reflux disease)  Glaucoma 08/17/2017  
 Joanne Starling  
 HLD (hyperlipidemia) 3/12/2010  
 HTN (hypertension) 3/12/2010  Orthostatic hypertension Past Surgical History:  
Procedure Laterality Date  COLONOSCOPY N/A 1/10/2019 COLONOSCOPY w polyp[ectomy performed by Stephanie Wiseman MD at [address] Left TOES-small  HX HERNIA REPAIR  2005  HX OTHER SURGICAL  11/07/2017  
 glaucoma valve- Ahmed  HX VASCULAR ACCESS    
 HD catheter right neck Current Outpatient Medications Medication Sig Dispense Refill  finasteride (PROSCAR) 5 mg tablet TAKE 1 TABLET BY MOUTH EVERY DAY 90 Tab 0  cloNIDine (CATAPRES) 0.3 mg/24 hr APPLY 1 PATCH TO SKIN ONCE EVERY 7 DAYS 12 Patch 2  
 fluticasone propionate (FLONASE) 50 mcg/actuation nasal spray INSTILL 1 SPRAY IN BOTH NOSTRILS TWICE DAILY 1 Bottle 2  
 rosuvastatin (CRESTOR) 20 mg tablet TAKE 1 TABLET BY MOUTH EVERY DAY 90 Tab 2  cyclobenzaprine (FLEXERIL) 10 mg tablet Take 1 Tab by mouth three (3) times daily as needed for Muscle Spasm(s). 30 Tab 0  
 hydrALAZINE (APRESOLINE) 50 mg tablet Take 1 Tab by mouth two (2) times a day. 180 Tab 3  
 brinzolamide (AZOPT OP) Apply  to eye. Indications: 3 times daily  amLODIPine (NORVASC) 10 mg tablet TAKE 1 TABLET BY MOUTH DAILY. 30 Tab 6  
 netarsudil (RHOPRESSA) 0.02 % drop Apply  to eye.  brimonidine-timolol (COMBIGAN) 0.2-0.5 % drop ophthalmic solution Administer 1 Drop to both eyes three (3) times daily.  soft lens rinse,store solution (SHYLA SALINE SENSITIVE EYES) by Does Not Apply route.  losartan (COZAAR) 100 mg tablet Take 50 mg by mouth two (2) times a day.  b complex-vitamin c-folic acid (NEPHROCAPS) 1 mg capsule Take 1 Cap by mouth daily. 30 Cap 6  calcium acetate (PHOSLO) 667 mg cap Take 1 Cap by mouth three (3) times daily (with meals). 90 Cap 2  
 insulin lispro (HUMALOG) 100 unit/mL injection Blood Sugar (mg/dL): <150 =0 units; 150 -199 =2 units; 200 -249 =4 units; 250 -299 =6 units; 300 -349 =8 units; 350 and above =10 units. 1 Vial 2  
 acetaminophen (TYLENOL EXTRA STRENGTH) 500 mg tablet Take  by mouth every six (6) hours as needed for Pain.  docusate sodium (COLACE) 100 mg capsule Take 1 Cap by mouth two (2) times a day. 60 Cap 0  
 cyanocobalamin (VITAMIN B-12) 1,000 mcg tablet Take 1,000 mcg by mouth daily. Allergies Allergen Reactions  Bactrim [Sulfamethoprim Ds] Nausea and Vomiting  Coreg [Carvedilol] Nausea and Vomiting Difficulty walking Family History Problem Relation Age of Onset  Diabetes Sister  Sickle Cell Anemia Sister  Diabetes Sister Social History Tobacco Use  Smoking status: Never Smoker  Smokeless tobacco: Never Used Substance Use Topics  Alcohol use: No  
 
 
Depression Risk Factor Screening:  
 
3 most recent PHQ Screens 9/28/2020 Little interest or pleasure in doing things Not at all Feeling down, depressed, irritable, or hopeless Not at all Total Score PHQ 2 0 Trouble falling or staying asleep, or sleeping too much - Feeling tired or having little energy - Poor appetite, weight loss, or overeating - Feeling bad about yourself - or that you are a failure or have let yourself or your family down - Trouble concentrating on things such as school, work, reading, or watching TV - Moving or speaking so slowly that other people could have noticed; or the opposite being so fidgety that others notice - Thoughts of being better off dead, or hurting yourself in some way -  
PHQ 9 Score - How difficult have these problems made it for you to do your work, take care of your home and get along with others - Alcohol Risk Screen Do you average more than 2 drinks per night or 14 drinks a week: No 
 
On any one occasion in the past three months have you have had more than 4 drinks containing alcohol:  No 
 
 
 
Functional Ability and Level of Safety:  
Hearing: Hearing is good. Activities of Daily Living: The home contains: no safety equipment. Patient does total self care Ambulation: with mild difficulty Fall Risk: 
No flowsheet data found. Abuse Screen: 
Patient is not abused Cognitive Screening Has your family/caregiver stated any concerns about your memory: no 
 
Cognitive Screening: cognition intact Patient Care Team  
Patient Care Team: 
Erin Locke MD as PCP - Kenroy Clark, Edith Morgan MD as PCP - REHABILITATION [facility] Empaneled Provider Crystal Sanchez MD (Cardiology) Christiana Nickerson NP (Nurse Practitioner) NEYMAR Griggs PA (Physician Assistant) Assessment/Plan Education and counseling provided: 
Are appropriate based on today's review and evaluation Diagnoses and all orders for this visit: 
 
1. Medicare annual wellness visit, subsequent 2. Status post amputation of toe of left foot ([facility] [address] 3. Diabetic ulcer of other part of left foot associated with diabetes mellitus due to underlying condition, with other ulcer severity ([facility] -     LIPID PANEL; Future -     METABOLIC PANEL, COMPREHENSIVE; Future 
-     HEMOGLOBIN A1C WITH EAG; Future 4. ESRD (end stage renal disease) on dialysis ([facility] Health Maintenance Due Topic Date Due  Shingrix Vaccine Age 50> (1 of 2) 10/14/2010  Pneumococcal 0-64 years (2 of 3 - PCV13) 04/05/2017  MICROALBUMIN Q1  03/21/2018 As above,  treatment plan as listed below Orders Placed This Encounter  LIPID PANEL  
 METABOLIC PANEL, COMPREHENSIVE  
 HEMOGLOBIN A1C WITH EAG Follow-up and Dispositions · Return in about 4 months (around 1/28/2021) for Chol, htn. This has been fully explained to the patient, who indicates understanding. An After Visit Summary was printed and given to the patient.  
 
 
Vandana Maza MD

## 2020-10-03 NOTE — PROGRESS NOTES
This is the Subsequent Medicare Annual Wellness Exam, performed 12 months or more after the Initial AWV or the last Subsequent AWV I have reviewed the patient's medical history in detail and updated the computerized patient record. Doing well On renal transplant list at [facility]; he has ESRD No new complaints Pt has a h/o diabetic foot ulcer with amputation; History Patient Active Problem List  
Diagnosis Code  HLD (hyperlipidemia) E78.5  
 HTN (hypertension) I10  
 Diabetic retinopathy ([address] E11.319  
 Noncompliance with treatment Z91.19  Type II or unspecified type diabetes mellitus without mention of complication, uncontrolled [id number]  Paresthesias/numbness [id number]  Diabetes mellitus with renal manifestations, uncontrolled (HCC) E11.29, E11.65  Diabetic foot ulcer ([address] E11.621, L97.509  Gangrene of toe (HCC) I96  
 Dry gangrene ([address] F75  Chronic kidney disease, stage IV (severe) ([address] N18.4  Renal failure (ARF), acute on chronic (HCC) N17.9, N18.9  Diabetic nephropathy (HCC) E11.21  
 Hypertension I10  
 Malignant hypertension I10  
 CKD (chronic kidney disease) N18.9  Hyperlipidemia E78.5  Type 2 diabetes mellitus with complication, with long-term current use of insulin (HCC) E11.8, Z79.4  Orthostatic hypotension I95.1  Chest pain R07.9  Stage 5 chronic kidney disease not on chronic dialysis ([address] N18.5  Diabetes mellitus due to underlying condition, uncontrolled, with diabetic nephropathy, with long-term current use of insulin ([facility]) E08.21, E08.65, Z79.4  Elevated troponin R79.89  
 Kidney disease N28.9  ESRD (end stage renal disease) ([address] N18.6  Anemia D64.9  Hypoalbuminemia E88.09  
 Blindness of one eye H54.40  ESRD on dialysis ([address] N18.6, Z99.2  Status post amputation of foot ([address] T38.934  Status post amputation of toe of left foot ([address] O53.980 Past Medical History:  
Diagnosis Date  Blindness of one eye left  
 Cardiac echocardiogram 10/21/2016 EF 60-65%. No WMA. Mod-marked LVH. Normal diastolic fx. No significant valvular heart disease.  Cardiac treadmill stress test, low risk 11/02/2012 Negative maximal exercise treadmill test.  Ex time 7 min 45 sec.  Cardiovascular LE peripheral arterial testing 03/22/2016 No significant peripheral arterial disease at rest bilaterally. ABIs deferred due to calcified vessels.  Cardiovascular LLE venous duplex 06/06/2012 Left leg:  No DVT.  Cardiovascular renal duplex 10/21/2016 No significant renal artery stenosis.  Chronic kidney disease   
 hd-m-w-f  
 CKD (chronic kidney disease), stage V ([address]  Diabetes mellitus ([facility] [address] 3/12/2010  Diabetic retinopathy ([facility] [address] 5/4/2010  Edema of both legs  Eye examination 5/4/10 OU: 20/20; OD: 20/25; OS: 20/25 without correction.  GERD (gastroesophageal reflux disease)  Glaucoma 08/17/2017  
 Melissa Dayana  
 HLD (hyperlipidemia) 3/12/2010  
 HTN (hypertension) 3/12/2010  Orthostatic hypertension Past Surgical History:  
Procedure Laterality Date  COLONOSCOPY N/A 1/10/2019 COLONOSCOPY w polyp[ectomy performed by Louann Small MD at [address] Left TOES-small  HX HERNIA REPAIR  2005  HX OTHER SURGICAL  11/07/2017  
 glaucoma valve- Ahmed  HX VASCULAR ACCESS    
 HD catheter right neck Current Outpatient Medications Medication Sig Dispense Refill  finasteride (PROSCAR) 5 mg tablet TAKE 1 TABLET BY MOUTH EVERY DAY 90 Tab 0  cloNIDine (CATAPRES) 0.3 mg/24 hr APPLY 1 PATCH TO SKIN ONCE EVERY 7 DAYS 12 Patch 2  
 fluticasone propionate (FLONASE) 50 mcg/actuation nasal spray INSTILL 1 SPRAY IN BOTH NOSTRILS TWICE DAILY 1 Bottle 2  
 rosuvastatin (CRESTOR) 20 mg tablet TAKE 1 TABLET BY MOUTH EVERY DAY 90 Tab 2  cyclobenzaprine (FLEXERIL) 10 mg tablet Take 1 Tab by mouth three (3) times daily as needed for Muscle Spasm(s). 30 Tab 0  
 hydrALAZINE (APRESOLINE) 50 mg tablet Take 1 Tab by mouth two (2) times a day. 180 Tab 3  
 brinzolamide (AZOPT OP) Apply  to eye. Indications: 3 times daily  amLODIPine (NORVASC) 10 mg tablet TAKE 1 TABLET BY MOUTH DAILY. 30 Tab 6  
 netarsudil (RHOPRESSA) 0.02 % drop Apply  to eye.  brimonidine-timolol (COMBIGAN) 0.2-0.5 % drop ophthalmic solution Administer 1 Drop to both eyes three (3) times daily.  soft lens rinse,store solution (SHYLA SALINE SENSITIVE EYES) by Does Not Apply route.  losartan (COZAAR) 100 mg tablet Take 50 mg by mouth two (2) times a day.  b complex-vitamin c-folic acid (NEPHROCAPS) 1 mg capsule Take 1 Cap by mouth daily. 30 Cap 6  calcium acetate (PHOSLO) 667 mg cap Take 1 Cap by mouth three (3) times daily (with meals). 90 Cap 2  
 insulin lispro (HUMALOG) 100 unit/mL injection Blood Sugar (mg/dL): <150 =0 units; 150 -199 =2 units; 200 -249 =4 units; 250 -299 =6 units; 300 -349 =8 units; 350 and above =10 units. 1 Vial 2  
 acetaminophen (TYLENOL EXTRA STRENGTH) 500 mg tablet Take  by mouth every six (6) hours as needed for Pain.  docusate sodium (COLACE) 100 mg capsule Take 1 Cap by mouth two (2) times a day. 60 Cap 0  
 cyanocobalamin (VITAMIN B-12) 1,000 mcg tablet Take 1,000 mcg by mouth daily. Allergies Allergen Reactions  Bactrim [Sulfamethoprim Ds] Nausea and Vomiting  Coreg [Carvedilol] Nausea and Vomiting Difficulty walking Family History Problem Relation Age of Onset  Diabetes Sister  Sickle Cell Anemia Sister  Diabetes Sister Social History Tobacco Use  Smoking status: Never Smoker  Smokeless tobacco: Never Used Substance Use Topics  Alcohol use: No  
 
 
Depression Risk Factor Screening:  
 
3 most recent PHQ Screens 9/28/2020 Little interest or pleasure in doing things Not at all Feeling down, depressed, irritable, or hopeless Not at all Total Score PHQ 2 0 Trouble falling or staying asleep, or sleeping too much - Feeling tired or having little energy - Poor appetite, weight loss, or overeating - Feeling bad about yourself - or that you are a failure or have let yourself or your family down - Trouble concentrating on things such as school, work, reading, or watching TV - Moving or speaking so slowly that other people could have noticed; or the opposite being so fidgety that others notice - Thoughts of being better off dead, or hurting yourself in some way -  
PHQ 9 Score - How difficult have these problems made it for you to do your work, take care of your home and get along with others - Alcohol Risk Screen Do you average more than 2 drinks per night or 14 drinks a week: No 
 
On any one occasion in the past three months have you have had more than 4 drinks containing alcohol:  No 
 
 
 
Functional Ability and Level of Safety:  
Hearing: Hearing is good. Activities of Daily Living: The home contains: no safety equipment. Patient does total self care Ambulation: with mild difficulty Fall Risk: 
No flowsheet data found. Abuse Screen: 
Patient is not abused Cognitive Screening Has your family/caregiver stated any concerns about your memory: no 
 
Cognitive Screening: cognition intact Patient Care Team  
Patient Care Team: 
Tabatha Lundberg MD as PCP - Jian Ortega MD as PCP - [facility] Lio Cloud MD (Cardiology) Vandana Car NP (Nurse Practitioner) NEYMAR Gutierrez PA (Physician Assistant) Assessment/Plan Education and counseling provided: 
Are appropriate based on today's review and evaluation Diagnoses and all orders for this visit: 
 
1. Medicare annual wellness visit, subsequent 2. Status post amputation of toe of left foot ([facility] [address] 3. Diabetic ulcer of other part of left foot associated with diabetes mellitus due to underlying condition, with other ulcer severity ([facility] -     LIPID PANEL; Future -     METABOLIC PANEL, COMPREHENSIVE; Future 
-     HEMOGLOBIN A1C WITH EAG; Future 4. ESRD (end stage renal disease) on dialysis ([facility] Health Maintenance Due Topic Date Due  Shingrix Vaccine Age 50> (1 of 2) 10/14/2010  Pneumococcal 0-64 years (2 of 3 - PCV13) 04/05/2017  MICROALBUMIN Q1  03/21/2018 As above,  treatment plan as listed below Orders Placed This Encounter  LIPID PANEL  
 METABOLIC PANEL, COMPREHENSIVE  
 HEMOGLOBIN A1C WITH EAG Follow-up and Dispositions · Return in about 4 months (around 1/28/2021) for Chol, htn. This has been fully explained to the patient, who indicates understanding. An After Visit Summary was printed and given to the patient.  
 
 
Nata Panchal MD

## 2020-12-10 NOTE — ED PROVIDER NOTES
EMERGENCY DEPARTMENT HISTORY AND PHYSICAL EXAM 
 
3:42 PM 
 
 
Date: 12/10/2020 Patient Name: Gennaro Black History of Presenting Illness Chief Complaint Patient presents with  Foot Problem History Provided By: Patient and Patient's Wife Additional History (Context): Gennaro Black is a 61 y.o. male with Past medical history of end-stage renal disease, diabetes who presents with left foot infection worsening over the past several days. Patient's wife states that they want to Dr. Maryalice Snellen office and they sent him to the emergency department for further evaluation. Wife reports that his blood sugars have been okay. Patient denies any foot pain, and no fever, trauma, leg pain, and no other complaints. He does have end-stage renal disease and does his dialysis as scheduled. PCP: Natalie Mas MD 
 
 
 
Past History Past Medical History: 
Past Medical History:  
Diagnosis Date  Blindness of one eye   
 left  Cardiac echocardiogram 10/21/2016 EF 60-65%. No WMA. Mod-marked LVH. Normal diastolic fx. No significant valvular heart disease.  Cardiac treadmill stress test, low risk 11/02/2012 Negative maximal exercise treadmill test.  Ex time 7 min 45 sec.  Cardiovascular LE peripheral arterial testing 03/22/2016 No significant peripheral arterial disease at rest bilaterally. ABIs deferred due to calcified vessels.  Cardiovascular LLE venous duplex 06/06/2012 Left leg:  No DVT.  Cardiovascular renal duplex 10/21/2016 No significant renal artery stenosis.  Chronic kidney disease   
 [id number]  
 CKD (chronic kidney disease), stage V ([address]  Diabetes mellitus ([address] 3/12/2010  Diabetic retinopathy ([address] 5/4/2010  Edema of both legs  Eye examination 5/4/10 OU: 20/20; OD: 20/25; OS: 20/25 without correction.  GERD (gastroesophageal reflux disease)  Glaucoma 08/17/2017  
 Stefano Desai  
 HLD (hyperlipidemia) 3/12/2010  HTN (hypertension) 3/12/2010  Orthostatic hypertension Past Surgical History: 
Past Surgical History:  
Procedure Laterality Date  COLONOSCOPY N/A 1/10/2019 COLONOSCOPY w polyp[ectomy performed by Stephanie Wiseman MD at [address] Left TOES-small  HX HERNIA REPAIR  2005  HX OTHER SURGICAL  11/07/2017  
 glaucoma valve- Ahmed  HX VASCULAR ACCESS    
 HD catheter right neck Family History: 
Family History Problem Relation Age of Onset  Diabetes Sister  Sickle Cell Anemia Sister  Diabetes Sister Social History: 
Social History Tobacco Use  Smoking status: Never Smoker  Smokeless tobacco: Never Used Substance Use Topics  Alcohol use: No  
 Drug use: No  
 
 
Allergies: Allergies Allergen Reactions  Bactrim [Sulfamethoprim Ds] Nausea and Vomiting  Coreg [Carvedilol] Nausea and Vomiting Difficulty walking Review of Systems Review of Systems Constitutional: Negative for chills and fever. HENT: Negative for congestion, rhinorrhea, sore throat and trouble swallowing. Eyes: Negative for visual disturbance. Respiratory: Negative for cough and shortness of breath. Cardiovascular: Negative for chest pain and leg swelling. Gastrointestinal: Negative for abdominal pain, nausea and vomiting. Endocrine: Negative for polyuria. Genitourinary: Negative for flank pain. Musculoskeletal: Negative for arthralgias and neck stiffness. Discolored second and third left toes Skin: Negative for rash. Neurological: Negative for dizziness, weakness, numbness and headaches. Hematological: Does not bruise/bleed easily. Psychiatric/Behavioral: Negative for confusion and dysphoric mood. All other systems reviewed and are negative. Physical Exam  
 
Visit Vitals BP (!) 180/81 (BP 1 Location: Right arm, BP Patient Position: At rest) Pulse 77 Temp 99.6 °F (37.6 °C) Resp 14 Ht 5' 6\" (1.676 m) Wt 80.3 kg (177 lb) SpO2 100% BMI 28.57 kg/m² Physical Exam 
Vitals signs and nursing note reviewed. Constitutional:   
   General: He is not in acute distress. Appearance: He is well-developed. He is not diaphoretic. HENT:  
   Head: Normocephalic and atraumatic. Eyes:  
   General: No scleral icterus. Conjunctiva/sclera: Conjunctivae normal.  
   Pupils: Pupils are equal, round, and reactive to light. Neck: Musculoskeletal: Normal range of motion and neck supple. Cardiovascular:  
   Rate and Rhythm: Normal rate. Pulmonary:  
   Effort: Pulmonary effort is normal. No respiratory distress. Breath sounds: Normal breath sounds. No wheezing. Abdominal:  
   General: Bowel sounds are normal. There is no distension. Palpations: Abdomen is soft. Tenderness: There is no abdominal tenderness. Musculoskeletal: Normal range of motion. General: No tenderness. Right lower leg: No edema. Left lower leg: No edema. Comments: Left foot: 
Necrotic gangrenous second and third toes No drainage Good capillary refill to the great left toe Foot is nontender to palpation Lymphadenopathy:  
   Cervical: No cervical adenopathy. Skin: 
   General: Skin is warm and dry. Neurological:  
   General: No focal deficit present. Mental Status: He is alert and oriented to person, place, and time. Cranial Nerves: No cranial nerve deficit. Sensory: No sensory deficit. Motor: No weakness. Psychiatric:     
   Thought Content: Thought content normal.  
 
 
 
 
Diagnostic Study Results Labs - Recent Results (from the past 12 hour(s)) CBC WITH AUTOMATED DIFF Collection Time: 12/10/20 11:30 AM  
Result Value Ref Range WBC 11.9 4.6 - 13.2 K/uL  
 RBC 3.10 (L) 4.70 - 5.50 M/uL HGB 9.3 (L) 13.0 - 16.0 g/dL HCT 28.9 (L) 36.0 - 48.0 %  MCV 93.2 74.0 - 97.0 FL  
 MCH 30.0 24.0 - 34.0 PG  
 MCHC 32.2 31.0 - 37.0 g/dL  
 RDW 13.9 11.6 - 14.5 % PLATELET 694 773 - 052 K/uL MPV 9.7 9.2 - 11.8 FL  
 NEUTROPHILS 74 42 - 75 % LYMPHOCYTES 14 (L) 20 - 51 % MONOCYTES 10 (H) 2 - 9 % EOSINOPHILS 2 0 - 5 % BASOPHILS 0 0 - 3 %  
 ABS. NEUTROPHILS 8.8 (H) 1.8 - 8.0 K/UL  
 ABS. LYMPHOCYTES 1.7 0.8 - 3.5 K/UL  
 ABS. MONOCYTES 1.2 (H) 0 - 1.0 K/UL  
 ABS. EOSINOPHILS 0.2 0.0 - 0.4 K/UL  
 ABS. BASOPHILS 0.0 0.0 - 0.06 K/UL  
 DF MANUAL PLATELET COMMENTS ADEQUATE PLATELETS    
 RBC COMMENTS HYPOCHROMIA 1+ 
    
 RBC COMMENTS POLYCHROMASIA 1+ METABOLIC PANEL, BASIC Collection Time: 12/10/20 11:30 AM  
Result Value Ref Range Sodium 137 136 - 145 mmol/L Potassium 3.6 3.5 - 5.5 mmol/L Chloride 94 (L) 100 - 111 mmol/L  
 CO2 32 21 - 32 mmol/L Anion gap 11 3.0 - 18 mmol/L Glucose 299 (H) 74 - 99 mg/dL BUN 18 7.0 - 18 MG/DL Creatinine 5.68 (H) 0.6 - 1.3 MG/DL  
 BUN/Creatinine ratio 3 (L) 12 - 20 GFR est AA 12 (L) >60 ml/min/1.73m2 GFR est non-AA 10 (L) >60 ml/min/1.73m2 Calcium 8.6 8.5 - 10.1 MG/DL Radiologic Studies -  
XR FOOT LT MIN 3 V Final Result IMPRESSION:  
  
Desquamated soft tissue with soft tissue gas in the second and third toe. No  
radiographic evidence of osteolysis to indicate osteomyelitis, this can be  
better evaluated with MRI as clinically warranted. Prior fourth and fifth transmetatarsal amputations. Vascular calcifications. Medical Decision Making I am the first provider for this patient. I reviewed the vital signs, available nursing notes, past medical history, past surgical history, family history and social history. Vital Signs-Reviewed the patient's vital signs. Records Reviewed: Nursing Notes and Old Medical Records (Time of Review: 3:42 PM) Provider Notes (Medical Decision Making): DDx: Osteomyelitis, diabetes, diabetic foot infection possibly needing amputation We will check labs, get x-ray of foot Patient denies any foot pain currently MDM Medications  
vancomycin (VANCOCIN) 1750 mg in  ml infusion (has no administration in time range) ED Course: Progress Notes, Reevaluation, and Consults: WBC within normal limits Creatinine 5.6 in patient with end-stage renal disease No osteomyelitis on x-ray Patient has no foot pain Consult:  Discussed care with Dr. Constantine Kawasaki, Specialty: Podiatrist, standard discussion; including history of patients chief complaint, available diagnostic results, and treatment course. He agrees with giving patient 1 dose of vancomycin while in the ED here. He states that he is spoken with Dr. Stephanie Miles hospitalist and they have come up with a plan for the patient instead of admitting and him today. They want to start him on Levaquin outpatient, and he is going to set up an outpatient procedure to amputate the toes. He will call patient's wife to notify her and get things set up appropriately. Dr. Constantine Kawasaki knows patient does dialysis and he will plan to set up surgery before dialysis and then get his dialysis. 3:43 PM, 12/10/2020 I have discussed plan with patient and wife. She states that Dr. Constantine Kawasaki has called her and she is aware of the plan. They are in agreement. We will discharge patient after vancomycin is completed. Explained to patient and wife that if there is any worsening symptoms such as fever and chills to return to the emergency department immediately. They are in agreement. Diagnosis Clinical Impression:  
1. Gangrene of toe of left foot ([address] 2. Hyperglycemia Disposition: Discharge Follow-up Information Follow up With Specialties Details Why Contact Info Keep your appointment with Dr. Constantine Kawasaki for amputation of left foot toes, without fail.       
 Chava Figueroa MD Family Medicine Schedule an appointment as soon as possible for a visit in 1 week  [address] [facility] [address] [address] 
[phone] [facility] EMERGENCY DEPT Emergency Medicine  As needed, If symptoms worsen [address] [id number] 
[phone] Patient's Medications Start Taking No medications on file Continue Taking ACETAMINOPHEN (TYLENOL EXTRA STRENGTH) 500 MG TABLET    Take  by mouth every six (6) hours as needed for Pain. AMLODIPINE (NORVASC) 10 MG TABLET    TAKE 1 TABLET BY MOUTH DAILY. B COMPLEX-VITAMIN C-FOLIC ACID (NEPHROCAPS) 1 MG CAPSULE    Take 1 Cap by mouth daily. BRIMONIDINE-TIMOLOL (COMBIGAN) 0.2-0.5 % DROP OPHTHALMIC SOLUTION    Administer 1 Drop to both eyes three (3) times daily. BRINZOLAMIDE (AZOPT OP)    Apply  to eye. Indications: 3 times daily CALCIUM ACETATE (PHOSLO) 667 MG CAP    Take 1 Cap by mouth three (3) times daily (with meals). CLONIDINE (CATAPRES) 0.3 MG/24 HR    APPLY 1 PATCH TO SKIN ONCE EVERY 7 DAYS CYANOCOBALAMIN (VITAMIN B-12) 1,000 MCG TABLET    Take 1,000 mcg by mouth daily. CYCLOBENZAPRINE (FLEXERIL) 10 MG TABLET    Take 1 Tab by mouth three (3) times daily as needed for Muscle Spasm(s). DOCUSATE SODIUM (COLACE) 100 MG CAPSULE    Take 1 Cap by mouth two (2) times a day. FINASTERIDE (PROSCAR) 5 MG TABLET    TAKE 1 TABLET BY MOUTH EVERY DAY  
 FLUTICASONE PROPIONATE (FLONASE) 50 MCG/ACTUATION NASAL SPRAY    INSTILL 1 SPRAY IN BOTH NOSTRILS TWICE DAILY HYDRALAZINE (APRESOLINE) 50 MG TABLET    Take 1 Tab by mouth two (2) times a day. INSULIN LISPRO (HUMALOG) 100 UNIT/ML INJECTION    Blood Sugar (mg/dL): <150 =0 units; 150 -199 =2 units; 200 -249 =4 units; 250 -299 =6 units; 300 -349 =8 units; 350 and above =10 units. LOSARTAN (COZAAR) 100 MG TABLET    Take 50 mg by mouth two (2) times a day. NETARSUDIL (RHOPRESSA) 0.02 % DROP    Apply  to eye.   
 ROSUVASTATIN (CRESTOR) 20 MG TABLET    TAKE 1 TABLET BY MOUTH EVERY DAY  
 SOFT LENS RINSE,STORE SOLUTION (SHYLA SALINE SENSITIVE EYES)    by Does Not Apply route. These Medications have changed No medications on file Stop Taking No medications on file DO Jone Tyson medical dictation software was used for portions of this report. Unintended transcription errors may occur. My signature above authenticates this document and my orders, the final   
diagnosis (es), discharge prescription (s), and instructions in the Epic   
record.

## 2020-12-10 NOTE — ED TRIAGE NOTES
Dr. Espinal Like sent patient here for evaluation of his left 2nd and 3rd toe for possible amputation.

## 2020-12-11 NOTE — PROGRESS NOTES
.Date/Time:  12/11/2020 3:16 PM   Call within 2 business days of discharge: Yes Attempted to reach patient by telephone. Left HIPPA compliant message requesting a return call. Will attempt to reach patient again. Patient seen in [facility] ED 12/10/2020 Gangrene of toe of left foot . Patient/family members asking to have toes amputated. ACM reviewed EMR noted updated surgery for Monday 12/14/2020

## 2020-12-11 NOTE — ED NOTES
Wounds and toes dressed on left foot. Pt assisted to wheelchair and transported to ED waiting room. Pt and wife reports procedure will be on Monday and not tomorrow on Friday and declined rapid COVID nasal swab, will accept rapid COVID test prior to procedure on Monday.

## 2020-12-11 NOTE — DISCHARGE INSTRUCTIONS
If you were prescribed any medication take as directed. Do not drive or use heavy equipment if prescribed narcotics. Follow up with your primary care physician or with specialist as directed. Return to the emergency room with any new or worsening conditions.

## 2020-12-14 NOTE — OP NOTES
[facility] 
OPERATIVE REPORT Name:  Danae Zaldivar 
MR#:   [id number] :  1960 ACCOUNT #:  [de-identified] DATE OF SERVICE:  2020 PREOPERATIVE DIAGNOSES:  Gangrene and osteomyelitis of second and third toes, left foot. POSTOPERATIVE DIAGNOSES:  Gangrene and osteomyelitis of second and third toes, left foot. PROCEDURE PERFORMED:  Amputation, second and third toes, left foot. SURGEON:  Len Lebron DPM 
 
ASSISTANT:  kacy ANESTHESIA:  MAC. COMPLICATIONS:  0 
 
SPECIMENS REMOVED:  toes IMPLANTS:  none ESTIMATED BLOOD LOSS:  0 
 
PROCEDURE:  On 2020, the patient was placed on the operating room table in the supine position. After adequate induction of MAC anesthesia, left lower extremity was prepped and draped in the usual sterile fashion. Attention was directed to the second and third toes. They were grossly necrotic two-thirds of the toe with slough skin to the base of the toe and with purplish discoloration. Two semi-elliptical incisions were accomplished at the base of both toes, both plantar and dorsally, with good bleeding tissue noted. Toes were carefully sharply disarticulated. Wound edges were debrided. Small blood vessels were bovied as necessary. It was thoroughly irrigated with antibiotic solution and loosely approximated with Prolene and a Penrose drain. The patient tolerated the procedure and anesthesia well with vital signs stable throughout. He was transported to the recovery room in stable condition. Maxim Hyatt DPM 
 
 
PG/[email or web address] 
D:  2020 10:03 
T:  2020 12:52 JOB #:  [id number] CC:  Len Lebron DPM

## 2020-12-14 NOTE — BRIEF OP NOTE
Brief Postoperative Note Patient: Trista January YOB: 1960 MRN: [id number] Date of Procedure: 12/14/2020 Pre-Op Diagnosis: M86.172, B95.61, E11.51 Post-Op Diagnosis: Same as preoperative diagnosis. Procedure(s): 
AMPUTATION SECOND AND THIRD TOES AND METATARSALS LEFT FOOT Surgeon(s): 
Grady Fournier DPM 
 
Surgical Assistant: Surg Asst-1: Emogene Masker Anesthesia: MAC Estimated Blood Loss (mL): Minimal 
 
Complications: None Specimens:  
ID Type Source Tests Collected by Time Destination 1 : LEFT 2ND AND 3RD TOES AND METATARSALS Preservative Foot, left  [facility] 12/14/2020 0940 Pathology 2 : LEFT 2ND METATARSAL CLEAN MARGIN Preservative Foot, left  [facility] 12/14/2020 2579 Pathology 1 : LEFT 2ND AND 3RD TOES AND METATARSALS Wound Foot, left CULTURE, ANAEROBIC, CULTURE, WOUND [facility] 12/14/2020 4815 Microbiology 2 : LEFT 2ND METATARSAL CLEAN MARGIN Wound Foot, left CULTURE, ANAEROBIC, CULTURE, WOUND [facility] 12/14/2020 6983 Microbiology Implants: * No implants in log * Drains: * No LDAs found * Findings: gangrene,osteitis Electronically Signed by Catherine Egan DPM on 12/14/2020 at 9:58 AM

## 2020-12-14 NOTE — PERIOP NOTES
Patient's BP is 195/80 pre-op was 206/106, patient is going to outpatient dialysis center when he leaves here.

## 2020-12-14 NOTE — ANESTHESIA PREPROCEDURE EVALUATION
Relevant Problems No relevant active problems Anesthetic History No history of anesthetic complications Review of Systems / Medical History Patient summary reviewed and pertinent labs reviewed Pulmonary Neuro/Psych Comments: Neuropathy Cardiovascular Hypertension: well controlled PAD 
 
 
  
GI/Hepatic/Renal 
  
GERD Renal disease: ESRD and dialysis Endo/Other Diabetes: type 2, using insulin Obesity and anemia Other Findings Physical Exam 
 
Airway Mallampati: III 
TM Distance: 4 - 6 cm Neck ROM: normal range of motion Mouth opening: Normal 
 
 Cardiovascular Rhythm: regular Rate: normal 
 
 
 
 Dental 
 
Dentition: Poor dentition Pulmonary Comments: Non labored Abdominal 
GI exam deferred Other Findings Anesthetic Plan ASA: 3 Anesthesia type: MAC Anesthetic plan and risks discussed with: Spouse and Patient

## 2020-12-14 NOTE — DISCHARGE INSTRUCTIONS
DISCHARGE SUMMARY from Nurse  PATIENT INSTRUCTIONS:  After general anesthesia or intravenous sedation, for 24 hours or while taking prescription Narcotics:  · Limit your activities  · Do not drive and operate hazardous machinery  · Do not make important personal or business decisions  · Do  not drink alcoholic beverages  · If you have not urinated within 8 hours after discharge, please contact your surgeon on call. Report the following to your surgeon:  · Excessive pain, swelling, redness or odor of or around the surgical area  · Temperature over 100.5  · Nausea and vomiting lasting longer than 4 hours or if unable to take medications  · Any signs of decreased circulation or nerve impairment to extremity: change in color, persistent  numbness, tingling, coldness or increase pain  · Any questions    What to do at Home:  Recommended activity: Activity as tolerated and no driving for today. *  Please give a list of your current medications to your Primary Care Provider. *  Please update this list whenever your medications are discontinued, doses are      changed, or new medications (including over-the-counter products) are added. *  Please carry medication information at all times in case of emergency situations. These are general instructions for a healthy lifestyle:    No smoking/ No tobacco products/ Avoid exposure to second hand smoke  Surgeon General's Warning:  Quitting smoking now greatly reduces serious risk to your health. Obesity, smoking, and sedentary lifestyle greatly increases your risk for illness    A healthy diet, regular physical exercise & weight monitoring are important for maintaining a healthy lifestyle    You may be retaining fluid if you have a history of heart failure or if you experience any of the following symptoms:  Weight gain of 3 pounds or more overnight or 5 pounds in a week, increased swelling in our hands or feet or shortness of breath while lying flat in bed.   Please call your doctor as soon as you notice any of these symptoms; do not wait until your next office visit. The discharge information has been reviewed with the patient. The patient verbalized understanding. Discharge medications reviewed with the patient and appropriate educational materials and side effects teaching were provided. ___________________________________________________________________________________________________________________________________    Patient Education   Toe Amputation: What to Expect at [facility] [place] had amputation surgery to remove one or more of your toes. For most people, pain improves within a week after surgery. You may have stitches or sutures. The doctor will probably take these out about 10 days after the surgery. You may need to wear a cast or a special type of shoe for about 2 to 4 weeks. You may think you have feeling or pain where your toe had been. This is called phantom pain. It is common, and it may come and go for a year or longer. If you have this kind of pain, your doctor may prescribe medicine to treat it. This care sheet gives you a general idea about how long it will take for you to recover. But each person recovers at a different pace. Follow the steps below to get better as quickly as possible. How can you care for yourself at home? Activity    · Rest when you feel tired. Getting enough sleep will help you recover.     · Follow your doctor's instructions about how much weight you can put on your foot and when you can go back to your usual activities. If you were given crutches, use them as directed.     · Try to walk each day if you are able. Start by walking a little more than you did the day before. Bit by bit, increase the amount you walk. Walking boosts blood flow and helps prevent blood clots.     · You may notice some changes in your balance when you walk.  Your balance will improve over time.     · Prop up your foot and leg on a pillow when you ice it or anytime you sit or lie down during the next 3 days. Try to keep it above the level of your heart. This will help reduce swelling.     · Ask your doctor when you can drive again.     · You may shower, unless your doctor tells you not to. Keep the bandage dry. If the bandage has been removed, you can wash the area with warm water and soap. Pat the area dry.     · You will probably need to take about 4 weeks off from work or your normal routine. How much time you need to take off depends on the type of work you do and your overall health. Diet    · You can eat your normal diet. If your stomach is upset, try bland, low-fat foods like plain rice, broiled chicken, toast, and yogurt.     · You may notice that your bowel movements are not regular right after your surgery. This is common. Try to avoid constipation and straining with bowel movements. You may want to take a fiber supplement every day. If you have not had a bowel movement after a couple of days, ask your doctor about taking a mild laxative. Medicines    · Your doctor will tell you if and when you can restart your medicines. He or she will also give you instructions about taking any new medicines.     · If you take aspirin or some other blood thinner, ask your doctor if and when to start taking it again. Make sure that you understand exactly what your doctor wants you to do.     · Take pain medicines exactly as directed. ? If the doctor gave you a prescription medicine for pain, take it as prescribed. ? If you are not taking a prescription pain medicine, ask your doctor if you can take an over-the-counter medicine.     · If your doctor prescribed antibiotics, take them as directed. Do not stop taking them just because you feel better. You need to take the full course of antibiotics.     · If you think your pain medicine is making you sick to your stomach:  ? Take your medicine after meals (unless your doctor has told you not to). ?  Ask your doctor for a different pain medicine. Incision care    · Your doctor will probably remove the bandages after several days. Or your doctor may have you remove your bandages at home. Do not touch the surgery area. Keep it dry.     · Do not soak your foot until your doctor says it is okay. Follow-up care is a key part of your treatment and safety. Be sure to make and go to all appointments, and call your doctor if you are having problems. It's also a good idea to know your test results and keep a list of the medicines you take. When should you call for help? Call 911 anytime you think you may need emergency care. For example, call if:    · You passed out (lost consciousness).     · You have sudden chest pain, are short of breath, or you cough up blood. Call your doctor now or seek immediate medical care if:    · You have pain that does not get better after you take pain medicine.     · You are sick to your stomach or cannot drink fluids.     · You have loose stitches, or your incision comes open.     · You have signs of a blood clot in your leg (called a deep vein thrombosis), such as:  ? Pain in your calf, back of the knee, thigh, or groin. ? Redness or swelling in your leg.     · You have signs of infection, such as:  ? Increased pain, swelling, warmth, or redness. ? Red streaks leading from the incision. ? Pus draining from the incision. ? A fever.     · You bleed through your bandage. Watch closely for any changes in your health, and be sure to contact your doctor if you have any problems. Where can you learn more? Go to http://www.gray.com/  Enter X742 in the search box to learn more about \"Toe Amputation: What to Expect at Home. \"  Current as of: March 2, 2020               Content Version: 12.6  © 6567-2549 [employer]. Care instructions adapted under license by [employer] (which disclaims liability or warranty for this information).  If you have questions about a medical condition or this instruction, always ask your healthcare professional. [place] [address] any warranty or liability for your use of this information.

## 2020-12-14 NOTE — ANESTHESIA POSTPROCEDURE EVALUATION
Procedure(s): 
AMPUTATION SECOND AND THIRD TOES AND METATARSALS LEFT FOOT. MAC Anesthesia Post Evaluation Multimodal analgesia: multimodal analgesia used between 6 hours prior to anesthesia start to PACU discharge Patient location during evaluation: PACU Patient participation: complete - patient participated Level of consciousness: awake and alert Pain management: adequate Airway patency: patent Anesthetic complications: no 
Cardiovascular status: acceptable Respiratory status: acceptable Hydration status: acceptable Post anesthesia nausea and vomiting:  controlled INITIAL Post-op Vital signs:  
Vitals Value Taken Time /79 12/14/2020 10:08 AM  
Temp 37 °C (98.6 °F) 12/14/2020 10:01 AM  
Pulse 77 12/14/2020 10:15 AM  
Resp 17 12/14/2020 10:01 AM  
SpO2 99 % 12/14/2020 10:15 AM  
Vitals shown include unvalidated device data.

## 2020-12-14 NOTE — PERIOP NOTES
Patient's /90, patient going to dialysis immediately upon discharge. Reviews discharge instructions with patient, however patient is blind and unable to sign AVS on computer. Patient understands all instructions, no further questions, written instructions sent home with patient for wife's review.

## 2020-12-14 NOTE — H&P
Update History & Physical 
 
The Patient's History and Physical of December 2020,  
surgery was reviewed with the patient and I examined the patient. There was no change. The surgical site was confirmed by the patient and me. Plan:  The risk, benefits, expected outcome, and alternative to the recommended procedure have been discussed with the patient. Patient understands and wants to proceed with the procedure.  
 
Electronically signed by Rand Díaz DPM on 12/14/2020 at 7:19 AM

## 2020-12-16 NOTE — PROGRESS NOTES
.Date/Time:  12/16/2020 2:52 PM   Call within 2 business days of discharge: Yes Attempted to reach patient by telephone. Left HIPPA compliant message requesting a return call. 2nd out reach attempt. Episode closed.

## 2020-12-24 NOTE — PROGRESS NOTES
HISTORY OF PRESENT ILLNESS Zoila Wall is a 61 y.o. male. Hypertension Pertinent negatives include no chest pain, no abdominal pain, no headaches and no shortness of breath. Follow-up Pertinent negatives include no chest pain, no abdominal pain, no headaches and no shortness of breath. Patient presents for a follow-up office visit. He was initially referred here for long-standing poorly controlled hypertension. The patient also has a history of chronic kidney disease, now end-stage recently started on hemodialysis in March 2018, long-standing poorly controlled diabetes, dyslipidemia, and intermittent compliance with his medications. He underwent an echocardiogram in October 2016 which showed moderate to marked concentric LVH with preserved LV systolic function, EF 93-83% without any significant valvular heart disease. He was hospitalized at [facility] in March 2017 following a syncopal episode. He was diagnosed with significant autonomic dysfunction on a tilt table test where his systolic blood pressure dropped from 200-97 while in the upright 70° position and administered 1 spray of sublingual nitroglycerin. His heart rate did increase appropriately from 75 to 110 bpm.  Because of the significant drop in blood pressure, his blood pressure medications were significantly decreased as were some of his diuretics. The patient underwent a cardiac catheterization in April 2019 and part of a renal transplant work-up. He was found to have no significant obstructive coronary disease, preserved LV function, and a mildly elevated LVEDP of 20 mmHg. The patient was last seen in our office a little over a year ago. Since last visit he states he has been feeling relatively well. He did not tolerate the higher dose of hydralazine due to increased fatigue and episodic low blood pressure, so has been maintained on hydralazine 50 mg twice daily which she is tolerating much better. He states he is able to finish hemodialysis without low blood pressure readings. He recently underwent amputations of his left second and third toes due to nonhealing diabetic foot ulcers. He still complains of intermittent orthostatic episodes which are more prominent after his dialysis days. He denies any luke syncope or near syncope. Past Medical History:  
Diagnosis Date  Blindness of one eye   
 left  Cardiac echocardiogram 10/21/2016 EF 60-65%. No WMA. Mod-marked LVH. Normal diastolic fx. No significant valvular heart disease.  Cardiac treadmill stress test, low risk 11/02/2012 Negative maximal exercise treadmill test.  Ex time 7 min 45 sec.  Cardiovascular LE peripheral arterial testing 03/22/2016 No significant peripheral arterial disease at rest bilaterally. ABIs deferred due to calcified vessels.  Cardiovascular LLE venous duplex 06/06/2012 Left leg:  No DVT.  Cardiovascular renal duplex 10/21/2016 No significant renal artery stenosis.  Chronic kidney disease   
 [id number]  [id number] [place]  CKD (chronic kidney disease), stage V ([address]  Diabetes mellitus ([address] 3/12/2010  Diabetic retinopathy ([address] 5/4/2010  Edema of both legs  ESRD on dialysis ([address]  Eye examination 05/04/2010 OU: 20/20; OD: 20/25; OS: 20/25 without correction.  GERD (gastroesophageal reflux disease)  Glaucoma 08/17/2017  
 Cori Motta  
 HLD (hyperlipidemia) 3/12/2010  
 HTN (hypertension) 3/12/2010  Orthostatic hypertension Current Outpatient Medications Medication Sig Dispense Refill  hydrALAZINE (APRESOLINE) 50 mg tablet Take 1 Tab by mouth two (2) times a day. 180 Tab 3  
 finasteride (PROSCAR) 5 mg tablet TAKE 1 TABLET BY MOUTH EVERY DAY 90 Tab 0  cloNIDine (CATAPRES) 0.3 mg/24 hr APPLY 1 PATCH TO SKIN ONCE EVERY 7 DAYS 12 Patch 2  
 fluticasone propionate (FLONASE) 50 mcg/actuation nasal spray INSTILL 1 SPRAY IN BOTH NOSTRILS TWICE DAILY 1 Bottle 2  
 rosuvastatin (CRESTOR) 20 mg tablet TAKE 1 TABLET BY MOUTH EVERY DAY 90 Tab 2  cyclobenzaprine (FLEXERIL) 10 mg tablet Take 1 Tab by mouth three (3) times daily as needed for Muscle Spasm(s). 30 Tab 0  
 brinzolamide (AZOPT OP) Apply  to eye. Indications: 3 times daily  amLODIPine (NORVASC) 10 mg tablet TAKE 1 TABLET BY MOUTH DAILY. 30 Tab 6  
 netarsudil (RHOPRESSA) 0.02 % drop Apply  to eye.  brimonidine-timolol (COMBIGAN) 0.2-0.5 % drop ophthalmic solution Administer 1 Drop to both eyes three (3) times daily.  soft lens rinse,store solution (SHYLA SALINE SENSITIVE EYES) by Does Not Apply route.  losartan (COZAAR) 100 mg tablet Take 50 mg by mouth two (2) times a day.  b complex-vitamin c-folic acid (NEPHROCAPS) 1 mg capsule Take 1 Cap by mouth daily. 30 Cap 6  calcium acetate (PHOSLO) 667 mg cap Take 1 Cap by mouth three (3) times daily (with meals). 90 Cap 2  
 insulin lispro (HUMALOG) 100 unit/mL injection Blood Sugar (mg/dL): <150 =0 units; 150 -199 =2 units; 200 -249 =4 units; 250 -299 =6 units; 300 -349 =8 units; 350 and above =10 units. 1 Vial 2  
 acetaminophen (TYLENOL EXTRA STRENGTH) 500 mg tablet Take  by mouth every six (6) hours as needed for Pain.  docusate sodium (COLACE) 100 mg capsule Take 1 Cap by mouth two (2) times a day. 60 Cap 0  
 cyanocobalamin (VITAMIN B-12) 1,000 mcg tablet Take 1,000 mcg by mouth daily. Allergies Allergen Reactions  Bactrim [Sulfamethoprim Ds] Nausea and Vomiting  Coreg [Carvedilol] Nausea and Vomiting Difficulty walking Social History Tobacco Use  Smoking status: Never Smoker  Smokeless tobacco: Never Used Substance Use Topics  Alcohol use: No  
 Drug use: No  
 
 
 
Review of Systems Constitutional: Negative for chills, fever, malaise/fatigue and weight loss. HENT: Negative for nosebleeds. Eyes: Negative for blurred vision and double vision. Respiratory: Negative for cough, shortness of breath and wheezing. Cardiovascular: Negative for chest pain, palpitations, orthopnea, claudication, leg swelling and PND. Gastrointestinal: Negative for abdominal pain, heartburn, nausea and vomiting. Genitourinary: Negative for dysuria and hematuria. Musculoskeletal: Negative for falls and myalgias. Skin: Negative for rash. Neurological: Positive for dizziness. Negative for focal weakness and headaches. Endo/Heme/Allergies: Does not bruise/bleed easily. Psychiatric/Behavioral: Negative for substance abuse. Visit Vitals BP (!) 160/84 (BP 1 Location: Left arm, BP Patient Position: Sitting) Pulse 79 Ht 5' 6\" (1.676 m) Wt 79.6 kg (175 lb 6.4 oz) SpO2 97% BMI 28.31 kg/m² Physical Exam  
Constitutional: He is oriented to person, place, and time. He appears well-developed and well-nourished. HENT:  
Head: Normocephalic and atraumatic. Eyes: Conjunctivae are normal.  
Neck: Neck supple. No JVD present. Carotid bruit is present ( Left). Cardiovascular: Normal rate, regular rhythm, S1 normal, S2 normal and normal pulses. Exam reveals distant heart sounds. Exam reveals no gallop and no S3. No murmur heard. Pulmonary/Chest: Breath sounds normal. He has no wheezes. He has no rales. Abdominal: Soft. Bowel sounds are normal. There is no abdominal tenderness. Musculoskeletal:     
   General: Deformity ( Left foot bandaged in a walking boot) present. No tenderness or edema. Neurological: He is alert and oriented to person, place, and time. Skin: Skin is warm and dry. Psychiatric: He has a normal mood and affect. His behavior is normal. Thought content normal.  
 
EKG: Normal sinus rhythm, normal axis, normal QTc interval, borderline voltage criteria for LVH, nonspecific T wave abnormality. No change compared to the previous tracing. ASSESSMENT and PLAN Hypertensive cardiovascular disease. Patient last underwent an echocardiogram in July 2018 which showed preserved LV systolic function, EF 12% with moderate concentric LVH, mild mitral regurgitation normal PA pressures. Patient blood pressure remains reasonably well-controlled on his current medical regimen. He is currently taking hydralazine 50 mg twice daily. He did not tolerate the higher dose due to dizziness, fatigue and I suspect worsening orthostatic hypotension. I would continue his current blood pressure medications. Dyslipidemia. Patient is managed with Crestor. This is followed by his PCP. Diabetes mellitus, type II, insulin dependent. Historically this has been poorly controlled. End-stage renal disease. Patient was started on hemodialysis in March 2018. No contraindication from a cardiac standpoint to pursue a renal transplant. He underwent a cardiac catheterization in April 2019 which showed no significant CAD. Nonhealing diabetic foot ulcerations. Status post recent amputations to his second and third left toes. Follow-up in 12 months, sooner if needed.

## 2020-12-24 NOTE — PROGRESS NOTES
Narcisa Murillo presents today for Chief Complaint Patient presents with  Follow-up  
  overdue year Narcisa Lidia preferred language for health care discussion is english/other. Is someone accompanying this pt? yes Is the patient using any DME equipment during OV? cane Depression Screening: 
3 most recent PHQ Screens 12/24/2020 Little interest or pleasure in doing things Not at all Feeling down, depressed, irritable, or hopeless Not at all Total Score PHQ 2 0 Trouble falling or staying asleep, or sleeping too much - Feeling tired or having little energy - Poor appetite, weight loss, or overeating - Feeling bad about yourself - or that you are a failure or have let yourself or your family down - Trouble concentrating on things such as school, work, reading, or watching TV - Moving or speaking so slowly that other people could have noticed; or the opposite being so fidgety that others notice - Thoughts of being better off dead, or hurting yourself in some way -  
PHQ 9 Score - How difficult have these problems made it for you to do your work, take care of your home and get along with others - Learning Assessment: 
Learning Assessment 12/5/2019 PRIMARY LEARNER Patient PRIMARY LANGUAGE ENGLISH  
LEARNER PREFERENCE PRIMARY LISTENING  
  -  
  -  
ANSWERED BY patient RELATIONSHIP SELF Abuse Screening: 
Abuse Screening Questionnaire 12/24/2020 Do you ever feel afraid of your partner? González Addison Are you in a relationship with someone who physically or mentally threatens you? González Addison Is it safe for you to go home? Desirae Palomares Fall Risk Fall Risk Assessment, last 12 mths 12/24/2020 Able to walk? Yes Fall in past 12 months? 0 Do you feel unsteady? 0 Are you worried about falling 0 Pt currently taking Anticoagulant therapy? no 
 
Coordination of Care: 1. Have you been to the ER, urgent care clinic since your last visit? Hospitalized since your last visit? no 
 
2. Have you seen or consulted any other health care providers outside of the [address] [place] [address] since your last visit? Include any pap smears or colon screening.  no

## 2021-01-01 ENCOUNTER — OFFICE VISIT (OUTPATIENT)
Dept: FAMILY MEDICINE CLINIC | Age: 61
End: 2021-01-01
Payer: MEDICARE

## 2021-01-01 VITALS
OXYGEN SATURATION: 97 % | HEART RATE: 76 BPM | SYSTOLIC BLOOD PRESSURE: 156 MMHG | WEIGHT: 174.6 LBS | HEIGHT: 66 IN | RESPIRATION RATE: 18 BRPM | BODY MASS INDEX: 28.06 KG/M2 | DIASTOLIC BLOOD PRESSURE: 82 MMHG | TEMPERATURE: 98.2 F

## 2021-01-01 DIAGNOSIS — E11.8 TYPE 2 DIABETES MELLITUS WITH COMPLICATION, WITH LONG-TERM CURRENT USE OF INSULIN (HCC): ICD-10-CM

## 2021-01-01 DIAGNOSIS — L97.528 DIABETIC ULCER OF OTHER PART OF LEFT FOOT ASSOCIATED WITH DIABETES MELLITUS DUE TO UNDERLYING CONDITION, WITH OTHER ULCER SEVERITY (HCC): ICD-10-CM

## 2021-01-01 DIAGNOSIS — Z89.432 STATUS POST AMPUTATION OF LEFT FOOT (HCC): ICD-10-CM

## 2021-01-01 DIAGNOSIS — E78.00 HYPERCHOLESTEREMIA: ICD-10-CM

## 2021-01-01 DIAGNOSIS — N25.81 SECONDARY HYPERPARATHYROIDISM OF RENAL ORIGIN (HCC): ICD-10-CM

## 2021-01-01 DIAGNOSIS — E11.51 TYPE 2 DIABETES MELLITUS WITH DIABETIC PERIPHERAL ANGIOPATHY WITHOUT GANGRENE, WITH LONG-TERM CURRENT USE OF INSULIN (HCC): ICD-10-CM

## 2021-01-01 DIAGNOSIS — Z49.01: ICD-10-CM

## 2021-01-01 DIAGNOSIS — Z23 ENCOUNTER FOR IMMUNIZATION: ICD-10-CM

## 2021-01-01 DIAGNOSIS — Z79.4 TYPE 2 DIABETES MELLITUS WITH COMPLICATION, WITH LONG-TERM CURRENT USE OF INSULIN (HCC): ICD-10-CM

## 2021-01-01 DIAGNOSIS — E11.3511 TYPE 2 DIABETES MELLITUS WITH RIGHT EYE AFFECTED BY PROLIFERATIVE RETINOPATHY AND MACULAR EDEMA, WITH LONG-TERM CURRENT USE OF INSULIN (HCC): ICD-10-CM

## 2021-01-01 DIAGNOSIS — Z79.4 TYPE 2 DIABETES MELLITUS WITH RIGHT EYE AFFECTED BY PROLIFERATIVE RETINOPATHY AND MACULAR EDEMA, WITH LONG-TERM CURRENT USE OF INSULIN (HCC): ICD-10-CM

## 2021-01-01 DIAGNOSIS — I10 HTN (HYPERTENSION), MALIGNANT: Primary | ICD-10-CM

## 2021-01-01 DIAGNOSIS — Z23 NEEDS FLU SHOT: ICD-10-CM

## 2021-01-01 DIAGNOSIS — Z79.4 TYPE 2 DIABETES MELLITUS WITH DIABETIC PERIPHERAL ANGIOPATHY WITHOUT GANGRENE, WITH LONG-TERM CURRENT USE OF INSULIN (HCC): ICD-10-CM

## 2021-01-01 DIAGNOSIS — E08.621 DIABETIC ULCER OF OTHER PART OF LEFT FOOT ASSOCIATED WITH DIABETES MELLITUS DUE TO UNDERLYING CONDITION, WITH OTHER ULCER SEVERITY (HCC): ICD-10-CM

## 2021-01-01 PROCEDURE — G0008 ADMIN INFLUENZA VIRUS VAC: HCPCS | Performed by: FAMILY MEDICINE

## 2021-01-01 PROCEDURE — 90686 IIV4 VACC NO PRSV 0.5 ML IM: CPT | Performed by: FAMILY MEDICINE

## 2021-01-01 PROCEDURE — G8432 DEP SCR NOT DOC, RNG: HCPCS | Performed by: FAMILY MEDICINE

## 2021-01-01 PROCEDURE — G9231 DOC ESRD DIA TRANS PREG: HCPCS | Performed by: FAMILY MEDICINE

## 2021-01-01 PROCEDURE — 3046F HEMOGLOBIN A1C LEVEL >9.0%: CPT | Performed by: FAMILY MEDICINE

## 2021-01-01 PROCEDURE — G8427 DOCREV CUR MEDS BY ELIG CLIN: HCPCS | Performed by: FAMILY MEDICINE

## 2021-01-01 PROCEDURE — 99214 OFFICE O/P EST MOD 30 MIN: CPT | Performed by: FAMILY MEDICINE

## 2021-01-01 PROCEDURE — 90670 PCV13 VACCINE IM: CPT | Performed by: FAMILY MEDICINE

## 2021-01-01 PROCEDURE — 3017F COLORECTAL CA SCREEN DOC REV: CPT | Performed by: FAMILY MEDICINE

## 2021-01-01 PROCEDURE — 2022F DILAT RTA XM EVC RTNOPTHY: CPT | Performed by: FAMILY MEDICINE

## 2021-01-01 PROCEDURE — G0463 HOSPITAL OUTPT CLINIC VISIT: HCPCS | Performed by: FAMILY MEDICINE

## 2021-01-01 PROCEDURE — G8419 CALC BMI OUT NRM PARAM NOF/U: HCPCS | Performed by: FAMILY MEDICINE

## 2021-01-01 RX ORDER — FINASTERIDE 5 MG/1
TABLET, FILM COATED ORAL
Qty: 30 TAB | Refills: 2 | Status: SHIPPED | OUTPATIENT
Start: 2021-01-01

## 2021-01-18 PROBLEM — N25.81 SECONDARY HYPERPARATHYROIDISM OF RENAL ORIGIN (HCC): Status: ACTIVE | Noted: 2021-01-01

## 2021-01-18 PROBLEM — Z79.4 TYPE 2 DIABETES MELLITUS WITH DIABETIC PERIPHERAL ANGIOPATHY WITHOUT GANGRENE, WITH LONG-TERM CURRENT USE OF INSULIN (HCC): Status: ACTIVE | Noted: 2021-01-01

## 2021-01-18 PROBLEM — E11.51 TYPE 2 DIABETES MELLITUS WITH DIABETIC PERIPHERAL ANGIOPATHY WITHOUT GANGRENE, WITH LONG-TERM CURRENT USE OF INSULIN (HCC): Status: ACTIVE | Noted: 2021-01-01

## 2021-01-18 PROBLEM — Z79.4 TYPE 2 DIABETES MELLITUS WITH RIGHT EYE AFFECTED BY PROLIFERATIVE RETINOPATHY AND MACULAR EDEMA, WITH LONG-TERM CURRENT USE OF INSULIN (HCC): Status: ACTIVE | Noted: 2021-01-01

## 2021-01-18 PROBLEM — E11.3511 TYPE 2 DIABETES MELLITUS WITH RIGHT EYE AFFECTED BY PROLIFERATIVE RETINOPATHY AND MACULAR EDEMA, WITH LONG-TERM CURRENT USE OF INSULIN (HCC): Status: ACTIVE | Noted: 2021-01-01

## 2021-01-18 NOTE — PATIENT INSTRUCTIONS
Home Blood Pressure Test: About This Test 
What is it? A home blood pressure test allows you to keep track of your blood pressure at home. Blood pressure is a measure of the force of blood against the walls of your arteries. Blood pressure readings include two numbers, such as 130/80 (say \"130 over 80\"). The first number is the systolic pressure. The second number is the diastolic pressure. Why is this test done? You may do this test at home to: · Find out if you have high blood pressure. · Track your blood pressure if you have high blood pressure. · Track how well medicine is working to reduce high blood pressure. · Check how lifestyle changes, such as weight loss and exercise, are affecting blood pressure. How do you prepare for the test? 
For at least 30 minutes before you take your blood pressure, don't exercise or use caffeine, tobacco, or medicines that raise blood pressure. Take your blood pressure while you feel comfortable and relaxed. Sit quietly with both feet on the floor for at least 5 minutes before the test. 
How is the test done? · Sit with your arm slightly bent and resting on a table so that your upper arm is at the same level as your heart. · Roll up your sleeve or take off your shirt to expose your upper arm. · Wrap the blood pressure cuff around your upper arm so that the lower edge of the cuff is about 1 inch above the bend of your elbow. Proceed with the following steps depending on if you are using an automatic or manual pressure monitor. Automatic blood pressure monitors · Press the on/off button on the automatic monitor and wait until the ready-to-measure \"heart\" symbol appears next to zero in the display window. · Press the start button. The cuff will inflate and deflate by itself. · Your blood pressure numbers will appear on the screen. · Write your numbers in your log book, along with the date and time. Manual blood pressure monitors · Place the earpieces of a stethoscope in your ears, and place the bell of the stethoscope over the artery, just below the cuff. · Close the valve on the rubber inflating bulb. · Squeeze the bulb rapidly with your opposite hand to inflate the cuff until the dial or column of mercury reads about 30 mm Hg higher than your usual systolic pressure. If you do not know your usual pressure, inflate the cuff to 210 mm Hg or until the pulse at your wrist disappears. · Open the pressure valve just slightly by twisting or pressing the valve on the bulb. · As you watch the pressure slowly fall, note the level on the dial at which you first start to hear a pulsing or tapping sound through the stethoscope. This is your systolic blood pressure. · Continue letting the air out slowly. The sounds will become muffled and will finally disappear. Note the pressure when the sounds completely disappear. This is your diastolic blood pressure. Let out all the remaining air. · Write your numbers in your log book, along with the date and time. Follow-up care is a key part of your treatment and safety. Be sure to make and go to all appointments, and call your doctor if you are having problems. It's also a good idea to keep a list of the medicines you take. Where can you learn more? Go to http://www.sahu.com/ Enter C427 in the search box to learn more about \"Home Blood Pressure Test: About This Test.\" Current as of: December 16, 2019               Content Version: 12.6 © 2859-2184 [employer]. Care instructions adapted under license by [employer] (which disclaims liability or warranty for this information). If you have questions about a medical condition or this instruction, always ask your healthcare professional. [place] [address] any warranty or liability for your use of this information.

## 2021-01-18 NOTE — PROGRESS NOTES
HPI: 
Hilary Ny is a 61 y.o. male who presents today with Chief Complaint Patient presents with  Hypertension  Cholesterol Problem F/U BP was 154 /80 at home; also gets some readings in the 110s-130s/ 80s States his hydralazine was decreased per pt at his last visit with cardiology Pt gets orthostatic at dialysis Overall BP is better. States BPs generally increases at MD offices and at dialysis; Usually gets better over time. Chol: on crestor  Has had recent blood work that the endocrinologist did 3 weeks ago; He has Diabetes and is on Dialysis: diabetes complications include nephropathy and retinopathy and peripheral angiopathy. He has had recent toe amputation. This is followed by Dr. Dez Vanegas. Pt is on Dialysis; he has had a dialysis catheter placed. Pt desires a flu shot and pneumonia 13 vaccine. 3 most recent PHQ Screens 12/24/2020 Little interest or pleasure in doing things Not at all Feeling down, depressed, irritable, or hopeless Not at all Total Score PHQ 2 0 Trouble falling or staying asleep, or sleeping too much - Feeling tired or having little energy - Poor appetite, weight loss, or overeating - Feeling bad about yourself - or that you are a failure or have let yourself or your family down - Trouble concentrating on things such as school, work, reading, or watching TV - Moving or speaking so slowly that other people could have noticed; or the opposite being so fidgety that others notice - Thoughts of being better off dead, or hurting yourself in some way -  
PHQ 9 Score - How difficult have these problems made it for you to do your work, take care of your home and get along with others -  
 
 
 
 
 
 
PMH,  Meds, Allergies, Family History, Social history reviewed Current Outpatient Medications Medication Sig Dispense Refill  finasteride (PROSCAR) 5 mg tablet TAKE 1 TABLET BY MOUTH EVERY DAY 30 Tab 2  
  hydrALAZINE (APRESOLINE) 50 mg tablet Take 1 Tab by mouth two (2) times a day. 180 Tab 3  cloNIDine (CATAPRES) 0.3 mg/24 hr APPLY 1 PATCH TO SKIN ONCE EVERY 7 DAYS 12 Patch 2  
 fluticasone propionate (FLONASE) 50 mcg/actuation nasal spray INSTILL 1 SPRAY IN BOTH NOSTRILS TWICE DAILY 1 Bottle 2  
 rosuvastatin (CRESTOR) 20 mg tablet TAKE 1 TABLET BY MOUTH EVERY DAY (Patient taking differently: Take 10 mg by mouth daily.) 90 Tab 2  cyclobenzaprine (FLEXERIL) 10 mg tablet Take 1 Tab by mouth three (3) times daily as needed for Muscle Spasm(s). 30 Tab 0  
 brinzolamide (AZOPT OP) Apply  to eye. Indications: 3 times daily  amLODIPine (NORVASC) 10 mg tablet TAKE 1 TABLET BY MOUTH DAILY. 30 Tab 6  
 netarsudil (RHOPRESSA) 0.02 % drop Apply  to eye.  brimonidine-timolol (COMBIGAN) 0.2-0.5 % drop ophthalmic solution Administer 1 Drop to both eyes three (3) times daily.  soft lens rinse,store solution (SHYLA SALINE SENSITIVE EYES) by Does Not Apply route.  losartan (COZAAR) 100 mg tablet Take 50 mg by mouth two (2) times a day.  b complex-vitamin c-folic acid (NEPHROCAPS) 1 mg capsule Take 1 Cap by mouth daily. 30 Cap 6  calcium acetate (PHOSLO) 667 mg cap Take 1 Cap by mouth three (3) times daily (with meals). 90 Cap 2  
 insulin lispro (HUMALOG) 100 unit/mL injection Blood Sugar (mg/dL): <150 =0 units; 150 -199 =2 units; 200 -249 =4 units; 250 -299 =6 units; 300 -349 =8 units; 350 and above =10 units. 1 Vial 2  
 acetaminophen (TYLENOL EXTRA STRENGTH) 500 mg tablet Take  by mouth every six (6) hours as needed for Pain.  docusate sodium (COLACE) 100 mg capsule Take 1 Cap by mouth two (2) times a day. 60 Cap 0  
 cyanocobalamin (VITAMIN B-12) 1,000 mcg tablet Take 1,000 mcg by mouth daily. Allergies Allergen Reactions  Bactrim [Sulfamethoprim Ds] Nausea and Vomiting  Coreg [Carvedilol] Nausea and Vomiting Difficulty walking Review of Systems Constitutional: Negative for chills and fever. Respiratory: Negative for shortness of breath and wheezing. Cardiovascular: Negative for chest pain and palpitations. Visit Vitals BP (!) 156/82 Pulse 76 Temp 98.2 °F (36.8 °C) (Temporal) Resp 18 Ht 5' 6\" (1.676 m) Wt 174 lb 9.6 oz (79.2 kg) SpO2 97% BMI 28.18 kg/m² Physical Exam  
Visit Vitals BP (!) 156/82 Pulse 76 Temp 98.2 °F (36.8 °C) (Temporal) Resp 18 Ht 5' 6\" (1.676 m) Wt 174 lb 9.6 oz (79.2 kg) SpO2 97% BMI 28.18 kg/m² General appearance: alert, cooperative, no distress, appears stated age Neck: supple, symmetrical, trachea midline, no adenopathy, thyroid: not enlarged, symmetric, no tenderness/mass/nodules, no carotid bruit and no JVD Lungs: clear to auscultation bilaterally Heart: regular rate and rhythm, S1, S2 normal, no murmur, click, rub or gallop Extremities: extremities normal, atraumatic, no cyanosis or edema Lab Results Component Value Date/Time Cholesterol, total 207 (H) 09/28/2020 09:34 AM  
 HDL Cholesterol 63 (H) 09/28/2020 09:34 AM  
 LDL, calculated 128.2 (H) 09/28/2020 09:34 AM  
 VLDL, calculated 15.8 09/28/2020 09:34 AM  
 Triglyceride 79 09/28/2020 09:34 AM  
 CHOL/HDL Ratio 3.3 09/28/2020 09:34 AM  
 
Lab Results Component Value Date/Time Sodium 137 12/10/2020 11:30 AM  
 Potassium 3.6 12/10/2020 11:30 AM  
 Chloride 94 (L) 12/10/2020 11:30 AM  
 CO2 32 12/10/2020 11:30 AM  
 Anion gap 11 12/10/2020 11:30 AM  
 Glucose 299 (H) 12/10/2020 11:30 AM  
 BUN 18 12/10/2020 11:30 AM  
 Creatinine 5.68 (H) 12/10/2020 11:30 AM  
 BUN/Creatinine ratio 3 (L) 12/10/2020 11:30 AM  
 GFR est AA 12 (L) 12/10/2020 11:30 AM  
 GFR est non-AA 10 (L) 12/10/2020 11:30 AM  
 Calcium 8.6 12/10/2020 11:30 AM  
 
Lab Results Component Value Date/Time  Hemoglobin A1c 7.7 (H) 09/28/2020 09:34 AM  
 Hemoglobin A1c (POC) 9.6 04/09/2019 02:10 PM  
 Hemoglobin A1c, External 8.3 08/25/2016 Assessment/Plan: 
 
Diagnoses and all orders for this visit: 1. HTN (hypertension), malignant 2. Hypercholesteremia 3. Encounter for immunization 
-     PNEUMOCOCCAL CONJ VACCINE 13 VALENT IM 4. Status post amputation of left foot ([facility] [address] Assessment & Plan: This condition is managed by Specialist. 
 
 
5. Diabetic ulcer of other part of left foot associated with diabetes mellitus due to underlying condition, with other ulcer severity ([address] Assessment & Plan: This condition is managed by Specialist. 
 
 
6. Encounter for fitting and adjustment of extracorporeal dialysis catheter ([facility] [address] 7. Type 2 diabetes mellitus with complication, with long-term current use of insulin ([facility] [address] Assessment & Plan: This condition is managed by Specialist. 
 
 
8. Secondary hyperparathyroidism of renal origin ([facility] [address] Assessment & Plan: This condition is managed by Specialist. 
 
 
9. Type 2 diabetes mellitus with diabetic peripheral angiopathy without gangrene, with long-term current use of insulin ([facility] [address] Assessment & Plan: This condition is managed by Specialist.Dr Timur Lofton 10. 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 
 
 
11. Needs flu shot 
-     INFLUENZA VIRUS VAC QUAD,SPLIT,PRESV FREE SYRINGE IM As above, BP is slightly elevated here but apparently very stable at home. above all stable unless otherwise noted 
 treatment plan as listed below Orders Placed This Encounter  Pneumococcal conjugate 13 valent, IM (PREVNAR-13)  Influenza Virus Vaccine QUAD, PF Syr 6 Months + (Flulaval, Fluzone, Fluarix [id number]) Follow-up and Dispositions · Return in about 4 months (around 5/18/2021) for htn, Chol. This has been fully explained to the patient, who indicates understanding. An After Visit Summary was printed and given to the patient. Follow-up and Dispositions · Return in about 4 months (around 5/18/2021) for htn, Chol.  
  
  
 
 
Benjamin Choi MD

## 2021-01-18 NOTE — PROGRESS NOTES
Caitlin Matthewsfast presents today for Chief Complaint Patient presents with  Hypertension  Cholesterol Problem F/U Is someone accompanying this pt? YES, Wife Is the patient using any DME equipment during OV? Yes [facility] Best Depression Screening: 
3 most recent PHQ Screens 12/24/2020 Little interest or pleasure in doing things Not at all Feeling down, depressed, irritable, or hopeless Not at all Total Score PHQ 2 0 Trouble falling or staying asleep, or sleeping too much - Feeling tired or having little energy - Poor appetite, weight loss, or overeating - Feeling bad about yourself - or that you are a failure or have let yourself or your family down - Trouble concentrating on things such as school, work, reading, or watching TV - Moving or speaking so slowly that other people could have noticed; or the opposite being so fidgety that others notice - Thoughts of being better off dead, or hurting yourself in some way -  
PHQ 9 Score - How difficult have these problems made it for you to do your work, take care of your home and get along with others - Learning Assessment: 
Learning Assessment 12/5/2019 PRIMARY LEARNER Patient PRIMARY LANGUAGE ENGLISH  
LEARNER PREFERENCE PRIMARY LISTENING  
  -  
  -  
ANSWERED BY patient RELATIONSHIP SELF Abuse Screening: 
Abuse Screening Questionnaire 12/24/2020 Do you ever feel afraid of your partner? Kay Sill Are you in a relationship with someone who physically or mentally threatens you? Kay Sill Is it safe for you to go home? Nirav Vasquez Fall Risk Fall Risk Assessment, last 12 mths 12/24/2020 Able to walk? Yes Fall in past 12 months? 0 Do you feel unsteady? 0 Are you worried about falling 0 ADL No flowsheet data found. Health Maintenance reviewed and discussed and ordered per Provider. Health Maintenance Due Topic Date Due  Shingrix Vaccine Age 50> (1 of 2) 10/14/2010  Pneumococcal 0-64 years (2 of 3 - PCV13) 04/05/2017  MICROALBUMIN Q1  03/21/2018 Mehran Salcedo Coordination of Care: 1. Have you been to the ER, urgent care clinic since your last visit? NO Hospitalized since your last visit? NO 
 
2. Have you seen or consulted any other health care providers outside of the [address] [place] [address] since your last visit? Include any pap smears or colon screening. TYLER Rosales is a 61 y.o. male who presents for routine immunizations. He denies any symptoms , reactions or allergies that would exclude them from being immunized today. Risks and adverse reactions were discussed and the VIS was given to them. All questions were addressed. He was observed for 15 min post injection. There were no reactions observed. Verbal order for PCV 13 and FLU IM received per Chelsy Hercules MD for pt Joel Rosales. Order written down and read back to provider for verification prior to completion.

## 2021-09-29 DIAGNOSIS — E08.621 DIABETIC ULCER OF OTHER PART OF LEFT FOOT ASSOCIATED WITH DIABETES MELLITUS DUE TO UNDERLYING CONDITION, WITH OTHER ULCER SEVERITY (HCC): ICD-10-CM

## 2021-09-29 DIAGNOSIS — E11.51 TYPE 2 DIABETES MELLITUS WITH DIABETIC PERIPHERAL ANGIOPATHY WITHOUT GANGRENE, WITH LONG-TERM CURRENT USE OF INSULIN (HCC): Primary | ICD-10-CM

## 2021-09-29 DIAGNOSIS — L97.528 DIABETIC ULCER OF OTHER PART OF LEFT FOOT ASSOCIATED WITH DIABETES MELLITUS DUE TO UNDERLYING CONDITION, WITH OTHER ULCER SEVERITY (HCC): ICD-10-CM

## 2021-09-29 DIAGNOSIS — Z79.4 TYPE 2 DIABETES MELLITUS WITH DIABETIC PERIPHERAL ANGIOPATHY WITHOUT GANGRENE, WITH LONG-TERM CURRENT USE OF INSULIN (HCC): Primary | ICD-10-CM

## 2021-11-03 NOTE — LETTER
NOTIFICATION RETURN TO WORK / SCHOOL 
 
9/28/2020 9:28 AM 
 
Mr. Beth Juarez [address] [facility] [address] To Whom It May Concern: 
 
[facility] the [facility] Please be advised that Mr. Mcnulty Even is in need of assistance of his wife to be present with him during dialysis given his history blindness. If there are questions or concerns please have the patient contact our office. Sincerely, Vandana Maza MD 
 
                                
 

Detail Level: Simple
Additional Notes: Patient consent was obtained to proceed with the visit and recommended plan of care after discussion of all risks and benefits, including the risks of COVID-19 exposure.

## 2022-04-18 NOTE — PROGRESS NOTES
1. Have you been to the ER, urgent care clinic since your last visit? Hospitalized since your last visit? Yes Where: ER [facility] for back pain    2. Have you seen or consulted any other health care providers outside of the [address] [place] [address] since your last visit? Include any pap smears or colon screening.  No We will need to do x-ray of her neck and then CT scan.  She can do x-ray even today if possible or tomorrow morning.

## 2022-05-22 NOTE — ED NOTES
Pt resting on stretcher with wife at bedside and no acute distress noted. HTN noted. Other vital signs stable. Will continue to monitor pt and call report. normal...

## 2022-06-01 NOTE — PROGRESS NOTES
PT DAILY TREATMENT NOTE 10-18    Patient Name: Nancy Terrell  Date:2019  : 1960  [x]  Patient  Verified  Payor: [place] MEDICARE / Plan: [place] MEDICARE PART A & B / Product Type: Medicare /    In time: 7:55  Out time: 8:32  Total Treatment Time (min): 37  Visit #: 8 of -    Medicare/BCBS Only   Total Timed Codes (min):  37 1:1 Treatment Time:  32       Treatment Area: Other symptoms and signs involving the musculoskeletal system [R29.898]  Unspecified lack of coordination [R27.9]  Unsteadiness on feet [R26.81]    SUBJECTIVE  Pain Level (0-10 scale): 2/10  Any medication changes, allergies to medications, adverse drug reactions, diagnosis change, or new procedure performed?: [x] No    [] Yes (see summary sheet for update)  Subjective functional status/changes:   [x] No changes reported  No medical changes. He rates pain a 2/10 and neuropathy an 8/10. He also reports feeling sort of drunk today but it is probably from the medication he took last night. He typically does not take it when he has therapy in the morning. OBJECTIVE    22 min Therapeutic Exercise:  [x] See flow sheet :   Rationale: increase strength to improve the patients ability to improve ease of prolonged ambulation and ADLs    15 min Neuromuscular Re-education:  []  See flow sheet : standing balance in // and step taps with decreased UE use   Rationale: improve coordination, improve balance and increase proprioception  to improve the patients ability to improve standing tolerance and ambulate with ease.         With   [] TE   [] TA   [] neuro   [] other: Patient Education: [x] Review HEP    [] Progressed/Changed HEP based on:   [] positioning   [] body mechanics   [] transfers   [] heat/ice application    [] other:      Other Objective/Functional Measures:   BP: 152/87  HR: 73 bpm    Decreased balance today on both compliant and non-compliant surfaces, LOB to R>L, pt reports it is normally L>R  Progressed to 2# ankle weights with tap ups on 4 inch step       Pain Level (0-10 scale) post:  2/10    ASSESSMENT/Changes in Function:   Decreased balance noted today likely due to pt's reports of felling \"drunk\" due to the medication he took last night that he does not normally take when he has therapy in the mornings. Pt tolerated taps ups with 2# weights well. Will continue to progress as able to address strength deficits. Progress towards goals / Updated goals:  Short Term Goals: To be accomplished in 1 weeks:  1. Pt will be compliant with initial HEP in order to optimize therapy outcomes. Goal met. 7/9/19  Long Term Goals: To be accomplished in 8 weeks:  1. Pt will improve FOTO score by 9 points in order to demonstrate functional improvement. 2. Pt will maintain Romberg stance on compliant surface for 10 seconds in order to demonstrate improved stability in uneven environments. Met 30 seconds (7/22/19)  3. Pt will be able to maintain left SLS for 1 second without UE support in order to improve stability with stance phase of gait. Not met.   Continues to require UE support 7/12/19 1 UE support; able to perform MSR without LOB (7/22/19)     PLAN  [x]  Upgrade activities as tolerated     [x]  Continue plan of care  [x]  Update interventions per flow sheet       []  Discharge due to:_  []  Other:_      CARLOS Vargas 7/30/2019  8:32 AM    Future Appointments   Date Time Provider Alyce Brown   7/30/2019  8:00 AM Jayson Zapata [facility]   8/1/2019  1:00 PM Crystal Zavala PT [facility]   8/6/2019  9:00 AM Rose Byrd, PTA [facility]   8/8/2019  9:30 AM Crystal Zavala, PT [facility]   8/13/2019  9:30 AM Rose Byrd PTA [facility]   8/15/2019  9:30 AM Crystal Zavala PT [facility]   8/15/2019 12:40 PM Reinaldo Cadena MD [facility]   8/20/2019  9:30 AM Rose Byrd, PTA [facility]   8/22/2019 10:30 AM Crystal Zavala, PT [facility]   10/11/2019 11:20 AM Sandra Arredondo MD [facility] [address] Cardiac Monitor/Defib/ACLS/Rescue Kit/O2/BVM/IV pump/Blood Products

## 2022-06-25 NOTE — MR AVS SNAPSHOT
Visit Information Date & Time Provider Department Dept. Phone Encounter #  
 11/2/2017 11:45 AM Sameera Birmingham  [facility] [id number] Your Appointments 1/10/2018  8:20 AM  
Office Visit with Annamarie Shepherd MD  
[place] [address] [facility]) Appt Note: fu on bp/chol [address] [place] [address] [address]  
[phone]  
  
   
 [address], [facility]  
  
    
 1/11/2018  9:00 AM  
Follow Up with Yordan Garvey NP [facility] ([facility]) Appt Note: [facility] [address]  
[phone] [address] [facility] [address] 4/13/2018  9:00 AM  
Follow Up with Eufemia Shelton MD  
[facility] ([facility]) Appt Note: 6 month [facility] [address]  
[phone] [address] [facility] [address] Upcoming Health Maintenance Date Due HEMOGLOBIN A1C Q6M 1/26/2018* LIPID PANEL Q1 2/28/2018 FOOT EXAM Q1 3/21/2018 MICROALBUMIN Q1 3/21/2018 EYE EXAM RETINAL OR DILATED Q1 5/8/2018 COLONOSCOPY 10/9/2023 DTaP/Tdap/Td series (2 - Td) 3/21/2027 *Topic was postponed. The date shown is not the original due date. Allergies as of 11/2/2017  Review Complete On: 11/2/2017 By: Sameera Birmingham NP Severity Noted Reaction Type Reactions Bactrim [Sulfamethoprim Ds]  06/06/2014   Side Effect Nausea and Vomiting Current Immunizations  Reviewed on 11/2/2017 Name Date Influenza Vaccine (Quad) PF 10/10/2017 Reviewed by Sameera Birmingham NP on 11/2/2017 at 12:30 PM  
You Were Diagnosed With   
  
 Codes Comments Preop examination    -  Primary ICD-10-CM: F02.133 ICD-9-CM: V72.84 Vitals BP Pulse Temp Resp Height(growth percentile) Weight(growth percentile) (!) 187/100 (BP 1 Location: Left arm, BP Patient Position: Sitting) 77 98.5 °F (36.9 °C) (Oral) 16 5' 6\" (1.676 m) 202 lb (91.6 kg) SpO2 BMI Smoking Status 98% 32.6 kg/m2 Never Smoker BMI and BSA Data Body Mass Index Body Surface Area  
 32.6 kg/m 2 2.07 m 2 Preferred Pharmacy Pharmacy Name Phone [facility], [address] [place] [address] [phone] Your Updated Medication List  
  
   
This list is accurate as of: 11/2/17 12:46 PM.  Always use your most recent med list. amLODIPine 10 mg tablet Commonly known as:  Yordy Presser Take 1 Tab by mouth daily. Blood-Glucose Meter monitoring kit  
by Does Not Apply route. One touch ultra2  
  
 bumetanide 2 mg tablet Commonly known as:  Corinn Kiang Take 0.5 Tabs by mouth two (2) times a day. calcitRIOL 0.25 mcg capsule Commonly known as:  ROCALTROL  
TAKE ONE CAPSULE BY MOUTH EVERY MONDAY, WEDNESDAY, AND FRIDAY  
  
 cloNIDine 0.3 mg/24 hr  
Commonly known as:  CATAPRES  
APPLY 1 PATCH TO SKIN ONCE EVERY 7 DAYS  
  
 COMBIGAN 0.2-0.5 % Drop ophthalmic solution Generic drug:  brimonidine-timolol INSTIL 1 DROP IN [facility] EYE 3 TIMES DAILY docusate sodium 100 mg capsule Commonly known as:  Ghulam Pata Take 1 Cap by mouth two (2) times a day. dorzolamide 2 % ophthalmic solution Commonly known as:  TRUSOPT INSTIL 1 DROP IN [facility] EYE 3 TIMES DAILY  
  
 finasteride 5 mg tablet Commonly known as:  PROSCAR Take 1 Tab by mouth daily. fluticasone 50 mcg/actuation nasal spray Commonly known as:  FLONASE  
1 [place] by Both Nostrils route two (2) times a day. glucose blood VI test strips strip Commonly known as:  ASCENSIA AUTODISC VI, ONE TOUCH ULTRA TEST VI Test twice daily:  Fasting before breakfast and 2 hours after dinner (log readings), One touch ultra 2 test strips please; Dx: 250.02  
  
 hydrALAZINE 50 mg tablet Commonly known as:  APRESOLINE Take 1.5 Tabs by mouth three (3) times daily. Insulin Needles (Disposable) 31 gauge x 5/16\" Ndle Use as directed with Levemir Insulin Pen. Iron 325 mg (65 mg iron) tablet Generic drug:  ferrous sulfate Take  by mouth Daily (before breakfast). Take 1 tablet by mouth once a week as per patient.  
  
 ketoconazole 2 % topical cream  
Commonly known as:  NIZORAL Apply  to affected area daily. Lancets Misc Commonly known as: One Touch [place] Maker Test twice daily: Once in the morning fasting, then two hours after dinner (log results) OTHER PGIIL/P CRM - Apply 1 -2 grams ( 1-2 pumps) to left foot 3 - 4 times daily. rosuvastatin 20 mg tablet Commonly known as:  CRESTOR Take 1 Tab by mouth nightly. [facility]. Generic drug:  sub-q insulin device, 40 unit  
by Does Not Apply route. VITAMIN B-12 1,000 mcg tablet Generic drug:  cyanocobalamin Take 1,000 mcg by mouth daily. To-Do List   
 11/02/2017 Lab:  METABOLIC PANEL, COMPREHENSIVE Introducing [facility] HE[facility]! [facility] introduces [email or web address] patient portal. Now you can access parts of your medical record, email your doctor's office, and request medication refills online. 1. In your internet browser, go to [email or web address] 2. Click on the First Time User? Click Here link in the Sign In box. You will see the New Member Sign Up page. 3. Enter your [email or web address] Access Code exactly as it appears below. You will not need to use this code after youve completed the sign-up process. If you do not sign up before the expiration date, you must request a new code. · [email or web address] Access Code: [email or web address] Expires: 1/8/2018 11:21 AM 
 
4.  Enter the last four digits of your Social Security Number (xxxx) and Date of Birth (mm/dd/yyyy) as indicated and click Submit. You will be taken to the next sign-up page. 5. Create a [employer] ID. This will be your [employer] login ID and cannot be changed, so think of one that is secure and easy to remember. 6. Create a [employer] password. You can change your password at any time. 7. Enter your Password Reset Question and Answer. This can be used at a later time if you forget your password. 8. Enter your e-mail address. You will receive e-mail notification when new information is available in [address] 9. Click Sign Up. You can now view and download portions of your medical record. 10. Click the Download Summary menu link to download a portable copy of your medical information. If you have questions, please visit the Frequently Asked Questions section of the [employer] website. Remember, [employer] is NOT to be used for urgent needs. For medical emergencies, dial 911. Now available from your iPhone and Android! Please provide this summary of care documentation to your next provider. Your primary care clinician is listed as [address]. If you have any questions after today's visit, please call [phone]. No

## 2023-01-14 NOTE — TELEPHONE ENCOUNTER
Patient wife came in to request a referral to [facility] in [address] [place] [address] for his upcoming surgery for a kidney transplant on June 26, 2019. Please advise no

## (undated) DEVICE — CANNULA ORIG TL CLR W FOAM CUSHIONS AND 14FT SUPL TB 3 CHN

## (undated) DEVICE — KENDALL SCD EXPRESS SLEEVES, KNEE LENGTH, MEDIUM: Brand: KENDALL SCD

## (undated) DEVICE — CURITY NON-ADHERENT STRIPS: Brand: CURITY

## (undated) DEVICE — MEDI-VAC SUCTION HIGH CAPACITY: Brand: CARDINAL HEALTH

## (undated) DEVICE — SYR 50ML SLIP TIP NSAF LF STRL --

## (undated) DEVICE — SURGICAL PROCEDURE PACK EYE 25 GA CUST

## (undated) DEVICE — SYRINGE MED 25GA 3ML L5/8IN SUBQ PLAS W/ DETACH NDL SFTY

## (undated) DEVICE — ANGIO-SEAL VIP VASCULAR CLOSURE DEVICE: Brand: ANGIO-SEAL

## (undated) DEVICE — PACK PROCEDURE SURG MAJ W/ BASIN LF

## (undated) DEVICE — SUTURE GOR TX SZ 5-0 L30IN NONABSORBABLE L12MM TTC-12 3/8 6M10A

## (undated) DEVICE — REVOLUTION DSP 25G ILM FORCEPS: Brand: ALCON GRIESHABER REVOLUTION

## (undated) DEVICE — (D)GLOVE EXAM LG NITRL NS -- DISC BY MFR NO SUB

## (undated) DEVICE — STERILE LATEX POWDER-FREE SURGICAL GLOVESWITH NITRILE COATING: Brand: PROTEXIS

## (undated) DEVICE — Device

## (undated) DEVICE — NEEDLE HYPO 30GA L0.5IN BGE POLYPR HUB S STL REG BVL STR

## (undated) DEVICE — STERILE POLYISOPRENE POWDER-FREE SURGICAL GLOVES: Brand: PROTEXIS

## (undated) DEVICE — OPHTHALMIC KNIFE 15°: Brand: ALCON

## (undated) DEVICE — LENS VITRCTMY FLAT DISP

## (undated) DEVICE — GAUZE,SPONGE,4"X4",16PLY,STRL,LF,10/TRAY: Brand: MEDLINE

## (undated) DEVICE — FLEX ADVANTAGE 3000CC: Brand: FLEX ADVANTAGE

## (undated) DEVICE — SNARE POLYP M W27MMXL240CM OVL STIFF DISP CAPTIVATOR

## (undated) DEVICE — GAUZE SPONGES,16 PLY: Brand: CURITY

## (undated) DEVICE — CANNULA ASPIR 25 GAX32 MM RETINAL LUER LCK HUB

## (undated) DEVICE — SUTURE PERMA-HAND SZ 3-0 L24IN NONABSORBABLE BLK W/O NDL SA74H

## (undated) DEVICE — KIT CLN UP BON SECOURS MARYV

## (undated) DEVICE — SURGICAL PROCEDURE PACK VITRECTOMY EYE CUST

## (undated) DEVICE — INTENDED FOR TISSUE SEPARATION, AND OTHER PROCEDURES THAT REQUIRE A SHARP SURGICAL BLADE TO PUNCTURE OR CUT.: Brand: BARD-PARKER SAFETY BLADES SIZE 11, STERILE

## (undated) DEVICE — 3 ML SYRINGE WITH HYPODERMIC SAFETY NEEDLE: Brand: MAGELLAN

## (undated) DEVICE — NDL PRT INJ NSAF BLNT 18GX1.5 --

## (undated) DEVICE — FLUFF AND POLYMER UNDERPAD,EXTRA HEAVY: Brand: WINGS

## (undated) DEVICE — SOLUTION IRRIG 1000ML H2O STRL BLT

## (undated) DEVICE — GLOVE SURG SZ 7.5 L11.73IN FNGR THK9.8MIL STRW LTX POLYMER

## (undated) DEVICE — INTRODUCER SHTH 6FR CANN L11CM DIL TIP 35MM GRN TUNGSTEN

## (undated) DEVICE — REM POLYHESIVE ADULT PATIENT RETURN ELECTRODE: Brand: VALLEYLAB

## (undated) DEVICE — MEDI-VAC NON-CONDUCTIVE SUCTION TUBING: Brand: CARDINAL HEALTH

## (undated) DEVICE — SYRINGE MED 20ML STD CLR PLAS LUERLOCK TIP N CTRL DISP

## (undated) DEVICE — PRESSURE MONITORING SET: Brand: TRUWAVE

## (undated) DEVICE — MICROSURGICAL INSTRUMENT 25GA SOFT TIP NEEDLE: Brand: ALCON

## (undated) DEVICE — DRAPE TWL SURG 16X26IN BLU ORB04] ALLCARE INC]

## (undated) DEVICE — DERMABOND SKIN ADH 0.7ML -- DERMABOND ADVANCED 12/BX

## (undated) DEVICE — CATHETER DIAG AD L100CM DIA6FR STD JUDKINS L 4 POLYUR COR

## (undated) DEVICE — CATHETER ANGIO JR4 STD 0.038 IN 6 FRX100 CM SUPER TORQUE +

## (undated) DEVICE — PROCEDURE KIT FLUID MGMT 10 FR CUST MAINFOLD

## (undated) DEVICE — SUPER VIEW® STERILE LENS PACK FOR USE WITH THE SUPER VIEW® SYSTEM AND THE BIOM®: Brand: SUPER VIEW® DISPOSABLE LENS SET

## (undated) DEVICE — PREP SKN CHLRAPRP APL 26ML STR --

## (undated) DEVICE — MEDI-VAC NON-CONDUCTIVE SUCTION TUBING 6MM X 6.1M (20 FT.) L: Brand: CARDINAL HEALTH

## (undated) DEVICE — (D)SYR 10ML 1/5ML GRAD NSAF -- PKGING CHANGE USE ITEM 338027

## (undated) DEVICE — ENDOSCOPY PUMP TUBING/ CAP SET: Brand: ERBE

## (undated) DEVICE — NEEDLE HYPO 25GA L1.5IN BVL ORIENTED ECLIPSE

## (undated) DEVICE — SUTURE MCRYL SZ 4 0 L18IN ABSRB VLT PS 1 L24MM 3 8 CIR REV Y682H

## (undated) DEVICE — GLOVE SURG SZ 7 L11.33IN FNGR THK9.8MIL STRW LTX POLYMER

## (undated) DEVICE — SYRINGE TB 1ML NDL 25GA L0.625IN PLAS SLIP TIP CONVENTIONAL

## (undated) DEVICE — DRAPE,EXTREMITY,89X128,STERILE: Brand: MEDLINE

## (undated) DEVICE — 3M™ STERI-DRAPE™ INCISE DRAPE 1040 (35CM X 35CM): Brand: STERI-DRAPE™

## (undated) DEVICE — BIPOLAR FORCEPS CORD,BANANA LEADS: Brand: VALLEYLAB

## (undated) DEVICE — SUTURE VCRL SZ 3-0 L27IN ABSRB UD L26MM SH 1/2 CIR J416H

## (undated) DEVICE — SOLUTION IV 1000ML 0.9% SOD CHL

## (undated) DEVICE — X-RAY CASSETTE COVER: Brand: CONVERTORS

## (undated) DEVICE — SYR 10ML CTRL LR LCK NSAF LF --

## (undated) DEVICE — CATHETER SUCT TR FL TIP 14FR W/ O CTRL

## (undated) DEVICE — FORCEPS BX L240CM JAW DIA2.8MM L CAP W/ NDL MIC MESH TOOTH

## (undated) DEVICE — DRAPE,UNDERBUTTOCKS,PCH,STERILE: Brand: MEDLINE

## (undated) DEVICE — LIGHT HANDLE: Brand: DEVON

## (undated) DEVICE — THREE-QUARTER SHEET: Brand: CONVERTORS

## (undated) DEVICE — SUT PROL 6-0 30IN C1 DA BLU --

## (undated) DEVICE — SYR 10ML LUER LOK 1/5ML GRAD --

## (undated) DEVICE — CANNULA ANTR CHMBR OPHTH WASHOUT 19GA

## (undated) DEVICE — INTENDED FOR TISSUE SEPARATION, AND OTHER PROCEDURES THAT REQUIRE A SHARP SURGICAL BLADE TO PUNCTURE OR CUT.: Brand: BARD-PARKER SAFETY BLADES SIZE 10, STERILE

## (undated) DEVICE — AMC/3 ARTERIAL NEEDLE–19GA X 1.5” (3.8CM): Brand: ARTERIAL NEEDLE

## (undated) DEVICE — REVOLUTION DSP 25G CURVED SCISSORS: Brand: ALCON GRIESHABER REVOLUTION

## (undated) DEVICE — AIRLIFE™ NASAL OXYGEN CANNULA CURVED, NONFLARED TIP WITH 14 FOOT (4.3 M) CRUSH-RESISTANT TUBING, OVER-THE-EAR STYLE: Brand: AIRLIFE™

## (undated) DEVICE — SUTURE COAT VCRL PC 5 PRECIS COSM CONVENTIONAL CUT PRIM 3 8 J823H

## (undated) DEVICE — APPLIER CLP L9.375IN APER 2.1MM CLS L3.8MM 20 SM TI CLP

## (undated) DEVICE — ADATO SIL-OL 5000 SYRINGE: Brand: ADATO SIL-OL-5000

## (undated) DEVICE — CELLULOSE EYE SPEAR: Brand: ULTRACELL

## (undated) DEVICE — FILTER NEEDLE: Brand: MONOJECT

## (undated) DEVICE — PACK PROCEDURE SURG VASC CATH 161 MMC LF

## (undated) DEVICE — VISCOUS FLUID CONTROL PAK: Brand: CONSTELLATION

## (undated) DEVICE — (D)PREP SKN CHLRAPRP APPL 26ML -- CONVERT TO ITEM 371833

## (undated) DEVICE — BANDAGE,GAUZE,BULKEE LITE,3"X4.1YD,STRL: Brand: MEDLINE

## (undated) DEVICE — BANDAGE COBAN 4 IN COMPR W4INXL5YD FOAM COHESIVE QUIK STK SELF ADH SFT

## (undated) DEVICE — SUTURE VCRL SZ 2-0 L27IN ABSRB UD L26MM SH 1/2 CIR J417H

## (undated) DEVICE — 3M™ TEGADERM™ HP TRANSPARENT FILM DRESSING FRAME STYLE, 9534HP, 2-3/8 X 2-3/4 IN (6 CM X 7 CM), 100/CT 4CT/CASE: Brand: 3M™ TEGADERM™

## (undated) DEVICE — ERASER HEMSTAT BPLR 45D 18G -- WET-FIELD

## (undated) DEVICE — SOLUTION IRRIGATION BAL SALT SOLUTION 500 ML BTL 6/CA BSS +

## (undated) DEVICE — GOWN ISOL IMPERV UNIV, DISP, OPEN BACK, BLUE --

## (undated) DEVICE — SYR 3ML LL TIP 1/10ML GRAD --

## (undated) DEVICE — NEEDLE HYPO 22GA L1.5IN BLK POLYPR HUB S STL REG BVL STR